# Patient Record
Sex: MALE | Race: WHITE | Employment: OTHER | URBAN - METROPOLITAN AREA
[De-identification: names, ages, dates, MRNs, and addresses within clinical notes are randomized per-mention and may not be internally consistent; named-entity substitution may affect disease eponyms.]

---

## 2018-09-05 ENCOUNTER — HOSPITAL ENCOUNTER (OUTPATIENT)
Dept: LAB | Age: 68
Discharge: HOME | End: 2018-09-05
Payer: MEDICARE

## 2018-09-05 DIAGNOSIS — I35.0 AORTIC VALVE STENOSIS, ETIOLOGY OF CARDIAC VALVE DISEASE UNSPECIFIED: ICD-10-CM

## 2018-09-05 DIAGNOSIS — R07.2 PRECORDIAL PAIN: ICD-10-CM

## 2018-09-05 DIAGNOSIS — R06.09 DYSPNEA ON EXERTION: ICD-10-CM

## 2018-09-05 DIAGNOSIS — I10 ESSENTIAL HYPERTENSION: ICD-10-CM

## 2018-09-05 LAB
ALBUMIN SERPL-MCNC: 3.7 G/DL (ref 3.2–4.6)
ALBUMIN/GLOB SERPL: 0.8 {RATIO} (ref 1.2–3.5)
ALP SERPL-CCNC: 53 U/L (ref 50–136)
ALT SERPL-CCNC: 34 U/L (ref 12–65)
ANION GAP SERPL CALC-SCNC: 11 MMOL/L (ref 7–16)
AST SERPL-CCNC: 26 U/L (ref 15–37)
BASOPHILS # BLD: 0 K/UL (ref 0–0.2)
BASOPHILS NFR BLD: 1 % (ref 0–2)
BILIRUB SERPL-MCNC: 0.6 MG/DL (ref 0.2–1.1)
BUN SERPL-MCNC: 19 MG/DL (ref 8–23)
CALCIUM SERPL-MCNC: 9 MG/DL (ref 8.3–10.4)
CHLORIDE SERPL-SCNC: 103 MMOL/L (ref 98–107)
CHOLEST SERPL-MCNC: 256 MG/DL
CO2 SERPL-SCNC: 25 MMOL/L (ref 21–32)
CREAT SERPL-MCNC: 0.97 MG/DL (ref 0.8–1.5)
DIFFERENTIAL METHOD BLD: NORMAL
EOSINOPHIL # BLD: 0.2 K/UL (ref 0–0.8)
EOSINOPHIL NFR BLD: 3 % (ref 0.5–7.8)
ERYTHROCYTE [DISTWIDTH] IN BLOOD BY AUTOMATED COUNT: 12.5 %
GLOBULIN SER CALC-MCNC: 4.6 G/DL (ref 2.3–3.5)
GLUCOSE SERPL-MCNC: 100 MG/DL (ref 65–100)
HCT VFR BLD AUTO: 46.6 % (ref 41.1–50.3)
HDLC SERPL-MCNC: 43 MG/DL (ref 40–60)
HDLC SERPL: 6 {RATIO}
HGB BLD-MCNC: 15 G/DL (ref 13.6–17.2)
IMM GRANULOCYTES # BLD: 0 K/UL (ref 0–0.5)
IMM GRANULOCYTES NFR BLD AUTO: 1 % (ref 0–5)
LDLC SERPL CALC-MCNC: 151.8 MG/DL
LIPID PROFILE,FLP: ABNORMAL
LYMPHOCYTES # BLD: 2 K/UL (ref 0.5–4.6)
LYMPHOCYTES NFR BLD: 31 % (ref 13–44)
MCH RBC QN AUTO: 29.1 PG (ref 26.1–32.9)
MCHC RBC AUTO-ENTMCNC: 32.2 G/DL (ref 31.4–35)
MCV RBC AUTO: 90.5 FL (ref 79.6–97.8)
MONOCYTES # BLD: 0.6 K/UL (ref 0.1–1.3)
MONOCYTES NFR BLD: 9 % (ref 4–12)
NEUTS SEG # BLD: 3.6 K/UL (ref 1.7–8.2)
NEUTS SEG NFR BLD: 57 % (ref 43–78)
NRBC # BLD: 0 K/UL (ref 0–0.2)
PLATELET # BLD AUTO: 186 K/UL (ref 150–450)
PMV BLD AUTO: 10.4 FL (ref 9.4–12.3)
POTASSIUM SERPL-SCNC: 4.5 MMOL/L (ref 3.5–5.1)
PROT SERPL-MCNC: 8.3 G/DL (ref 6.3–8.2)
RBC # BLD AUTO: 5.15 M/UL (ref 4.23–5.6)
SODIUM SERPL-SCNC: 139 MMOL/L (ref 136–145)
TRIGL SERPL-MCNC: 306 MG/DL (ref 35–150)
TSH SERPL DL<=0.005 MIU/L-ACNC: 2.21 UIU/ML (ref 0.36–3.74)
VLDLC SERPL CALC-MCNC: 61.2 MG/DL (ref 6–23)
WBC # BLD AUTO: 6.4 K/UL (ref 4.3–11.1)

## 2018-09-05 PROCEDURE — 85025 COMPLETE CBC W/AUTO DIFF WBC: CPT

## 2018-09-05 PROCEDURE — 84443 ASSAY THYROID STIM HORMONE: CPT

## 2018-09-05 PROCEDURE — 80053 COMPREHEN METABOLIC PANEL: CPT

## 2018-09-05 PROCEDURE — 36415 COLL VENOUS BLD VENIPUNCTURE: CPT

## 2018-09-05 PROCEDURE — 80061 LIPID PANEL: CPT

## 2018-09-06 RX ORDER — CHOLECALCIFEROL (VITAMIN D3) 125 MCG
220 CAPSULE ORAL AS NEEDED
COMMUNITY
End: 2021-07-27

## 2018-09-06 NOTE — PROGRESS NOTES
Patient pre-assessment complete for [facility] poss with Dr Tina Ortega scheduled for 18 at 9:30am, arrival time 7:30am. Patient verified using . Patient instructed to bring all home medications in labeled bottles on the day of procedure. NPO status reinforced. Patient informed to take a full dose aspirin 325mg  or 81 mg x 4 on the day of procedure. Instructed they can take all other medications excluding vitamins & supplements. Patient verbalizes understanding of all instructions & denies any questions at this time.

## 2018-09-07 ENCOUNTER — HOSPITAL ENCOUNTER (OUTPATIENT)
Dept: CARDIAC CATH/INVASIVE PROCEDURES | Age: 68
Discharge: HOME | End: 2018-09-07
Attending: INTERNAL MEDICINE | Admitting: INTERNAL MEDICINE
Payer: MEDICARE

## 2018-09-07 VITALS
HEIGHT: 71 IN | OXYGEN SATURATION: 95 % | DIASTOLIC BLOOD PRESSURE: 82 MMHG | BODY MASS INDEX: 31.08 KG/M2 | WEIGHT: 222 LBS | RESPIRATION RATE: 16 BRPM | SYSTOLIC BLOOD PRESSURE: 156 MMHG | HEART RATE: 66 BPM

## 2018-09-07 PROCEDURE — 77030019569 HC BND COMPR RAD TERU -B

## 2018-09-07 PROCEDURE — C1769 GUIDE WIRE: HCPCS

## 2018-09-07 PROCEDURE — 93454 CORONARY ARTERY ANGIO S&I: CPT

## 2018-09-07 PROCEDURE — 74011636320 HC RX REV CODE- 636/320: Performed by: INTERNAL MEDICINE

## 2018-09-07 PROCEDURE — 74011250636 HC RX REV CODE- 250/636

## 2018-09-07 PROCEDURE — 77030015766

## 2018-09-07 PROCEDURE — 74011250636 HC RX REV CODE- 250/636: Performed by: INTERNAL MEDICINE

## 2018-09-07 PROCEDURE — 74011000258 HC RX REV CODE- 258: Performed by: INTERNAL MEDICINE

## 2018-09-07 PROCEDURE — 74011250637 HC RX REV CODE- 250/637: Performed by: INTERNAL MEDICINE

## 2018-09-07 PROCEDURE — C1893 INTRO/SHEATH, FIXED,NON-PEEL: HCPCS

## 2018-09-07 PROCEDURE — 74011000250 HC RX REV CODE- 250: Performed by: INTERNAL MEDICINE

## 2018-09-07 PROCEDURE — 99152 MOD SED SAME PHYS/QHP 5/>YRS: CPT

## 2018-09-07 RX ORDER — MIDAZOLAM HYDROCHLORIDE 1 MG/ML
.5-5 INJECTION, SOLUTION INTRAMUSCULAR; INTRAVENOUS
Status: DISCONTINUED | OUTPATIENT
Start: 2018-09-07 | End: 2018-09-07 | Stop reason: HOSPADM

## 2018-09-07 RX ORDER — DIAZEPAM 5 MG/1
5 TABLET ORAL ONCE
Status: DISCONTINUED | OUTPATIENT
Start: 2018-09-07 | End: 2018-09-07 | Stop reason: HOSPADM

## 2018-09-07 RX ORDER — GUAIFENESIN 100 MG/5ML
81-324 LIQUID (ML) ORAL
Status: COMPLETED | OUTPATIENT
Start: 2018-09-07 | End: 2018-09-07

## 2018-09-07 RX ORDER — HEPARIN SODIUM 200 [USP'U]/100ML
3 INJECTION, SOLUTION INTRAVENOUS CONTINUOUS
Status: DISCONTINUED | OUTPATIENT
Start: 2018-09-07 | End: 2018-09-07 | Stop reason: HOSPADM

## 2018-09-07 RX ORDER — ASPIRIN 325 MG
650 TABLET ORAL ONCE
COMMUNITY
End: 2018-10-01

## 2018-09-07 RX ORDER — LIDOCAINE HYDROCHLORIDE 10 MG/ML
5-40 INJECTION INFILTRATION; PERINEURAL
Status: DISCONTINUED | OUTPATIENT
Start: 2018-09-07 | End: 2018-09-07 | Stop reason: HOSPADM

## 2018-09-07 RX ORDER — SODIUM CHLORIDE 9 MG/ML
75 INJECTION, SOLUTION INTRAVENOUS CONTINUOUS
Status: DISCONTINUED | OUTPATIENT
Start: 2018-09-07 | End: 2018-09-07 | Stop reason: HOSPADM

## 2018-09-07 RX ADMIN — IOPAMIDOL 50 ML: 755 INJECTION, SOLUTION INTRAVENOUS at 09:47

## 2018-09-07 RX ADMIN — HEPARIN SODIUM 2 ML: 10000 INJECTION INTRAVENOUS; SUBCUTANEOUS at 09:35

## 2018-09-07 RX ADMIN — LIDOCAINE HYDROCHLORIDE 3 ML: 10 INJECTION, SOLUTION INFILTRATION; PERINEURAL at 09:33

## 2018-09-07 RX ADMIN — SODIUM CHLORIDE 75 ML/HR: 900 INJECTION, SOLUTION INTRAVENOUS at 08:01

## 2018-09-07 RX ADMIN — ASPIRIN 81 MG 324 MG: 81 TABLET ORAL at 07:59

## 2018-09-07 RX ADMIN — MIDAZOLAM HYDROCHLORIDE 2 MG: 1 INJECTION, SOLUTION INTRAMUSCULAR; INTRAVENOUS at 09:29

## 2018-09-07 RX ADMIN — HEPARIN SODIUM 3 ML/HR: 5000 INJECTION, SOLUTION INTRAVENOUS; SUBCUTANEOUS at 09:35

## 2018-09-07 NOTE — PROGRESS NOTES
Discharged to home accompanied by family. Left unit via wheelchair. Right radial site without bleeding or hematoma.

## 2018-09-07 NOTE — DISCHARGE INSTRUCTIONS
HEART CATHETERIZATION/ANGIOGRAPHY DISCHARGE INSTRUCTIONS    1. Check puncture site frequently for swelling or bleeding. If there is any bleeding, lie down and apply pressure over the area with a clean towel or washcloth. Notify your doctor for any redness, swelling, drainage, or oozing from the puncture site. Notify your doctor for any fever or chills. 2. If the extremity becomes cold, numb, or painful call Dr. Bryan Faria at [phone].  3. Activity should be limited for the next 48 hours. Do not use your right arm for lifting, pushing, or pulling during this time. 4. You may resume your usual diet. Drink more fluids than usual.  5. Have a responsible person drive you home and stay with you for at least 24 hours after your heart catheterization/angiography. 6. You may remove bandage from your Right wrist in 24 hours. You may shower in 24 hours. No tub baths, hot tubs, or swimming for 1 week. Do not place any lotions, creams, powders, or ointments over puncture site for 1 week. You may place a clean band-aid over the puncture site each day for 5 days. Change daily. 7. Do not drive for 24 hours. I have read the above instructions and have had the opportunity to ask questions.       Patient: ________________________   Date: 9/7/2018    Witness: _______________________   Date: 9/7/2018

## 2018-09-07 NOTE — IP AVS SNAPSHOT
[address] 
[phone] Patient: Ronal Myers MRN: [id number] LYE:[birth year] Discharge Summary 9/7/2018 Ronal Myers MRN[de-identified]  [id number] Admission Information Provider Pager Service Admission Date Expected D/C Date Mara Hector MD  CARDIAC CATH LAB 9/7/2018 Actual LOS Patient Class 0 days OUTPATIENT Follow-up Information Follow up With Details Comments Contact Info Mara Hector MD  As needed [address] [facility] [address] 
[phone] Tarik Santa MD On 9/11/2018 at 2:20 p.m. [address] [facility] CARDIO THORACIC [facility] [address] 
[phone] My Medications ASK your physician about these medications Instructions Each Dose to Equal  
 Morning Noon Evening Bedtime ALEVE 220 mg Cap Generic drug:  naproxen sodium Your last dose was: Your next dose is: Take  by mouth daily. aspirin 325 mg tablet Commonly known as:  ASPIRIN Your last dose was: Your next dose is: Take 650 mg by mouth once. 650 mg  
    
   
   
   
  
 losartan 50 mg tablet Commonly known as:  COZAAR Your last dose was: Your next dose is: Take  by mouth daily. magnesium 250 mg Tab Your last dose was: Your next dose is: Take  by mouth daily. multivitamin tablet Commonly known as:  ONE A DAY Your last dose was: Your next dose is: Take 1 Tab by mouth daily. 1 Tab  
    
   
   
   
  
 potassium 99 mg tablet Your last dose was: Your next dose is: Take 99 mg by mouth daily. 99 mg  
    
   
   
   
  
  
  
  
 
  
General Information Please provide this summary of care documentation to your next provider. Allergies No Known Allergies Current Immunizations  Never Reviewed No immunizations on file. Discharge Instructions Discharge Instructions HEART CATHETERIZATION/ANGIOGRAPHY DISCHARGE INSTRUCTIONS 1. Check puncture site frequently for swelling or bleeding. If there is any bleeding, lie down and apply pressure over the area with a clean towel or washcloth. Notify your doctor for any redness, swelling, drainage, or oozing from the puncture site. Notify your doctor for any fever or chills. 2. If the extremity becomes cold, numb, or painful call Dr. Luis M Tamayo at [phone]. 
3. Activity should be limited for the next 48 hours. Do not use your right arm for lifting, pushing, or pulling during this time. 4. You may resume your usual diet. Drink more fluids than usual. 
5. Have a responsible person drive you home and stay with you for at least 24 hours after your heart catheterization/angiography. 6. You may remove bandage from your Right wrist in 24 hours. You may shower in 24 hours. No tub baths, hot tubs, or swimming for 1 week. Do not place any lotions, creams, powders, or ointments over puncture site for 1 week. You may place a clean band-aid over the puncture site each day for 5 days. Change daily. 7. Do not drive for 24 hours. I have read the above instructions and have had the opportunity to ask questions. Patient: ________________________   Date: 9/7/2018 Witness: _______________________   Date: 9/7/2018 Discharge Orders None  
  
` Patient Signature:  ____________________________________________________________ Date:  ____________________________________________________________  
  
 [employer] Provider Signature:  ____________________________________________________________ Date:  ____________________________________________________________

## 2018-09-07 NOTE — PROCEDURES
Cardiac Catheterization Procedure Note    Patient ID:     Name: Coty Serrano   Medical Record Number: [id number]   YOB: 1950    Date of Procedure: 9/7/2018     Pre-procedure Diagnosis:  Aortic Stenosis    Post-procedure Diagnosis: Aortic Stenosis    Reason for Procedure: Valvular Disease    Blood loss less than 5 ml    Sedation. Pt received 2 mg versed and 0 mcg fentanyl for monitored conscious sedation from 9:30 to 10:00. Nurse deidre    Specimen: None    No complications    No assistants    Time out, Mallampati, and ASA performed    Procedure:  After informed consent, patient was prepped and draped in the usual sterile fashion. The right wrist was infiltrated with lidocaine. The right radial artery was accessed via the modified Seldinger technique with a 6 Hungarian sheath. 70cc Visipaque contrast were utilized for the entire procedure. no closure device used    Catheters/wires/stents.  tig4    FINDINGS    Left Ventricle: not done  LVEDP: not done    Left Main:ok    Left Anterior descending coronary artery: moderate sized lad with diffuse disease mid lad diag 90% bifurcating stenosis    diagonals high grade ostial disease 90%    Left Circumflex coronary artery: prox 90%   OMs no sig om disease   Ramus large ramus with no sig disease    Right coronary artery: occluded   RV branch occluded   KAITLIN/PDA small fills from collaterals      Graft anatomy: na    Intervention if done: na    Conclusions: severe AS and 2 vessel disease    Recommentations: valve cabg    No complications      Signed By: Lesly Nelson MD

## 2018-09-07 NOTE — PROGRESS NOTES
TRANSFER - OUT REPORT: 
 
Verbal report given to Sudha White RN(name) on Andie Pineda  being transferred to cpru(unit) for routine progression of care Report consisted of patients Situation, Background, Assessment and  
Recommendations(SBAR). Information from the following report(s) SBAR was reviewed with the receiving nurse. 
 
has No Known Allergies. Opportunity for questions and clarification was provided. Procedure Summary:Pt had LHC via R wrist, site sealed with R band using 13 ml at 0943 hrs. Med Administration Versed:  2 mg Visit Vitals  /63 (BP 1 Location: Left arm, BP Patient Position: Supine)  Pulse 64  Resp 16  
 Ht 5' 11\" (1.803 m)  Wt 100.7 kg (222 lb)  SpO2 93%  BMI 30.96 kg/m2 Past Medical History:  
Diagnosis Date  Arthritis  Cancer ([facility] [address] melanoma nose  GERD (gastroesophageal reflux disease)  PUD (peptic ulcer disease) 1970's Peripheral IV 09/07/18 Right Antecubital (Active) Site Assessment Clean, dry, & intact 9/7/2018  7:49 AM  
Phlebitis Assessment 0 9/7/2018  7:49 AM  
Infiltration Assessment 0 9/7/2018  7:49 AM  
Dressing Status Clean, dry, & intact 9/7/2018  7:49 AM  
Dressing Type Soledad 9/7/2018  7:49 AM  
Hub Color/Line Status Infusing 9/7/2018  7:49 AM  
   
Peripheral IV 09/07/18 Left Antecubital (Active) Site Assessment Clean, dry, & intact 9/7/2018  7:49 AM  
Phlebitis Assessment 0 9/7/2018  7:49 AM  
Infiltration Assessment 0 9/7/2018  7:49 AM  
Dressing Status Clean, dry, & intact 9/7/2018  7:49 AM  
Dressing Type Soledad 9/7/2018  7:49 AM  
Hub Color/Line Status Flushed 9/7/2018  7:49 AM

## 2018-09-07 NOTE — PROGRESS NOTES
Report received from [facility] Cath Lab RN. Procedural findings communicated. Intra procedural  medication administration reviewed. Progression of care discussed. Patient received into [address] [facility] 4 post sheath removal.  
 
Access site without bleeding or swelling yes Dressing dry and intact yes Patient instructed to limit movement to right upper extremity Routine post procedural vital signs and site assessment initiated yes

## 2018-09-07 NOTE — PROGRESS NOTES
Patient arrived and ambulated to room with no visual problems for planned LHC/poss with Dr. Reed Brochelena. Consent signed and procedure discussed with patient. Time allow for patient to verbalize concerns, and concerns addressed with voiced understanding. Medications and history reviewed with patient. Patient prepped for procedure as ordered. The patient has a fraility score of 1-VERY FIT, based on lives very active lifestyle.

## 2018-09-07 NOTE — PROGRESS NOTES
Right radial band removed after deflation completed. No bleeding or hematoma. Site redressed with sterile gauze covered with tegaderm. Discharge instructions reviewed with patient and family. Verbalize understanding. Written instructions given.

## 2018-09-07 NOTE — PROGRESS NOTES
Report received from [facility] Cath Lab RN. Procedural findings communicated. Intra procedural  medication administration reviewed. Progression of care discussed. Patient received into [facility] 4 post sheath removal.  
 
Access site without bleeding or swelling yes Dressing dry and intact yes Patient instructed to limit movement to right upper extremity Routine post procedural vital signs and site assessment initiated yes

## 2018-09-11 PROBLEM — I10 HYPERTENSION: Chronic | Status: ACTIVE | Noted: 2018-09-11

## 2018-09-11 PROBLEM — I25.10 CAD (CORONARY ARTERY DISEASE): Chronic | Status: ACTIVE | Noted: 2018-09-11

## 2018-09-11 PROBLEM — I35.0 AORTIC STENOSIS: Status: ACTIVE | Noted: 2018-09-11

## 2018-10-01 ENCOUNTER — ANESTHESIA EVENT (OUTPATIENT)
Dept: SURGERY | Age: 68
DRG: 220 | End: 2018-10-01
Payer: MEDICARE

## 2018-10-01 ENCOUNTER — HOSPITAL ENCOUNTER (OUTPATIENT)
Dept: ULTRASOUND IMAGING | Age: 68
Discharge: HOME | DRG: 220 | End: 2018-10-01
Attending: THORACIC SURGERY (CARDIOTHORACIC VASCULAR SURGERY)
Payer: MEDICARE

## 2018-10-01 ENCOUNTER — HOSPITAL ENCOUNTER (OUTPATIENT)
Dept: SURGERY | Age: 68
Discharge: HOME | DRG: 220 | End: 2018-10-01
Payer: MEDICARE

## 2018-10-01 ENCOUNTER — HOSPITAL ENCOUNTER (OUTPATIENT)
Dept: GENERAL RADIOLOGY | Age: 68
Discharge: HOME | DRG: 220 | End: 2018-10-01
Attending: THORACIC SURGERY (CARDIOTHORACIC VASCULAR SURGERY)
Payer: MEDICARE

## 2018-10-01 VITALS
BODY MASS INDEX: 32.93 KG/M2 | WEIGHT: 230 LBS | HEIGHT: 70 IN | OXYGEN SATURATION: 99 % | HEART RATE: 70 BPM | TEMPERATURE: 98 F | SYSTOLIC BLOOD PRESSURE: 157 MMHG | DIASTOLIC BLOOD PRESSURE: 85 MMHG | RESPIRATION RATE: 17 BRPM

## 2018-10-01 LAB
ALBUMIN SERPL-MCNC: 3.8 G/DL (ref 3.2–4.6)
ANION GAP SERPL CALC-SCNC: 6 MMOL/L (ref 7–16)
APPEARANCE UR: CLEAR
APTT PPP: 30 SEC (ref 23.2–35.3)
ATRIAL RATE: 76 BPM
BACTERIA SPEC CULT: ABNORMAL
BILIRUB SERPL-MCNC: 0.5 MG/DL (ref 0.2–1.1)
BILIRUB UR QL: NEGATIVE
BUN SERPL-MCNC: 16 MG/DL (ref 8–23)
CALCIUM SERPL-MCNC: 8.8 MG/DL (ref 8.3–10.4)
CALCULATED P AXIS, ECG09: 36 DEGREES
CALCULATED R AXIS, ECG10: -2 DEGREES
CALCULATED T AXIS, ECG11: 16 DEGREES
CHLORIDE SERPL-SCNC: 104 MMOL/L (ref 98–107)
CO2 SERPL-SCNC: 25 MMOL/L (ref 21–32)
COLOR UR: YELLOW
CREAT SERPL-MCNC: 1.09 MG/DL (ref 0.8–1.5)
DIAGNOSIS, 93000: NORMAL
ERYTHROCYTE [DISTWIDTH] IN BLOOD BY AUTOMATED COUNT: 12.1 %
EST. AVERAGE GLUCOSE BLD GHB EST-MCNC: 134 MG/DL
GLUCOSE SERPL-MCNC: 131 MG/DL (ref 65–100)
GLUCOSE UR STRIP.AUTO-MCNC: NEGATIVE MG/DL
HBA1C MFR BLD: 6.3 % (ref 4.8–6)
HCT VFR BLD AUTO: 44 % (ref 41.1–50.3)
HGB BLD-MCNC: 14.5 G/DL (ref 13.6–17.2)
HGB UR QL STRIP: NEGATIVE
INR PPP: 1
KETONES UR QL STRIP.AUTO: NEGATIVE MG/DL
LEUKOCYTE ESTERASE UR QL STRIP.AUTO: NEGATIVE
MCH RBC QN AUTO: 29.4 PG (ref 26.1–32.9)
MCHC RBC AUTO-ENTMCNC: 33 G/DL (ref 31.4–35)
MCV RBC AUTO: 89.1 FL (ref 79.6–97.8)
NITRITE UR QL STRIP.AUTO: NEGATIVE
NRBC # BLD: 0 K/UL (ref 0–0.2)
P-R INTERVAL, ECG05: 188 MS
PH UR STRIP: 5 [PH] (ref 5–9)
PLATELET # BLD AUTO: 168 K/UL (ref 150–450)
PMV BLD AUTO: 10.1 FL (ref 9.4–12.3)
POTASSIUM SERPL-SCNC: 4.3 MMOL/L (ref 3.5–5.1)
PROT UR STRIP-MCNC: NEGATIVE MG/DL
PROTHROMBIN TIME: 12.6 SEC (ref 11.5–14.5)
Q-T INTERVAL, ECG07: 372 MS
QRS DURATION, ECG06: 98 MS
QTC CALCULATION (BEZET), ECG08: 418 MS
RBC # BLD AUTO: 4.94 M/UL (ref 4.23–5.6)
SERVICE CMNT-IMP: ABNORMAL
SODIUM SERPL-SCNC: 135 MMOL/L (ref 136–145)
SP GR UR REFRACTOMETRY: 1.02 (ref 1–1.02)
UROBILINOGEN UR QL STRIP.AUTO: 0.2 EU/DL (ref 0.2–1)
VENTRICULAR RATE, ECG03: 76 BPM
WBC # BLD AUTO: 6.4 K/UL (ref 4.3–11.1)

## 2018-10-01 PROCEDURE — 86901 BLOOD TYPING SEROLOGIC RH(D): CPT

## 2018-10-01 PROCEDURE — 82040 ASSAY OF SERUM ALBUMIN: CPT

## 2018-10-01 PROCEDURE — 86920 COMPATIBILITY TEST SPIN: CPT

## 2018-10-01 PROCEDURE — 93880 EXTRACRANIAL BILAT STUDY: CPT

## 2018-10-01 PROCEDURE — 77030027138 HC INCENT SPIROMETER -A

## 2018-10-01 PROCEDURE — 82247 BILIRUBIN TOTAL: CPT

## 2018-10-01 PROCEDURE — 85730 THROMBOPLASTIN TIME PARTIAL: CPT

## 2018-10-01 PROCEDURE — 93005 ELECTROCARDIOGRAM TRACING: CPT | Performed by: THORACIC SURGERY (CARDIOTHORACIC VASCULAR SURGERY)

## 2018-10-01 PROCEDURE — 94010 BREATHING CAPACITY TEST: CPT

## 2018-10-01 PROCEDURE — 81003 URINALYSIS AUTO W/O SCOPE: CPT

## 2018-10-01 PROCEDURE — 87641 MR-STAPH DNA AMP PROBE: CPT

## 2018-10-01 PROCEDURE — 71046 X-RAY EXAM CHEST 2 VIEWS: CPT

## 2018-10-01 PROCEDURE — 85027 COMPLETE CBC AUTOMATED: CPT

## 2018-10-01 PROCEDURE — 85610 PROTHROMBIN TIME: CPT

## 2018-10-01 PROCEDURE — 80048 BASIC METABOLIC PNL TOTAL CA: CPT

## 2018-10-01 PROCEDURE — 83036 HEMOGLOBIN GLYCOSYLATED A1C: CPT

## 2018-10-01 RX ORDER — AMIODARONE HYDROCHLORIDE 200 MG/1
600 TABLET ORAL ONCE
COMMUNITY
Start: 2018-10-02 | End: 2018-10-08

## 2018-10-01 RX ORDER — SODIUM CHLORIDE 9 MG/ML
250 INJECTION, SOLUTION INTRAVENOUS AS NEEDED
Status: DISCONTINUED | OUTPATIENT
Start: 2018-10-01 | End: 2018-10-05 | Stop reason: HOSPADM

## 2018-10-01 RX ORDER — ASPIRIN 81 MG/1
81 TABLET ORAL 2 TIMES WEEKLY
COMMUNITY
End: 2021-07-06 | Stop reason: CLARIF

## 2018-10-01 RX ORDER — MUPIROCIN 20 MG/G
OINTMENT TOPICAL SEE ADMIN INSTRUCTIONS
COMMUNITY
End: 2018-10-19

## 2018-10-01 NOTE — PERIOP NOTES
MRSA/MSSA swab positive for MRSA and MSSA. Called patient and instructed to begin Bactroban ointment immediately BID. Patient verbalizes understanding.

## 2018-10-01 NOTE — ANESTHESIA PREPROCEDURE EVALUATION
Anesthetic History No history of anesthetic complications Review of Systems / Medical History Patient summary reviewed and pertinent labs reviewed Pulmonary Within defined limits Neuro/Psych Within defined limits Cardiovascular Hypertension Valvular problems/murmurs: aortic stenosis CAD Exercise tolerance: >4 METS Comments: Denies recent CP, SOB or Palpitations Echo showing normal LVEF and severe AV stenosis with Mean gradient of 45mmHg and Peak of 76mmHG Cath showing LAD 90% occluded, Circ 90% occluded and RCA . EKG showing inferior infarct. GI/Hepatic/Renal 
  
GERD Endo/Other Obesity and arthritis Other Findings Physical Exam 
 
Airway Mallampati: II 
TM Distance: 4 - 6 cm Neck ROM: normal range of motion Mouth opening: Normal 
 
 Cardiovascular Murmur: Grade 3, Aortic area Dental 
No notable dental hx 
 
Comments: Multiple chips Pulmonary Breath sounds clear to auscultation Abdominal 
GI exam deferred Other Findings Anesthetic Plan ASA: 4 Anesthesia type: general 
 
Monitoring Plan: Arterial line, BIS, CVP, [place] and GARY Post procedure ventilation Induction: Intravenous Anesthetic plan and risks discussed with: Patient and Family

## 2018-10-01 NOTE — PERIOP NOTES
Patient agrees that pt name, date of birth and procedure is correct. All questions were answered to the best of their ability. Labs per Surgeon:CBC, BMP, albumin, PT/INR, PTT, total bili, A1C, MRSA/ MSSA, UA, T+C ([facility] labs)  POC Glucose:not required      Dr Vince King in to assess patient per anesthesia protocol. All cardiac records reviewed. EKG today and previous reviewed. Medication bottles visualized today. Patient viewed preoperative heart teaching video. Pre op instructions and education sheets given and reviewed with patient:  Heart instructions, central line catheter infection prevention, ventilator education,  blood transfusion education, hand hygiene education, smoking cessation education, pain management education. Patient verbalizes understanding of all instructions. Patient given incentive spirometer with verbal instructions,demonstrates proficient use, and verbalizes understanding to bring incentive spirometer on day of surgery. Patient seen by respiratory therapist, and  FEV1 results on chart. Pt instructed on importance of handwashing for infection prevention. Pt verbalizes understanding to shower nightly with antibacterial soap & Hibiclens provided at pre assessment on the night prior to and morning of surgery. Instructed to turn water off and wash with HIBICLENS from chin to toes avoiding genitalia, then rinse after 1 minute. Pt verbalizes understanding to repeat this the morning of surgery. Pt verbalizes understanding to take listed medications only on morning of surgery: ASA 81 mg, Bactroban nasal ointment    Pt verbalizes understanding to hold the following medications: vitamins and supplements    Pt verbalizes understanding to CONTINUE ALL OTHER MEDICATIONS AS PRESCRIBED UNTIL DAY OF SURGERY. Prescriptions called to patient's pharmacy: [facility]  Patient given written and verbal instructions to obtain and instructions for use.  Pt understands instructions to apply Mupirocin ointment with clean q-tips to each nostril twice daily and morning of surgery. Pt verbalizes understanding to take Amiodarone 600 mg after 4 pm the night before heart surgery. Lopressor 12.5 mg take by mouth after 4 pm the night before heart surgery. One time dose with no refills. MRSA swab and clean catch urine obtained and sent to lab. Patient ID armband placed on patient's arm, and patient given copy of order for CXR and carotid ultrasound. Patient verbalizes understanding to proceed to radiology after labs are drawn to have both CXR and carotid ultrasound completed before leaving the hospital today. Labs drawn by Shree Álvarez RN including blood bank labs. [employer] blood bank wrist band placed on the patient's right wrist and pt verbalizes understanding not to remove the band until discharged from the hospital after surgery. Patient verbalizes understanding that arrival time will be called to patient on the weekday prior to surgery date and that patient must check phone messages. If patient has any questions regarding arrival times call [phone]. PT. INSTRUCTED TO CALL THE FOLLOWING NUMBERS IF ANY SAFETY CONCERNS BEFORE, DURING, OR AFTER HOSPITAL ENCOUNTER: PT. SAFETY MDXI - [phone] OR PT. RELATIONS - [phone].

## 2018-10-01 NOTE — PROGRESS NOTES
Recent Results (from the past 12 hour(s))   HEMOGLOBIN A1C WITH EAG    Collection Time: 10/01/18  7:45 AM   Result Value Ref Range    Hemoglobin A1c 6.3 (H) 4.8 - 6.0 %    Est. average glucose 134 mg/dL   CBC W/O DIFF    Collection Time: 10/01/18  7:54 AM   Result Value Ref Range    WBC 6.4 4.3 - 11.1 K/uL    RBC 4.94 4.23 - 5.6 M/uL    HGB 14.5 13.6 - 17.2 g/dL    HCT 44.0 41.1 - 50.3 %    MCV 89.1 79.6 - 97.8 FL    MCH 29.4 26.1 - 32.9 PG    MCHC 33.0 31.4 - 35.0 g/dL    RDW 12.1 %    PLATELET 689 270 - 027 K/uL    MPV 10.1 9.4 - 12.3 FL    ABSOLUTE NRBC 0.00 0.0 - 0.2 K/uL   METABOLIC PANEL, BASIC    Collection Time: 10/01/18  7:54 AM   Result Value Ref Range    Sodium 135 (L) 136 - 145 mmol/L    Potassium 4.3 3.5 - 5.1 mmol/L    Chloride 104 98 - 107 mmol/L    CO2 25 21 - 32 mmol/L    Anion gap 6 (L) 7 - 16 mmol/L    Glucose 131 (H) 65 - 100 mg/dL    BUN 16 8 - 23 MG/DL    Creatinine 1.09 0.8 - 1.5 MG/DL    GFR est AA >60 >60 ml/min/1.73m2    GFR est non-AA >60 >60 ml/min/1.73m2    Calcium 8.8 8.3 - 10.4 MG/DL   PROTHROMBIN TIME + INR    Collection Time: 10/01/18  7:54 AM   Result Value Ref Range    Prothrombin time 12.6 11.5 - 14.5 sec    INR 1.0     PTT    Collection Time: 10/01/18  7:54 AM   Result Value Ref Range    aPTT 30.0 23.2 - 35.3 SEC   ALBUMIN    Collection Time: 10/01/18  7:54 AM   Result Value Ref Range    Albumin 3.8 3.2 - 4.6 g/dL   BILIRUBIN, TOTAL    Collection Time: 10/01/18  7:54 AM   Result Value Ref Range    Bilirubin, total 0.5 0.2 - 1.1 MG/DL   TYPE + CROSSMATCH    Collection Time: 10/01/18  7:54 AM   Result Value Ref Range    Crossmatch Expiration 10/04/2018     ABO/Rh(D) O POSITIVE     Antibody screen NEG     Unit number [id number]     Blood component type [facility]     Unit division 00     Status of unit ALLOCATED     Crossmatch result Compatible     Unit number [id number]     Blood component type [facility]     Unit division 00     Status of unit ALLOCATED     Crossmatch result Compatible    MSSA/MRSA SC BY PCR, NASAL SWAB    Collection Time: 10/01/18  8:00 AM   Result Value Ref Range    Special Requests: NO SPECIAL REQUESTS      Culture result: (A)       MRSA target DNA detected, SA target DNA detected. A positive test result does not necessarily indicate the presence of viable organisms. It is however, presumptive for the presence of MRSA or SA.    URINALYSIS W/ RFLX MICROSCOPIC    Collection Time: 10/01/18  8:40 AM   Result Value Ref Range    Color YELLOW      Appearance CLEAR      Specific gravity 1.019 1.001 - 1.023      pH (UA) 5.0 5.0 - 9.0      Protein NEGATIVE  NEG mg/dL    Glucose NEGATIVE  mg/dL    Ketone NEGATIVE  NEG mg/dL    Bilirubin NEGATIVE  NEG      Blood NEGATIVE  NEG      Urobilinogen 0.2 0.2 - 1.0 EU/dL    Nitrites NEGATIVE  NEG      Leukocyte Esterase NEGATIVE  NEG

## 2018-10-02 RX ORDER — VANCOMYCIN/0.9 % SOD CHLORIDE 1.5G/250ML
1500 PLASTIC BAG, INJECTION (ML) INTRAVENOUS ONCE
Status: COMPLETED | OUTPATIENT
Start: 2018-10-03 | End: 2018-10-05

## 2018-10-03 ENCOUNTER — HOSPITAL ENCOUNTER (INPATIENT)
Age: 68
LOS: 5 days | Discharge: HOME HEALTH | DRG: 220 | End: 2018-10-08
Attending: THORACIC SURGERY (CARDIOTHORACIC VASCULAR SURGERY) | Admitting: THORACIC SURGERY (CARDIOTHORACIC VASCULAR SURGERY)
Payer: MEDICARE

## 2018-10-03 ENCOUNTER — APPOINTMENT (OUTPATIENT)
Dept: GENERAL RADIOLOGY | Age: 68
DRG: 220 | End: 2018-10-03
Attending: THORACIC SURGERY (CARDIOTHORACIC VASCULAR SURGERY)
Payer: MEDICARE

## 2018-10-03 ENCOUNTER — ANESTHESIA (OUTPATIENT)
Dept: SURGERY | Age: 68
DRG: 220 | End: 2018-10-03
Payer: MEDICARE

## 2018-10-03 DIAGNOSIS — Z95.2 S/P AVR (AORTIC VALVE REPLACEMENT): ICD-10-CM

## 2018-10-03 DIAGNOSIS — Z99.11 ENCOUNTER FOR WEANING FROM VENTILATOR (HCC): ICD-10-CM

## 2018-10-03 DIAGNOSIS — I10 ESSENTIAL HYPERTENSION: ICD-10-CM

## 2018-10-03 DIAGNOSIS — Z95.1 STATUS POST CORONARY ARTERY BYPASS GRAFT: ICD-10-CM

## 2018-10-03 DIAGNOSIS — I35.0 AORTIC VALVE STENOSIS, ETIOLOGY OF CARDIAC VALVE DISEASE UNSPECIFIED: ICD-10-CM

## 2018-10-03 DIAGNOSIS — R09.02 HYPOXIA: ICD-10-CM

## 2018-10-03 LAB
ANION GAP SERPL CALC-SCNC: 7 MMOL/L (ref 7–16)
APTT PPP: 31.4 SEC (ref 23.2–35.3)
ARTERIAL PATENCY WRIST A: ABNORMAL
ARTERIAL PATENCY WRIST A: POSITIVE
ATRIAL RATE: 77 BPM
BASE DEFICIT BLD-SCNC: 1 MMOL/L
BASE DEFICIT BLD-SCNC: 1 MMOL/L
BASE DEFICIT BLD-SCNC: 2 MMOL/L
BASE DEFICIT BLD-SCNC: 4 MMOL/L
BASE DEFICIT BLDA-SCNC: 7.1 MMOL/L (ref 0–2)
BASE DEFICIT BLDA-SCNC: 7.2 MMOL/L (ref 0–2)
BASE EXCESS BLD CALC-SCNC: 0 MMOL/L
BASE EXCESS BLD CALC-SCNC: 0 MMOL/L
BASE EXCESS BLD CALC-SCNC: 1 MMOL/L
BDY SITE: ABNORMAL
BDY SITE: ABNORMAL
BUN SERPL-MCNC: 15 MG/DL (ref 8–23)
CA-I BLD-MCNC: 1.08 MMOL/L (ref 1.12–1.32)
CA-I BLD-MCNC: 1.14 MMOL/L (ref 1.12–1.32)
CA-I BLD-MCNC: 1.2 MMOL/L (ref 1.12–1.32)
CA-I BLD-MCNC: 1.2 MMOL/L (ref 1.12–1.32)
CA-I BLD-MCNC: 1.31 MMOL/L (ref 1.12–1.32)
CA-I BLD-SCNC: 1.17 MMOL/L (ref 1–1.3)
CALCIUM SERPL-MCNC: 7.4 MG/DL (ref 8.3–10.4)
CALCULATED P AXIS, ECG09: 52 DEGREES
CALCULATED R AXIS, ECG10: 36 DEGREES
CALCULATED T AXIS, ECG11: -148 DEGREES
CHLORIDE BLDA-SCNC: 110 MMOL/L (ref 98–106)
CHLORIDE SERPL-SCNC: 113 MMOL/L (ref 98–107)
CO2 SERPL-SCNC: 24 MMOL/L (ref 21–32)
COHGB MFR BLD: 0.1 % (ref 0.5–1.5)
COHGB MFR BLD: 0.3 % (ref 0.5–1.5)
CREAT SERPL-MCNC: 1.23 MG/DL (ref 0.8–1.5)
DIAGNOSIS, 93000: NORMAL
DO-HGB BLD-MCNC: 2 % (ref 0–5)
DO-HGB BLD-MCNC: 3 % (ref 0–5)
ERYTHROCYTE [DISTWIDTH] IN BLOOD BY AUTOMATED COUNT: 12.5 %
FIBRINOGEN PPP-MCNC: 160 MG/DL (ref 190–501)
FIO2 ON VENT: 60 %
GLUCOSE BLD STRIP.AUTO-MCNC: 117 MG/DL (ref 65–100)
GLUCOSE BLD STRIP.AUTO-MCNC: 121 MG/DL (ref 65–100)
GLUCOSE BLD STRIP.AUTO-MCNC: 123 MG/DL (ref 65–100)
GLUCOSE BLD STRIP.AUTO-MCNC: 124 MG/DL (ref 65–100)
GLUCOSE BLD STRIP.AUTO-MCNC: 124 MG/DL (ref 65–100)
GLUCOSE BLD STRIP.AUTO-MCNC: 126 MG/DL (ref 65–100)
GLUCOSE BLD STRIP.AUTO-MCNC: 127 MG/DL (ref 65–100)
GLUCOSE BLD STRIP.AUTO-MCNC: 130 MG/DL (ref 65–100)
GLUCOSE BLD STRIP.AUTO-MCNC: 132 MG/DL (ref 65–100)
GLUCOSE BLD STRIP.AUTO-MCNC: 133 MG/DL (ref 65–100)
GLUCOSE BLD STRIP.AUTO-MCNC: 134 MG/DL (ref 65–100)
GLUCOSE BLD STRIP.AUTO-MCNC: 135 MG/DL (ref 65–100)
GLUCOSE BLD STRIP.AUTO-MCNC: 144 MG/DL (ref 65–100)
GLUCOSE BLD STRIP.AUTO-MCNC: 145 MG/DL (ref 65–100)
GLUCOSE SERPL-MCNC: 124 MG/DL (ref 65–100)
HCO3 BLD-SCNC: 22.1 MMOL/L (ref 22–26)
HCO3 BLD-SCNC: 24.7 MMOL/L (ref 22–26)
HCO3 BLD-SCNC: 24.7 MMOL/L (ref 22–26)
HCO3 BLD-SCNC: 25.1 MMOL/L (ref 22–26)
HCO3 BLD-SCNC: 25.6 MMOL/L (ref 22–26)
HCO3 BLD-SCNC: 25.9 MMOL/L (ref 22–26)
HCO3 BLD-SCNC: 26.8 MMOL/L (ref 22–26)
HCO3 BLDA-SCNC: 19 MMOL/L (ref 22–26)
HCO3 BLDA-SCNC: 21 MMOL/L (ref 22–26)
HCT VFR BLD AUTO: 35.3 % (ref 41.1–50.3)
HCT VFR BLD AUTO: 35.6 % (ref 41.1–50.3)
HCT VFR BLD AUTO: 36.2 % (ref 41.1–50.3)
HGB BLD-MCNC: 11.3 G/DL (ref 13.6–17.2)
HGB BLD-MCNC: 11.5 G/DL (ref 13.6–17.2)
HGB BLD-MCNC: 11.6 G/DL (ref 13.6–17.2)
HGB BLDMV-MCNC: 12.8 GM/DL (ref 11.7–15)
HGB BLDMV-MCNC: 13.1 GM/DL (ref 11.7–15)
INR PPP: 1.6
MAGNESIUM SERPL-MCNC: 2.3 MG/DL (ref 1.8–2.4)
MAGNESIUM SERPL-MCNC: 2.4 MG/DL (ref 1.8–2.4)
MAGNESIUM SERPL-MCNC: 3.1 MG/DL (ref 1.8–2.4)
MCH RBC QN AUTO: 29.1 PG (ref 26.1–32.9)
MCHC RBC AUTO-ENTMCNC: 32 G/DL (ref 31.4–35)
MCV RBC AUTO: 91 FL (ref 79.6–97.8)
METHGB MFR BLD: 0.4 % (ref 0–1.5)
METHGB MFR BLD: 0.5 % (ref 0–1.5)
NRBC # BLD: 0 K/UL (ref 0–0.2)
OXYHGB MFR BLDA: 96.9 % (ref 94–97)
OXYHGB MFR BLDA: 97.5 % (ref 94–97)
P-R INTERVAL, ECG05: 224 MS
PCO2 BLD: 43.3 MMHG (ref 35–45)
PCO2 BLD: 44.2 MMHG (ref 35–45)
PCO2 BLD: 46.4 MMHG (ref 35–45)
PCO2 BLD: 47.4 MMHG (ref 35–45)
PCO2 BLD: 47.8 MMHG (ref 35–45)
PCO2 BLD: 49.4 MMHG (ref 35–45)
PCO2 BLD: 49.5 MMHG (ref 35–45)
PCO2 BLDA: 42 MMHG (ref 35–45)
PCO2 BLDA: 50 MMHG (ref 35–45)
PEEP RESPIRATORY: 8 CM[H2O]
PEEP RESPIRATORY: 8 CM[H2O]
PH BLD: 7.29 [PH] (ref 7.35–7.45)
PH BLD: 7.31 [PH] (ref 7.35–7.45)
PH BLD: 7.33 [PH] (ref 7.35–7.45)
PH BLD: 7.34 [PH] (ref 7.35–7.45)
PH BLD: 7.36 [PH] (ref 7.35–7.45)
PH BLD: 7.36 [PH] (ref 7.35–7.45)
PH BLD: 7.37 [PH] (ref 7.35–7.45)
PH BLDA: 7.23 [PH] (ref 7.35–7.45)
PH BLDA: 7.28 [PH] (ref 7.35–7.45)
PLATELET # BLD AUTO: 113 K/UL (ref 150–450)
PMV BLD AUTO: 9.9 FL (ref 9.4–12.3)
PO2 BLD: 231 MMHG (ref 80–105)
PO2 BLD: 242 MMHG (ref 80–105)
PO2 BLD: 299 MMHG (ref 80–105)
PO2 BLD: 321 MMHG (ref 80–105)
PO2 BLD: 340 MMHG (ref 80–105)
PO2 BLD: 359 MMHG (ref 80–105)
PO2 BLD: 40 MMHG (ref 80–105)
PO2 BLDA: 110 MMHG (ref 80–105)
PO2 BLDA: 140 MMHG (ref 80–105)
POTASSIUM BLD-SCNC: 4.2 MMOL/L (ref 3.5–5.1)
POTASSIUM BLD-SCNC: 4.3 MMOL/L (ref 3.5–5.1)
POTASSIUM BLD-SCNC: 4.4 MMOL/L (ref 3.5–5.1)
POTASSIUM BLD-SCNC: 4.7 MMOL/L (ref 3.5–5.1)
POTASSIUM BLD-SCNC: 4.9 MMOL/L (ref 3.5–5.1)
POTASSIUM BLD-SCNC: 5.5 MMOL/L (ref 3.5–5.1)
POTASSIUM BLD-SCNC: 5.8 MMOL/L (ref 3.5–5.1)
POTASSIUM BLDA-SCNC: 3.99 MMOL/L (ref 3.5–5.3)
POTASSIUM SERPL-SCNC: 4.1 MMOL/L (ref 3.5–5.1)
POTASSIUM SERPL-SCNC: 4.4 MMOL/L (ref 3.5–5.1)
POTASSIUM SERPL-SCNC: 4.7 MMOL/L (ref 3.5–5.1)
PROTHROMBIN TIME: 18 SEC (ref 11.5–14.5)
Q-T INTERVAL, ECG07: 476 MS
QRS DURATION, ECG06: 160 MS
QTC CALCULATION (BEZET), ECG08: 538 MS
RBC # BLD AUTO: 3.98 M/UL (ref 4.23–5.6)
RESP RATE: 15
SAO2 % BLD: 100 % (ref 95–98)
SAO2 % BLD: 69 % (ref 95–98)
SAO2 % BLD: 97 % (ref 92–98.5)
SAO2 % BLD: 98 % (ref 92–98.5)
SERVICE CMNT-IMP: ABNORMAL
SODIUM BLD-SCNC: 136 MMOL/L (ref 136–145)
SODIUM BLD-SCNC: 138 MMOL/L (ref 136–145)
SODIUM BLD-SCNC: 138 MMOL/L (ref 136–145)
SODIUM BLD-SCNC: 139 MMOL/L (ref 136–145)
SODIUM BLD-SCNC: 139 MMOL/L (ref 136–145)
SODIUM BLDA-SCNC: 136.9 MMOL/L (ref 135–148)
SODIUM SERPL-SCNC: 144 MMOL/L (ref 136–145)
VENTILATION MODE VENT: ABNORMAL
VENTILATION MODE VENT: ABNORMAL
VENTRICULAR RATE, ECG03: 77 BPM
VT SETTING VENT: 500 ML
WBC # BLD AUTO: 14.2 K/UL (ref 4.3–11.1)

## 2018-10-03 PROCEDURE — 85018 HEMOGLOBIN: CPT

## 2018-10-03 PROCEDURE — 93005 ELECTROCARDIOGRAM TRACING: CPT | Performed by: THORACIC SURGERY (CARDIOTHORACIC VASCULAR SURGERY)

## 2018-10-03 PROCEDURE — 77030013292 HC BOWL MX PRSM J&J -A: Performed by: ANESTHESIOLOGY

## 2018-10-03 PROCEDURE — 85730 THROMBOPLASTIN TIME PARTIAL: CPT

## 2018-10-03 PROCEDURE — 74011000250 HC RX REV CODE- 250: Performed by: THORACIC SURGERY (CARDIOTHORACIC VASCULAR SURGERY)

## 2018-10-03 PROCEDURE — 77030027138 HC INCENT SPIROMETER -A

## 2018-10-03 PROCEDURE — 80048 BASIC METABOLIC PNL TOTAL CA: CPT

## 2018-10-03 PROCEDURE — 77030018547 HC SUT ETHBND1 J&J -B: Performed by: THORACIC SURGERY (CARDIOTHORACIC VASCULAR SURGERY)

## 2018-10-03 PROCEDURE — 5A1221Z PERFORMANCE OF CARDIAC OUTPUT, CONTINUOUS: ICD-10-PCS | Performed by: THORACIC SURGERY (CARDIOTHORACIC VASCULAR SURGERY)

## 2018-10-03 PROCEDURE — 77030013797 HC KT TRNSDUC PRSSR EDWD -A: Performed by: THORACIC SURGERY (CARDIOTHORACIC VASCULAR SURGERY)

## 2018-10-03 PROCEDURE — 84132 ASSAY OF SERUM POTASSIUM: CPT

## 2018-10-03 PROCEDURE — 77030018729 HC ELECTRD DEFIB PAD CARD -B: Performed by: THORACIC SURGERY (CARDIOTHORACIC VASCULAR SURGERY)

## 2018-10-03 PROCEDURE — 77030020255 HC SOL INJ LR 1000ML BG: Performed by: THORACIC SURGERY (CARDIOTHORACIC VASCULAR SURGERY)

## 2018-10-03 PROCEDURE — 77030002933 HC SUT MCRYL J&J -A: Performed by: THORACIC SURGERY (CARDIOTHORACIC VASCULAR SURGERY)

## 2018-10-03 PROCEDURE — 77030013861 HC PNCH AORT CLNCUT QUES -B: Performed by: THORACIC SURGERY (CARDIOTHORACIC VASCULAR SURGERY)

## 2018-10-03 PROCEDURE — 94002 VENT MGMT INPAT INIT DAY: CPT

## 2018-10-03 PROCEDURE — 77030025827 HC BG BLD DNR AUTLG MEDT -A: Performed by: THORACIC SURGERY (CARDIOTHORACIC VASCULAR SURGERY)

## 2018-10-03 PROCEDURE — 77030008703 HC TU ET UNCUF COVD -A: Performed by: ANESTHESIOLOGY

## 2018-10-03 PROCEDURE — 77030005537 HC CATH URETH BARD -A: Performed by: THORACIC SURGERY (CARDIOTHORACIC VASCULAR SURGERY)

## 2018-10-03 PROCEDURE — 77030020751 HC FLTR TBNG TRNSFUS HAEM -A: Performed by: THORACIC SURGERY (CARDIOTHORACIC VASCULAR SURGERY)

## 2018-10-03 PROCEDURE — 77030002996 HC SUT SLK J&J -A: Performed by: THORACIC SURGERY (CARDIOTHORACIC VASCULAR SURGERY)

## 2018-10-03 PROCEDURE — 77030025646 HC AUTOTRNSFUS KT TERU -C: Performed by: THORACIC SURGERY (CARDIOTHORACIC VASCULAR SURGERY)

## 2018-10-03 PROCEDURE — 3E080GC INTRODUCTION OF OTHER THERAPEUTIC SUBSTANCE INTO HEART, OPEN APPROACH: ICD-10-PCS | Performed by: THORACIC SURGERY (CARDIOTHORACIC VASCULAR SURGERY)

## 2018-10-03 PROCEDURE — 77030033069 HC RLD QLC SGL COR-KNOT LSIS -B: Performed by: THORACIC SURGERY (CARDIOTHORACIC VASCULAR SURGERY)

## 2018-10-03 PROCEDURE — 77030012890

## 2018-10-03 PROCEDURE — 85610 PROTHROMBIN TIME: CPT

## 2018-10-03 PROCEDURE — 77030013839 HC OCCL INT FLRST BAXT -B: Performed by: THORACIC SURGERY (CARDIOTHORACIC VASCULAR SURGERY)

## 2018-10-03 PROCEDURE — 76060000040 HC ANESTHESIA 4.5 TO 5 HR: Performed by: THORACIC SURGERY (CARDIOTHORACIC VASCULAR SURGERY)

## 2018-10-03 PROCEDURE — 71045 X-RAY EXAM CHEST 1 VIEW: CPT

## 2018-10-03 PROCEDURE — 74011000258 HC RX REV CODE- 258

## 2018-10-03 PROCEDURE — 5A1223Z PERFORMANCE OF CARDIAC PACING, CONTINUOUS: ICD-10-PCS | Performed by: THORACIC SURGERY (CARDIOTHORACIC VASCULAR SURGERY)

## 2018-10-03 PROCEDURE — 74011250636 HC RX REV CODE- 250/636

## 2018-10-03 PROCEDURE — 74011636637 HC RX REV CODE- 636/637

## 2018-10-03 PROCEDURE — 77030018548 HC SUT ETHBND2 J&J -B: Performed by: THORACIC SURGERY (CARDIOTHORACIC VASCULAR SURGERY)

## 2018-10-03 PROCEDURE — 74011250637 HC RX REV CODE- 250/637: Performed by: THORACIC SURGERY (CARDIOTHORACIC VASCULAR SURGERY)

## 2018-10-03 PROCEDURE — 03HY32Z INSERTION OF MONITORING DEVICE INTO UPPER ARTERY, PERCUTANEOUS APPROACH: ICD-10-PCS | Performed by: NURSE ANESTHETIST, CERTIFIED REGISTERED

## 2018-10-03 PROCEDURE — 77030006689 HC BLD OPHTH BVR BD -A: Performed by: THORACIC SURGERY (CARDIOTHORACIC VASCULAR SURGERY)

## 2018-10-03 PROCEDURE — 88311 DECALCIFY TISSUE: CPT

## 2018-10-03 PROCEDURE — C1751 CATH, INF, PER/CENT/MIDLINE: HCPCS | Performed by: ANESTHESIOLOGY

## 2018-10-03 PROCEDURE — 77030039022: Performed by: THORACIC SURGERY (CARDIOTHORACIC VASCULAR SURGERY)

## 2018-10-03 PROCEDURE — 77030013794 HC KT TRNSDUC BLD EDWD -B: Performed by: ANESTHESIOLOGY

## 2018-10-03 PROCEDURE — 83735 ASSAY OF MAGNESIUM: CPT

## 2018-10-03 PROCEDURE — 77030020751 HC FLTR TBNG TRNSFUS HAEM -A: Performed by: ANESTHESIOLOGY

## 2018-10-03 PROCEDURE — C1729 CATH, DRAINAGE: HCPCS | Performed by: THORACIC SURGERY (CARDIOTHORACIC VASCULAR SURGERY)

## 2018-10-03 PROCEDURE — 77030034888 HC SUT PROL 2 J&J -B: Performed by: THORACIC SURGERY (CARDIOTHORACIC VASCULAR SURGERY)

## 2018-10-03 PROCEDURE — 05HM33Z INSERTION OF INFUSION DEVICE INTO RIGHT INTERNAL JUGULAR VEIN, PERCUTANEOUS APPROACH: ICD-10-PCS | Performed by: ANESTHESIOLOGY

## 2018-10-03 PROCEDURE — 77030034927 HC PK PROC CPB INSPIRE PERF LIVA -F: Performed by: THORACIC SURGERY (CARDIOTHORACIC VASCULAR SURGERY)

## 2018-10-03 PROCEDURE — 77030005401 HC CATH RAD ARRO -A: Performed by: ANESTHESIOLOGY

## 2018-10-03 PROCEDURE — 77030002520 HC INSRT CLMP LATIS STLTH AMR -B: Performed by: THORACIC SURGERY (CARDIOTHORACIC VASCULAR SURGERY)

## 2018-10-03 PROCEDURE — 77030010813: Performed by: THORACIC SURGERY (CARDIOTHORACIC VASCULAR SURGERY)

## 2018-10-03 PROCEDURE — 77030018836 HC SOL IRR NACL ICUM -A: Performed by: THORACIC SURGERY (CARDIOTHORACIC VASCULAR SURGERY)

## 2018-10-03 PROCEDURE — 77030018571 HC SUT PROL1 J&J -B: Performed by: THORACIC SURGERY (CARDIOTHORACIC VASCULAR SURGERY)

## 2018-10-03 PROCEDURE — 77030008477 HC STYL SATN SLP COVD -A: Performed by: ANESTHESIOLOGY

## 2018-10-03 PROCEDURE — 82803 BLOOD GASES ANY COMBINATION: CPT

## 2018-10-03 PROCEDURE — 77030020263 HC SOL INJ SOD CL0.9% LFCR 1000ML

## 2018-10-03 PROCEDURE — 77030002912 HC SUT ETHBND J&J -A: Performed by: THORACIC SURGERY (CARDIOTHORACIC VASCULAR SURGERY)

## 2018-10-03 PROCEDURE — 77030019597 HC DRN CHST PLUER TELE -B: Performed by: THORACIC SURGERY (CARDIOTHORACIC VASCULAR SURGERY)

## 2018-10-03 PROCEDURE — 77030016687: Performed by: THORACIC SURGERY (CARDIOTHORACIC VASCULAR SURGERY)

## 2018-10-03 PROCEDURE — 77030002888 HC SUT CHRMC J&J -A: Performed by: THORACIC SURGERY (CARDIOTHORACIC VASCULAR SURGERY)

## 2018-10-03 PROCEDURE — B24BZZ4 ULTRASONOGRAPHY OF HEART WITH AORTA, TRANSESOPHAGEAL: ICD-10-PCS | Performed by: ANESTHESIOLOGY

## 2018-10-03 PROCEDURE — 77030009355 HC CRDPLG DEL SET QUES -C: Performed by: THORACIC SURGERY (CARDIOTHORACIC VASCULAR SURGERY)

## 2018-10-03 PROCEDURE — 77030005518 HC CATH URETH FOL 2W BARD -B: Performed by: THORACIC SURGERY (CARDIOTHORACIC VASCULAR SURGERY)

## 2018-10-03 PROCEDURE — 74011000302 HC RX REV CODE- 302: Performed by: THORACIC SURGERY (CARDIOTHORACIC VASCULAR SURGERY)

## 2018-10-03 PROCEDURE — 77030008771 HC TU NG SALEM SUMP -A: Performed by: ANESTHESIOLOGY

## 2018-10-03 PROCEDURE — 77030006247 HC LD PCMKR MYOCRD BPLR TEMP MEDT -B: Performed by: THORACIC SURGERY (CARDIOTHORACIC VASCULAR SURGERY)

## 2018-10-03 PROCEDURE — 77030033068 HC DEV COR-KNOT SUT KT LSIS -E: Performed by: THORACIC SURGERY (CARDIOTHORACIC VASCULAR SURGERY)

## 2018-10-03 PROCEDURE — 74011000250 HC RX REV CODE- 250

## 2018-10-03 PROCEDURE — P9045 ALBUMIN (HUMAN), 5%, 250 ML: HCPCS | Performed by: THORACIC SURGERY (CARDIOTHORACIC VASCULAR SURGERY)

## 2018-10-03 PROCEDURE — 65610000006 HC RM INTENSIVE CARE

## 2018-10-03 PROCEDURE — 82962 GLUCOSE BLOOD TEST: CPT

## 2018-10-03 PROCEDURE — 77030010939 HC CLP LIG TELE -B: Performed by: THORACIC SURGERY (CARDIOTHORACIC VASCULAR SURGERY)

## 2018-10-03 PROCEDURE — C1769 GUIDE WIRE: HCPCS | Performed by: THORACIC SURGERY (CARDIOTHORACIC VASCULAR SURGERY)

## 2018-10-03 PROCEDURE — 77030002970 HC SUT PLEDG TELE -A: Performed by: THORACIC SURGERY (CARDIOTHORACIC VASCULAR SURGERY)

## 2018-10-03 PROCEDURE — 80051 ELECTROLYTE PANEL: CPT

## 2018-10-03 PROCEDURE — 77030003010 HC SUT SURG STL J&J -B: Performed by: THORACIC SURGERY (CARDIOTHORACIC VASCULAR SURGERY)

## 2018-10-03 PROCEDURE — 02100Z9 BYPASS CORONARY ARTERY, ONE ARTERY FROM LEFT INTERNAL MAMMARY, OPEN APPROACH: ICD-10-PCS | Performed by: THORACIC SURGERY (CARDIOTHORACIC VASCULAR SURGERY)

## 2018-10-03 PROCEDURE — 77030031139 HC SUT VCRL2 J&J -A: Performed by: THORACIC SURGERY (CARDIOTHORACIC VASCULAR SURGERY)

## 2018-10-03 PROCEDURE — X2RF032 REPLACEMENT OF AORTIC VALVE USING ZOOPLASTIC TISSUE, RAPID DEPLOYMENT TECHNIQUE, OPEN APPROACH, NEW TECHNOLOGY GROUP 2: ICD-10-PCS | Performed by: THORACIC SURGERY (CARDIOTHORACIC VASCULAR SURGERY)

## 2018-10-03 PROCEDURE — 88305 TISSUE EXAM BY PATHOLOGIST: CPT

## 2018-10-03 PROCEDURE — 77030016564 HC BLD STRNL SAW4 CNMD -B: Performed by: THORACIC SURGERY (CARDIOTHORACIC VASCULAR SURGERY)

## 2018-10-03 PROCEDURE — 76010000155 HC AUTO TRANSFUSION/CELL SAVER: Performed by: THORACIC SURGERY (CARDIOTHORACIC VASCULAR SURGERY)

## 2018-10-03 PROCEDURE — 77030002986 HC SUT PROL J&J -A: Performed by: THORACIC SURGERY (CARDIOTHORACIC VASCULAR SURGERY)

## 2018-10-03 PROCEDURE — 84295 ASSAY OF SERUM SODIUM: CPT

## 2018-10-03 PROCEDURE — 77030015758: Performed by: THORACIC SURGERY (CARDIOTHORACIC VASCULAR SURGERY)

## 2018-10-03 PROCEDURE — 76010000199 HC CV SURG 4.5 TO 5 HR INTENSV-TIER 1: Performed by: THORACIC SURGERY (CARDIOTHORACIC VASCULAR SURGERY)

## 2018-10-03 PROCEDURE — 74011250636 HC RX REV CODE- 250/636: Performed by: THORACIC SURGERY (CARDIOTHORACIC VASCULAR SURGERY)

## 2018-10-03 PROCEDURE — 77030020407 HC IV BLD WRMR ST 3M -A: Performed by: ANESTHESIOLOGY

## 2018-10-03 PROCEDURE — 36600 WITHDRAWAL OF ARTERIAL BLOOD: CPT

## 2018-10-03 PROCEDURE — 85384 FIBRINOGEN ACTIVITY: CPT

## 2018-10-03 PROCEDURE — 77030003422 HC NDL ASPIR NOVO -A: Performed by: THORACIC SURGERY (CARDIOTHORACIC VASCULAR SURGERY)

## 2018-10-03 PROCEDURE — 85027 COMPLETE CBC AUTOMATED: CPT

## 2018-10-03 PROCEDURE — 74011250637 HC RX REV CODE- 250/637

## 2018-10-03 PROCEDURE — 77030019908 HC STETH ESOPH SIMS -A: Performed by: ANESTHESIOLOGY

## 2018-10-03 PROCEDURE — 99291 CRITICAL CARE FIRST HOUR: CPT | Performed by: INTERNAL MEDICINE

## 2018-10-03 PROCEDURE — P9047 ALBUMIN (HUMAN), 25%, 50ML: HCPCS

## 2018-10-03 PROCEDURE — 86580 TB INTRADERMAL TEST: CPT | Performed by: THORACIC SURGERY (CARDIOTHORACIC VASCULAR SURGERY)

## 2018-10-03 PROCEDURE — 74011636637 HC RX REV CODE- 636/637: Performed by: THORACIC SURGERY (CARDIOTHORACIC VASCULAR SURGERY)

## 2018-10-03 PROCEDURE — 77030020782 HC GWN BAIR PAWS FLX 3M -B: Performed by: ANESTHESIOLOGY

## 2018-10-03 PROCEDURE — 74011250636 HC RX REV CODE- 250/636: Performed by: ANESTHESIOLOGY

## 2018-10-03 PROCEDURE — 77030002987 HC SUT PROL J&J -B: Performed by: THORACIC SURGERY (CARDIOTHORACIC VASCULAR SURGERY)

## 2018-10-03 PROCEDURE — 77030039425 HC BLD LARYNG TRULITE DISP TELE -A: Performed by: ANESTHESIOLOGY

## 2018-10-03 DEVICE — IMPLANTABLE DEVICE: Type: IMPLANTABLE DEVICE | Site: AORTIC VALVE | Status: FUNCTIONAL

## 2018-10-03 RX ORDER — SUFENTANIL CITRATE 50 UG/ML
INJECTION EPIDURAL; INTRAVENOUS AS NEEDED
Status: DISCONTINUED | OUTPATIENT
Start: 2018-10-03 | End: 2018-10-03 | Stop reason: HOSPADM

## 2018-10-03 RX ORDER — LIDOCAINE HYDROCHLORIDE 10 MG/ML
0.1 INJECTION INFILTRATION; PERINEURAL AS NEEDED
Status: DISCONTINUED | OUTPATIENT
Start: 2018-10-03 | End: 2018-10-03 | Stop reason: HOSPADM

## 2018-10-03 RX ORDER — NALOXONE HYDROCHLORIDE 0.4 MG/ML
0.4 INJECTION, SOLUTION INTRAMUSCULAR; INTRAVENOUS; SUBCUTANEOUS AS NEEDED
Status: DISCONTINUED | OUTPATIENT
Start: 2018-10-03 | End: 2018-10-04

## 2018-10-03 RX ORDER — MIDAZOLAM HYDROCHLORIDE 1 MG/ML
INJECTION, SOLUTION INTRAMUSCULAR; INTRAVENOUS AS NEEDED
Status: DISCONTINUED | OUTPATIENT
Start: 2018-10-03 | End: 2018-10-03 | Stop reason: HOSPADM

## 2018-10-03 RX ORDER — PROTAMINE SULFATE 10 MG/ML
INJECTION, SOLUTION INTRAVENOUS AS NEEDED
Status: DISCONTINUED | OUTPATIENT
Start: 2018-10-03 | End: 2018-10-03 | Stop reason: HOSPADM

## 2018-10-03 RX ORDER — NITROGLYCERIN 20 MG/100ML
INJECTION INTRAVENOUS
Status: DISCONTINUED | OUTPATIENT
Start: 2018-10-03 | End: 2018-10-03 | Stop reason: HOSPADM

## 2018-10-03 RX ORDER — SODIUM CHLORIDE 0.9 % (FLUSH) 0.9 %
5-10 SYRINGE (ML) INJECTION EVERY 8 HOURS
Status: DISCONTINUED | OUTPATIENT
Start: 2018-10-03 | End: 2018-10-04

## 2018-10-03 RX ORDER — AMIODARONE HYDROCHLORIDE 200 MG/1
200 TABLET ORAL EVERY 12 HOURS
Status: DISCONTINUED | OUTPATIENT
Start: 2018-10-03 | End: 2018-10-04 | Stop reason: SDUPTHER

## 2018-10-03 RX ORDER — ETOMIDATE 2 MG/ML
INJECTION INTRAVENOUS AS NEEDED
Status: DISCONTINUED | OUTPATIENT
Start: 2018-10-03 | End: 2018-10-03 | Stop reason: HOSPADM

## 2018-10-03 RX ORDER — HEPARIN SODIUM 1000 [USP'U]/ML
INJECTION, SOLUTION INTRAVENOUS; SUBCUTANEOUS AS NEEDED
Status: DISCONTINUED | OUTPATIENT
Start: 2018-10-03 | End: 2018-10-03 | Stop reason: HOSPADM

## 2018-10-03 RX ORDER — ONDANSETRON 2 MG/ML
4 INJECTION INTRAMUSCULAR; INTRAVENOUS
Status: DISCONTINUED | OUTPATIENT
Start: 2018-10-03 | End: 2018-10-04

## 2018-10-03 RX ORDER — PAPAVERINE HYDROCHLORIDE 30 MG/ML
INJECTION INTRAMUSCULAR; INTRAVENOUS AS NEEDED
Status: DISCONTINUED | OUTPATIENT
Start: 2018-10-03 | End: 2018-10-03 | Stop reason: HOSPADM

## 2018-10-03 RX ORDER — CEFAZOLIN SODIUM/WATER 2 G/20 ML
2 SYRINGE (ML) INTRAVENOUS EVERY 8 HOURS
Status: COMPLETED | OUTPATIENT
Start: 2018-10-03 | End: 2018-10-04

## 2018-10-03 RX ORDER — ALBUMIN HUMAN 50 G/1000ML
25 SOLUTION INTRAVENOUS ONCE
Status: COMPLETED | OUTPATIENT
Start: 2018-10-03 | End: 2018-10-05

## 2018-10-03 RX ORDER — NITROGLYCERIN 20 MG/100ML
10-100 INJECTION INTRAVENOUS
Status: DISCONTINUED | OUTPATIENT
Start: 2018-10-03 | End: 2018-10-04

## 2018-10-03 RX ORDER — MIDAZOLAM HYDROCHLORIDE 1 MG/ML
2 INJECTION, SOLUTION INTRAMUSCULAR; INTRAVENOUS
Status: DISCONTINUED | OUTPATIENT
Start: 2018-10-03 | End: 2018-10-03 | Stop reason: HOSPADM

## 2018-10-03 RX ORDER — SODIUM CHLORIDE, SODIUM LACTATE, POTASSIUM CHLORIDE, CALCIUM CHLORIDE 600; 310; 30; 20 MG/100ML; MG/100ML; MG/100ML; MG/100ML
75 INJECTION, SOLUTION INTRAVENOUS CONTINUOUS
Status: DISCONTINUED | OUTPATIENT
Start: 2018-10-03 | End: 2018-10-03 | Stop reason: HOSPADM

## 2018-10-03 RX ORDER — MUPIROCIN 20 MG/G
OINTMENT TOPICAL 2 TIMES DAILY
Status: DISCONTINUED | OUTPATIENT
Start: 2018-10-03 | End: 2018-10-03

## 2018-10-03 RX ORDER — DEXTROSE 50 % IN WATER (D50W) INTRAVENOUS SYRINGE
25 AS NEEDED
Status: DISCONTINUED | OUTPATIENT
Start: 2018-10-03 | End: 2018-10-04

## 2018-10-03 RX ORDER — MUPIROCIN 20 MG/G
OINTMENT TOPICAL ONCE
Status: DISCONTINUED | OUTPATIENT
Start: 2018-10-03 | End: 2018-10-03 | Stop reason: HOSPADM

## 2018-10-03 RX ORDER — GUAIFENESIN 100 MG/5ML
81 LIQUID (ML) ORAL DAILY
Status: DISCONTINUED | OUTPATIENT
Start: 2018-10-04 | End: 2018-10-03 | Stop reason: SDUPTHER

## 2018-10-03 RX ORDER — ATORVASTATIN CALCIUM 40 MG/1
40 TABLET, FILM COATED ORAL
Status: DISCONTINUED | OUTPATIENT
Start: 2018-10-03 | End: 2018-10-04 | Stop reason: SDUPTHER

## 2018-10-03 RX ORDER — OXYCODONE AND ACETAMINOPHEN 5; 325 MG/1; MG/1
1 TABLET ORAL
Status: DISCONTINUED | OUTPATIENT
Start: 2018-10-03 | End: 2018-10-04

## 2018-10-03 RX ORDER — AMIODARONE HYDROCHLORIDE 200 MG/1
400 TABLET ORAL ONCE
Status: COMPLETED | OUTPATIENT
Start: 2018-10-03 | End: 2018-10-03

## 2018-10-03 RX ORDER — GUAIFENESIN 100 MG/5ML
LIQUID (ML) ORAL
Status: COMPLETED
Start: 2018-10-03 | End: 2018-10-03

## 2018-10-03 RX ORDER — KETOROLAC TROMETHAMINE 30 MG/ML
30 INJECTION, SOLUTION INTRAMUSCULAR; INTRAVENOUS
Status: COMPLETED | OUTPATIENT
Start: 2018-10-03 | End: 2018-10-03

## 2018-10-03 RX ORDER — CEFAZOLIN SODIUM 1 G/3ML
INJECTION, POWDER, FOR SOLUTION INTRAMUSCULAR; INTRAVENOUS AS NEEDED
Status: DISCONTINUED | OUTPATIENT
Start: 2018-10-03 | End: 2018-10-03 | Stop reason: HOSPADM

## 2018-10-03 RX ORDER — POTASSIUM CHLORIDE 14.9 MG/ML
10 INJECTION INTRAVENOUS AS NEEDED
Status: DISCONTINUED | OUTPATIENT
Start: 2018-10-03 | End: 2018-10-04

## 2018-10-03 RX ORDER — ROCURONIUM BROMIDE 10 MG/ML
INJECTION, SOLUTION INTRAVENOUS AS NEEDED
Status: DISCONTINUED | OUTPATIENT
Start: 2018-10-03 | End: 2018-10-03 | Stop reason: HOSPADM

## 2018-10-03 RX ORDER — CELECOXIB 200 MG/1
200 CAPSULE ORAL
Status: DISCONTINUED | OUTPATIENT
Start: 2018-10-03 | End: 2018-10-03 | Stop reason: HOSPADM

## 2018-10-03 RX ORDER — FENTANYL CITRATE 50 UG/ML
100 INJECTION, SOLUTION INTRAMUSCULAR; INTRAVENOUS ONCE
Status: DISCONTINUED | OUTPATIENT
Start: 2018-10-03 | End: 2018-10-03 | Stop reason: HOSPADM

## 2018-10-03 RX ORDER — SODIUM CHLORIDE 0.9 % (FLUSH) 0.9 %
5-10 SYRINGE (ML) INJECTION AS NEEDED
Status: DISCONTINUED | OUTPATIENT
Start: 2018-10-03 | End: 2018-10-04

## 2018-10-03 RX ORDER — LIDOCAINE HYDROCHLORIDE 20 MG/ML
INJECTION, SOLUTION EPIDURAL; INFILTRATION; INTRACAUDAL; PERINEURAL AS NEEDED
Status: DISCONTINUED | OUTPATIENT
Start: 2018-10-03 | End: 2018-10-03 | Stop reason: HOSPADM

## 2018-10-03 RX ORDER — MIDAZOLAM HYDROCHLORIDE 1 MG/ML
1 INJECTION, SOLUTION INTRAMUSCULAR; INTRAVENOUS
Status: DISCONTINUED | OUTPATIENT
Start: 2018-10-03 | End: 2018-10-04

## 2018-10-03 RX ORDER — MORPHINE SULFATE 10 MG/ML
3-5 INJECTION, SOLUTION INTRAMUSCULAR; INTRAVENOUS
Status: DISCONTINUED | OUTPATIENT
Start: 2018-10-03 | End: 2018-10-04

## 2018-10-03 RX ORDER — MAGNESIUM SULFATE 1 G/100ML
1 INJECTION INTRAVENOUS AS NEEDED
Status: DISCONTINUED | OUTPATIENT
Start: 2018-10-03 | End: 2018-10-04

## 2018-10-03 RX ORDER — METOPROLOL TARTRATE 25 MG/1
25 TABLET, FILM COATED ORAL EVERY 12 HOURS
Status: DISCONTINUED | OUTPATIENT
Start: 2018-10-04 | End: 2018-10-04

## 2018-10-03 RX ORDER — LIDOCAINE HCL/PF 100 MG/5ML
50-100 SYRINGE (ML) INTRAVENOUS
Status: DISCONTINUED | OUTPATIENT
Start: 2018-10-03 | End: 2018-10-04

## 2018-10-03 RX ORDER — MUPIROCIN 20 MG/G
OINTMENT TOPICAL EVERY 12 HOURS
Status: DISCONTINUED | OUTPATIENT
Start: 2018-10-03 | End: 2018-10-04 | Stop reason: SDUPTHER

## 2018-10-03 RX ORDER — SODIUM BICARBONATE 1 MEQ/ML
50 SYRINGE (ML) INTRAVENOUS AS NEEDED
Status: DISCONTINUED | OUTPATIENT
Start: 2018-10-03 | End: 2018-10-04

## 2018-10-03 RX ORDER — SODIUM CHLORIDE 0.9 % (FLUSH) 0.9 %
5-10 SYRINGE (ML) INJECTION EVERY 8 HOURS
Status: DISCONTINUED | OUTPATIENT
Start: 2018-10-03 | End: 2018-10-03 | Stop reason: HOSPADM

## 2018-10-03 RX ORDER — GUAIFENESIN 100 MG/5ML
81 LIQUID (ML) ORAL DAILY
Status: DISCONTINUED | OUTPATIENT
Start: 2018-10-03 | End: 2018-10-03 | Stop reason: HOSPADM

## 2018-10-03 RX ORDER — CHLORHEXIDINE GLUCONATE 1.2 MG/ML
10 RINSE ORAL 2 TIMES DAILY
Status: DISCONTINUED | OUTPATIENT
Start: 2018-10-03 | End: 2018-10-04

## 2018-10-03 RX ORDER — MIDAZOLAM HYDROCHLORIDE 1 MG/ML
2 INJECTION, SOLUTION INTRAMUSCULAR; INTRAVENOUS ONCE
Status: DISCONTINUED | OUTPATIENT
Start: 2018-10-03 | End: 2018-10-03 | Stop reason: HOSPADM

## 2018-10-03 RX ORDER — VECURONIUM BROMIDE FOR INJECTION 1 MG/ML
INJECTION, POWDER, LYOPHILIZED, FOR SOLUTION INTRAVENOUS AS NEEDED
Status: DISCONTINUED | OUTPATIENT
Start: 2018-10-03 | End: 2018-10-03 | Stop reason: HOSPADM

## 2018-10-03 RX ORDER — ASPIRIN 81 MG/1
81 TABLET ORAL DAILY
Status: DISCONTINUED | OUTPATIENT
Start: 2018-10-04 | End: 2018-10-04 | Stop reason: SDUPTHER

## 2018-10-03 RX ORDER — SODIUM CHLORIDE 9 MG/ML
INJECTION, SOLUTION INTRAVENOUS
Status: DISCONTINUED | OUTPATIENT
Start: 2018-10-03 | End: 2018-10-03 | Stop reason: HOSPADM

## 2018-10-03 RX ORDER — SODIUM CHLORIDE, SODIUM LACTATE, POTASSIUM CHLORIDE, CALCIUM CHLORIDE 600; 310; 30; 20 MG/100ML; MG/100ML; MG/100ML; MG/100ML
INJECTION, SOLUTION INTRAVENOUS
Status: DISCONTINUED | OUTPATIENT
Start: 2018-10-03 | End: 2018-10-03 | Stop reason: HOSPADM

## 2018-10-03 RX ORDER — SODIUM CHLORIDE 9 MG/ML
25 INJECTION, SOLUTION INTRAVENOUS CONTINUOUS
Status: DISCONTINUED | OUTPATIENT
Start: 2018-10-03 | End: 2018-10-04

## 2018-10-03 RX ORDER — INSULIN GLARGINE 100 [IU]/ML
20 INJECTION, SOLUTION SUBCUTANEOUS ONCE
Status: COMPLETED | OUTPATIENT
Start: 2018-10-03 | End: 2018-10-03

## 2018-10-03 RX ORDER — CEFAZOLIN SODIUM/WATER 2 G/20 ML
2 SYRINGE (ML) INTRAVENOUS
Status: DISCONTINUED | OUTPATIENT
Start: 2018-10-03 | End: 2018-10-03

## 2018-10-03 RX ORDER — PROPOFOL 10 MG/ML
0-50 VIAL (ML) INTRAVENOUS
Status: DISCONTINUED | OUTPATIENT
Start: 2018-10-03 | End: 2018-10-04

## 2018-10-03 RX ORDER — SODIUM CHLORIDE 0.9 % (FLUSH) 0.9 %
5-10 SYRINGE (ML) INJECTION AS NEEDED
Status: DISCONTINUED | OUTPATIENT
Start: 2018-10-03 | End: 2018-10-03 | Stop reason: HOSPADM

## 2018-10-03 RX ORDER — DEXTROSE, SODIUM CHLORIDE, AND POTASSIUM CHLORIDE 5; .45; .15 G/100ML; G/100ML; G/100ML
25 INJECTION INTRAVENOUS CONTINUOUS
Status: DISCONTINUED | OUTPATIENT
Start: 2018-10-03 | End: 2018-10-04

## 2018-10-03 RX ADMIN — SUFENTANIL CITRATE 30 MCG: 50 INJECTION EPIDURAL; INTRAVENOUS at 07:22

## 2018-10-03 RX ADMIN — SUFENTANIL CITRATE 20 MCG: 50 INJECTION EPIDURAL; INTRAVENOUS at 07:59

## 2018-10-03 RX ADMIN — ALBUMIN (HUMAN) 25 G: 12.5 INJECTION, SOLUTION INTRAVENOUS at 21:39

## 2018-10-03 RX ADMIN — FAMOTIDINE 20 MG: 10 INJECTION, SOLUTION INTRAVENOUS at 21:07

## 2018-10-03 RX ADMIN — ASPIRIN 81 MG 81 MG: 81 TABLET ORAL at 05:51

## 2018-10-03 RX ADMIN — VECURONIUM BROMIDE FOR INJECTION 5 MG: 1 INJECTION, POWDER, LYOPHILIZED, FOR SOLUTION INTRAVENOUS at 08:00

## 2018-10-03 RX ADMIN — DEXTROSE, SODIUM CHLORIDE, AND POTASSIUM CHLORIDE 25 ML/HR: 5; .45; .15 INJECTION INTRAVENOUS at 12:36

## 2018-10-03 RX ADMIN — HEPARIN SODIUM 30000 UNITS: 1000 INJECTION, SOLUTION INTRAVENOUS; SUBCUTANEOUS at 09:07

## 2018-10-03 RX ADMIN — VANCOMYCIN HYDROCHLORIDE 1500 MG: 10 INJECTION, POWDER, LYOPHILIZED, FOR SOLUTION INTRAVENOUS at 06:04

## 2018-10-03 RX ADMIN — ETOMIDATE 14 MG: 2 INJECTION INTRAVENOUS at 07:23

## 2018-10-03 RX ADMIN — TUBERCULIN PURIFIED PROTEIN DERIVATIVE 5 UNITS: 5 INJECTION, SOLUTION INTRADERMAL at 21:08

## 2018-10-03 RX ADMIN — MIDAZOLAM HYDROCHLORIDE 1 MG: 1 INJECTION, SOLUTION INTRAMUSCULAR; INTRAVENOUS at 07:09

## 2018-10-03 RX ADMIN — CEFAZOLIN SODIUM 2 G: 1 INJECTION, POWDER, FOR SOLUTION INTRAMUSCULAR; INTRAVENOUS at 11:00

## 2018-10-03 RX ADMIN — Medication 81 MG: at 05:51

## 2018-10-03 RX ADMIN — CHLORHEXIDINE GLUCONATE 10 ML: 1.2 RINSE ORAL at 12:36

## 2018-10-03 RX ADMIN — ROCURONIUM BROMIDE 50 MG: 10 INJECTION, SOLUTION INTRAVENOUS at 07:24

## 2018-10-03 RX ADMIN — SODIUM CHLORIDE, SODIUM LACTATE, POTASSIUM CHLORIDE, CALCIUM CHLORIDE: 600; 310; 30; 20 INJECTION, SOLUTION INTRAVENOUS at 07:04

## 2018-10-03 RX ADMIN — SODIUM CHLORIDE, SODIUM LACTATE, POTASSIUM CHLORIDE, AND CALCIUM CHLORIDE 75 ML/HR: 600; 310; 30; 20 INJECTION, SOLUTION INTRAVENOUS at 06:04

## 2018-10-03 RX ADMIN — NITROGLYCERIN 10 MCG/MIN: 20 INJECTION INTRAVENOUS at 08:00

## 2018-10-03 RX ADMIN — VECURONIUM BROMIDE FOR INJECTION 5 MG: 1 INJECTION, POWDER, LYOPHILIZED, FOR SOLUTION INTRAVENOUS at 09:05

## 2018-10-03 RX ADMIN — SUFENTANIL CITRATE 50 MCG: 50 INJECTION EPIDURAL; INTRAVENOUS at 08:05

## 2018-10-03 RX ADMIN — CEFAZOLIN SODIUM 2 G: 1 INJECTION, POWDER, FOR SOLUTION INTRAMUSCULAR; INTRAVENOUS at 07:59

## 2018-10-03 RX ADMIN — OXYCODONE HYDROCHLORIDE AND ACETAMINOPHEN 1 TABLET: 5; 325 TABLET ORAL at 14:33

## 2018-10-03 RX ADMIN — ONDANSETRON 4 MG: 2 INJECTION INTRAMUSCULAR; INTRAVENOUS at 14:33

## 2018-10-03 RX ADMIN — ATORVASTATIN CALCIUM 40 MG: 40 TABLET, FILM COATED ORAL at 21:07

## 2018-10-03 RX ADMIN — SUFENTANIL CITRATE 50 MCG: 50 INJECTION EPIDURAL; INTRAVENOUS at 08:06

## 2018-10-03 RX ADMIN — PROTAMINE SULFATE 300 MG: 10 INJECTION, SOLUTION INTRAVENOUS at 11:10

## 2018-10-03 RX ADMIN — ROCURONIUM BROMIDE 30 MG: 10 INJECTION, SOLUTION INTRAVENOUS at 10:18

## 2018-10-03 RX ADMIN — SUFENTANIL CITRATE 50 MCG: 50 INJECTION EPIDURAL; INTRAVENOUS at 08:08

## 2018-10-03 RX ADMIN — SODIUM CHLORIDE: 9 INJECTION, SOLUTION INTRAVENOUS at 07:53

## 2018-10-03 RX ADMIN — MIDAZOLAM HYDROCHLORIDE 1 MG: 1 INJECTION, SOLUTION INTRAMUSCULAR; INTRAVENOUS at 07:11

## 2018-10-03 RX ADMIN — LIDOCAINE HYDROCHLORIDE 60 MG: 20 INJECTION, SOLUTION EPIDURAL; INFILTRATION; INTRACAUDAL; PERINEURAL at 07:22

## 2018-10-03 RX ADMIN — Medication 2 G: at 19:38

## 2018-10-03 RX ADMIN — MIDAZOLAM HYDROCHLORIDE 5 MG: 1 INJECTION, SOLUTION INTRAMUSCULAR; INTRAVENOUS at 10:18

## 2018-10-03 RX ADMIN — OXYCODONE HYDROCHLORIDE AND ACETAMINOPHEN 1 TABLET: 5; 325 TABLET ORAL at 21:07

## 2018-10-03 RX ADMIN — KETOROLAC TROMETHAMINE 30 MG: 30 INJECTION, SOLUTION INTRAMUSCULAR at 14:43

## 2018-10-03 RX ADMIN — INSULIN GLARGINE 20 UNITS: 100 INJECTION, SOLUTION SUBCUTANEOUS at 21:09

## 2018-10-03 RX ADMIN — AMIODARONE HYDROCHLORIDE 200 MG: 200 TABLET ORAL at 21:07

## 2018-10-03 RX ADMIN — SODIUM CHLORIDE: 9 INJECTION, SOLUTION INTRAVENOUS at 11:23

## 2018-10-03 RX ADMIN — MIDAZOLAM HYDROCHLORIDE 1 MG: 1 INJECTION, SOLUTION INTRAMUSCULAR; INTRAVENOUS at 07:07

## 2018-10-03 RX ADMIN — Medication 10 ML: at 14:37

## 2018-10-03 RX ADMIN — MIDAZOLAM HYDROCHLORIDE 2 MG: 1 INJECTION, SOLUTION INTRAMUSCULAR; INTRAVENOUS at 07:04

## 2018-10-03 RX ADMIN — MUPIROCIN: 20 OINTMENT TOPICAL at 21:09

## 2018-10-03 RX ADMIN — Medication 10 ML: at 21:38

## 2018-10-03 RX ADMIN — AMIODARONE HYDROCHLORIDE 400 MG: 200 TABLET ORAL at 05:50

## 2018-10-03 RX ADMIN — SUFENTANIL CITRATE 50 MCG: 50 INJECTION EPIDURAL; INTRAVENOUS at 08:07

## 2018-10-03 RX ADMIN — ROCURONIUM BROMIDE 20 MG: 10 INJECTION, SOLUTION INTRAVENOUS at 10:00

## 2018-10-03 RX ADMIN — SODIUM CHLORIDE, SODIUM LACTATE, POTASSIUM CHLORIDE, CALCIUM CHLORIDE: 600; 310; 30; 20 INJECTION, SOLUTION INTRAVENOUS at 07:53

## 2018-10-03 NOTE — CONSULTS
Cardiovascular ICU Consult Note: 10/3/2018  Elliot Peralta  Admission Date: 10/3/2018     The patient's chart is reviewed and the patient is discussed with the staff. Subjective:     Patient is seen at the request of Dr. Natalio Torres for respiratory management status post cardiac surgery. Patient had CABG x1 , LIMA to the LAD WITH AVR 23 mm Intuity valve. Currently is sedated in CV-ICU and orally intubated receiving  mechanical ventilation. Had been worsening fatigue and DELEON. Noted with cardiac murmur and ECHO revealed severe AS. Cardiac cath with severe CAD. We have been asked to see in the CV-ICU for mechanical ventilation management and weaning. Prior to Admission Medications   Prescriptions Last Dose Informant Patient Reported? Taking?   amiodarone (CORDARONE) 200 mg tablet   Yes No   Sig: Take 600 mg by mouth once. Amiodarone 200 mg (3 tabs). Take by mouth after 4pm- 10/2/2018 night before surgery per Dr. Toby Thomason   aspirin delayed-release 81 mg tablet 10/2/2018 at Unknown time  Yes Yes   Sig: Take 81 mg by mouth daily. losartan (COZAAR) 50 mg tablet 10/2/2018 at Unknown time  Yes Yes   Sig: Take  by mouth daily. magnesium 250 mg tab 10/1/2018  Yes No   Sig: Take  by mouth daily. metoprolol tartrate (LOPRESSOR PO) 10/2/2018 at Unknown time  Yes Yes   Sig: Take 12.5 mg by mouth once. Lopressor 12.5 mg. Take after 4pm (10/2/2018) night before surgery per Dr. Toby Thomason   multivitamin (ONE A DAY) tablet 10/1/2018  Yes No   Sig: Take 1 Tab by mouth daily. mupirocin (BACTROBAN) 2 % ointment 10/3/2018 at 430  Yes Yes   Sig: Apply  to affected area See Admin Instructions. Use inside each nostril as instructed by PAT nurse   naproxen sodium (ALEVE) 220 mg cap 10/1/2018  Yes No   Sig: Take  by mouth daily. oxymetazoline HCl (AFRIN NASAL SPRAY NA) 9/30/2018  Yes No   Sig: by Nasal route nightly. potassium 99 mg tablet 10/1/2018  Yes No   Sig: Take 99 mg by mouth daily. Facility-Administered Medications: None       Review of Systems  Review of systems not obtained due to patient factors. Patient is sedated and intubated immediatly post op CABG and AVR 23 mm Intuity valve . Past Medical History:   Diagnosis Date    Arthritis     generalized OA    CAD (coronary artery disease) 9/11/2018    Cancer ([address]     melanoma nose ~2016    GERD (gastroesophageal reflux disease)     OTC alkaseltzer    Hypertension 9/11/2018    Kidney stones     last passed stone 2016    PUD (peptic ulcer disease)     1970's- patient denies 10/01/18    Sleep apnea     observed to stop breathing in sleep; no CPAP     Past Surgical History:   Procedure Laterality Date    HX AORTIC VALVE REPLACEMENT  10/03/2018    CORONARY ARTERY BYPASS GRAFT X 1, LIMA to the LAD WITH AVR 23 mm Intuity valve    HX APPENDECTOMY       15 yo    HX CORONARY ARTERY BYPASS GRAFT  10/03/2018    CABG x1, Dr. Chandler Alvarado  2014    right lower ext - broken put in rods and screws    HX OTHER SURGICAL      melanoma surgery to nose     Social History     Social History    Marital status:      Spouse name: N/A    Number of children: N/A    Years of education: N/A     Occupational History    Not on file.      Social History Main Topics    Smoking status: Never Smoker    Smokeless tobacco: Never Used    Alcohol use 1.2 oz/week     2 Shots of liquor per week      Comment: occasional- 2     Drug use: No    Sexual activity: Not on file     Other Topics Concern    Not on file     Social History Narrative     Family History   Problem Relation Age of Onset    Cancer Mother     Cancer Brother     Hypertension Brother      Allergies   Allergen Reactions    Lortab [Hydrocodone-Acetaminophen] Other (comments)     hallucinations       Current Facility-Administered Medications   Medication Dose Route Frequency    [START ON 10/4/2018] aspirin delayed-release tablet 81 mg  81 mg Oral DAILY    0.9% sodium chloride infusion  25 mL/hr IntraVENous CONTINUOUS    dextrose 5% - 0.45% NaCl with KCl 20 mEq/L infusion  25 mL/hr IntraVENous CONTINUOUS    sodium chloride (NS) flush 5-10 mL  5-10 mL IntraVENous Q8H    sodium chloride (NS) flush 5-10 mL  5-10 mL IntraVENous PRN    oxyCODONE-acetaminophen (PERCOCET) 5-325 mg per tablet 1 Tab  1 Tab Oral Q4H PRN    morphine 10 mg/ml injection 3-5 mg  3-5 mg IntraVENous Q1H PRN    naloxone (NARCAN) injection 0.4 mg  0.4 mg IntraVENous PRN    mupirocin (BACTROBAN) 2 % ointment   Both Nostrils BID    ceFAZolin (ANCEF) 2 g/20 mL in sterile water IV syringe  2 g IntraVENous Q8H    sodium bicarbonate 8.4 % (1 mEq/mL) injection 50 mEq  50 mEq IntraVENous PRN    EPINEPHrine (ADRENALIN) 4 mg in 0.9% sodium chloride 250 mL infusion  0.05-0.1 mcg/kg/min IntraVENous TITRATE    nitroglycerin (Tridil) 200 mcg/ml infusion   mcg/min IntraVENous TITRATE    PHENYLephrine (WILNER-SYNEPHRINE) 30 mg in 0.9% sodium chloride 250 mL infusion   mcg/min IntraVENous TITRATE    lidocaine (PF) (XYLOCAINE) 100 mg/5 mL (2 %) injection syringe  mg   mg IntraVENous ONCE PRN    amiodarone (CORDARONE) tablet 200 mg  200 mg Oral Q12H    [START ON 10/4/2018] metoprolol tartrate (LOPRESSOR) tablet 25 mg  25 mg Oral Q12H    atorvastatin (LIPITOR) tablet 40 mg  40 mg Oral QHS    ondansetron (ZOFRAN) injection 4 mg  4 mg IntraVENous Q4H PRN    insulin regular (NOVOLIN R, HUMULIN R) 100 Units in 0.9% sodium chloride 100 mL infusion  1 Units/hr IntraVENous TITRATE    dextrose (D50W) injection syrg 12.5 g  25 mL IntraVENous PRN    magnesium sulfate 1 g/100 ml IVPB (premix or compounded)  1 g IntraVENous PRN    potassium chloride 10 mEq in 50 ml IVPB  10 mEq IntraVENous PRN    midazolam (VERSED) injection 1 mg  1 mg IntraVENous Q1H PRN    propofol (DIPRIVAN) infusion  0-50 mcg/kg/min IntraVENous TITRATE    chlorhexidine (PERIDEX) 0.12 % mouthwash 10 mL  10 mL Oral BID    tuberculin injection 5 Units  5 Units IntraDERMal ONCE    niCARdipine in Saline (CARDENE) 25 MG/250 mL infusion kit  0-15 mg/hr IntraVENous TITRATE     Facility-Administered Medications Ordered in Other Encounters   Medication Dose Route Frequency    0.9% sodium chloride infusion 250 mL  250 mL IntraVENous PRN     Objective:     Vitals:    10/03/18 0543 10/03/18 1201 10/03/18 1203 10/03/18 1207   BP: 140/78 94/46     Pulse: 72 74 71 71   Resp: 18 15 18 14   Temp: 98 °F (36.7 °C) 98.4 °F (36.9 °C) 98.4 °F (36.9 °C)    SpO2: 99% 98% 96% 96%   Weight: 226 lb 3 oz (102.6 kg)      Height: 5' 10\" (1.778 m)          Intake and Output:      10/03 0701 - 10/03 1900  In: 2600 [I.V.:1700]  Out: 740 [Urine:730]    Physical Exam:          Constitutional:  Sedated, orally intubated and mechanically ventilated. EENMT:  Sclera clear, pupils equal, oral mucosa moist and orally intubated  Respiratory: clear and equal breath sounds  Cardiovascular:  RRR with no M,G,R,crisp valve sounds with audible rub  Gastrointestinal:  soft; no bowel sounds present  Musculoskeletal:  warm with no cyanosis, no lower leg edema. Sedated with no movements. SKIN:  no jaundice or ecchymosis   Neurologic:  sedated but no gross neuro deficits  Psychiatric:  sedated and unable to assess at this time    CXR:  Reviewed at bedside when taken, no infiltrates, lines, tubes in adequate position. LINES:  ETT, leigh, swan vania, arterial line, chest tubes times 2 in epigastric area without air leak.     DRIPS:  NTG, Insulin    CI:  3.2    Ventilator Settings  Mode FIO2 Rate Tidal Volume Pressure PEEP   SIMV  60 %       20 cm H2O  8 cm H20      Peak airway pressure: 18 cm H2O   Minute ventilation: 7.4 l/min     ABG:   Recent Labs      10/03/18   1210   PH  7.23*   PCO2  50*   PO2  110*   HCO3  21*      LAB  Recent Labs      10/03/18   1220  10/01/18   0754   WBC  14.2*  6.4   HGB  11.6*  14.5   HCT  36.2*  44.0   PLT  113*  168   INR   --   1.0     Recent Labs      10/01/18   0754   NA  135*   K  4.3   CL  104   CO2  25   GLU  131*   BUN  16   CREA  1.09   CA  8.8   ALB  3.8     No results for input(s): LCAD, LAC in the last 72 hours. Assessment and Plan :  (Medical Decision Making)     Hospital Problems  Date Reviewed: 10/3/2018          Codes Class Noted POA    Aortic stenosis ICD-10-CM: I35.0  ICD-9-CM: 424.1  9/11/2018 Yes    chronic    CAD (coronary artery disease) (Chronic) ICD-10-CM: I25.10  ICD-9-CM: 414.00  9/11/2018 Yes    Severe CAD    Status post coronary artery bypass graft ICD-10-CM: Z95.1  ICD-9-CM: V45.81  10/3/2018 No     10/3/18 Dr. Anastasiya Blanco  CABG X 1, LIMA to the LAD WITH AVR 23 mm Intuity valve         Per CV surgery    S/P AVR (aortic valve replacement) ICD-10-CM: Z95.2  ICD-9-CM: V43.3  10/3/2018 No     10/3/18  Dr. Anastasiya Blanco  CABG X 1, LIMA to the LAD WITH AVR 23 mm Intuity valve         Per CV surgery    Encounter for weaning from ventilator [facility]) ICD-10-CM: Z99.11  ICD-9-CM: V46.13  10/3/2018 No    Per protocol    Hypertension (Chronic) ICD-10-CM: I10  ICD-9-CM: 401.9  9/11/2018 Yes    controlled    Hypoxia ICD-10-CM: R09.02  ICD-9-CM: 799.02  10/3/2018 No    Sat acceptable          Plan:   --Wean mechanical ventilation per protocol. --Bronchodilators per protocol. --Incentive spirometry every hour post extubation. --Lines tubes in adequate position on CXR  --Chest tube management per primary    More than 50% of the time documented was spent in face-to-face contact with the patient and in the care of the patient on the floor/unit where the patient is located. Thank you for this referral.  We appreciate the opportunity to participate in this patient's care. Will follow along with you.     Fazal Gerard MD

## 2018-10-03 NOTE — PERIOP NOTES
TRANSFER - OUT REPORT:    Verbal report given to Graciela Weiner RN  on Dayla Forget  being transferred to CVICU for routine post - op       Report consisted of patients Situation, Background, Assessment and   Recommendations(SBAR). Information from the following report(s) OR Summary was reviewed with the receiving nurse. Lines:   Double Lumen 10/03/18 Right Internal jugular (Active)       Ector [place] Dual 10/03/18 Right Neck (Active)       Peripheral IV 10/03/18 Right Hand (Active)   Site Assessment Clean, dry, & intact 10/3/2018  5:46 AM   Phlebitis Assessment 0 10/3/2018  5:46 AM   Infiltration Assessment 0 10/3/2018  5:46 AM   Dressing Status Clean, dry, & intact 10/3/2018  5:46 AM   Dressing Type Tape;Transparent 10/3/2018  5:46 AM   Hub Color/Line Status Green; Infusing 10/3/2018  5:46 AM       Arterial Line 10/03/18 Left Radial artery (Active)        Opportunity for questions and clarification was provided.       Patient transported with:   Monitor

## 2018-10-03 NOTE — IP AVS SNAPSHOT
[address] 
[phone] Patient: Betty Braun MRN: [id number] RNM:8[birth year] A check erick indicates which time of day the medication should be taken. My Medications START taking these medications Instructions Each Dose to Equal  
 Morning Noon Evening Bedtime  
 atorvastatin 80 mg tablet Commonly known as:  LIPITOR Your last dose was: Your next dose is: Take 1 Tab by mouth nightly. 80 mg  
    
   
   
   
  
 furosemide 40 mg tablet Commonly known as:  LASIX Your last dose was: Your next dose is: Take 1 Tab by mouth daily for 3 days. 40 mg  
    
   
   
   
  
 nitroglycerin 0.4 mg SL tablet Commonly known as:  NITROSTAT Your last dose was: Your next dose is:    
   
   
 1 Tab by SubLINGual route as needed for Chest Pain. Up to 3 doses. 0.4 mg  
    
   
   
   
  
 oxyCODONE-acetaminophen  mg per tablet Commonly known as:  PERCOCET 10 Your last dose was: Your next dose is: Take 1 Tab by mouth every four (4) hours as needed. Max Daily Amount: 6 Tabs. 1 Tab CHANGE how you take these medications Instructions Each Dose to Equal  
 Morning Noon Evening Bedtime  
 metoprolol tartrate 50 mg tablet Commonly known as:  LOPRESSOR What changed:   
- medication strength 
- how much to take - when to take this 
- additional instructions Your last dose was: Your next dose is: Take 1 Tab by mouth two (2) times a day. 50 mg CONTINUE taking these medications Instructions Each Dose to Equal  
 Morning Noon Evening Bedtime AFRIN NASAL SPRAY NA Your last dose was: Your next dose is:    
   
   
 by Nasal route nightly. ALEVE 220 mg Cap Generic drug:  naproxen sodium Your last dose was: Your next dose is: Take  by mouth daily. aspirin delayed-release 81 mg tablet Your last dose was: Your next dose is: Take 81 mg by mouth daily. 81 mg  
    
   
   
   
  
 losartan 50 mg tablet Commonly known as:  COZAAR Your last dose was: Your next dose is: Take  by mouth daily. magnesium 250 mg Tab Your last dose was: Your next dose is: Take  by mouth daily. multivitamin tablet Commonly known as:  ONE A DAY Your last dose was: Your next dose is: Take 1 Tab by mouth daily. 1 Tab  
    
   
   
   
  
 mupirocin 2 % ointment Commonly known as:  [facility] Your last dose was: Your next dose is:    
   
   
 Apply  to affected area See Admin Instructions. Use inside each nostril as instructed by PAT nurse  
     
   
   
   
  
 potassium 99 mg tablet Your last dose was: Your next dose is: Take 99 mg by mouth daily. 99 mg  
    
   
   
   
  
  
STOP taking these medications   
 amiodarone 200 mg tablet Commonly known as:  CORDARONE Where to Get Your Medications Information on where to get these meds will be given to you by the nurse or doctor. ! Ask your nurse or doctor about these medications  
  atorvastatin 80 mg tablet  
 furosemide 40 mg tablet  
 metoprolol tartrate 50 mg tablet  
 nitroglycerin 0.4 mg SL tablet  
 oxyCODONE-acetaminophen  mg per tablet

## 2018-10-03 NOTE — ANESTHESIA PROCEDURE NOTES
G[place] 
Date/Time: 10/3/2018 7:55 AM 
Ordering Provider: Oliver De La Rosa Procedure Details: probe placement, image aquisition & interpretation Risks and benefits discussed with the patient and plans are to proceed Procedure Note Performed by: Frederick Machado Authorized by: Frederick Machado Indications: assessment of ascending aorta and assessment of surgical repair Modalities: 2D, CF, CWD, PWD Probe Type: multiplane Insertion: atraumatic Patient Status: intubated and sedated Echocardiographic and Doppler Measurements Aorta  Size  Diam(cm)  Dissection PlaqueThick(mm)  Plaque Mobile Ascending normal 2.5 No    
 Arch normal      
 Descending Valves  Annulus  Stenosis  Area/Grad  Regurg  Leaflet Morph  Leaflet Motion Aortic calcified severe   calcified, thickened restricted Mitral normal none  1+ normal normal  
 Tricuspid normal none  0 normal normal  
 
 
 
 Atria  Size  SEC (smoke)  Thrombus  Tumor  Device Rt Atrium Lt Atrium Interatrial Septum Morphology: normal 
 
Interventricular Septum Morphology: hypertrophy Ventricle  Cavity Size  Cavity Dimension Hypertrophy  Thrombus  Gloal FXN  EF  
 RV        
 LV normal  Yes  normal   
 
 
 
Post Intervention Follow-up Study Valve  Function  Regurgitation  Area Aortic Mitral     
 Tricuspid Prosthetic Comments: Findings discussed with Dr. Giana Jacinto Maryobany Bridget   MRN: [id number]    Department:  [facility] Emergency Department   Date of Visit:  4/2/2019           Disclosure     Insurance plans vary and the physician(s) referred by the ER may not be covered by your plan.  Please contact your i tell this physician (or your personal doctor if your instructions are to return to your personal doctor) about any new or lasting problems. The primary care or specialist physician will see patients referred from the [facility] Emergency Department.  Eliana Peraza

## 2018-10-03 NOTE — PROGRESS NOTES
Hemodynamically stable. Outputs with parameters. RT at bedside. Successful completion of extubation mechanics. Extubated to AirVo with strong cough. Pt able to clear secretions. Voice clear. No stridor. SpO2 >95%. Chest expansion symmetrical. Teaching and encouragement provided regarding splinting chest, coughing, deep breathing and IS use. Ongoing monitoring.

## 2018-10-03 NOTE — ANESTHESIA PROCEDURE NOTES
Arterial Line Placement Start time: 10/3/2018 7:11 AM 
End time: 10/3/2018 7:14 AM 
Performed by: Darnell Freitas Authorized by: Gi Yousif  
 
Pre-Procedure Indications:  Arterial pressure monitoring and blood sampling Preanesthetic Checklist: patient identified, risks and benefits discussed, anesthesia consent, site marked, patient being monitored, timeout performed and patient being monitored Timeout Time: 07:10 Procedure:  
Prep:  Chlorhexidine Seldinger Technique?: No   
Orientation:  Left Location:  Radial artery Catheter size:  20 G Number of attempts:  1 Cont Cardiac Output Sensor: No   
 
Assessment:  
Post-procedure:  Line secured and sterile dressing applied Patient Tolerance:  Patient tolerated the procedure well with no immediate complications

## 2018-10-03 NOTE — INTERDISCIPLINARY ROUNDS
Interdisciplinary team rounds were held 10/3/2018 with the following team members:Nursing, Physician, Respiratory Therapy and Clinical Coordinator Recover per unit standard, routine. Plan of care discussed. See clinical pathway and/or care plan for interventions and desired outcomes.

## 2018-10-03 NOTE — ANESTHESIA POSTPROCEDURE EVALUATION
Post-Anesthesia Evaluation and Assessment Patient: Deena Peters MRN: [id number]  SSN: [id number] YOB: 1950  Age: 76 y.o. Sex: male Cardiovascular Function/Vital Signs Visit Vitals  BP 94/46  Pulse 71  Temp 36.6 °C (97.8 °F)  Resp 11  
 Ht 5' 10\" (1.778 m)  Wt 102.6 kg (226 lb 3 oz)  SpO2 99%  BMI 32.45 kg/m2 Patient is status post general anesthesia for Procedure(s): CORONARY ARTERY BYPASS GRAFT X 1, LIMA  WITH AVR 
ESOPHAGEAL TRANS ECHOCARDIOGRAM. 
 
Nausea/Vomiting: None Postoperative hydration reviewed and adequate. Pain: 
Pain Scale 1: Visual (10/03/18 1219) Pain Intensity 1: 0 (10/03/18 1219) Managed Neurological Status:  
Neuro (WDL): Within Defined Limits (10/03/18 0544) Neuro Neurologic State: Drowsy; Eyes open to voice (10/03/18 1310) Orientation Level: Unable to verbalize;Oriented to person (10/03/18 1310) Cognition: Follows commands; Impaired decision making;Poor safety awareness (10/03/18 1310) Speech: Intubated;Gesturing; Nods appropriately;Mouths words;Delayed responses (10/03/18 1310) Assessment L Pupil: Brisk;Round (10/03/18 1310) Size L Pupil (mm): 3 (10/03/18 1310) Assessment R Pupil: Brisk;Round (10/03/18 1310) Size R Pupil (mm): 3 (10/03/18 1310) LUE Motor Response: Spontaneous ; Purposeful;Weak (10/03/18 1310) LLE Motor Response: Spontaneous ; Purposeful;Weak (10/03/18 1310) RUE Motor Response: Spontaneous ; Purposeful;Weak (10/03/18 1310) RLE Motor Response: Spontaneous ; Purposeful;Weak (10/03/18 1310) NMB resolving Mental Status and Level of Consciousness: Sedated Pulmonary Status:  
O2 Device: Endotracheal tube;Ventilator (10/03/18 1205) Adequate oxygenation and airway patent Complications related to anesthesia: None Post-anesthesia assessment completed. No concerns Signed By: Cat Cobos MD   
 October 3, 2018

## 2018-10-03 NOTE — PROGRESS NOTES
's visit attempted in pre-op. Mr. Asmita Gardner went to surgery prior to my arrival. Attempted to visit with family in the surgery waiting room but patient's son could not be found.      Denver Favre, [address]  Board Certified

## 2018-10-03 NOTE — PROGRESS NOTES
Dual skin assessment completed - no skin breakdown noted. Allevyn repstnd on coccyx. MS surgical incision with james c/d/i.

## 2018-10-03 NOTE — PROGRESS NOTES
TRANSFER - IN REPORT:    Verbal report received from [facility]) on Magnus Locke  being received from OR(unit) for routine post - op      Report consisted of patients Situation, Background, Assessment and   Recommendations(SBAR). Information from the following report(s) SBAR, OR Summary, Intake/Output, MAR, Recent Results and Cardiac Rhythm SR was reviewed with the receiving nurse. Opportunity for questions and clarification was provided. Assessment completed upon patients arrival to unit and care assumed.    Received to [facility]

## 2018-10-03 NOTE — ANESTHESIA PROCEDURE NOTES
Central Line Placement Start time: 10/3/2018 7:30 AM 
End time: 10/3/2018 7:47 AM 
Performed by: Eleanor Finnegan Authorized by: Eleanor Finnegan Indications: vascular access, central pressure monitoring and need for vasopressors Preanesthetic Checklist: patient identified, risks and benefits discussed, anesthesia consent and patient being monitored Pre-procedure: All elements of maximal sterile barrier technique followed? Yes   
2% Chlorhexidine for cutaneous antisepsis, Hand hygiene performed prior to catheter insertion and Ultrasound guidance Sterile Ultrasound Technique followed?: Yes Ultrasound Image Stored? Image stored Procedure:  
Prep:  Chlorhexidine Location:  Internal jugular Orientation:  Right Patient position:  Trendelenburg Catheter type:  Double lumen Catheter size:  9 Fr 
Caudal catheter size: 11.5 cm. Number of attempts:  1 Successful placement: Yes Assessment:  
Post-procedure:  Catheter secured and sterile dressing applied Assessment:  Free fluid flow, blood return through all ports and guidewire removal verified Insertion:  Uncomplicated Patient tolerance:  Patient tolerated the procedure well with no immediate complications Internal Jugular CVC- placed after initiation of general anesthesia Right internal jugular CVC: Risks, benefits, alternatives explained and pt agrees to proceed. Pt positioned in Trendelenburg. Sterile prep with chlorhexidine and full body drape. Local anesthetic at insertion site. Right internal jugular vein on first stick with real time ultrasound guidance. Veinous cannulation confirmed with manometry and visualization of wire in vein on ultrasound. Easy introducer and PAC insertion to  46 centimeters. Sterile tegaderm applied. No complications. CXR to be performed in ICU. Seven step protocol followed.

## 2018-10-03 NOTE — PROGRESS NOTES
Ventilator check complete; patient has a #8. 0 ET tube secured at the 23 at the lip. Patient is sedated. Patient is not able to follow commands. Breath sounds are diminished. Trachea is midline, Negative for subcutaneous air, and chest excursion is symmetric. Patient is also Negative for cyanosis and is Negative for pitting edema. All alarms are set and audible. Resuscitation bag is at the head of the bed. Ventilator Settings  Mode FIO2 Rate Tidal Volume Pressure PEEP I:E Ratio                           Peak airway pressure:     Minute ventilation:       ABG: No results for input(s): PH, PCO2, PO2, HCO3 in the last 72 hours.       Lev Tellez, RT

## 2018-10-03 NOTE — PROGRESS NOTES
Respiratory Mechanics completed and are as follows:  Weaning Parameters  Spontaneous Breathing Trial Complete: Yes  Resp Rate Observed: 20  Ve: 14.8  VT: 508  NIF: -40  VC: 1611  Peraza Agitation Sedation Scale (RASS): Alert and calm  Patient extubated to a 40% airvo. Patient is able to communicate and is negative for stridor. Breath sounds are clear. No complications with extubation.      Cay Nyhan, RT

## 2018-10-03 NOTE — BRIEF OP NOTE
BRIEF OPERATIVE NOTE    Date of Procedure: 10/3/2018   Preoperative Diagnosis: Nonrheumatic aortic valve stenosis [I35.0]  Atherosclerosis of native coronary artery of native heart without angina pectoris [I25.10]  Postoperative Diagnosis: coronary artery of native heart without angina pectoris [I25.1    Procedure(s):  CORONARY ARTERY BYPASS GRAFT X 1, LIMA to the LAD WITH AVR 23 mm Intuity valve  ESOPHAGEAL TRANS ECHOCARDIOGRAM  Surgeon(s) and Role:     * Miguel Modi MD - Primary         Surgical Assistant:      Surgical Staff:  Circ-1: Callie Harrington  Circ-Relief: Ryan Jensen RN  Perfusionist: Fara Soni  Scrub Tech-1: Theo Kasper  Scrub Tech-2: Kole Plummer  Scrub Tech-Relief: Star Puri  Event Time In   Incision Start 0805   Incision Close 1143     Anesthesia: General   Estimated Blood Loss: minimal   Specimens:   ID Type Source Tests Collected by Time Destination   1 : Aortic Valve Preservative Aortic Valve  Miguel Modi MD 10/3/2018 7055 Pathology      Findings:     Complications:    Implants:   Implant Name Type Inv.  Item Serial No.  Lot No. LRB No. Used Action   VALVE AORT KT W/SYR-HDL 23MM [facility] - [id number]   VALVE AORT KT W/SYR-HDL 23MM -- Umer Ibarra [id number] [employer]   N/A 1 Implanted

## 2018-10-03 NOTE — IP AVS SNAPSHOT
Efe Coughlin 
 
 
 [address] [facility] 
[phone] Patient: Jw Batista MRN: [id number] PGP:[id number] About your hospitalization You were admitted on:  October 3, 2018 You last received care in the:  [facility] 2 CV STEPDOWN You were discharged on:  October 8, 2018 Why you were hospitalized Your primary diagnosis was:  Cad (Coronary Artery Disease) Your diagnoses also included:  Status Post Coronary Artery Bypass Graft, Aortic Stenosis, Hypertension, S/P Avr (Aortic Valve Replacement), Hypoxia, Encounter For Weaning From Ventilator ([facility]), Pneumothorax Follow-up Information Follow up With Details Comments Contact Info O CARDIOPULM REHAB  We will forward your referral to the [facility] facility as requested. [address] [place] [id number] 
[phone] [address] [id number] [phone] Jose Martin Narayan MD   [address] [facility] 39 [id number] 
[phone] Munira Epps MD On 10/19/2018 Follow up on October 19th at 4:00pm [facility])  [address] [facility] [address] 
[phone] Raul Parson MD On 10/23/2018 at 2:00 [address] [facility] [address] [facility] CARDIO THORACIC [facility] [address] 
[phone] Your Scheduled Appointments Friday October 19, 2018  4:00 PM EDT TRANSITIONAL CARE MANAGEMENT with Munira Epps MD  
[facility] OFFICE ([address] [place] [address]) [address]  
[address] [place]  
[phone] Tuesday October 23, 2018  2:00 PM EDT Global Post Op with Raul Parson MD  
[address] [facility] ([facility] [address] [address] [phone] Discharge Orders Procedure Order Date Status Priority Quantity Spec Type Associated Dx REFERRAL TO CARDIAC [facility] 10/08/18 0859 Normal Routine 1  Status post coronary artery bypass graft [[id number]] A check erick indicates which time of day the medication should be taken. My Medications START taking these medications Instructions Each Dose to Equal  
 Morning Noon Evening Bedtime  
 atorvastatin 80 mg tablet Commonly known as:  LIPITOR Your last dose was: Your next dose is: Take 1 Tab by mouth nightly. 80 mg  
    
   
   
   
  
 furosemide 40 mg tablet Commonly known as:  LASIX Your last dose was: Your next dose is: Take 1 Tab by mouth daily for 3 days. 40 mg  
    
   
   
   
  
 nitroglycerin 0.4 mg SL tablet Commonly known as:  NITROSTAT Your last dose was: Your next dose is:    
   
   
 1 Tab by SubLINGual route as needed for Chest Pain. Up to 3 doses. 0.4 mg  
    
   
   
   
  
 oxyCODONE-acetaminophen  mg per tablet Commonly known as:  PERCOCET 10 Your last dose was: Your next dose is: Take 1 Tab by mouth every four (4) hours as needed. Max Daily Amount: 6 Tabs. 1 Tab CHANGE how you take these medications Instructions Each Dose to Equal  
 Morning Noon Evening Bedtime  
 metoprolol tartrate 50 mg tablet Commonly known as:  LOPRESSOR What changed:   
- medication strength 
- how much to take - when to take this 
- additional instructions Your last dose was: Your next dose is: Take 1 Tab by mouth two (2) times a day. 50 mg CONTINUE taking these medications Instructions Each Dose to Equal  
 Morning Noon Evening Bedtime AFRIN NASAL SPRAY NA Your last dose was: Your next dose is:    
   
   
 by Nasal route nightly. ALEVE 220 mg Cap Generic drug:  naproxen sodium Your last dose was: Your next dose is: Take  by mouth daily. aspirin delayed-release 81 mg tablet Your last dose was: Your next dose is: Take 81 mg by mouth daily. 81 mg  
    
   
   
   
  
 losartan 50 mg tablet Commonly known as:  COZAAR Your last dose was: Your next dose is: Take  by mouth daily. magnesium 250 mg Tab Your last dose was: Your next dose is: Take  by mouth daily. multivitamin tablet Commonly known as:  ONE A DAY Your last dose was: Your next dose is: Take 1 Tab by mouth daily. 1 Tab  
    
   
   
   
  
 mupirocin 2 % ointment Commonly known as:  [facility] Your last dose was: Your next dose is:    
   
   
 Apply  to affected area See Admin Instructions. Use inside each nostril as instructed by PAT nurse  
     
   
   
   
  
 potassium 99 mg tablet Your last dose was: Your next dose is: Take 99 mg by mouth daily. 99 mg  
    
   
   
   
  
  
STOP taking these medications   
 amiodarone 200 mg tablet Commonly known as:  CORDARONE Where to Get Your Medications Information on where to get these meds will be given to you by the nurse or doctor. ! Ask your nurse or doctor about these medications  
  atorvastatin 80 mg tablet  
 furosemide 40 mg tablet  
 metoprolol tartrate 50 mg tablet  
 nitroglycerin 0.4 mg SL tablet  
 oxyCODONE-acetaminophen  mg per tablet Opioid Education Prescription Opioids: What You Need to Know: 
 
Prescription opioids can be used to help relieve moderate-to-severe pain and are often prescribed following a surgery or injury, or for certain health conditions. These medications can be an important part of treatment but also come with serious risks.   Opioids are strong pain medicines. Examples include hydrocodone, oxycodone, fentanyl, and morphine. Heroin is an example of an illegal opioid. It is important to work with your health care provider to make sure you are getting the safest, most effective care. WHAT ARE THE RISKS AND SIDE EFFECTS OF OPIOID USE? Prescription opioids carry serious risks of addiction and overdose, especially with prolonged use. An opioid overdose, often marked by slow breathing, can cause sudden death. The use of prescription opioids can have a number of side effects as well, even when taken as directed. · Tolerance-meaning you might need to take more of a medication for the same pain relief · Physical dependence-meaning you have symptoms of withdrawal when the medication is stopped. Withdrawal symptoms can include nausea, sweating, chills, diarrhea, stomach cramps, and muscle aches. Withdrawal can last up to several weeks, depending on which drug you took and how long you took it. · Increased sensitivity to pain · Constipation · Nausea, vomiting, and dry mouth · Sleepiness and dizziness · Confusion · Depression · Low levels of testosterone that can result in lower sex drive, energy, and strength · Itching and sweating RISKS ARE GREATER WITH:      
· History of drug misuse, substance use disorder, or overdose · Mental health conditions (such as depression or anxiety) · Sleep apnea · Older age (72 years or older) · Pregnancy Avoid alcohol while taking prescription opioids. Also, unless specifically advised by your health care provider, medications to avoid include: · Benzodiazepines (such as Xanax or Valium) · Muscle relaxants (such as Soma or Flexeril) · Hypnotics (such as Ambien or Lunesta) · Other prescription opioids KNOW YOUR OPTIONS Talk to your health care provider about ways to manage your pain that don't involve prescription opioids.   Some of these options may actually work better and have fewer risks and side effects. Consult your physician before adding or stopping any medications, treatments, or physical activity. Options may include: 
· Pain relievers such as acetaminophen, ibuprofen, and naproxen · Some medications that are also used for depression or seizures · Physical therapy and exercise · Counseling to help patients learn how to cope better with triggers of pain and stress. · Application of heat or cold compress · Massage therapy · Relaxation techniques Be Informed Make sure you know the name of your medication, how much and how often to take it, and its potential risks & side effects. IF YOU ARE PRESCRIBED OPIOIDS FOR PAIN: 
· Never take opioids in greater amounts or more often than prescribed. Remember the goal is not to be pain-free but to manage your pain at a tolerable level. · Follow up with your primary care provider to: · Work together to create a plan on how to manage your pain. · Talk about ways to help manage your pain that don't involve prescription opioids. · Talk about any and all concerns and side effects. · Help prevent misuse and abuse. · Never sell or share prescription opioids · Help prevent misuse and abuse. · Store prescription opioids in a secure place and out of reach of others (this may include visitors, children, friends, and family). · Safely dispose of unused/unwanted prescription opioids: Find your community drug take-back program or your pharmacy mail-back program, or flush them down the toilet, following guidance from the Food and Drug Administration (www.fda.gov/Drugs/ResourcesForYou). · Visit www.cdc.gov/drugoverdose to learn about the risks of opioid abuse and overdose. · If you believe you may be struggling with addiction, tell your health care provider and ask for guidance or call [address] [place], [place] [address] at [phone]-IWFG. Discharge Instructions Coronary Artery Bypass Graft: What to Expect at Home Your Recovery Coronary artery bypass graft (CABG) is surgery to treat coronary artery disease. The surgery helps blood make a detour, or bypass, around one or more narrowed or blocked coronary arteries. Coronary arteries are the blood vessels that bring blood to the heart. Your doctor did the surgery through a cut, called an incision, in your chest. 
You will feel tired and sore for the first few weeks after surgery. You may have some brief, sharp pains on either side of your chest. Your chest, shoulders, and upper back may ache. The incision in your chest and the area where the healthy vein was taken may be sore or swollen. These symptoms usually get better after 4 to 6 weeks. You will probably be able to do many of your usual activities after 4 to 6 weeks. But for 2 to 3 months you will not be able to lift heavy objects or do activities that strain your chest or upper arm muscles. At first you may notice that you get tired easily and need to rest often. It may take 1 to 2 months to get your energy back. Some people find that they are more emotional after this surgery. You may cry easily or show emotion in ways that are unusual for you. This is common and may last for up to a year. Some people get depressed after CABG surgery. Talk with your doctor if you have sadness that continues or you are concerned about how you are feeling. Treatment and other support can help you feel better. Even though the surgery may improve your symptoms, you will still need to make changes in your lifestyle to lower your risk of a heart attack or stroke. It will be important to eat a heart-healthy diet, get regular exercise, not smoke, take your heart medicines, and reduce stress.  
You will likely start a cardiac rehabilitation (rehab) program in the hospital. You will continue with this rehab program after you go home to help you recover and prevent problems with your heart. Talk to your doctor about whether rehab is right for you. This care sheet gives you a general idea about how long it will take for you to recover. But each person recovers at a different pace. Follow the steps below to get better as quickly as possible. How can you care for yourself at home? Activity 
  · Rest when you feel tired. Getting enough sleep will help you recover. Try to sleep on your back for 4 to 6 weeks while your breastbone (sternum) heals. This usually takes about 4 to 6 weeks.  
  · Try to walk each day. Start by walking a little more than you did the day before. Bit by bit, increase the amount you walk. Walking boosts blood flow and helps prevent pneumonia and constipation.  
  · Avoid strenuous activities, such as bicycle riding, jogging, weight lifting, or heavy aerobic exercise, until your doctor says it is okay.  
  · For 3 months, avoid activities that strain your chest or upper arm muscles. This includes pushing a  or vacuum, mopping floors, or swinging a golf club or tennis racquet.  
  · For 2 to 3 months, avoid lifting anything that would make you strain. This may include a child, heavy grocery bags and milk containers, a heavy briefcase or backpack, or cat litter or dog food bags.  
  · Hold a pillow firmly over your chest incision when you cough or take deep breaths. This will support your chest and reduce your pain.  
  · Do breathing exercises at home as instructed by your doctor. This will help prevent pneumonia.  
  · Ask your doctor when you can drive again.  
  · You will probably need to take 4 to 12 weeks off from work. It depends on the type of work you do and how you feel.  
  · You may shower as usual. Pat the incision dry. Do not take a bath for the first 3 weeks, or until your doctor tells you it is okay.  
  · Do not swim or use a hot tub for at least 1 month, or until your doctor says it is okay.   · Ask your doctor when it is okay for you to have sex. Diet 
  · Eat a heart-healthy diet. If you have not been eating this way, talk to your doctor. You also may want to talk to a dietitian. A dietitian can help you learn about healthy foods.  
  · Drink plenty of fluids (unless your doctor tells you not to).  
  · You may notice that your bowel movements are not regular right after your surgery. This is common. Try to avoid constipation and straining with bowel movements. You may want to take a fiber supplement every day. If you have not had a bowel movement after a couple of days, ask your doctor about taking a mild laxative. Medicines 
  · Your doctor will tell you if and when you can restart your medicines. He or she will also give you instructions about taking any new medicines.  
  · If you take blood thinners, such as warfarin (Coumadin), clopidogrel (Plavix), or aspirin, be sure to talk to your doctor. He or she will tell you if and when to start taking those medicines again. Make sure that you understand exactly what your doctor wants you to do.  
  · Your doctor may give you medicines to prevent blood clots, keep your heartbeat steady, and lower your blood pressure and cholesterol. Take your medicines exactly as prescribed. Call your doctor if you think you are having a problem with your medicine.  
  · Be safe with medicines. Take pain medicines exactly as directed. ¨ If the doctor gave you a prescription medicine for pain, take it as prescribed. ¨ If you are not taking a prescription pain medicine, ask your doctor if you can take an over-the-counter medicine. ¨ Do not take aspirin, ibuprofen (Advil, Motrin), naproxen (Aleve), or other nonsteroidal anti-inflammatory drugs (NSAIDs) unless your doctor says it is okay.  
  · If you think your pain medicine is making you sick to your stomach: 
¨ Take your medicine after meals (unless your doctor has told you not to). ¨ Ask your doctor for a different pain medicine.  
  · If your doctor prescribed antibiotics, take them as directed. Do not stop taking them just because you feel better. You need to take the full course of antibiotics. Incision care 
  · If you have strips of tape on the incisions the doctor made, leave the tape on for a week or until it falls off.  
  · Wash the area daily with warm, soapy water, and pat it dry. Don't use hydrogen peroxide or alcohol, which can slow healing. You may cover the area with a gauze bandage if it weeps or rubs against clothing. Change the bandage every day.  
  · Keep the area clean and dry.  
  · Do not use any creams, lotions, powders, ointments, or oils unless your doctor tells you it is okay.  
  · If you have an incision in your leg: ¨ Wear support stockings on your legs during the day for the first 2 weeks. You can take the stockings off at night while you sleep. ¨ Raise your legs above the level of your heart whenever you lay down for the first 4 to 6 weeks. Other instructions 
  · Keep track of your weight. Weigh yourself every day at the same time of day, on the same scale, in the same amount of clothing. A sudden increase in weight can be a sign of a problem with your heart. Tell your doctor if you suddenly gain weight, such as 3 pounds or more in 2 to 3 days.  
  · Do not smoke. Smoking can make it harder for you to recover and it will raise the chances of your arteries narrowing again. If you need help quitting, talk to your doctor about stop-smoking programs and medicines. These can increase your chances of quitting for good. Follow-up care is a key part of your treatment and safety. Be sure to make and go to all appointments, and call your doctor if you are having problems. It's also a good idea to know your test results and keep a list of the medicines you take. When should you call for help? Call 911 anytime you think you may need emergency care. For example, call if: 
  · You passed out (lost consciousness).  
  · You have severe trouble breathing.  
  · You have sudden chest pain and shortness of breath, or you cough up blood.  
  · You have severe pain in your chest.  
  · You have symptoms of a heart attack. These may include: ¨ Chest pain or pressure, or a strange feeling in the chest. 
¨ Sweating. ¨ Shortness of breath. ¨ Nausea or vomiting. ¨ Pain, pressure, or a strange feeling in the back, neck, jaw, or upper belly or in one or both shoulders or arms. ¨ Lightheadedness or sudden weakness. ¨ A fast or irregular heartbeat. After you call 911, the  may tell you to chew 1 adult-strength or 2 to 4 low-dose aspirin. Wait for an ambulance. Do not try to drive yourself.  
  · You have angina symptoms (such as chest pain or pressure) that do not go away with rest or are not getting better within 5 minutes after you take a dose of nitroglycerin.  
 Call your doctor now or seek immediate medical care if: 
  · You have pain that does not get better after you take pain medicine.  
  · You have a fever over 100°F.  
  · You have loose stitches, or your incision comes open.  
  · Bright red blood has soaked through the bandage over your incision.  
  · You have signs of infection, such as: 
¨ Increased pain, swelling, warmth, or redness. ¨ Red streaks leading from the incision. ¨ Pus draining from the incision. ¨ Swollen lymph nodes in your neck, armpits, or groin. ¨ A fever.  
  · You have signs of a blood clot in a leg. If you had a vein removed from your leg, you may have tenderness and swelling while your leg heals. But signs of a blood clot may be in a different part of your leg and may include: 
¨ Pain in your calf, back of the knee, thigh, or groin. ¨ Redness and swelling in your leg or groin.  
  · Your heartbeat feels very fast or slow, skips beats, or flutters.   · You are dizzy or lightheaded, or you feel like you may faint.  
  · You have new or increased shortness of breath.  
 Watch closely for changes in your health, and be sure to contact your doctor if: 
  · You gain weight suddenly, such as 3 pounds or more in 2 to 3 days.  
  · You have increased swelling in your legs, ankles, or feet.  
  · You have any concerns about your incision.  
  · You feel very sad or have other signs of depression, such as trouble sleeping or eating.  
  · You have questions about diet, exercise, quitting smoking, or stress reduction after surgery. Where can you learn more? Go to http://mya-solo.info/. Enter F759 in the search box to learn more about \"Coronary Artery Bypass Graft: What to Expect at Home. \" Current as of: December 6, 2017 Content Version: 11.8 © 6081-7323 [employer]. Care instructions adapted under license by "[employer] (which disclaims liability or warranty for this information). If you have questions about a medical condition or this instruction, always ask your healthcare professional. [place] [address] any warranty or liability for your use of this information. Reducing Heart Attack Risk With Daily Medicine: Care Instructions Your Care Instructions Heart disease is the number one cause of death. If you are at risk for heart disease, there are many medicines that can reduce your risk. These include: · ACE inhibitors or ARBs. These are types of blood pressure medicines. They can reduce the risk of heart attacks and strokes if you are at high risk. · Statin medicines. These lower cholesterol. They can also reduce the risk of heart disease and strokes. · Aspirin and other antiplatelets. These prevent blood clots. They can help certain people lower their risk of a heart attack or stroke. · Beta-blocker medicines.  These are a type of blood pressure and heart medicine. They can reduce the chance of early death if you have had a heart attack. All medicines can cause side effects. So it is important to understand the pros and cons of any medicine you take. It is also important to take your medicines exactly as your doctor tells you to. Follow-up care is a key part of your treatment and safety. Be sure to make and go to all appointments, and call your doctor if you are having problems. It's also a good idea to know your test results and keep a list of the medicines you take. ACE inhibitors ACE (angiotensin-converting enzyme) inhibitors are used for three main reasons. They lower blood pressure, protect the kidneys, and prevent heart attacks and strokes. Examples include benazepril (Lotensin), lisinopril (Prinivil, Zestril), and ramipril (Altace). An angiotensin II receptor blocker (ARB) may be used instead of an ACE inhibitor. ARBs help you in the same ways as ACE inhibitors. Examples include candesartan (Atacand), irbesartan (Avapro), losartan (Cozaar). Before you start taking an ACE inhibitor or an ARB, make sure your doctor knows if: 
· You are taking a water pill (diuretic). · You are taking potassium pills or using salt substitutes. · You are pregnant or breastfeeding. · You have had a kidney transplant or other kidney problems. ACE inhibitors and ARBs can cause side effects. Call your doctor right away if you have: · Trouble breathing. · Swelling in your face, head, neck, or tongue. · Dizziness or lightheadedness. · A dry cough. Statins Statins lower cholesterol. Examples include atorvastatin (Lipitor), lovastatin (Mevacor), pravastatin (Pravachol), and simvastatin (Zocor). Before you start taking a statin, make sure your doctor knows if: 
· You have had a kidney transplant or other kidney problems. · You have liver disease. · You take any other prescription medicine, over-the-counter medicine, vitamins, supplements, or herbal remedies. · You are pregnant or breastfeeding. Statins can cause side effects. Call your doctor right away if you have: · New, severe muscle aches. · Brown urine. Aspirin Taking an aspirin every day can lower your risk for a heart attack. A heart attack occurs when a blood vessel in the heart gets blocked. When this happens, oxygen can't get to the heart muscle, and part of the heart dies. Aspirin can help prevent blood clots that can block the blood vessels. Talk to your doctor before you start taking aspirin every day. He or she may recommend that you take one low-dose aspirin (81 mg) tablet each day, with a meal and a full glass of water. Taking aspirin isn't right for everyone. This is because it can cause serious bleeding. And you may not be able to use aspirin if you: 
· Have asthma. · Have an ulcer or other stomach problem. · Take some other medicine (called a blood thinner) that prevents blood clots. · Are allergic to aspirin. Before having a surgery or procedure, tell your doctor or dentist that you take aspirin. He or she will tell you if you should stop taking aspirin beforehand. Make sure that you understand exactly what your doctor wants you to do. Aspirin can cause side effects. Call your doctor right away if you have: · Unusual bleeding or bruising. · Nausea, vomiting, or heartburn. · Black or bloody stools. Beta-blockers Beta-blockers are used for three main reasons. They lower blood pressure, relieve angina symptoms (such as chest pain or pressure), and reduce the chances of a second heart attack. They include atenolol (Tenormin), carvedilol (Coreg), and metoprolol (Lopressor). Before you start taking a beta-blocker, make sure your doctor knows if you have: · Severe asthma or frequent asthma attacks. · A very slow pulse (less than 55 beats a minute). Beta-blockers can cause side effects. Call your doctor right away if you have: · Wheezing or trouble breathing. · Dizziness or lightheadedness. · Asthma that gets worse. When should you call for help? Watch closely for changes in your health, and be sure to contact your doctor if you have any problems. Where can you learn more? Go to http://mya-solo.info/. Enter R428 in the search box to learn more about \"Reducing Heart Attack Risk With Daily Medicine: Care Instructions. \" Current as of: December 6, 2017 Content Version: 11.8 © 7457-8616 [employer]. Care instructions adapted under license by [employer] (which disclaims liability or warranty for this information). If you have questions about a medical condition or this instruction, always ask your healthcare professional. [place] [address] any warranty or liability for your use of this information. Aortic Valve Replacement Surgery: What to Expect at Home Your Recovery You have had surgery to replace your heart's aortic valve. Your doctor did the surgery through a cut, called an incision, in your chest. 
You will feel tired and sore for the first few weeks after surgery. You may have some brief, sharp pains on either side of your chest. Your chest, shoulders, and upper back may ache. The incision in your chest may be sore or swollen. These symptoms usually get better after 4 to 6 weeks. You will probably be able to do many of your usual activities after 4 to 6 weeks. But for at least 6 weeks, you will not be able to lift heavy objects or do activities that strain your chest or upper arm muscles. At first you may notice that you get tired easily and need to rest often. It may take 1 to 2 months to get your energy back. Some people find that they are more emotional after this surgery. You may cry easily or show emotion in ways that are unusual for you. This is common and may last for up to a year.  Some people get depressed after this surgery. Talk with your doctor if you have sadness that continues or you are concerned about how you are feeling. Treatment and other support can help you feel better. Even though you have a new aortic valve, it is still important to eat a heart-healthy diet, get regular exercise, not smoke, take your heart medicines, and reduce stress. Your doctor may recommend that you work with a nurse, a dietitian, and a physical therapist to make these changes. This is sometimes called cardiac rehabilitation. This care sheet gives you a general idea about how long it will take for you to recover. But each person recovers at a different pace. Follow the steps below to get better as quickly as possible. How can you care for yourself at home? Activity 
  · Rest when you feel tired. Getting enough sleep will help you recover. Try to sleep on your back while your breastbone (sternum) heals. This usually takes about 4 to 6 weeks.  
  · Try to walk each day. Start by walking a little more than you did the day before. Bit by bit, increase the amount you walk. Walking boosts blood flow and helps prevent pneumonia and constipation.  
  · Avoid strenuous activities, such as bicycle riding, jogging, weight lifting, or heavy aerobic exercise, until your doctor says it is okay.  
  · For 3 months, avoid activities that strain your chest or upper arm muscles. This includes pushing a  or vacuum, mopping floors, or swinging a golf club or tennis racquet.  
  · For at least 6 weeks, avoid lifting anything that would make you strain. This may include a child, heavy grocery bags and milk containers, a heavy briefcase or backpack, or cat litter or dog food bags.  
  · Hold a pillow firmly over your chest incision when you cough or take deep breaths. This will support your chest and reduce your pain.  
  · Do breathing exercises at home as instructed by your doctor. This will help prevent pneumonia.   · Ask your doctor when you can drive again.  
  · You will probably need to take 4 to 12 weeks off from work. It depends on the type of work you do and how you feel.  
  · You may shower as usual. Pat the incision dry. Do not take a bath for the first 3 weeks, or until your doctor tells you it is okay.  
  · Do not swim or use a hot tub for at least 1 month, or until your doctor says it is okay.  
  · Ask your doctor when it is okay for you to have sex. Diet 
  · Eat a heart-healthy, low-salt diet. If you have not been eating this way, talk to your doctor. You also may want to talk to a dietitian. A dietitian can help you plan meals and learn about healthy foods.  
  · Drink plenty of fluids (unless your doctor tells you not to).  
  · You may notice that your bowel movements are not regular right after your surgery. This is common. Try to avoid constipation and straining with bowel movements. You may want to take a fiber supplement every day. If you have not had a bowel movement after a couple of days, ask your doctor about taking a mild laxative. Medicines 
  · Your doctor will tell you if and when you can restart your medicines. He or she will also give you instructions about taking any new medicines.  
  · If you take blood thinners, such as warfarin (Coumadin), clopidogrel (Plavix), or aspirin, be sure to talk to your doctor. He or she will tell you if and when to start taking those medicines again. Make sure that you understand exactly what your doctor wants you to do.  
  · Be safe with medicines. Take your medicines exactly as prescribed. Call your doctor if you think you are having a problem with your medicine.  
  · Take pain medicines exactly as directed. ¨ If the doctor gave you a prescription medicine for pain, take it as prescribed. ¨ If you are not taking a prescription pain medicine, ask your doctor if you can take an over-the-counter medicine. ¨ Do not take aspirin, ibuprofen (Advil, Motrin), naproxen (Aleve), or other nonsteroidal anti-inflammatory drugs (NSAIDs) unless your doctor says it is okay.  
  · If you think your pain medicine is making you sick to your stomach: 
¨ Take your medicine after meals (unless your doctor has told you not to). ¨ Ask your doctor for a different pain medicine.  
  · If your doctor prescribed antibiotics, take them as directed. Do not stop taking them just because you feel better. You need to take the full course of antibiotics.  
  · Your doctor may give you a blood thinner to prevent blood clots. If you take a blood thinner, be sure you get instructions about how to take your medicine safely. Blood thinners can cause serious bleeding problems. Incision care 
  · If you have strips of tape on the incision the doctor made, leave the tape on for a week or until it falls off.  
  · Wash the area daily with warm, soapy water and pat it dry. Don't use hydrogen peroxide or alcohol, which may delay healing. You may cover the area with a gauze bandage if it weeps or rubs against clothing. Change the bandage every day.  
  · Keep the area clean and dry. Other instructions 
  · Keep track of your weight. Weigh yourself every day at the same time of day, on the same scale, in the same amount of clothing. A sudden increase in weight can be a sign of a problem with your heart. Tell your doctor if you suddenly gain weight, such as 3 pounds or more in 2 to 3 days.  
  · Be sure to tell all your doctors and your dentist that you have an artificial aortic valve. This is important, because you may need to take antibiotics before certain procedures to prevent infection. Follow-up care is a key part of your treatment and safety. Be sure to make and go to all appointments, and call your doctor if you are having problems. It's also a good idea to know your test results and keep a list of the medicines you take. When should you call for help? Call 911 anytime you think you may need emergency care. For example, call if: 
  · You passed out (lost consciousness).  
  · You have trouble breathing.  
  · You have severe pain in your chest.  
  · You have symptoms of a stroke. These may include: 
¨ Sudden numbness, tingling, weakness, or loss of movement in your face, arm, or leg, especially on only one side of your body. ¨ Sudden vision changes. ¨ Sudden trouble speaking. ¨ Sudden confusion or trouble understanding simple statements. ¨ Sudden problems with walking or balance. ¨ A sudden, severe headache that is different from past headaches.  
  · You have symptoms of a heart attack. These may include: ¨ Chest pain or pressure, or a strange feeling in the chest. 
¨ Sweating. ¨ Shortness of breath. ¨ Nausea or vomiting. ¨ Pain, pressure, or a strange feeling in the back, neck, jaw, or upper belly or in one or both shoulders or arms. ¨ Lightheadedness or sudden weakness. ¨ A fast or irregular heartbeat.  
 After you call 911, the  may tell you to chew 1 adult-strength or 2 to 4 low-dose aspirin. Wait for an ambulance. Do not try to drive yourself. 
 Call your doctor now or seek immediate medical care if: 
  · You have pain that does not get better after you take pain medicine.  
  · You have loose stitches, or your incision comes open.  
  · Bright red blood has soaked through the bandage over your incision.  
  · You have signs of infection, such as: 
¨ Increased pain, swelling, warmth, or redness. ¨ Red streaks leading from the incision. ¨ Pus draining from the incision. ¨ A fever.  
  · You have signs of a blood clot, such as: 
¨ Pain in your calf, back of the knee, thigh, or groin. ¨ Redness and swelling in your leg or groin.  
  · You have new or changed symptoms of heart failure, such as: ¨ New or increased shortness of breath. ¨ New or worse swelling in your legs, ankles, or feet. ¨ Sudden weight gain, such as more than 2 to 3 pounds in a day or 5 pounds in a week. (Your doctor may suggest a different range of weight gain.) ¨ Feeling dizzy or lightheaded or like you may faint. ¨ Feeling so tired or weak that you cannot do your usual activities. ¨ Not sleeping well. Shortness of breath wakes you at night. You need extra pillows to prop yourself up to breathe easier.  
 Watch closely for changes in your health, and be sure to contact your doctor if you have any problems. Where can you learn more? Go to http://mya-solo.info/. Enter N712 in the search box to learn more about \"Aortic Valve Replacement Surgery: What to Expect at Home. \" Current as of: February 14, 2018 Content Version: 11.8 © 4612-4200 [employer]. Care instructions adapted under license by [employer] (which disclaims liability or warranty for this information). If you have questions about a medical condition or this instruction, always ask your healthcare professional. [address] any warranty or liability for your use of this information. Heart-Healthy Diet: Care Instructions Your Care Instructions A heart-healthy diet has lots of vegetables, fruits, nuts, beans, and whole grains, and is low in salt. It limits foods that are high in saturated fat, such as meats, cheeses, and fried foods. It may be hard to change your diet, but even small changes can lower your risk of heart attack and heart disease. Follow-up care is a key part of your treatment and safety. Be sure to make and go to all appointments, and call your doctor if you are having problems. It's also a good idea to know your test results and keep a list of the medicines you take. How can you care for yourself at home? Watch your portions · Learn what a serving is. A \"serving\" and a \"portion\" are not always the same thing.  Make sure that you are not eating larger portions than are recommended. For example, a serving of pasta is ½ cup. A serving size of meat is 2 to 3 ounces. A 3-ounce serving is about the size of a deck of cards. Measure serving sizes until you are good at [place]" them. Keep in mind that restaurants often serve portions that are 2 or 3 times the size of one serving. · To keep your energy level up and keep you from feeling hungry, eat often but in smaller portions. · Eat only the number of calories you need to stay at a healthy weight. If you need to lose weight, eat fewer calories than your body burns (through exercise and other physical activity). Eat more fruits and vegetables · Eat a variety of fruit and vegetables every day. Dark green, deep orange, red, or yellow fruits and vegetables are especially good for you. Examples include spinach, carrots, peaches, and berries. · Keep carrots, celery, and other veggies handy for snacks. Buy fruit that is in season and store it where you can see it so that you will be tempted to eat it. · Cook dishes that have a lot of veggies in them, such as stir-fries and soups. Limit saturated and trans fat · Read food labels, and try to avoid saturated and trans fats. They increase your risk of heart disease. Trans fat is found in many processed foods such as cookies and crackers. · Use olive or canola oil when you cook. Try cholesterol-lowering spreads, such as Benecol or Take Control. · Bake, broil, grill, or steam foods instead of frying them. · Choose lean meats instead of high-fat meats such as hot dogs and sausages. Cut off all visible fat when you prepare meat. · Eat fish, skinless poultry, and meat alternatives such as soy products instead of high-fat meats. Soy products, such as tofu, may be especially good for your heart. · Choose low-fat or fat-free milk and dairy products. Eat fish · Eat at least two servings of fish a week.  Certain fish, such as salmon and tuna, contain omega-3 fatty acids, which may help reduce your risk of heart attack. Eat foods high in fiber · Eat a variety of grain products every day. Include whole-grain foods that have lots of fiber and nutrients. Examples of whole-grain foods include oats, whole wheat bread, and brown rice. · Buy whole-grain breads and cereals, instead of white bread or pastries. Limit salt and sodium · Limit how much salt and sodium you eat to help lower your blood pressure. · Taste food before you salt it. Add only a little salt when you think you need it. With time, your taste buds will adjust to less salt. · Eat fewer snack items, fast foods, and other high-salt, processed foods. Check food labels for the amount of sodium in packaged foods. · Choose low-sodium versions of canned goods (such as soups, vegetables, and beans). Limit sugar · Limit drinks and foods with added sugar. These include candy, desserts, and soda pop. Limit alcohol · Limit alcohol to no more than 2 drinks a day for men and 1 drink a day for women. Too much alcohol can cause health problems. When should you call for help? Watch closely for changes in your health, and be sure to contact your doctor if: 
  · You would like help planning heart-healthy meals. Where can you learn more? Go to http://mya-solo.info/. Enter V137 in the search box to learn more about \"Heart-Healthy Diet: Care Instructions. \" Current as of: December 6, 2017 Content Version: 11.8 © 5657-9943 [employer]. Care instructions adapted under license by [employer] (which disclaims liability or warranty for this information). If you have questions about a medical condition or this instruction, always ask your healthcare professional. [place] [address] any warranty or liability for your use of this information. DISCHARGE SUMMARY from Nurse PATIENT INSTRUCTIONS: 
 
 
F-face looks uneven A-arms unable to move or move unevenly S-speech slurred or non-existent T-time-call 911 as soon as signs and symptoms begin-DO NOT go Back to bed or wait to see if you get better-TIME IS BRAIN. Warning Signs of HEART ATTACK Call 911 if you have these symptoms: 
? Chest discomfort. Most heart attacks involve discomfort in the center of the chest that lasts more than a few minutes, or that goes away and comes back. It can feel like uncomfortable pressure, squeezing, fullness, or pain. ? Discomfort in other areas of the upper body. Symptoms can include pain or discomfort in one or both arms, the back, neck, jaw, or stomach. ? Shortness of breath with or without chest discomfort. ? Other signs may include breaking out in a cold sweat, nausea, or lightheadedness. Don't wait more than five minutes to call [address]! Fast action can save your life. Calling 911 is almost always the fastest way to get lifesaving treatment. Emergency Medical Services staff can begin treatment when they arrive  up to an hour sooner than if someone gets to the hospital by car. The discharge information has been reviewed with the {PATIENT PARENT GUARDIAN:27415}. The {PATIENT PARENT GUARDIAN:60933} verbalized understanding. Discharge medications reviewed with the {Dishcarge meds reviewed ZRWE:55592} and appropriate educational materials and side effects teaching were provided. ___________________________________________________________________________________________________________________________________ MyChart Announcement We are excited to announce that we are making your provider's discharge notes available to you in Vice Media[employer]. You will see these notes when they are completed and signed by the physician that discharged you from your recent hospital stay.   If you have any questions or concerns about any information you see in RÃƒÂ¶sler miniDaT, please call the Health Information Department where you were seen or reach out to your Primary Care Provider for more information about your plan of care. Introducing [facility]! [employer] introduces Rocketskatest patient portal. Now you can access parts of your medical record, email your doctor's office, and request medication refills online. 1. In your internet browser, go to [email or web address] 2. Click on the First Time User? Click Here link in the Sign In box. You will see the New Member Sign Up page. 3. Enter your RÃƒÂ¶sler miniDaT Access Code exactly as it appears below. You will not need to use this code after youve completed the sign-up process. If you do not sign up before the expiration date, you must request a new code. · RÃƒÂ¶sler miniDaT Access Code: [email or web address] Expires: 12/4/2018 10:02 AM 
 
4. Enter the last four digits of your Social Security Number (xxxx) and Date of Birth (mm/dd/yyyy) as indicated and click Submit. You will be taken to the next sign-up page. 5. Create a RÃƒÂ¶sler miniDaT ID. This will be your RÃƒÂ¶sler miniDaT login ID and cannot be changed, so think of one that is secure and easy to remember. 6. Create a RÃƒÂ¶sler miniDaT password. You can change your password at any time. 7. Enter your Password Reset Question and Answer. This can be used at a later time if you forget your password. 8. Enter your e-mail address. You will receive e-mail notification when new information is available in [address] 9. Click Sign Up. You can now view and download portions of your medical record. 10. Click the Download Summary menu link to download a portable copy of your medical information. If you have questions, please visit the Frequently Asked Questions section of the RÃƒÂ¶sler miniDaT website. Remember, RÃƒÂ¶sler miniDaT is NOT to be used for urgent needs. For medical emergencies, dial 911. Now available from your iPhone and Android! Introducing Anthony Marvin As a [facility] patient, I wanted to make you aware of our electronic visit tool called Anthony Marvin. LifeShield allows you to connect within minutes with a medical provider 24 hours a day, seven days a week via a mobile device or tablet or logging into a secure website from your computer. You can access Anthony Castrolinafin from anywhere in the [place]. A virtual visit might be right for you when you have a simple condition and feel like you just dont want to get out of bed, or cant get away from work for an appointment, when your regular [facility] provider is not available (evenings, weekends or holidays), or when youre out of town and need minor care. Electronic visits cost only $49 and if the [employer] provider determines a prescription is needed to treat your condition, one can be electronically transmitted to a nearby pharmacy*. Please take a moment to enroll today if you have not already done so. The enrollment process is free and takes just a few minutes. To enroll, please download the LifeShield mark to your tablet or phone, or visit [email or web address] to enroll on your computer. And, as an [address] [place] [address] patient with a Probity account, the results of your visits will be scanned into your electronic medical record and your primary care provider will be able to view the scanned results. We urge you to continue to see your regular [facility] provider for your ongoing medical care. And while your primary care provider may not be the one available when you seek a Anthony Matthewlinafin virtual visit, the peace of mind you get from getting a real diagnosis real time can be priceless. For more information on Anthony Matthewsamaria, view our Frequently Asked Questions (FAQs) at [email or web address]. Sincerely, 
 
Marissa Arevalo MD 
Chief Medical Officer Karla Villarreal *:  certain medications cannot be prescribed via Anthony Marvin Providers Seen During Your Hospitalization Provider Specialty Primary office phone Lety Rizzo MD Cardiothoracic Surgery [phone] Immunizations Administered for This Admission Name Date  
 TB Skin Test (PPD) Intradermal  Deferred (), 10/3/2018 Your Primary Care Physician (PCP) Primary Care Physician Office Phone Office Fax [address] [phone] You are allergic to the following Allergen Reactions Lortab (Hydrocodone-Acetaminophen) Other (comments)  
 hallucinations Recent Documentation Height Weight BMI Smoking Status 1.778 m 102.2 kg 32.34 kg/m2 Never Smoker Emergency Contacts Name Discharge Info Relation Home Work Mobile Garett Villarreal [22] [phone] Patient Belongings The following personal items are in your possession at time of discharge: 
  Dental Appliances: None  Visual Aid: Glasses      Home Medications: None   Jewelry: None  Clothing: Socks, Undergarments, Footwear, Shirt, Pants    Other Valuables: None Discharge Instructions Attachments/References OXYCODONE/ACETAMINOPHEN (BY MOUTH) (ENGLISH) SECONDHAND SMOKE (ENGLISH) SMOKING: STOPPING (ENGLISH) Patient Handouts Oxycodone/Acetaminophen (By mouth) Acetaminophen (z-mqyh-t-MIN-oh-fen), Oxycodone Hydrochloride (ue-t-ZAT-done jayesh-droe-KLOR-kelly) Treats moderate to moderately severe pain. This medicine is a narcotic pain reliever. Brand Name(s): Endocet, Percocet, Primlev, Xartemis XR There may be other brand names for this medicine. When This Medicine Should Not Be Used: This medicine is not right for everyone. Do not use it if you had an allergic reaction to acetaminophen or oxycodone, or if you have serious breathing problems or paralytic ileus. How to Use This Medicine:  
Capsule, Liquid, Tablet, Long Acting Tablet · Your doctor will tell you how much medicine to use. Do not use more than directed. · An overdose can be dangerous. Follow directions carefully so you do not get too much medicine at one time. · Oral liquid: Measure the oral liquid medicine with a marked measuring spoon, oral syringe, or medicine cup. · Swallow the extended-release tablet whole. Do not crush, break, or chew it. Do not lick or wet the tablet before placing it in your mouth. Do not give this medicine through a feeding tube. · This medicine should come with a Medication Guide. Ask your pharmacist for a copy if you do not have one. · Missed dose: If you miss a dose of this medicine, skip the missed dose and go back to your regular dosing schedule. Do not double doses. · Store the medicine in a closed container at room temperature, away from heat, moisture, and direct light. Ask your pharmacist about the best way to dispose of medicine you do not use. Drugs and Foods to Avoid: Ask your doctor or pharmacist before using any other medicine, including over-the-counter medicines, vitamins, and herbal products. · Do not use Xartemis XR if you are using or have used an MAO inhibitor in the past 14 days. · Some medicines can affect how this medicine works. Tell your doctor if you are using any of the following: ¨ Carbamazepine, erythromycin, ketoconazole, lamotrigine, mirtazapine, naltrexone, phenytoin, propranolol, rifampin, ritonavir, tramadol, trazodone, or zidovudine ¨ Birth control pills ¨ Diuretic (water pill) ¨ Medicine to treat depression ¨ Phenothiazine medicine ¨ Triptan medicine to treat migraine headaches · Do not drink alcohol while you are using this medicine. Acetaminophen can damage your liver, and alcohol can increase this risk. Do not take acetaminophen without asking your doctor if you have 3 or more drinks of alcohol every day. · Tell your doctor if you use anything else that makes you sleepy.  Some examples are allergy medicine, narcotic pain medicine, and alcohol. Tell your doctor if you are using buprenorphine, butorphanol, nalbuphine, pentazocine, a benzodiazepine, or a muscle relaxer. Warnings While Using This Medicine: · Tell your doctor if you are pregnant or breastfeeding, or if you have kidney disease, liver disease, heart disease, low blood pressure, breathing problems or lung disease (such as asthma, COPD), thyroid problems, Fabian disease, pancreas or gallbladder problems, prostate problems, trouble urinating, or a stomach problems, or a history of head injury or brain damage, seizures, or alcohol or drug abuse. Tell your doctor if you are allergic to codeine. · This medicine may cause the following problems: 
¨ High risk of overdose, which can lead to death ¨ Respiratory depression (serious breathing problem that can be life-threatening) ¨ Liver problems ¨ Serious skin reactions ¨ Serotonin syndrome (when used with certain medicines) · This medicine may make you dizzy or drowsy. Do not drive or do anything that could be dangerous until you know how this medicine affects you. Sit or lie down if you feel dizzy. Stand up carefully. · This medicine contains acetaminophen. Read the labels of all other medicines you are using to see if they also contain acetaminophen, or ask your doctor or pharmacist. Eleazar Lee Ann not use more than 4 grams (4,000 milligrams) total of acetaminophen in one day. · This medicine can be habit-forming. Do not use more than your prescribed dose. Call your doctor if you think your medicine is not working. · Do not stop using this medicine suddenly. Your doctor will need to slowly decrease your dose before you stop it completely. · This medicine could cause infertility. Talk with your doctor before using this medicine if you plan to have children. · This medicine may cause constipation, especially with long-term use.  Ask your doctor if you should use a laxative to prevent and treat constipation. · Keep all medicine out of the reach of children. Never share your medicine with anyone. Possible Side Effects While Using This Medicine:  
Call your doctor right away if you notice any of these side effects: · Allergic reaction: Itching or hives, swelling in your face or hands, swelling or tingling in your mouth or throat, chest tightness, trouble breathing · Anxiety, restlessness, fast heartbeat, fever, muscle spasms, twitching, diarrhea, seeing or hearing things that are not there · Blistering, peeling, red skin rash · Blue lips, fingernails, or skin · Dark urine or pale stools, loss of appetite, stomach pain, yellow skin or eyes · Extreme weakness, shallow breathing, uneven heartbeat, seizures, sweating, or cold or clammy skin · Severe confusion, lightheadedness, dizziness, or fainting · Severe constipation, nausea, or vomiting · Trouble breathing or slow breathing If you notice these less serious side effects, talk with your doctor:  
· Headache · Mild constipation, nausea, or vomiting · Mild sleepiness or drowsiness If you notice other side effects that you think are caused by this medicine, tell your doctor. Call your doctor for medical advice about side effects. You may report side effects to FDA at 0-303-FDA-4153 © 2017 [address] Information is for End User's use only and may not be sold, redistributed or otherwise used for commercial purposes. The above information is an  only. It is not intended as medical advice for individual conditions or treatments. Talk to your doctor, nurse or pharmacist before following any medical regimen to see if it is safe and effective for you. Secondhand Smoke: Care Instructions Your Care Instructions Secondhand smoke comes from the burning end of a cigarette, cigar, or pipe and the smoke that a smoker exhales. The smoke contains nicotine and many other harmful chemicals. Breathing secondhand smoke can cause or worsen health problems including cancer, asthma, coronary artery disease, and respiratory infections. It can make your eyes and nose burn and cause a sore throat. Secondhand smoke is especially bad for babies and young children whose lungs are still developing. Babies whose parents smoke are more likely to have ear infections, pneumonia, and bronchitis in the first few years of their lives. Secondhand smoke can make asthma symptoms worse in children. If you are pregnant, it is important that you not smoke and that you avoid secondhand smoke. You are more likely to give birth to a baby who weighs less than expected (low birth weight) if you smoke. And your baby may have a greater risk for sudden infant death syndrome (SIDS). Babies whose mothers are exposed to secondhand smoke during pregnancy have a higher risk for health problems. Follow-up care is a key part of your treatment and safety. Be sure to make and go to all appointments, and call your doctor if you are having problems. It's also a good idea to know your test results and keep a list of the medicines you take. How can you care for yourself at home? · Do not smoke or let anyone else smoke in your home. If people must smoke, ask them to go outside. · If people do smoke in your home, choose a room where you can open a window or use a fan to get the smoke outside. · Do not let anyone smoke in your car. If someone must smoke, pull over in a safe place and let him or her smoke away from the car. · Ask your employer to make sure that you have a smoke-free work area. · Make sure that your children are not exposed to secondhand smoke at day care, school, and after-school programs. · Try to choose nonsmoking bars, restaurants, and other public places when you go out. · Help your family and friends who smoke to quit by encouraging them to try. Tell them about treatment resources. Having support from others often helps. · If you smoke, quit. Quitting is hard, but there are ways to boost your chance of quitting tobacco for good. ¨ Use nicotine gum, patches, or lozenges. Call a quitline. Ask your doctor about stop-smoking programs and medicines. ¨ Keep trying. When should you call for help? Watch closely for changes in your health, and be sure to contact your doctor if you have any problems. Where can you learn more? Go to http://myaTuxebosolo.info/. Enter L004 in the search box to learn more about \"Secondhand Smoke: Care Instructions. \" Current as of: November 29, 2017 Content Version: 11.8 © 2767-5707 [employer]. Care instructions adapted under license by [employer] (which disclaims liability or warranty for this information). If you have questions about a medical condition or this instruction, always ask your healthcare professional. [place] [address] any warranty or liability for your use of this information. Stopping Smoking: Care Instructions Your Care Instructions Cigarette smokers crave the nicotine in cigarettes. Giving it up is much harder than simply changing a habit. Your body has to stop craving the nicotine. It is hard to quit, but you can do it. There are many tools that people use to quit smoking. You may find that combining tools works best for you. There are several steps to quitting. First you get ready to quit. Then you get support to help you. After that, you learn new skills and behaviors to become a nonsmoker. For many people, a necessary step is getting and using medicine. Your doctor will help you set up the plan that best meets your needs. You may want to attend a smoking cessation program to help you quit smoking. When you choose a program, look for one that has proven success. Ask your doctor for ideas. You will greatly increase your chances of success if you take medicine as well as get counseling or join a cessation program. 
Some of the changes you feel when you first quit tobacco are uncomfortable. Your body will miss the nicotine at first, and you may feel short-tempered and grumpy. You may have trouble sleeping or concentrating. Medicine can help you deal with these symptoms. You may struggle with changing your smoking habits and rituals. The last step is the tricky one: Be prepared for the smoking urge to continue for a time. This is a lot to deal with, but keep at it. You will feel better. Follow-up care is a key part of your treatment and safety. Be sure to make and go to all appointments, and call your doctor if you are having problems. It's also a good idea to know your test results and keep a list of the medicines you take. How can you care for yourself at home? · Ask your family, friends, and coworkers for support. You have a better chance of quitting if you have help and support. · Join a support group, such as Nicotine Anonymous, for people who are trying to quit smoking. · Consider signing up for a smoking cessation program, such as the American Lung Association's Freedom from Smoking program. 
· Get text messaging support. Go to the website at www.smokefree. gov to sign up for the [facility] program. 
· Set a quit date. Pick your date carefully so that it is not right in the middle of a big deadline or stressful time. Once you quit, do not even take a puff. Get rid of all ashtrays and lighters after your last cigarette. Clean your house and your clothes so that they do not smell of smoke. · Learn how to be a nonsmoker. Think about ways you can avoid those things that make you reach for a cigarette. ¨ Avoid situations that put you at greatest risk for smoking.  For some people, it is hard to have a drink with friends without smoking. For others, they might skip a coffee break with coworkers who smoke. ¨ Change your daily routine. Take a different route to work or eat a meal in a different place. · Cut down on stress. Calm yourself or release tension by doing an activity you enjoy, such as reading a book, taking a hot bath, or gardening. · Talk to your doctor or pharmacist about nicotine replacement therapy, which replaces the nicotine in your body. You still get nicotine but you do not use tobacco. Nicotine replacement products help you slowly reduce the amount of nicotine you need. These products come in several forms, many of them available over-the-counter: ¨ Nicotine patches ¨ Nicotine gum and lozenges ¨ Nicotine inhaler · Ask your doctor about bupropion (Wellbutrin) or varenicline (Chantix), which are prescription medicines. They do not contain nicotine. They help you by reducing withdrawal symptoms, such as stress and anxiety. · Some people find hypnosis, acupuncture, and massage helpful for ending the smoking habit. · Eat a healthy diet and get regular exercise. Having healthy habits will help your body move past its craving for nicotine. · Be prepared to keep trying. Most people are not successful the first few times they try to quit. Do not get mad at yourself if you smoke again. Make a list of things you learned and think about when you want to try again, such as next week, next month, or next year. Where can you learn more? Go to http://mya-solo.info/. Enter H158 in the search box to learn more about \"Stopping Smoking: Care Instructions. \" Current as of: November 29, 2017 Content Version: 11.8 © 8042-6484 [employer]. Care instructions adapted under license by [employer] (which disclaims liability or warranty for this information).  If you have questions about a medical condition or this instruction, always ask your healthcare professional. [address] any warranty or liability for your use of this information. Please provide this summary of care documentation to your next provider. Signatures-by signing, you are acknowledging that this After Visit Summary has been reviewed with you and you have received a copy. Patient Signature:  ____________________________________________________________ Date:  ____________________________________________________________  
  
[facility] Signature:  ____________________________________________________________ Date:  ____________________________________________________________

## 2018-10-04 ENCOUNTER — APPOINTMENT (OUTPATIENT)
Dept: GENERAL RADIOLOGY | Age: 68
DRG: 220 | End: 2018-10-04
Attending: THORACIC SURGERY (CARDIOTHORACIC VASCULAR SURGERY)
Payer: MEDICARE

## 2018-10-04 LAB
ANION GAP SERPL CALC-SCNC: 5 MMOL/L (ref 7–16)
ATRIAL RATE: 86 BPM
BUN SERPL-MCNC: 13 MG/DL (ref 8–23)
CALCIUM SERPL-MCNC: 7.2 MG/DL (ref 8.3–10.4)
CALCULATED P AXIS, ECG09: 39 DEGREES
CALCULATED R AXIS, ECG10: 14 DEGREES
CALCULATED T AXIS, ECG11: 137 DEGREES
CHLORIDE SERPL-SCNC: 110 MMOL/L (ref 98–107)
CO2 SERPL-SCNC: 24 MMOL/L (ref 21–32)
CREAT SERPL-MCNC: 0.96 MG/DL (ref 0.8–1.5)
DIAGNOSIS, 93000: NORMAL
ERYTHROCYTE [DISTWIDTH] IN BLOOD BY AUTOMATED COUNT: 12.7 %
GLUCOSE BLD STRIP.AUTO-MCNC: 116 MG/DL (ref 65–100)
GLUCOSE BLD STRIP.AUTO-MCNC: 120 MG/DL (ref 65–100)
GLUCOSE BLD STRIP.AUTO-MCNC: 129 MG/DL (ref 65–100)
GLUCOSE BLD STRIP.AUTO-MCNC: 132 MG/DL (ref 65–100)
GLUCOSE BLD STRIP.AUTO-MCNC: 149 MG/DL (ref 65–100)
GLUCOSE SERPL-MCNC: 124 MG/DL (ref 65–100)
HCT VFR BLD AUTO: 33.5 % (ref 41.1–50.3)
HGB BLD-MCNC: 10.7 G/DL (ref 13.6–17.2)
MAGNESIUM SERPL-MCNC: 2.2 MG/DL (ref 1.8–2.4)
MCH RBC QN AUTO: 29.4 PG (ref 26.1–32.9)
MCHC RBC AUTO-ENTMCNC: 31.9 G/DL (ref 31.4–35)
MCV RBC AUTO: 92 FL (ref 79.6–97.8)
MM INDURATION POC: 0 MM (ref 0–5)
NRBC # BLD: 0 K/UL (ref 0–0.2)
P-R INTERVAL, ECG05: 198 MS
PLATELET # BLD AUTO: 72 K/UL (ref 150–450)
PMV BLD AUTO: 10.6 FL (ref 9.4–12.3)
POTASSIUM SERPL-SCNC: 4.6 MMOL/L (ref 3.5–5.1)
PPD POC: NORMAL NEGATIVE
Q-T INTERVAL, ECG07: 450 MS
QRS DURATION, ECG06: 158 MS
QTC CALCULATION (BEZET), ECG08: 538 MS
RBC # BLD AUTO: 3.64 M/UL (ref 4.23–5.6)
SODIUM SERPL-SCNC: 139 MMOL/L (ref 136–145)
VENTRICULAR RATE, ECG03: 86 BPM
WBC # BLD AUTO: 8.7 K/UL (ref 4.3–11.1)

## 2018-10-04 PROCEDURE — 82962 GLUCOSE BLOOD TEST: CPT

## 2018-10-04 PROCEDURE — 97110 THERAPEUTIC EXERCISES: CPT

## 2018-10-04 PROCEDURE — 93005 ELECTROCARDIOGRAM TRACING: CPT | Performed by: THORACIC SURGERY (CARDIOTHORACIC VASCULAR SURGERY)

## 2018-10-04 PROCEDURE — 74011250636 HC RX REV CODE- 250/636: Performed by: THORACIC SURGERY (CARDIOTHORACIC VASCULAR SURGERY)

## 2018-10-04 PROCEDURE — 74011000250 HC RX REV CODE- 250: Performed by: THORACIC SURGERY (CARDIOTHORACIC VASCULAR SURGERY)

## 2018-10-04 PROCEDURE — 71045 X-RAY EXAM CHEST 1 VIEW: CPT

## 2018-10-04 PROCEDURE — 97161 PT EVAL LOW COMPLEX 20 MIN: CPT

## 2018-10-04 PROCEDURE — 99233 SBSQ HOSP IP/OBS HIGH 50: CPT | Performed by: INTERNAL MEDICINE

## 2018-10-04 PROCEDURE — 74011250637 HC RX REV CODE- 250/637: Performed by: THORACIC SURGERY (CARDIOTHORACIC VASCULAR SURGERY)

## 2018-10-04 PROCEDURE — 36592 COLLECT BLOOD FROM PICC: CPT

## 2018-10-04 PROCEDURE — 83735 ASSAY OF MAGNESIUM: CPT

## 2018-10-04 PROCEDURE — 77010033678 HC OXYGEN DAILY

## 2018-10-04 PROCEDURE — 80048 BASIC METABOLIC PNL TOTAL CA: CPT

## 2018-10-04 PROCEDURE — 85027 COMPLETE CBC AUTOMATED: CPT

## 2018-10-04 PROCEDURE — 65660000004 HC RM CVT STEPDOWN

## 2018-10-04 RX ORDER — POTASSIUM CHLORIDE 750 MG/1
10 TABLET, EXTENDED RELEASE ORAL DAILY
Status: COMPLETED | OUTPATIENT
Start: 2018-10-04 | End: 2018-10-06

## 2018-10-04 RX ORDER — ZOLPIDEM TARTRATE 5 MG/1
5 TABLET ORAL
Status: DISCONTINUED | OUTPATIENT
Start: 2018-10-04 | End: 2018-10-08 | Stop reason: HOSPADM

## 2018-10-04 RX ORDER — METOPROLOL TARTRATE 25 MG/1
25 TABLET, FILM COATED ORAL EVERY 12 HOURS
Status: DISCONTINUED | OUTPATIENT
Start: 2018-10-04 | End: 2018-10-08

## 2018-10-04 RX ORDER — TRAMADOL HYDROCHLORIDE 50 MG/1
100 TABLET ORAL
Status: DISCONTINUED | OUTPATIENT
Start: 2018-10-04 | End: 2018-10-08 | Stop reason: HOSPADM

## 2018-10-04 RX ORDER — SODIUM CHLORIDE 0.9 % (FLUSH) 0.9 %
5-10 SYRINGE (ML) INJECTION AS NEEDED
Status: DISCONTINUED | OUTPATIENT
Start: 2018-10-04 | End: 2018-10-08 | Stop reason: HOSPADM

## 2018-10-04 RX ORDER — FUROSEMIDE 20 MG/1
20 TABLET ORAL DAILY
Status: COMPLETED | OUTPATIENT
Start: 2018-10-04 | End: 2018-10-05

## 2018-10-04 RX ORDER — SODIUM CHLORIDE 0.9 % (FLUSH) 0.9 %
5-10 SYRINGE (ML) INJECTION EVERY 8 HOURS
Status: DISCONTINUED | OUTPATIENT
Start: 2018-10-04 | End: 2018-10-08 | Stop reason: HOSPADM

## 2018-10-04 RX ORDER — POTASSIUM CHLORIDE 20 MEQ/1
20 TABLET, EXTENDED RELEASE ORAL
Status: DISCONTINUED | OUTPATIENT
Start: 2018-10-04 | End: 2018-10-08 | Stop reason: HOSPADM

## 2018-10-04 RX ORDER — ATORVASTATIN CALCIUM 40 MG/1
80 TABLET, FILM COATED ORAL
Status: DISCONTINUED | OUTPATIENT
Start: 2018-10-04 | End: 2018-10-08 | Stop reason: HOSPADM

## 2018-10-04 RX ORDER — MUPIROCIN 20 MG/G
OINTMENT TOPICAL 2 TIMES DAILY
Status: DISCONTINUED | OUTPATIENT
Start: 2018-10-04 | End: 2018-10-05

## 2018-10-04 RX ORDER — LANOLIN ALCOHOL/MO/W.PET/CERES
400 CREAM (GRAM) TOPICAL
Status: DISCONTINUED | OUTPATIENT
Start: 2018-10-04 | End: 2018-10-08 | Stop reason: HOSPADM

## 2018-10-04 RX ORDER — POTASSIUM CHLORIDE 20 MEQ/1
40 TABLET, EXTENDED RELEASE ORAL
Status: DISCONTINUED | OUTPATIENT
Start: 2018-10-04 | End: 2018-10-08 | Stop reason: HOSPADM

## 2018-10-04 RX ORDER — ASPIRIN 81 MG/1
81 TABLET ORAL DAILY
Status: DISCONTINUED | OUTPATIENT
Start: 2018-10-04 | End: 2018-10-08 | Stop reason: HOSPADM

## 2018-10-04 RX ORDER — OXYCODONE AND ACETAMINOPHEN 10; 325 MG/1; MG/1
1 TABLET ORAL
Status: DISCONTINUED | OUTPATIENT
Start: 2018-10-04 | End: 2018-10-08 | Stop reason: HOSPADM

## 2018-10-04 RX ORDER — ACETAMINOPHEN 10 MG/ML
1000 INJECTION, SOLUTION INTRAVENOUS ONCE
Status: COMPLETED | OUTPATIENT
Start: 2018-10-04 | End: 2018-10-05

## 2018-10-04 RX ORDER — AMOXICILLIN 250 MG
2 CAPSULE ORAL
Status: DISCONTINUED | OUTPATIENT
Start: 2018-10-04 | End: 2018-10-06

## 2018-10-04 RX ORDER — AMIODARONE HYDROCHLORIDE 200 MG/1
200 TABLET ORAL EVERY 12 HOURS
Status: DISCONTINUED | OUTPATIENT
Start: 2018-10-04 | End: 2018-10-06

## 2018-10-04 RX ORDER — ACETAMINOPHEN 325 MG/1
650 TABLET ORAL
Status: DISCONTINUED | OUTPATIENT
Start: 2018-10-04 | End: 2018-10-08 | Stop reason: HOSPADM

## 2018-10-04 RX ORDER — MAG HYDROX/ALUMINUM HYD/SIMETH 200-200-20
30 SUSPENSION, ORAL (FINAL DOSE FORM) ORAL
Status: DISCONTINUED | OUTPATIENT
Start: 2018-10-04 | End: 2018-10-08 | Stop reason: HOSPADM

## 2018-10-04 RX ORDER — LOSARTAN POTASSIUM 50 MG/1
25 TABLET ORAL DAILY
Status: DISCONTINUED | OUTPATIENT
Start: 2018-10-05 | End: 2018-10-08 | Stop reason: HOSPADM

## 2018-10-04 RX ADMIN — SENNOSIDES AND DOCUSATE SODIUM 2 TABLET: 8.6; 5 TABLET ORAL at 20:47

## 2018-10-04 RX ADMIN — OXYCODONE HYDROCHLORIDE AND ACETAMINOPHEN 1 TABLET: 5; 325 TABLET ORAL at 07:51

## 2018-10-04 RX ADMIN — OXYCODONE HYDROCHLORIDE AND ACETAMINOPHEN 1 TABLET: 10; 325 TABLET ORAL at 11:34

## 2018-10-04 RX ADMIN — FAMOTIDINE 20 MG: 10 INJECTION, SOLUTION INTRAVENOUS at 07:50

## 2018-10-04 RX ADMIN — ACETAMINOPHEN 1000 MG: 10 INJECTION, SOLUTION INTRAVENOUS at 15:22

## 2018-10-04 RX ADMIN — ATORVASTATIN CALCIUM 80 MG: 40 TABLET, FILM COATED ORAL at 20:47

## 2018-10-04 RX ADMIN — Medication 2 G: at 03:31

## 2018-10-04 RX ADMIN — TAPENTADOL HYDROCHLORIDE 100 MG: 50 TABLET, FILM COATED ORAL at 13:55

## 2018-10-04 RX ADMIN — POTASSIUM CHLORIDE 10 MEQ: 10 TABLET, EXTENDED RELEASE ORAL at 11:34

## 2018-10-04 RX ADMIN — TRAMADOL HYDROCHLORIDE 100 MG: 50 TABLET, FILM COATED ORAL at 16:17

## 2018-10-04 RX ADMIN — Medication 5 ML: at 06:00

## 2018-10-04 RX ADMIN — METOPROLOL TARTRATE 25 MG: 25 TABLET ORAL at 20:48

## 2018-10-04 RX ADMIN — Medication 10 ML: at 13:55

## 2018-10-04 RX ADMIN — AMIODARONE HYDROCHLORIDE 200 MG: 200 TABLET ORAL at 07:49

## 2018-10-04 RX ADMIN — OXYCODONE HYDROCHLORIDE AND ACETAMINOPHEN 1 TABLET: 5; 325 TABLET ORAL at 05:42

## 2018-10-04 RX ADMIN — OXYCODONE HYDROCHLORIDE AND ACETAMINOPHEN 1 TABLET: 10; 325 TABLET ORAL at 20:48

## 2018-10-04 RX ADMIN — AMIODARONE HYDROCHLORIDE 200 MG: 200 TABLET ORAL at 20:48

## 2018-10-04 RX ADMIN — MUPIROCIN: 20 OINTMENT TOPICAL at 17:29

## 2018-10-04 RX ADMIN — OXYCODONE HYDROCHLORIDE AND ACETAMINOPHEN 1 TABLET: 5; 325 TABLET ORAL at 01:03

## 2018-10-04 RX ADMIN — METOPROLOL TARTRATE 12.5 MG: 25 TABLET ORAL at 09:00

## 2018-10-04 RX ADMIN — FUROSEMIDE 20 MG: 20 TABLET ORAL at 11:34

## 2018-10-04 RX ADMIN — Medication 10 ML: at 20:48

## 2018-10-04 RX ADMIN — MUPIROCIN: 20 OINTMENT TOPICAL at 07:51

## 2018-10-04 NOTE — PROGRESS NOTES
TRANSFER - OUT REPORT:    Verbal report given to Sina Solano RN(name) on Amalia Barnard  being transferred to [facility](unit) for routine progression of care       Report consisted of patients Situation, Background, Assessment and   Recommendations(SBAR). Information from the following report(s) SBAR, OR Summary, Procedure Summary, Intake/Output, MAR, Recent Results and Cardiac Rhythm NSR with BBB was reviewed with the receiving nurse. Lines:   Peripheral IV 10/03/18 Right Hand (Active)   Site Assessment Clean, dry, & intact 10/4/2018 10:54 AM   Phlebitis Assessment 0 10/4/2018 10:54 AM   Infiltration Assessment 0 10/4/2018 10:54 AM   Dressing Status Clean, dry, & intact 10/4/2018 10:54 AM   Dressing Type Transparent;Tape 10/4/2018 10:54 AM   Hub Color/Line Status Green;Patent 10/4/2018 10:54 AM        Opportunity for questions and clarification was provided. Patient transported with:   Registered Nurse       Dual skin assessment completed with RN. Allevyn pulled back, all skin assessed. PIV patent, dsg C/D/I. Incisions documented under designated flowsheets. Sacrum benign. Otherwise, skin warm, dry, intact, and appropriate for ethnicity. Allevyn replaced.

## 2018-10-04 NOTE — PROGRESS NOTES
Problem: Mobility Impaired (Adult and Pediatric)  Goal: *Acute Goals and Plan of Care (Insert Text)  STG:  (1.)Mr. Edu Khan will move from supine to sit and sit to supine  with CONTACT GUARD ASSIST within 3 treatment day(s). (2.)Mr. Edu Khan will transfer from bed to chair and chair to bed with STAND BY ASSIST using the least restrictive device within 3 treatment day(s). (3.)Mr. Mcfarland will ambulate with CONTACT GUARD ASSIST for 250+ feet with the least restrictive device within 3 treatment day(s). LTG:  (1.)Mr. Edu Khan will move from supine to sit and sit to supine  in bed with SUPERVISION within 7 treatment day(s). (2.)Mr. Edu Khan will transfer from bed to chair and chair to bed with SUPERVISION using the least restrictive device within 7 treatment day(s). (3.)Mr. Mcfarland will ambulate with SUPERVISION for 500+ feet with the least restrictive device within 7 treatment day(s). ________________________________________________________________________________________________       Physical Therapy Note:     Patient requested PT to return later. He was in too much pain. RN aware of patient's request and complaints. Will check back on patient later as time and schedule permits. Thank you.     Jun Nuñez, PTA

## 2018-10-04 NOTE — DISCHARGE INSTRUCTIONS
Coronary Artery Bypass Graft: What to Expect at Home  Your Recovery    Coronary artery bypass graft (CABG) is surgery to treat coronary artery disease. The surgery helps blood make a detour, or bypass, around one or more narrowed or blocked coronary arteries. Coronary arteries are the blood vessels that bring blood to the heart. Your doctor did the surgery through a cut, called an incision, in your chest.  You will feel tired and sore for the first few weeks after surgery. You may have some brief, sharp pains on either side of your chest. Your chest, shoulders, and upper back may ache. The incision in your chest and the area where the healthy vein was taken may be sore or swollen. These symptoms usually get better after 4 to 6 weeks. You will probably be able to do many of your usual activities after 4 to 6 weeks. But for 2 to 3 months you will not be able to lift heavy objects or do activities that strain your chest or upper arm muscles. At first you may notice that you get tired easily and need to rest often. It may take 1 to 2 months to get your energy back. Some people find that they are more emotional after this surgery. You may cry easily or show emotion in ways that are unusual for you. This is common and may last for up to a year. Some people get depressed after CABG surgery. Talk with your doctor if you have sadness that continues or you are concerned about how you are feeling. Treatment and other support can help you feel better. Even though the surgery may improve your symptoms, you will still need to make changes in your lifestyle to lower your risk of a heart attack or stroke. It will be important to eat a heart-healthy diet, get regular exercise, not smoke, take your heart medicines, and reduce stress.   You will likely start a cardiac rehabilitation (rehab) program in the hospital. You will continue with this rehab program after you go home to help you recover and prevent problems with your heart. Talk to your doctor about whether rehab is right for you. This care sheet gives you a general idea about how long it will take for you to recover. But each person recovers at a different pace. Follow the steps below to get better as quickly as possible. How can you care for yourself at home? Activity    · Rest when you feel tired. Getting enough sleep will help you recover. Try to sleep on your back for 4 to 6 weeks while your breastbone (sternum) heals. This usually takes about 4 to 6 weeks.     · Try to walk each day. Start by walking a little more than you did the day before. Bit by bit, increase the amount you walk. Walking boosts blood flow and helps prevent pneumonia and constipation.     · Avoid strenuous activities, such as bicycle riding, jogging, weight lifting, or heavy aerobic exercise, until your doctor says it is okay.     · For 3 months, avoid activities that strain your chest or upper arm muscles. This includes pushing a  or vacuum, mopping floors, or swinging a golf club or tennis racquet.     · For 2 to 3 months, avoid lifting anything that would make you strain. This may include a child, heavy grocery bags and milk containers, a heavy briefcase or backpack, or cat litter or dog food bags.     · Hold a pillow firmly over your chest incision when you cough or take deep breaths. This will support your chest and reduce your pain.     · Do breathing exercises at home as instructed by your doctor. This will help prevent pneumonia.     · Ask your doctor when you can drive again.     · You will probably need to take 4 to 12 weeks off from work. It depends on the type of work you do and how you feel.     · You may shower as usual. Pat the incision dry. Do not take a bath for the first 3 weeks, or until your doctor tells you it is okay.     · Do not swim or use a hot tub for at least 1 month, or until your doctor says it is okay.     · Ask your doctor when it is okay for you to have sex. Diet    · Eat a heart-healthy diet. If you have not been eating this way, talk to your doctor. You also may want to talk to a dietitian. A dietitian can help you learn about healthy foods.     · Drink plenty of fluids (unless your doctor tells you not to).     · You may notice that your bowel movements are not regular right after your surgery. This is common. Try to avoid constipation and straining with bowel movements. You may want to take a fiber supplement every day. If you have not had a bowel movement after a couple of days, ask your doctor about taking a mild laxative. Medicines    · Your doctor will tell you if and when you can restart your medicines. He or she will also give you instructions about taking any new medicines.     · If you take blood thinners, such as warfarin (Coumadin), clopidogrel (Plavix), or aspirin, be sure to talk to your doctor. He or she will tell you if and when to start taking those medicines again. Make sure that you understand exactly what your doctor wants you to do.     · Your doctor may give you medicines to prevent blood clots, keep your heartbeat steady, and lower your blood pressure and cholesterol. Take your medicines exactly as prescribed. Call your doctor if you think you are having a problem with your medicine.     · Be safe with medicines. Take pain medicines exactly as directed. ¨ If the doctor gave you a prescription medicine for pain, take it as prescribed. ¨ If you are not taking a prescription pain medicine, ask your doctor if you can take an over-the-counter medicine. ¨ Do not take aspirin, ibuprofen (Advil, Motrin), naproxen (Aleve), or other nonsteroidal anti-inflammatory drugs (NSAIDs) unless your doctor says it is okay.     · If you think your pain medicine is making you sick to your stomach:  ¨ Take your medicine after meals (unless your doctor has told you not to).   ¨ Ask your doctor for a different pain medicine.     · If your doctor prescribed antibiotics, take them as directed. Do not stop taking them just because you feel better. You need to take the full course of antibiotics. Incision care    · If you have strips of tape on the incisions the doctor made, leave the tape on for a week or until it falls off.     · Wash the area daily with warm, soapy water, and pat it dry. Don't use hydrogen peroxide or alcohol, which can slow healing. You may cover the area with a gauze bandage if it weeps or rubs against clothing. Change the bandage every day.     · Keep the area clean and dry.     · Do not use any creams, lotions, powders, ointments, or oils unless your doctor tells you it is okay.     · If you have an incision in your leg:  ¨ Wear support stockings on your legs during the day for the first 2 weeks. You can take the stockings off at night while you sleep. ¨ Raise your legs above the level of your heart whenever you lay down for the first 4 to 6 weeks. Other instructions    · Keep track of your weight. Weigh yourself every day at the same time of day, on the same scale, in the same amount of clothing. A sudden increase in weight can be a sign of a problem with your heart. Tell your doctor if you suddenly gain weight, such as 3 pounds or more in 2 to 3 days.     · Do not smoke. Smoking can make it harder for you to recover and it will raise the chances of your arteries narrowing again. If you need help quitting, talk to your doctor about stop-smoking programs and medicines. These can increase your chances of quitting for good. Follow-up care is a key part of your treatment and safety. Be sure to make and go to all appointments, and call your doctor if you are having problems. It's also a good idea to know your test results and keep a list of the medicines you take. When should you call for help? Call 911 anytime you think you may need emergency care.  For example, call if:    · You passed out (lost consciousness).     · You have severe trouble breathing.     · You have sudden chest pain and shortness of breath, or you cough up blood.     · You have severe pain in your chest.     · You have symptoms of a heart attack. These may include:  ¨ Chest pain or pressure, or a strange feeling in the chest.  ¨ Sweating. ¨ Shortness of breath. ¨ Nausea or vomiting. ¨ Pain, pressure, or a strange feeling in the back, neck, jaw, or upper belly or in one or both shoulders or arms. ¨ Lightheadedness or sudden weakness. ¨ A fast or irregular heartbeat. After you call 911, the  may tell you to chew 1 adult-strength or 2 to 4 low-dose aspirin. Wait for an ambulance. Do not try to drive yourself.     · You have angina symptoms (such as chest pain or pressure) that do not go away with rest or are not getting better within 5 minutes after you take a dose of nitroglycerin.    Call your doctor now or seek immediate medical care if:    · You have pain that does not get better after you take pain medicine.     · You have a fever over 100°F.     · You have loose stitches, or your incision comes open.     · Bright red blood has soaked through the bandage over your incision.     · You have signs of infection, such as:  ¨ Increased pain, swelling, warmth, or redness. ¨ Red streaks leading from the incision. ¨ Pus draining from the incision. ¨ Swollen lymph nodes in your neck, armpits, or groin. ¨ A fever.     · You have signs of a blood clot in a leg. If you had a vein removed from your leg, you may have tenderness and swelling while your leg heals. But signs of a blood clot may be in a different part of your leg and may include:  ¨ Pain in your calf, back of the knee, thigh, or groin.   ¨ Redness and swelling in your leg or groin.     · Your heartbeat feels very fast or slow, skips beats, or flutters.     · You are dizzy or lightheaded, or you feel like you may faint.     · You have new or increased shortness of breath.    Watch closely for changes in your health, and be sure to contact your doctor if:    · You gain weight suddenly, such as 3 pounds or more in 2 to 3 days.     · You have increased swelling in your legs, ankles, or feet.     · You have any concerns about your incision.     · You feel very sad or have other signs of depression, such as trouble sleeping or eating.     · You have questions about diet, exercise, quitting smoking, or stress reduction after surgery. Where can you learn more? Go to http://mya-solo.info/. Enter F759 in the search box to learn more about \"Coronary Artery Bypass Graft: What to Expect at Home. \"  Current as of: December 6, 2017  Content Version: 11.8  © 3030-0921 [email or web address]. Care instructions adapted under license by [employer] (which disclaims liability or warranty for this information). If you have questions about a medical condition or this instruction, always ask your healthcare professional. [place] [address] any warranty or liability for your use of this information. Reducing Heart Attack Risk With Daily Medicine: Care Instructions  Your Care Instructions    Heart disease is the number one cause of death. If you are at risk for heart disease, there are many medicines that can reduce your risk. These include:  · ACE inhibitors or ARBs. These are types of blood pressure medicines. They can reduce the risk of heart attacks and strokes if you are at high risk. · Statin medicines. These lower cholesterol. They can also reduce the risk of heart disease and strokes. · Aspirin and other antiplatelets. These prevent blood clots. They can help certain people lower their risk of a heart attack or stroke. · Beta-blocker medicines. These are a type of blood pressure and heart medicine. They can reduce the chance of early death if you have had a heart attack. All medicines can cause side effects.  So it is important to understand the pros and cons of any medicine you take. It is also important to take your medicines exactly as your doctor tells you to. Follow-up care is a key part of your treatment and safety. Be sure to make and go to all appointments, and call your doctor if you are having problems. It's also a good idea to know your test results and keep a list of the medicines you take. ACE inhibitors  ACE (angiotensin-converting enzyme) inhibitors are used for three main reasons. They lower blood pressure, protect the kidneys, and prevent heart attacks and strokes. Examples include benazepril (Lotensin), lisinopril (Prinivil, Zestril), and ramipril (Altace). An angiotensin II receptor blocker (ARB) may be used instead of an ACE inhibitor. ARBs help you in the same ways as ACE inhibitors. Examples include candesartan (Atacand), irbesartan (Avapro), losartan (Cozaar). Before you start taking an ACE inhibitor or an ARB, make sure your doctor knows if:  · You are taking a water pill (diuretic). · You are taking potassium pills or using salt substitutes. · You are pregnant or breastfeeding. · You have had a kidney transplant or other kidney problems. ACE inhibitors and ARBs can cause side effects. Call your doctor right away if you have:  · Trouble breathing. · Swelling in your face, head, neck, or tongue. · Dizziness or lightheadedness. · A dry cough. Statins  Statins lower cholesterol. Examples include atorvastatin (Lipitor), lovastatin (Mevacor), pravastatin (Pravachol), and simvastatin (Zocor). Before you start taking a statin, make sure your doctor knows if:  · You have had a kidney transplant or other kidney problems. · You have liver disease. · You take any other prescription medicine, over-the-counter medicine, vitamins, supplements, or herbal remedies. · You are pregnant or breastfeeding. Statins can cause side effects. Call your doctor right away if you have:  · New, severe muscle aches. · Brown urine.   Aspirin  Taking an aspirin every day can lower your risk for a heart attack. A heart attack occurs when a blood vessel in the heart gets blocked. When this happens, oxygen can't get to the heart muscle, and part of the heart dies. Aspirin can help prevent blood clots that can block the blood vessels. Talk to your doctor before you start taking aspirin every day. He or she may recommend that you take one low-dose aspirin (81 mg) tablet each day, with a meal and a full glass of water. Taking aspirin isn't right for everyone. This is because it can cause serious bleeding. And you may not be able to use aspirin if you:  · Have asthma. · Have an ulcer or other stomach problem. · Take some other medicine (called a blood thinner) that prevents blood clots. · Are allergic to aspirin. Before having a surgery or procedure, tell your doctor or dentist that you take aspirin. He or she will tell you if you should stop taking aspirin beforehand. Make sure that you understand exactly what your doctor wants you to do. Aspirin can cause side effects. Call your doctor right away if you have:  · Unusual bleeding or bruising. · Nausea, vomiting, or heartburn. · Black or bloody stools. Beta-blockers  Beta-blockers are used for three main reasons. They lower blood pressure, relieve angina symptoms (such as chest pain or pressure), and reduce the chances of a second heart attack. They include atenolol (Tenormin), carvedilol (Coreg), and metoprolol (Lopressor). Before you start taking a beta-blocker, make sure your doctor knows if you have:  · Severe asthma or frequent asthma attacks. · A very slow pulse (less than 55 beats a minute). Beta-blockers can cause side effects. Call your doctor right away if you have:  · Wheezing or trouble breathing. · Dizziness or lightheadedness. · Asthma that gets worse. When should you call for help? Watch closely for changes in your health, and be sure to contact your doctor if you have any problems. Where can you learn more?   Go to http://mya-solo.info/. Enter R428 in the search box to learn more about \"Reducing Heart Attack Risk With Daily Medicine: Care Instructions. \"  Current as of: December 6, 2017  Content Version: 11.8  © 5302-8081 [employer]. Care instructions adapted under license by [employer] (which disclaims liability or warranty for this information). If you have questions about a medical condition or this instruction, always ask your healthcare professional. [address] any warranty or liability for your use of this information. Aortic Valve Replacement Surgery: What to Expect at [address] [place] [address] Drive have had surgery to replace your heart's aortic valve. Your doctor did the surgery through a cut, called an incision, in your chest.  You will feel tired and sore for the first few weeks after surgery. You may have some brief, sharp pains on either side of your chest. Your chest, shoulders, and upper back may ache. The incision in your chest may be sore or swollen. These symptoms usually get better after 4 to 6 weeks. You will probably be able to do many of your usual activities after 4 to 6 weeks. But for at least 6 weeks, you will not be able to lift heavy objects or do activities that strain your chest or upper arm muscles. At first you may notice that you get tired easily and need to rest often. It may take 1 to 2 months to get your energy back. Some people find that they are more emotional after this surgery. You may cry easily or show emotion in ways that are unusual for you. This is common and may last for up to a year. Some people get depressed after this surgery. Talk with your doctor if you have sadness that continues or you are concerned about how you are feeling. Treatment and other support can help you feel better.   Even though you have a new aortic valve, it is still important to eat a heart-healthy diet, get regular exercise, not smoke, take your heart medicines, and reduce stress. Your doctor may recommend that you work with a nurse, a dietitian, and a physical therapist to make these changes. This is sometimes called cardiac rehabilitation. This care sheet gives you a general idea about how long it will take for you to recover. But each person recovers at a different pace. Follow the steps below to get better as quickly as possible. How can you care for yourself at home? Activity    · Rest when you feel tired. Getting enough sleep will help you recover. Try to sleep on your back while your breastbone (sternum) heals. This usually takes about 4 to 6 weeks.     · Try to walk each day. Start by walking a little more than you did the day before. Bit by bit, increase the amount you walk. Walking boosts blood flow and helps prevent pneumonia and constipation.     · Avoid strenuous activities, such as bicycle riding, jogging, weight lifting, or heavy aerobic exercise, until your doctor says it is okay.     · For 3 months, avoid activities that strain your chest or upper arm muscles. This includes pushing a  or vacuum, mopping floors, or swinging a golf club or tennis racquet.     · For at least 6 weeks, avoid lifting anything that would make you strain. This may include a child, heavy grocery bags and milk containers, a heavy briefcase or backpack, or cat litter or dog food bags.     · Hold a pillow firmly over your chest incision when you cough or take deep breaths. This will support your chest and reduce your pain.     · Do breathing exercises at home as instructed by your doctor. This will help prevent pneumonia.     · Ask your doctor when you can drive again.     · You will probably need to take 4 to 12 weeks off from work. It depends on the type of work you do and how you feel.     · You may shower as usual. Pat the incision dry.  Do not take a bath for the first 3 weeks, or until your doctor tells you it is okay.     · Do not swim or use a hot tub for at least 1 month, or until your doctor says it is okay.     · Ask your doctor when it is okay for you to have sex. Diet    · Eat a heart-healthy, low-salt diet. If you have not been eating this way, talk to your doctor. You also may want to talk to a dietitian. A dietitian can help you plan meals and learn about healthy foods.     · Drink plenty of fluids (unless your doctor tells you not to).     · You may notice that your bowel movements are not regular right after your surgery. This is common. Try to avoid constipation and straining with bowel movements. You may want to take a fiber supplement every day. If you have not had a bowel movement after a couple of days, ask your doctor about taking a mild laxative. Medicines    · Your doctor will tell you if and when you can restart your medicines. He or she will also give you instructions about taking any new medicines.     · If you take blood thinners, such as warfarin (Coumadin), clopidogrel (Plavix), or aspirin, be sure to talk to your doctor. He or she will tell you if and when to start taking those medicines again. Make sure that you understand exactly what your doctor wants you to do.     · Be safe with medicines. Take your medicines exactly as prescribed. Call your doctor if you think you are having a problem with your medicine.     · Take pain medicines exactly as directed. ¨ If the doctor gave you a prescription medicine for pain, take it as prescribed. ¨ If you are not taking a prescription pain medicine, ask your doctor if you can take an over-the-counter medicine. ¨ Do not take aspirin, ibuprofen (Advil, Motrin), naproxen (Aleve), or other nonsteroidal anti-inflammatory drugs (NSAIDs) unless your doctor says it is okay.     · If you think your pain medicine is making you sick to your stomach:  ¨ Take your medicine after meals (unless your doctor has told you not to).   ¨ Ask your doctor for a different pain medicine.     · If your doctor prescribed antibiotics, take them as directed. Do not stop taking them just because you feel better. You need to take the full course of antibiotics.     · Your doctor may give you a blood thinner to prevent blood clots. If you take a blood thinner, be sure you get instructions about how to take your medicine safely. Blood thinners can cause serious bleeding problems. Incision care    · If you have strips of tape on the incision the doctor made, leave the tape on for a week or until it falls off.     · Wash the area daily with warm, soapy water and pat it dry. Don't use hydrogen peroxide or alcohol, which may delay healing. You may cover the area with a gauze bandage if it weeps or rubs against clothing. Change the bandage every day.     · Keep the area clean and dry. Other instructions    · Keep track of your weight. Weigh yourself every day at the same time of day, on the same scale, in the same amount of clothing. A sudden increase in weight can be a sign of a problem with your heart. Tell your doctor if you suddenly gain weight, such as 3 pounds or more in 2 to 3 days.     · Be sure to tell all your doctors and your dentist that you have an artificial aortic valve. This is important, because you may need to take antibiotics before certain procedures to prevent infection. Follow-up care is a key part of your treatment and safety. Be sure to make and go to all appointments, and call your doctor if you are having problems. It's also a good idea to know your test results and keep a list of the medicines you take. When should you call for help? Call 911 anytime you think you may need emergency care. For example, call if:    · You passed out (lost consciousness).     · You have trouble breathing.     · You have severe pain in your chest.     · You have symptoms of a stroke.  These may include:  ¨ Sudden numbness, tingling, weakness, or loss of movement in your face, arm, or leg, especially on only one side of your body. ¨ Sudden vision changes. ¨ Sudden trouble speaking. ¨ Sudden confusion or trouble understanding simple statements. ¨ Sudden problems with walking or balance. ¨ A sudden, severe headache that is different from past headaches.     · You have symptoms of a heart attack. These may include:  ¨ Chest pain or pressure, or a strange feeling in the chest.  ¨ Sweating. ¨ Shortness of breath. ¨ Nausea or vomiting. ¨ Pain, pressure, or a strange feeling in the back, neck, jaw, or upper belly or in one or both shoulders or arms. ¨ Lightheadedness or sudden weakness. ¨ A fast or irregular heartbeat.    After you call 911, the  may tell you to chew 1 adult-strength or 2 to 4 low-dose aspirin. Wait for an ambulance. Do not try to drive yourself.   Call your doctor now or seek immediate medical care if:    · You have pain that does not get better after you take pain medicine.     · You have loose stitches, or your incision comes open.     · Bright red blood has soaked through the bandage over your incision.     · You have signs of infection, such as:  ¨ Increased pain, swelling, warmth, or redness. ¨ Red streaks leading from the incision. ¨ Pus draining from the incision. ¨ A fever.     · You have signs of a blood clot, such as:  ¨ Pain in your calf, back of the knee, thigh, or groin. ¨ Redness and swelling in your leg or groin.     · You have new or changed symptoms of heart failure, such as:  ¨ New or increased shortness of breath. ¨ New or worse swelling in your legs, ankles, or feet. ¨ Sudden weight gain, such as more than 2 to 3 pounds in a day or 5 pounds in a week. (Your doctor may suggest a different range of weight gain.)  ¨ Feeling dizzy or lightheaded or like you may faint. ¨ Feeling so tired or weak that you cannot do your usual activities. ¨ Not sleeping well. Shortness of breath wakes you at night.  You need extra pillows to prop yourself up to breathe easier.    Watch closely for changes in your health, and be sure to contact your doctor if you have any problems. Where can you learn more? Go to http://mya-solo.info/. Enter T089 in the search box to learn more about \"Aortic Valve Replacement Surgery: What to Expect at Home. \"  Current as of: February 14, 2018  Content Version: 11.8  © 5071-6057 [employer]. Care instructions adapted under license by [employer] (which disclaims liability or warranty for this information). If you have questions about a medical condition or this instruction, always ask your healthcare professional. [place] [address] any warranty or liability for your use of this information. Heart-Healthy Diet: Care Instructions  Your Care Instructions    A heart-healthy diet has lots of vegetables, fruits, nuts, beans, and whole grains, and is low in salt. It limits foods that are high in saturated fat, such as meats, cheeses, and fried foods. It may be hard to change your diet, but even small changes can lower your risk of heart attack and heart disease. Follow-up care is a key part of your treatment and safety. Be sure to make and go to all appointments, and call your doctor if you are having problems. It's also a good idea to know your test results and keep a list of the medicines you take. How can you care for yourself at home? Watch your portions  · Learn what a serving is. A \"serving\" and a \"portion\" are not always the same thing. Make sure that you are not eating larger portions than are recommended. For example, a serving of pasta is ½ cup. A serving size of meat is 2 to 3 ounces. A 3-ounce serving is about the size of a deck of cards. Measure serving sizes until you are good at [place]" them. Keep in mind that restaurants often serve portions that are 2 or 3 times the size of one serving.   · To keep your energy level up and keep you from feeling hungry, eat often but in smaller portions. · Eat only the number of calories you need to stay at a healthy weight. If you need to lose weight, eat fewer calories than your body burns (through exercise and other physical activity). Eat more fruits and vegetables  · Eat a variety of fruit and vegetables every day. Dark green, deep orange, red, or yellow fruits and vegetables are especially good for you. Examples include spinach, carrots, peaches, and berries. · Keep carrots, celery, and other veggies handy for snacks. Buy fruit that is in season and store it where you can see it so that you will be tempted to eat it. · Cook dishes that have a lot of veggies in them, such as stir-fries and soups. Limit saturated and trans fat  · Read food labels, and try to avoid saturated and trans fats. They increase your risk of heart disease. Trans fat is found in many processed foods such as cookies and crackers. · Use olive or canola oil when you cook. Try cholesterol-lowering spreads, such as Benecol or Take Control. · Bake, broil, grill, or steam foods instead of frying them. · Choose lean meats instead of high-fat meats such as hot dogs and sausages. Cut off all visible fat when you prepare meat. · Eat fish, skinless poultry, and meat alternatives such as soy products instead of high-fat meats. Soy products, such as tofu, may be especially good for your heart. · Choose low-fat or fat-free milk and dairy products. Eat fish  · Eat at least two servings of fish a week. Certain fish, such as salmon and tuna, contain omega-3 fatty acids, which may help reduce your risk of heart attack. Eat foods high in fiber  · Eat a variety of grain products every day. Include whole-grain foods that have lots of fiber and nutrients. Examples of whole-grain foods include oats, whole wheat bread, and brown rice. · Buy whole-grain breads and cereals, instead of white bread or pastries.   Limit salt and sodium  · Limit how much salt and sodium you eat to help lower your blood pressure. · Taste food before you salt it. Add only a little salt when you think you need it. With time, your taste buds will adjust to less salt. · Eat fewer snack items, fast foods, and other high-salt, processed foods. Check food labels for the amount of sodium in packaged foods. · Choose low-sodium versions of canned goods (such as soups, vegetables, and beans). Limit sugar  · Limit drinks and foods with added sugar. These include candy, desserts, and soda pop. Limit alcohol  · Limit alcohol to no more than 2 drinks a day for men and 1 drink a day for women. Too much alcohol can cause health problems. When should you call for help? Watch closely for changes in your health, and be sure to contact your doctor if:    · You would like help planning heart-healthy meals. Where can you learn more? Go to http://myaSchedulicitysolo.info/. Enter V137 in the search box to learn more about \"Heart-Healthy Diet: Care Instructions. \"  Current as of: December 6, 2017  Content Version: 11.8  © 2867-9046 [employer]. Care instructions adapted under license by [employer] (which disclaims liability or warranty for this information). If you have questions about a medical condition or this instruction, always ask your healthcare professional. [address] any warranty or liability for your use of this information. DISCHARGE SUMMARY from Nurse    PATIENT INSTRUCTIONS:    After general anesthesia or intravenous sedation, for 24 hours or while taking prescription Narcotics:  · Limit your activities  · Do not drive and operate hazardous machinery  · Do not make important personal or business decisions  · Do  not drink alcoholic beverages  · If you have not urinated within 8 hours after discharge, please contact your surgeon on call.     Report the following to your surgeon:  · Excessive pain, swelling, redness or odor of or around the surgical area  · Temperature over 100.5  · Nausea and vomiting lasting longer than 4 hours or if unable to take medications  · Any signs of decreased circulation or nerve impairment to extremity: change in color, persistent  numbness, tingling, coldness or increase pain  · Any questions        *  Please give a list of your current medications to your Primary Care Provider. *  Please update this list whenever your medications are discontinued, doses are      changed, or new medications (including over-the-counter products) are added. *  Please carry medication information at all times in case of emergency situations. These are general instructions for a healthy lifestyle:    No smoking/ No tobacco products/ Avoid exposure to second hand smoke  Surgeon General's Warning:  Quitting smoking now greatly reduces serious risk to your health. Obesity, smoking, and sedentary lifestyle greatly increases your risk for illness    A healthy diet, regular physical exercise & weight monitoring are important for maintaining a healthy lifestyle    You may be retaining fluid if you have a history of heart failure or if you experience any of the following symptoms:  Weight gain of 3 pounds or more overnight or 5 pounds in a week, increased swelling in our hands or feet or shortness of breath while lying flat in bed. Please call your doctor as soon as you notice any of these symptoms; do not wait until your next office visit. Recognize signs and symptoms of STROKE:    F-face looks uneven    A-arms unable to move or move unevenly    S-speech slurred or non-existent    T-time-call 911 as soon as signs and symptoms begin-DO NOT go       Back to bed or wait to see if you get better-TIME IS BRAIN. Warning Signs of HEART ATTACK     Call 911 if you have these symptoms:   Chest discomfort. Most heart attacks involve discomfort in the center of the chest that lasts more than a few minutes, or that goes away and comes back.  It can feel like uncomfortable pressure, squeezing, fullness, or pain.  Discomfort in other areas of the upper body. Symptoms can include pain or discomfort in one or both arms, the back, neck, jaw, or stomach.  Shortness of breath with or without chest discomfort.  Other signs may include breaking out in a cold sweat, nausea, or lightheadedness. Don't wait more than five minutes to call 911 - MINUTES MATTER! Fast action can save your life. Calling 911 is almost always the fastest way to get lifesaving treatment. Emergency Medical Services staff can begin treatment when they arrive -- up to an hour sooner than if someone gets to the hospital by car. The discharge information has been reviewed with the patient. The patient and caregiver verbalized understanding. Discharge medications reviewed with the patient and caregiver and appropriate educational materials and side effects teaching were provided.   ___________________________________________________________________________________________________________________________________

## 2018-10-04 NOTE — PROGRESS NOTES
Pt states pain now under control after IV acetaminophen. Ambulatory in hallway with  ft. Minimal assistance required, pt denies dizziness or lightheadedness with ambulation, minimal exertional dyspnea noted. Repositioned in recliner for comfort with call light and personal items within reach. Continuing to reinforce use of IS, pt pulling 1250 mL at this time.

## 2018-10-04 NOTE — PROGRESS NOTES
Problem: Mobility Impaired (Adult and Pediatric)  Goal: *Acute Goals and Plan of Care (Insert Text)  STG:  (1.)Mr. Stephanie Tellez will move from supine to sit and sit to supine  with CONTACT GUARD ASSIST within 3 treatment day(s). (2.)Mr. Stephanie Tellez will transfer from bed to chair and chair to bed with STAND BY ASSIST using the least restrictive device within 3 treatment day(s). (3.)Mr. Mcfarland will ambulate with CONTACT GUARD ASSIST for 250+ feet with the least restrictive device within 3 treatment day(s). LTG:  (1.)Mr. Stephanie Tellez will move from supine to sit and sit to supine  in bed with SUPERVISION within 7 treatment day(s). (2.)Mr. Stephanie Tellez will transfer from bed to chair and chair to bed with SUPERVISION using the least restrictive device within 7 treatment day(s). (3.)Mr. Mcfarland will ambulate with SUPERVISION for 500+ feet with the least restrictive device within 7 treatment day(s).   ________________________________________________________________________________________________      PHYSICAL THERAPY: Initial Assessment, Treatment Day: Day of Assessment, AM 10/4/2018  INPATIENT: Hospital Day: 2  [place] / [place] / Product Type: Medicare /      NAME/AGE/GENDER: Mehran Polo is a 76 y.o. male   PRIMARY DIAGNOSIS: Nonrheumatic aortic valve stenosis [I35.0]  Atherosclerosis of native coronary artery of native heart without angina pectoris [I25.10] <principal problem not specified> <principal problem not specified>  Procedure(s) (LRB):  CORONARY ARTERY BYPASS GRAFT X 1, LIMA  WITH AVR (N/A)  ESOPHAGEAL TRANS ECHOCARDIOGRAM (N/A)  1 Day Post-Op  ICD-10: Treatment Diagnosis:    · Generalized Muscle Weakness (M62.81)  · Difficulty in walking, Not elsewhere classified (R26.2)   Precaution/Allergies:  Lortab [hydrocodone-acetaminophen]   Sternal  precautions     ASSESSMENT:     Mr. Stephanie Tellez is sitting up in bedside chair upon contact and agreeable to PT evaluation and treatment this morning. Pt just finished his second bout of gait with nursing. Pt lives alone in 1 story home with 1 step to enter. Pt is independent with gait and ADLs, 0 falls, and drives. Pt does not require supplemental O2 at baseline. Pt performed STS with CGA-Chaparrita demonstrating good static standing balance. Pt able to march in place several steps with fair dynamic balance noted. Will defer gait until PM session per pt request since he just finished a walk. Pt agreeable to exercises in chair. Pt performed exercises in both sitting and reclined position tolerating treatment well. Pt left sitting up in chair with all needs met and within reach. Sisi Nye will benefit from skilled PT (medically necessary) to address decreased strength, decreased balance, decreased functional tolerance, decreased cardiopulmonary endurance affecting participation in basic ADLs and functional tasks. This section established at most recent assessment   PROBLEM LIST (Impairments causing functional limitations):  1. Decreased Strength  2. Decreased ADL/Functional Activities  3. Decreased Transfer Abilities  4. Decreased Ambulation Ability/Technique  5. Decreased Balance  6. Increased Pain  7. Decreased Activity Tolerance  8. Decreased Pacing Skills  9. Increased Fatigue  10. Increased Shortness of Breath  11. Decreased Knowledge of Precautions  12. Decreased [place] with Home Exercise Program   INTERVENTIONS PLANNED: (Benefits and precautions of physical therapy have been discussed with the patient.)  1. Balance Exercise  2. Bed Mobility  3. Family Education  4. Gait Training  5. Home Exercise Program (HEP)  6. Neuromuscular Re-education/Strengthening  7. Range of Motion (ROM)  8. Therapeutic Activites  9. Therapeutic Exercise/Strengthening  10.  Transfer Training     TREATMENT PLAN: Frequency/Duration: twice daily for duration of hospital stay  Rehabilitation Potential For Stated Goals: Good     RECOMMENDED REHABILITATION/EQUIPMENT: (at time of discharge pending progress): Due to the probability of continued deficits (see above) this patient will likely need continued skilled physical therapy after discharge. Equipment:    None at this time              HISTORY:   History of Present Injury/Illness (Reason for Referral):  S/p CORONARY ARTERY BYPASS GRAFT X 1, LIMA  WITH AVR (N/A)  ESOPHAGEAL TRANS ECHOCARDIOGRAM  Past Medical History/Comorbidities:   Mr. Asmita Gardner  has a past medical history of Arthritis; CAD (coronary artery disease) (9/11/2018); Cancer [facility]); GERD (gastroesophageal reflux disease); Hypertension (9/11/2018); Kidney stones; PUD (peptic ulcer disease); and Sleep apnea. Mr. Asmita Gardner  has a past surgical history that includes hx other surgical; hx orthopaedic (2014); hx appendectomy; hx coronary artery bypass graft (10/03/2018); and hx aortic valve replacement (10/03/2018). Social History/Living Environment:   Home Environment: Private residence  # Steps to Enter: 1  One/Two Story Residence: One story  Living Alone: Yes  Support Systems: Family member(s)  Patient Expects to be Discharged to[de-identified] Private residence  Current DME Used/Available at Home: None  Prior Level of Function/Work/Activity:  Lives alone, independent with all mobility, 0 falls, drives   Number of Personal Factors/Comorbidities that affect the Plan of Care: 3+: HIGH COMPLEXITY   EXAMINATION:   Most Recent Physical Functioning:   Gross Assessment:  AROM: Generally decreased, functional  Strength: Generally decreased, functional  Coordination: Generally decreased, functional               Posture:     Balance:  Sitting: Intact; Without support  Standing: Impaired;Pull to stand; With support Bed Mobility:     Wheelchair Mobility:     Transfers:  Sit to Stand: Contact guard assistance;Minimum assistance  Stand to Sit: Contact guard assistance  Gait:            Body Structures Involved:  1. Nerves  2. Heart  3. Lungs  4. Bones  5. Joints  6. Muscles  7. Ligaments Body Functions Affected:  1. Sensory/Pain  2. Cardio  3. Respiratory  4. Neuromusculoskeletal  5. Movement Related Activities and Participation Affected:  1. General Tasks and Demands  2. Mobility  3. Self Care  4. Domestic Life  5. Interpersonal Interactions and Relationships  6. Community, Social and [place] Comfort   Number of elements that affect the Plan of Care: 4+: HIGH COMPLEXITY   CLINICAL PRESENTATION:   Presentation: Evolving clinical presentation with changing clinical characteristics: MODERATE COMPLEXITY   CLINICAL DECISION MAKIN [facility] Mobility Inpatient Short Form  How much difficulty does the patient currently have. .. Unable A Lot A Little None   1. Turning over in bed (including adjusting bedclothes, sheets and blankets)? [] 1   [] 2   [x] 3   [] 4   2. Sitting down on and standing up from a chair with arms ( e.g., wheelchair, bedside commode, etc.)   [] 1   [] 2   [x] 3   [] 4   3. Moving from lying on back to sitting on the side of the bed? [] 1   [] 2   [] 3   [] 4   How much help from another person does the patient currently need. .. Total A Lot A Little None   4. Moving to and from a bed to a chair (including a wheelchair)? [] 1   [] 2   [x] 3   [] 4   5. Need to walk in hospital room? [] 1   [] 2   [x] 3   [] 4   6. Climbing 3-5 steps with a railing? [] 1   [] 2   [x] 3   [] 4   © , Trustees of [facility] [employer], under license to [employer]. All rights reserved      Score:  Initial: 18 Most Recent: X (Date: -- )    Interpretation of Tool:  Represents activities that are increasingly more difficult (i.e. Bed mobility, Transfers, Gait). Score 24 23 22-20 19-15 14-10 9-7 6     Modifier CH CI CJ CK CL CM CN      ?  Mobility - Walking and Moving Around:     - CURRENT STATUS: CK - 40%-59% impaired, limited or restricted    - GOAL STATUS: CJ - 20%-39% impaired, limited or restricted    - D/C STATUS:  ---------------To be determined---------------  [place] / [place] / Product Type: Medicare /      Medical Necessity:     · Patient is expected to demonstrate progress in strength, range of motion, balance, coordination and functional technique to decrease assistance required with gait, transfers, and functional mobility. .  Reason for Services/Other Comments:  · Patient continues to require skilled intervention due to  decreased strength, decreased balance, decreased functional tolerance, decreased cardiopulmonary endurance affecting participation in basic ADLs and functional tasks. Use of outcome tool(s) and clinical judgement create a POC that gives a: Clear prediction of patient's progress: LOW COMPLEXITY            TREATMENT:   (In addition to Assessment/Re-Assessment sessions the following treatments were rendered)   Pre-treatment Symptoms/Complaints:  Post op pain  Pain: Initial:   Pain Intensity 1: 4  Post Session:  Increased with mobility 5-6/10   In addition to evaluation:  Therapeutic Exercise: (10 Minutes):  Exercises per grid below to improve mobility and strength. Required minimal visual, verbal and tactile cues to promote proper body alignment, promote proper body posture and promote proper body mechanics. Progressed range, repetitions and complexity of movement as indicated.      Date:  10/4/18 Date:   Date:     Activity/Exercise Parameters Parameters Parameters   LAQ 10 X B      Alt marches 10 X B      Ankle pumps 10 X B      Quad set 10 X B      Glute set 10 X      Heel slides 10 X B      Hip abduction 10 X B            Braces/Orthotics/Lines/Etc:   · IV  · leigh catheter  · chest tube  · O2 Device: Nasal cannula  Treatment/Session Assessment:    · Response to Treatment:  Exercises in chair, tolerated well  · Interdisciplinary Collaboration:   o Physical Therapist  o Registered Nurse  · After treatment position/precautions:   o Up in chair  o Bed/Chair-wheels locked  o Bed in low position  o Call light within reach  o Nurse at bedside   · Compliance with Program/Exercises: Will assess as treatment progresses. · Recommendations/Intent for next treatment session: \"Next visit will focus on advancements to more challenging activities and reduction in assistance provided\".   Total Treatment Duration:  PT Patient Time In/Time Out  Time In: 0926  Time Out: [place]

## 2018-10-04 NOTE — PROGRESS NOTES
Critical Care Daily Progress Note: 10/4/2018    Brian Lopez   Admission Date: 10/3/2018         The patient's chart is reviewed and the patient is discussed with the staff.     76 y old WM ,s/p CABG x 1 and AVR        Subjective:   extubated last night  up in a chair   On NC    Current Facility-Administered Medications   Medication Dose Route Frequency    aspirin delayed-release tablet 81 mg  81 mg Oral DAILY    0.9% sodium chloride infusion  25 mL/hr IntraVENous CONTINUOUS    dextrose 5% - 0.45% NaCl with KCl 20 mEq/L infusion  25 mL/hr IntraVENous CONTINUOUS    sodium chloride (NS) flush 5-10 mL  5-10 mL IntraVENous Q8H    sodium chloride (NS) flush 5-10 mL  5-10 mL IntraVENous PRN    oxyCODONE-acetaminophen (PERCOCET) 5-325 mg per tablet 1 Tab  1 Tab Oral Q4H PRN    morphine 10 mg/ml injection 3-5 mg  3-5 mg IntraVENous Q1H PRN    naloxone (NARCAN) injection 0.4 mg  0.4 mg IntraVENous PRN    sodium bicarbonate 8.4 % (1 mEq/mL) injection 50 mEq  50 mEq IntraVENous PRN    EPINEPHrine (ADRENALIN) 4 mg in 0.9% sodium chloride 250 mL infusion  0.05-0.1 mcg/kg/min IntraVENous TITRATE    nitroglycerin (Tridil) 200 mcg/ml infusion   mcg/min IntraVENous TITRATE    PHENYLephrine (WILNER-SYNEPHRINE) 30 mg in 0.9% sodium chloride 250 mL infusion   mcg/min IntraVENous TITRATE    lidocaine (PF) (XYLOCAINE) 100 mg/5 mL (2 %) injection syringe  mg   mg IntraVENous ONCE PRN    amiodarone (CORDARONE) tablet 200 mg  200 mg Oral Q12H    metoprolol tartrate (LOPRESSOR) tablet 25 mg  25 mg Oral Q12H    atorvastatin (LIPITOR) tablet 40 mg  40 mg Oral QHS    ondansetron (ZOFRAN) injection 4 mg  4 mg IntraVENous Q4H PRN    insulin regular (NOVOLIN R, HUMULIN R) 100 Units in 0.9% sodium chloride 100 mL infusion  1 Units/hr IntraVENous TITRATE    dextrose (D50W) injection syrg 12.5 g  25 mL IntraVENous PRN    magnesium sulfate 1 g/100 ml IVPB (premix or compounded)  1 g IntraVENous PRN    potassium chloride 10 mEq in 50 ml IVPB  10 mEq IntraVENous PRN    midazolam (VERSED) injection 1 mg  1 mg IntraVENous Q1H PRN    propofol (DIPRIVAN) infusion  0-50 mcg/kg/min IntraVENous TITRATE    chlorhexidine (PERIDEX) 0.12 % mouthwash 10 mL  10 mL Oral BID    tuberculin injection 5 Units  5 Units IntraDERMal ONCE    niCARdipine in Saline (CARDENE) 25 MG/250 mL infusion kit  0-15 mg/hr IntraVENous TITRATE    mupirocin (BACTROBAN) 2 % ointment   Both Nostrils Q12H    famotidine (PF) (PEPCID) 20 mg in sodium chloride 0.9% 10 mL injection  20 mg IntraVENous Q12H     Facility-Administered Medications Ordered in Other Encounters   Medication Dose Route Frequency    0.9% sodium chloride infusion 250 mL  250 mL IntraVENous PRN       Review of Systems      Constitutional:  negative for fever, chills, sweats  Cardiovascular:  negative for chest pain, palpitations, syncope, edema  Gastrointestinal:  negative for dysphagia, reflux, vomiting, diarrhea, abdominal pain, or melena  Neurologic:  negative for focal weakness, numbness, headache      Objective:     Vitals:    10/04/18 0428 10/04/18 0500 10/04/18 0530 10/04/18 0600   BP: 96/54 99/54 107/56 100/57   Pulse: 80 80 80 79   Resp: 15 14 17 17   Temp:       SpO2: 98% 98% 97% 98%   Weight:   240 lb 15.4 oz (109.3 kg)    Height:           Intake and Output:   10/02 1901 - 10/04 0700  In: 4436.3 [I.V.:3536.3]  Out: 3610 [Urine:3205]       Physical Exam:          Constitutional:  the patient is well developed and in no acute distress  EENMT:  Sclera clear, pupils equal, oral mucosa moist  Respiratory: clear  Cardiovascular:  RRR without M,G,R  Gastrointestinal: soft and non-tender; with positive bowel sounds. Musculoskeletal: warm without cyanosis. There is no lower leg edema.   Skin:  no jaundice or rashes, surgical wound  Neurologic: no gross neuro deficits     Psychiatric:  alert and oriented x 3    LINES:  CT, cordis, Mckay,     DRIPS:  None CXR:       LAB  Recent Labs      10/04/18   0613  10/04/18   0315  10/04/18   0109  10/03/18   2306  10/03/18   2129   GLUCPOC  116*  132*  120*  117*  121*      Recent Labs      10/04/18   0309  10/03/18   2007  10/03/18   1601  10/03/18   1220  10/01/18   0754   WBC  8.7   --    --   14.2*  6.4   HGB  10.7*  11.3*  11.5*  11.6*  14.5   HCT  33.5*  35.3*  35.6*  36.2*  44.0   PLT  72*   --    --   113*  168   INR   --    --    --   1.6  1.0     Recent Labs      10/04/18   0309  10/03/18   2007  10/03/18   1601  10/03/18   1220   10/01/18   0754   NA  139   --    --   144   --   135*   K  4.6  4.4  4.7  4.1   --   4.3   CL  110*   --    --   113*   --   104   CO2  24   --    --   24   --   25   GLU  124*   --    --   124*   --   131*   BUN  13   --    --   15   --   16   CREA  0.96   --    --   1.23   --   1.09   MG  2.2  2.3  2.4  3.1*   < >   --    CA  7.2*   --    --   7.4*   --   8.8   ALB   --    --    --    --    --   3.8   TBILI   --    --    --    --    --   0.5    < > = values in this interval not displayed.      Recent Labs      10/03/18   1345  10/03/18   1210   PH  7.28*  7.23*   PCO2  42  50*   PO2  140*  110*   HCO3  19*  21*         Assessment:  (Medical Decision Making)     Hospital Problems  Date Reviewed: 10/3/2018          Codes Class Noted POA    Status post coronary artery bypass graft ICD-10-CM: Z95.1  ICD-9-CM: V45.81  10/3/2018 No    Overview Signed 10/3/2018 12:20 PM by Leeroy Osuna NP     10/3/18 Dr. Lance Confer  CABG X 1, LIMA to the LAD WITH AVR 23 mm Intuity valve             S/P AVR (aortic valve replacement) ICD-10-CM: Z95.2  ICD-9-CM: V43.3  10/3/2018 No    Overview Signed 10/3/2018 12:21 PM by Leeroy Osuna NP     10/3/18  Dr. Lance Confer  CABG X 1, LIMA to the LAD WITH AVR 23 mm Intuity valve             Hypoxia ICD-10-CM: R09.02  ICD-9-CM: 799.02  10/3/2018 No        Encounter for weaning from ventilator [facility]) extubated  ICD-10-CM: Z99.11  ICD-9-CM: V46.13  10/3/2018 No Aortic stenosis ICD-10-CM: I35.0  ICD-9-CM: 424.1  9/11/2018 Yes        CAD (coronary artery disease) (Chronic) ICD-10-CM: I25.10  ICD-9-CM: 414.00  9/11/2018 Yes        Hypertension (Chronic) ICD-10-CM: I10  ICD-9-CM: 401.9  9/11/2018 Yes              Plan:  (Medical Decision Making)     -- mobilize  - pulmonary toilet, IS  - wean 02   -going to step down     More than 50% of the time documented was spent in face-to-face contact with the patient and in the care of the patient on the floor/unit where the patient is located.     Donavon Crawford MD

## 2018-10-04 NOTE — PROGRESS NOTES
CV Progress Note    Admit Date: 10/3/2018    POD 1    Subjective:     Patient present conditions: Awake, Alert and Cooperative. Review of Systems   Cardiac: Vital signs stable. Lines out  Respiratory: Chest x-ray clear. Minimal chest tube drainage. extubated  Neuro: Moves all four extremities. Incision: Dry. GI: Taking liquids. Objective:     Vitals:  Blood pressure 100/57, pulse 79, temperature 98.4 °F (36.9 °C), resp. rate 17, height 5' 10\" (1.778 m), weight 240 lb 15.4 oz (109.3 kg), SpO2 98 %. I/O:  10/03 1901 - 10/04 0700  In: 1500 [I.V.:1500]  Out: 1410 [Urine:1225]  10/02 0701 - 10/03 1900  In: 2936.3 [I.V.:2036.3]  Out: 2200 [Urine:1980]    Heart: No Murmur. Lung: Working with IS. Neuro: Cooperative. Incisions: Dry.     ECG/Telemetry: Unchanged from Pre-Op    Labs:  Recent Results (from the past 12 hour(s))   GLUCOSE, POC    Collection Time: 10/03/18  8:01 PM   Result Value Ref Range    Glucose (POC) 123 (H) 65 - 100 mg/dL   POTASSIUM    Collection Time: 10/03/18  8:07 PM   Result Value Ref Range    Potassium 4.4 3.5 - 5.1 mmol/L   MAGNESIUM    Collection Time: 10/03/18  8:07 PM   Result Value Ref Range    Magnesium 2.3 1.8 - 2.4 mg/dL   HGB & HCT    Collection Time: 10/03/18  8:07 PM   Result Value Ref Range    HGB 11.3 (L) 13.6 - 17.2 g/dL    HCT 35.3 (L) 41.1 - 50.3 %   GLUCOSE, POC    Collection Time: 10/03/18  9:29 PM   Result Value Ref Range    Glucose (POC) 121 (H) 65 - 100 mg/dL   GLUCOSE, POC    Collection Time: 10/03/18 11:06 PM   Result Value Ref Range    Glucose (POC) 117 (H) 65 - 100 mg/dL   GLUCOSE, POC    Collection Time: 10/04/18  1:09 AM   Result Value Ref Range    Glucose (POC) 120 (H) 65 - 662 mg/dL   METABOLIC PANEL, BASIC    Collection Time: 10/04/18  3:09 AM   Result Value Ref Range    Sodium 139 136 - 145 mmol/L    Potassium 4.6 3.5 - 5.1 mmol/L    Chloride 110 (H) 98 - 107 mmol/L    CO2 24 21 - 32 mmol/L    Anion gap 5 (L) 7 - 16 mmol/L    Glucose 124 (H) 65 - 100 mg/dL    BUN 13 8 - 23 MG/DL    Creatinine 0.96 0.8 - 1.5 MG/DL    GFR est AA >60 >60 ml/min/1.73m2    GFR est non-AA >60 >60 ml/min/1.73m2    Calcium 7.2 (L) 8.3 - 10.4 MG/DL   CBC W/O DIFF    Collection Time: 10/04/18  3:09 AM   Result Value Ref Range    WBC 8.7 4.3 - 11.1 K/uL    RBC 3.64 (L) 4.23 - 5.6 M/uL    HGB 10.7 (L) 13.6 - 17.2 g/dL    HCT 33.5 (L) 41.1 - 50.3 %    MCV 92.0 79.6 - 97.8 FL    MCH 29.4 26.1 - 32.9 PG    MCHC 31.9 31.4 - 35.0 g/dL    RDW 12.7 %    PLATELET 72 (L) 013 - 450 K/uL    MPV 10.6 9.4 - 12.3 FL    ABSOLUTE NRBC 0.00 0.0 - 0.2 K/uL   MAGNESIUM    Collection Time: 10/04/18  3:09 AM   Result Value Ref Range    Magnesium 2.2 1.8 - 2.4 mg/dL   GLUCOSE, POC    Collection Time: 10/04/18  3:15 AM   Result Value Ref Range    Glucose (POC) 132 (H) 65 - 100 mg/dL   GLUCOSE, POC    Collection Time: 10/04/18  6:13 AM   Result Value Ref Range    Glucose (POC) 116 (H) 65 - 100 mg/dL       Assessment:     Stable. Plan transfer today      Plan/Recommendations/Medical Decision Making:     Continue present treatment    Discontinue: Chest tubes today. See orders    Roni Cruz.  Libia Cade MD

## 2018-10-04 NOTE — DISCHARGE SUMMARY
Discharge Summary     Patient ID:  Chito Murray  [id number]  27 y.o.  1950    Admit date: 10/3/2018    Discharge date:  10/8/2018    Admitting Physician: Christa Fuller MD     Discharge Physician: CARLI Deleon/Dr. Liliana Kerr    Admission Diagnoses: Nonrheumatic aortic valve stenosis [I35.0]  Atherosclerosis of native coronary artery of native heart without angina pectoris [I25.10]    Discharge Diagnoses:   Patient Active Problem List    Diagnosis Date Noted    Status post coronary artery bypass graft 10/03/2018    S/P AVR (aortic valve replacement) 10/03/2018    Hypoxia 10/03/2018    Encounter for weaning from ventilator ([address] 10/03/2018    Aortic stenosis 09/11/2018    CAD (coronary artery disease) 09/11/2018    Hypertension 09/11/2018       Procedures this admission:  CABG surgery, aortic valve replacement  Consults: [facility] Course: Patient presented for elective CABG/AVR. He had noted worsening fatigue and some DELEON. He was evaluated by PCP and noted to have a murmur. He had an echo and was referred to cardiology. Echo showed severe aortic stenosis. He underwent cardiac catheterization that showed obstructive disease in the LAD and occluded RCA. He is active and does  work. He stated he had to stop and rest before he can return to activity. He underwent CABG X 1 with LIMA to LAD and AVR with 23mm Intuity valve on 10/3/18. He did well post operatively and was transferred to  stepdown on POD 1. He had a trace left lateral pneumothorax that improved. He was weaned off of O2 and diuresed with lasix. He had a new LBBB post op but no AV block noted. He lives alone and STR was recommended for continued rehab. The morning of 10/10/18, patient was feeling well and ambulating without any symptoms. Patient was determined stable and ready for discharge.  For maximized medical therapy for CAD, patient will continue ASA, BB, ARB, and statin as well. The patient will have close transitional care follow up with [facility] Dr. Ofelia Gore on October 19th at 4:00pm THE [facility] AT [place]). The patient will follow up with Dr. Libia Cade in 2-3 weeks and has been referred to cardiac rehab to begin after discharge from rehab,     DISPOSITION: The patient is being discharged to rehab facility in stable condition on a low saturated fat, low cholesterol and low salt diet. The patient is instructed to advance activities as tolerated to the limit of fatigue or shortness of breath. The patient is instructed to avoid all heavy lifting or activities that strain the chest or upper arm muscles. Strenuous activity should be avoided. The patient is instructed to watch for signs of infection which include: increasing area of redness, fever or purulent drainage at the incision site. The patient is instructed to call the office or return to the ER for immediate evaluation for any shortness of breath or chest pain not relieved by NTG. Discharge Exam:   Visit Vitals    /79    Pulse 86    Temp 97.2 °F (36.2 °C)    Resp 18    Ht 5' 10\" (1.778 m)    Wt 225 lb 6.4 oz (102.2 kg)    SpO2 94%    BMI 32.34 kg/m2         Physical Exam:  General: Well Developed, Well Nourished, No Acute Distress, Alert & Oriented  Neck: supple, no JVD  Heart: S1S2 with RRR without murmurs or gallops  Lungs: Clear throughout auscultation bilaterally without adventitious sounds  Abd: soft, nontender, nondistended, with good bowel sounds  Ext: warm, no edema  Sternal incision: clean, dry, and intact  Skin: warm and dry    Patient Instructions:   Current Discharge Medication List      START taking these medications    Details   atorvastatin (LIPITOR) 80 mg tablet Take 1 Tab by mouth nightly. Qty: 30 Tab, Refills: 11      furosemide (LASIX) 40 mg tablet Take 1 Tab by mouth daily for 3 days.   Qty: 3 Tab, Refills: 0      nitroglycerin (NITROSTAT) 0.4 mg SL tablet 1 Tab by SubLINGual route as needed for Chest Pain. Up to 3 doses. Qty: 2 Bottle, Refills: 4      oxyCODONE-acetaminophen (PERCOCET 10)  mg per tablet Take 1 Tab by mouth every four (4) hours as needed. Max Daily Amount: 6 Tabs. Qty: 15 Tab, Refills: 0    Associated Diagnoses: Status post coronary artery bypass graft; Aortic valve stenosis, etiology of cardiac valve disease unspecified         CONTINUE these medications which have CHANGED    Details   metoprolol tartrate (LOPRESSOR) 50 mg tablet Take 1 Tab by mouth two (2) times a day. Qty: 60 Tab, Refills: 6         CONTINUE these medications which have NOT CHANGED    Details   aspirin delayed-release 81 mg tablet Take 81 mg by mouth daily. mupirocin (BACTROBAN) 2 % ointment Apply  to affected area See Admin Instructions. Use inside each nostril as instructed by PAT nurse      losartan (COZAAR) 50 mg tablet Take  by mouth daily. oxymetazoline HCl (AFRIN NASAL SPRAY NA) by Nasal route nightly. naproxen sodium (ALEVE) 220 mg cap Take  by mouth daily. multivitamin (ONE A DAY) tablet Take 1 Tab by mouth daily. magnesium 250 mg tab Take  by mouth daily. potassium 99 mg tablet Take 99 mg by mouth daily.          STOP taking these medications       amiodarone (CORDARONE) 200 mg tablet Comments:   Reason for Stopping:                 Sophia Whitman PA-C  10/8/2018

## 2018-10-04 NOTE — INTERDISCIPLINARY ROUNDS
Interdisciplinary team rounds were held 10/4/2018 with the following team members:Care Management, Nursing and Clinical Coordinator. , Marybeth Fernandez, aware that Dr Adan Jackson wants pt ready for discharge to [facility] by Monday    Plan of care discussed. See clinical pathway and/or care plan for interventions and desired outcomes.

## 2018-10-04 NOTE — PROGRESS NOTES
Chest tubes off suction for 3 hours, no air leak noted with breathing or coughing. Pt ambulatory in hallway 150 feet prior to chest tube removal with minimal output after.     Pt instructed to hum after maximum inspiration, chest tubes removed per orders. Sutures triple tied. Petroleum gauze placed over site and bandaged with 4x4 and tape. Pacer wires isolated and taped.     SWAN introducer cath removed with cath tip intact. Pressure held until hemostasis achieved. Site bandaged with gauze, antibiotic ointment, and tegaderm.     Pt remains resting in NAD, no s/sx SOB, SaO2 95% on supplemental O2 at 1lnc, breath sounds CTA bilaterally and diminished in bases as prior, no subcutaneous emphysema noted.  Will continue to monitor.

## 2018-10-04 NOTE — PROGRESS NOTES
Pt's left radial art line removed at this time. Manual pressure held until hemostasis achieved. No bleeding or hematoma at site. Pressure dressing to site. Will monitor. Pt's [place] removed at this time. Pt given instructions for [place] pull and Pt v/u. [place] removed without difficulty, no ectopy seen on monitor. Line hub capped. Pt tolerated both procedures well. No acute distress noted. VSS throughout. Pt placed on NIBP Q 15min. Will monitor.

## 2018-10-04 NOTE — PROGRESS NOTES
SW assessed pt s/p CABG for discharge planning. He lives alone in a one level home with one step at back entrance. He is ADL-independent, doesn't use anything for ambulation, and doesn't have any DMEs. He confirmed his PCP and insurance as listed and doesn't have any trouble getting his medications. His son, DIL, grandchildren, and some long time customers provide his social support. 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 At discharge, he wants to go to [facility]. VIOLETTE sent referral via [facility] and notified Citlali of referral and that pt might be ready on Saturday, per [facility]. CM following. Care Management Interventions  PCP Verified by CM:  Yes  Last Visit to PCP: 08/04/18  Transition of Care Consult (CM Consult): SNF  Partner SNF: Yes  Physical Therapy Consult: Yes  Current Support Network: Own Home, Family Lives Nearby  Confirm Follow Up Transport: Family  Plan discussed with Pt/Family/Caregiver: Yes  Freedom of Choice Offered: Yes  Discharge Location  Discharge Placement: Skilled nursing facility

## 2018-10-05 ENCOUNTER — APPOINTMENT (OUTPATIENT)
Dept: GENERAL RADIOLOGY | Age: 68
DRG: 220 | End: 2018-10-05
Attending: THORACIC SURGERY (CARDIOTHORACIC VASCULAR SURGERY)
Payer: MEDICARE

## 2018-10-05 PROBLEM — J93.9 PNEUMOTHORAX: Status: ACTIVE | Noted: 2018-10-05

## 2018-10-05 PROBLEM — Z99.11 ENCOUNTER FOR WEANING FROM VENTILATOR (HCC): Status: RESOLVED | Noted: 2018-10-03 | Resolved: 2018-10-05

## 2018-10-05 LAB
ABO + RH BLD: NORMAL
BLD PROD TYP BPU: NORMAL
BLD PROD TYP BPU: NORMAL
BLOOD GROUP ANTIBODIES SERPL: NORMAL
BPU ID: NORMAL
BPU ID: NORMAL
CROSSMATCH RESULT,%XM: NORMAL
CROSSMATCH RESULT,%XM: NORMAL
ERYTHROCYTE [DISTWIDTH] IN BLOOD BY AUTOMATED COUNT: 12.4 %
GLUCOSE BLD STRIP.AUTO-MCNC: 118 MG/DL (ref 65–100)
HCT VFR BLD AUTO: 36.1 % (ref 41.1–50.3)
HGB BLD-MCNC: 11.7 G/DL (ref 13.6–17.2)
MAGNESIUM SERPL-MCNC: 2.1 MG/DL (ref 1.8–2.4)
MCH RBC QN AUTO: 29.2 PG (ref 26.1–32.9)
MCHC RBC AUTO-ENTMCNC: 32.4 G/DL (ref 31.4–35)
MCV RBC AUTO: 90 FL (ref 79.6–97.8)
MM INDURATION POC: 0 MM (ref 0–5)
NRBC # BLD: 0 K/UL (ref 0–0.2)
PLATELET # BLD AUTO: 77 K/UL (ref 150–450)
PMV BLD AUTO: 10.5 FL (ref 9.4–12.3)
POTASSIUM SERPL-SCNC: 4.2 MMOL/L (ref 3.5–5.1)
PPD POC: NORMAL NEGATIVE
RBC # BLD AUTO: 4.01 M/UL (ref 4.23–5.6)
SPECIMEN EXP DATE BLD: NORMAL
STATUS OF UNIT,%ST: NORMAL
STATUS OF UNIT,%ST: NORMAL
UNIT DIVISION, %UDIV: 0
UNIT DIVISION, %UDIV: 0
WBC # BLD AUTO: 11.3 K/UL (ref 4.3–11.1)

## 2018-10-05 PROCEDURE — 74011250637 HC RX REV CODE- 250/637: Performed by: THORACIC SURGERY (CARDIOTHORACIC VASCULAR SURGERY)

## 2018-10-05 PROCEDURE — 65660000004 HC RM CVT STEPDOWN

## 2018-10-05 PROCEDURE — 36415 COLL VENOUS BLD VENIPUNCTURE: CPT

## 2018-10-05 PROCEDURE — 97110 THERAPEUTIC EXERCISES: CPT

## 2018-10-05 PROCEDURE — 97530 THERAPEUTIC ACTIVITIES: CPT

## 2018-10-05 PROCEDURE — 83735 ASSAY OF MAGNESIUM: CPT

## 2018-10-05 PROCEDURE — 82962 GLUCOSE BLOOD TEST: CPT

## 2018-10-05 PROCEDURE — 85027 COMPLETE CBC AUTOMATED: CPT

## 2018-10-05 PROCEDURE — 71046 X-RAY EXAM CHEST 2 VIEWS: CPT

## 2018-10-05 PROCEDURE — 94760 N-INVAS EAR/PLS OXIMETRY 1: CPT

## 2018-10-05 PROCEDURE — 84132 ASSAY OF SERUM POTASSIUM: CPT

## 2018-10-05 PROCEDURE — 99232 SBSQ HOSP IP/OBS MODERATE 35: CPT | Performed by: INTERNAL MEDICINE

## 2018-10-05 PROCEDURE — 77010033678 HC OXYGEN DAILY

## 2018-10-05 RX ORDER — BISACODYL 5 MG
10 TABLET, DELAYED RELEASE (ENTERIC COATED) ORAL DAILY PRN
Status: DISCONTINUED | OUTPATIENT
Start: 2018-10-05 | End: 2018-10-06

## 2018-10-05 RX ORDER — MUPIROCIN 20 MG/G
OINTMENT TOPICAL EVERY 12 HOURS
Status: DISCONTINUED | OUTPATIENT
Start: 2018-10-05 | End: 2018-10-08 | Stop reason: HOSPADM

## 2018-10-05 RX ADMIN — POTASSIUM CHLORIDE 10 MEQ: 10 TABLET, EXTENDED RELEASE ORAL at 10:19

## 2018-10-05 RX ADMIN — OXYCODONE HYDROCHLORIDE AND ACETAMINOPHEN 1 TABLET: 10; 325 TABLET ORAL at 21:22

## 2018-10-05 RX ADMIN — METOPROLOL TARTRATE 25 MG: 25 TABLET ORAL at 21:21

## 2018-10-05 RX ADMIN — SENNOSIDES AND DOCUSATE SODIUM 2 TABLET: 8.6; 5 TABLET ORAL at 21:21

## 2018-10-05 RX ADMIN — Medication 10 ML: at 05:37

## 2018-10-05 RX ADMIN — MUPIROCIN: 20 OINTMENT TOPICAL at 21:22

## 2018-10-05 RX ADMIN — Medication 10 ML: at 21:20

## 2018-10-05 RX ADMIN — OXYCODONE HYDROCHLORIDE AND ACETAMINOPHEN 1 TABLET: 10; 325 TABLET ORAL at 10:21

## 2018-10-05 RX ADMIN — ATORVASTATIN CALCIUM 80 MG: 40 TABLET, FILM COATED ORAL at 21:20

## 2018-10-05 RX ADMIN — OXYCODONE HYDROCHLORIDE AND ACETAMINOPHEN 1 TABLET: 10; 325 TABLET ORAL at 16:59

## 2018-10-05 RX ADMIN — FUROSEMIDE 20 MG: 20 TABLET ORAL at 10:20

## 2018-10-05 RX ADMIN — Medication 10 ML: at 10:30

## 2018-10-05 RX ADMIN — AMIODARONE HYDROCHLORIDE 200 MG: 200 TABLET ORAL at 21:21

## 2018-10-05 RX ADMIN — MUPIROCIN: 20 OINTMENT TOPICAL at 10:28

## 2018-10-05 RX ADMIN — AMIODARONE HYDROCHLORIDE 200 MG: 200 TABLET ORAL at 10:20

## 2018-10-05 RX ADMIN — OXYCODONE HYDROCHLORIDE AND ACETAMINOPHEN 1 TABLET: 10; 325 TABLET ORAL at 01:22

## 2018-10-05 RX ADMIN — BISACODYL 10 MG: 5 TABLET, COATED ORAL at 21:22

## 2018-10-05 RX ADMIN — METOPROLOL TARTRATE 25 MG: 25 TABLET ORAL at 10:21

## 2018-10-05 NOTE — PROGRESS NOTES
Problem: Mobility Impaired (Adult and Pediatric)  Goal: *Acute Goals and Plan of Care (Insert Text)  STG:  (1.)Mr. Juliana Delgado will move from supine to sit and sit to supine  with CONTACT GUARD ASSIST within 3 treatment day(s). (2.)Mr. Juliana Delgado will transfer from bed to chair and chair to bed with STAND BY ASSIST using the least restrictive device within 3 treatment day(s). (3.)Mr. Mcfarland will ambulate with CONTACT GUARD ASSIST for 250+ feet with the least restrictive device within 3 treatment day(s). LTG:  (1.)Mr. Juliana Delgado will move from supine to sit and sit to supine  in bed with SUPERVISION within 7 treatment day(s). (2.)Mr. Juliana Delgado will transfer from bed to chair and chair to bed with SUPERVISION using the least restrictive device within 7 treatment day(s). (3.)Mr. Mcfarland will ambulate with SUPERVISION for 500+ feet with the least restrictive device within 7 treatment day(s). ________________________________________________________________________________________________      PHYSICAL THERAPY: Daily Note, Treatment Day: 1st, PM 10/5/2018  INPATIENT: Hospital Day: 3  [place] / Product Type: Medicare /      NAME/AGE/GENDER: Brian Lopez is a 76 y.o. male   PRIMARY DIAGNOSIS: Nonrheumatic aortic valve stenosis [I35.0]  Atherosclerosis of native coronary artery of native heart without angina pectoris [I25.10] CAD (coronary artery disease) CAD (coronary artery disease)  Procedure(s) (LRB):  CORONARY ARTERY BYPASS GRAFT X 1, LIMA  WITH AVR (N/A)  ESOPHAGEAL TRANS ECHOCARDIOGRAM (N/A)  2 Days Post-Op  ICD-10: Treatment Diagnosis:    · Generalized Muscle Weakness (M62.81)  · Difficulty in walking, Not elsewhere classified (R26.2)   Precaution/Allergies:  Lortab [hydrocodone-acetaminophen]   Sternal  precautions     ASSESSMENT:     Mr. Juliana Delgado presents sitting up in bedside chair and is agreeable to therapy.   Pt stood and was able to ambulate about 250' with SBA, O2 at 5L/min. Pt returned to room and was left with RN to assist with shower. Pt is making good progress and requiring less assistance. Will continue with POC. This section established at most recent assessment   PROBLEM LIST (Impairments causing functional limitations):  1. Decreased Strength  2. Decreased ADL/Functional Activities  3. Decreased Transfer Abilities  4. Decreased Ambulation Ability/Technique  5. Decreased Balance  6. Increased Pain  7. Decreased Activity Tolerance  8. Decreased Pacing Skills  9. Increased Fatigue  10. Increased Shortness of Breath  11. Decreased Knowledge of Precautions  12. Decreased [place] with Home Exercise Program   INTERVENTIONS PLANNED: (Benefits and precautions of physical therapy have been discussed with the patient.)  1. Balance Exercise  2. Bed Mobility  3. Family Education  4. Gait Training  5. Home Exercise Program (HEP)  6. Neuromuscular Re-education/Strengthening  7. Range of Motion (ROM)  8. Therapeutic Activites  9. Therapeutic Exercise/Strengthening  10. Transfer Training     TREATMENT PLAN: Frequency/Duration: twice daily for duration of hospital stay  Rehabilitation Potential For Stated Goals: Good     RECOMMENDED REHABILITATION/EQUIPMENT: (at time of discharge pending progress): Due to the probability of continued deficits (see above) this patient will likely need continued skilled physical therapy after discharge. Equipment:    None at this time              HISTORY:   History of Present Injury/Illness (Reason for Referral):  S/p CORONARY ARTERY BYPASS GRAFT X 1, LIMA  WITH AVR (N/A)  ESOPHAGEAL TRANS ECHOCARDIOGRAM  Past Medical History/Comorbidities:   Mr. Karo Lay  has a past medical history of Arthritis; CAD (coronary artery disease) (9/11/2018); Cancer [facility]); GERD (gastroesophageal reflux disease); Hypertension (9/11/2018); Kidney stones; PUD (peptic ulcer disease); and Sleep apnea.   Mr. Karo Lay  has a past surgical history that includes hx other surgical; hx orthopaedic (); hx appendectomy; hx coronary artery bypass graft (10/03/2018); and hx aortic valve replacement (10/03/2018). Social History/Living Environment:   Home Environment: Private residence  # Steps to Enter: 1  One/Two Story Residence: One story  Living Alone: Yes  Support Systems: Family member(s)  Patient Expects to be Discharged to[de-identified] Private residence  Current DME Used/Available at Home: None  Prior Level of Function/Work/Activity:  Lives alone, independent with all mobility, 0 falls, drives   Number of Personal Factors/Comorbidities that affect the Plan of Care: 3+: HIGH COMPLEXITY   EXAMINATION:   Most Recent Physical Functioning:   Gross Assessment:                  Posture:     Balance:    Bed Mobility:     Wheelchair Mobility:     Transfers:     Gait:            Body Structures Involved:  1. Nerves  2. Heart  3. Lungs  4. Bones  5. Joints  6. Muscles  7. Ligaments Body Functions Affected:  1. Sensory/Pain  2. Cardio  3. Respiratory  4. Neuromusculoskeletal  5. Movement Related Activities and Participation Affected:  1. General Tasks and Demands  2. Mobility  3. Self Care  4. Domestic Life  5. Interpersonal Interactions and Relationships  6. Community, Social and [place]   Number of elements that affect the Plan of Care: 4+: HIGH COMPLEXITY   CLINICAL PRESENTATION:   Presentation: Evolving clinical presentation with changing clinical characteristics: MODERATE COMPLEXITY   CLINICAL DECISION MAKIN [facility] Mobility Inpatient Short Form  How much difficulty does the patient currently have. .. Unable A Lot A Little None   1. Turning over in bed (including adjusting bedclothes, sheets and blankets)? [] 1   [] 2   [x] 3   [] 4   2. Sitting down on and standing up from a chair with arms ( e.g., wheelchair, bedside commode, etc.)   [] 1   [] 2   [x] 3   [] 4   3. Moving from lying on back to sitting on the side of the bed?    [] 1   [] 2 [] 3   [] 4   How much help from another person does the patient currently need. .. Total A Lot A Little None   4. Moving to and from a bed to a chair (including a wheelchair)? [] 1   [] 2   [x] 3   [] 4   5. Need to walk in hospital room? [] 1   [] 2   [x] 3   [] 4   6. Climbing 3-5 steps with a railing? [] 1   [] 2   [x] 3   [] 4   © 2007, Trustees of [address] [place] [address] [address] [id number], under license to [employer]. All rights reserved      Score:  Initial: 18 Most Recent: X (Date: -- )    Interpretation of Tool:  Represents activities that are increasingly more difficult (i.e. Bed mobility, Transfers, Gait). Score 24 23 22-20 19-15 14-10 9-7 6     Modifier CH CI CJ CK CL CM CN      ? Mobility - Walking and Moving Around:     - CURRENT STATUS: CK - 40%-59% impaired, limited or restricted    - GOAL STATUS: CJ - 20%-39% impaired, limited or restricted    - D/C STATUS:  ---------------To be determined---------------  [place] / [place] / Product Type: Medicare /      Medical Necessity:     · Patient is expected to demonstrate progress in strength, range of motion, balance, coordination and functional technique to decrease assistance required with gait, transfers, and functional mobility. .  Reason for Services/Other Comments:  · Patient continues to require skilled intervention due to  decreased strength, decreased balance, decreased functional tolerance, decreased cardiopulmonary endurance affecting participation in basic ADLs and functional tasks. Use of outcome tool(s) and clinical judgement create a POC that gives a: Clear prediction of patient's progress: LOW COMPLEXITY            TREATMENT:      Pre-treatment Symptoms/Complaints:  Pt agreeable to therapy and with no complaints. Pain: Initial:      Post Session:  None noted     Therapeutic Activity: (   10 Minutes):   Therapeutic activities including Chair transfers and Ambulation on level ground to improve mobility, strength, balance and activity tolerance. Required minimal   to promote dynamic balance in standing. Therapeutic Exercise: ():  Exercises per grid below to improve mobility and strength. Required minimal visual, verbal and tactile cues to promote proper body alignment, promote proper body posture and promote proper body mechanics. Progressed range, repetitions and complexity of movement as indicated. Date:  10/4/18 Date:  10-5-18 Date:     Activity/Exercise Parameters Parameters Parameters   LAQ 10 X B  x20    Alt marches 10 X B  x20    Ankle pumps 10 X B  x20    Quad set 10 X B      Glute set 10 X      Heel slides 10 X B      Hip abduction 10 X B  x20            Braces/Orthotics/Lines/Etc:   · NOne  Treatment/Session Assessment:    · Response to Treatment:  Pt tolerated treatment well. · Interdisciplinary Collaboration:   o Physical Therapy Assistant  o Registered Nurse  · After treatment position/precautions:   o Up in chair  o Bed/Chair-wheels locked  o Bed in low position  o Call light within reach  o RN notified   · Compliance with Program/Exercises: Will assess as treatment progresses. · Recommendations/Intent for next treatment session: \"Next visit will focus on advancements to more challenging activities and reduction in assistance provided\".   Total Treatment Duration:  PT Patient Time In/Time Out  Time In: 2244  Time Out:  [facility], [facility]

## 2018-10-05 NOTE — PROGRESS NOTES
Intentional bedside rounding completed with RN. Patient involved and encouraged to ask any questions regarding previous care and upcoming care.

## 2018-10-05 NOTE — PROGRESS NOTES
Today's Date: 10/5/2018  Date of Admission: 10/3/2018    Chart Reviewed. Subjective:     Pt feels ok. He notes mild dyspnea. Appetite is good. Medications Reviewed. Objective:     Vitals:    10/04/18 2252 10/05/18 0400 10/05/18 0739 10/05/18 0755   BP: 129/73 139/78  129/71   Pulse: 84 98  83   Resp: 18 18 18   Temp: 98 °F (36.7 °C) 97 °F (36.1 °C)  97.5 °F (36.4 °C)   SpO2: 91% 92% 93% 98%   Weight:  237 lb 11.2 oz (107.8 kg)     Height:           Intake and Output  Current Shift:     Last 3 Shifts: 10/03 1901 - 10/05 0700  In: 3021.7 [P.O.:960; I.V.:2061.7]  Out: 3180 [Urine:2670]    Physical Exam:  General: Well Developed, Well Nourished, No Acute Distress, Alert & Oriented x 3, Appropriate mood  Neck: supple, no JVD  Heart: S1S2 with RRR without murmurs or gallops  Lungs: decreased bilaterally with crackles   Abd: soft, nontender, nondistended, with good bowel sounds  Ext: mild edema bilaterally  Sternal incision: clean, dry, and intact  Skin: warm and dry    LABS  Data Review:   Recent Labs      10/05/18   0350  10/04/18   0309   10/03/18   1220   NA   --   139   --   144   K  4.2  4.6   < >  4.1   MG  2.1  2.2   < >  3.1*   BUN   --   13   --   15   CREA   --   0.96   --   1.23   GLU   --   124*   --   124*   WBC  11.3*  8.7   --   14.2*   HGB  11.7*  10.7*   < >  11.6*   HCT  36.1*  33.5*   < >  36.2*   PLT  77*  72*   --   113*   INR   --    --    --   1.6    < > = values in this interval not displayed. Estimated Creatinine Clearance: 90.5 mL/min (based on Cr of 0.96). Assessment/Plan:     Principal Problem:    CAD (coronary artery disease) (9/11/2018)  S/p CABG X 1, on ASA, BB, ARB, statin    Active Problems:     Aortic stenosis (9/11/2018)    S/p AVR      Hypertension (9/11/2018)  BP stable      Status post coronary artery bypass graft (10/3/2018)  As above, continue medical therapy, pacing wires removed, on O2 by NC      S/P AVR (aortic valve replacement) (10/3/2018)  On O2 by NC      Hypoxia (10/3/2018)  On O2       Pneumothorax (10/5/2018)   New trace left lateral pneumothorax, monitor     Dispo   plans for STR at discharge      iVckie Celestin PA-C

## 2018-10-05 NOTE — PROGRESS NOTES
Ciarra Easton  Admission Date: 10/3/2018             Daily Progress Note: 10/5/2018    The patient's chart is reviewed and the patient is discussed with the staff. 77 yo male with a history of GERD, HTN, and CAD. He experienced worsening fatigue and DELEON. Noted with cardiac murmur and ECHO revealed severe AS. Cardiac cath with severe CAD. Patient is now s/p CABG x 1 to LAD and AVR on 10/3/18 per Dr. Cirilo Garibay. Subjective:     Currently on O2 at 2 lpm via NC with o2 sat 93%. Denies cough or mucus production. Using IS and drawing 1750 ml. Incisional pain controlled with pain medications. Bowels have not moved post-op.      Current Facility-Administered Medications   Medication Dose Route Frequency    lip protectant (BLISTEX) ointment   Topical PRN    sodium chloride (NS) flush 5-10 mL  5-10 mL IntraVENous Q8H    sodium chloride (NS) flush 5-10 mL  5-10 mL IntraVENous PRN    metoprolol tartrate (LOPRESSOR) tablet 25 mg  25 mg Oral Q12H    furosemide (LASIX) tablet 20 mg  20 mg Oral DAILY    alum-mag hydroxide-simeth (MYLANTA) oral suspension 30 mL  30 mL Oral Q4H PRN    acetaminophen (TYLENOL) tablet 650 mg  650 mg Oral Q4H PRN    zolpidem (AMBIEN) tablet 5 mg  5 mg Oral QHS PRN    atorvastatin (LIPITOR) tablet 80 mg  80 mg Oral QHS    amiodarone (CORDARONE) tablet 200 mg  200 mg Oral Q12H    mupirocin (BACTROBAN) 2 % ointment   Both Nostrils BID    aspirin delayed-release tablet 81 mg  81 mg Oral DAILY    magnesium oxide (MAG-OX) tablet 400 mg  400 mg Oral TID PRN    magnesium oxide (MAG-OX) tablet 400 mg  400 mg Oral QID PRN    potassium chloride (KLOR-CON) tablet 10 mEq  10 mEq Oral DAILY    potassium chloride (K-DUR, KLOR-CON) SR tablet 20 mEq  20 mEq Oral BID PRN    potassium chloride (K-DUR, KLOR-CON) SR tablet 40 mEq  40 mEq Oral BID PRN    oxyCODONE-acetaminophen (PERCOCET 10)  mg per tablet 1 Tab  1 Tab Oral Q4H PRN    senna-docusate (PERICOLACE) 8.6-50 mg per tablet 2 Tab  2 Tab Oral QHS    losartan (COZAAR) tablet 25 mg  25 mg Oral DAILY    traMADol (ULTRAM) tablet 100 mg  100 mg Oral Q6H PRN       Review of Systems  Constitutional: negative for fever, chills, sweats  Cardiovascular: negative for chest pain, palpitations, syncope, edema  Gastrointestinal:  negative for dysphagia, reflux, vomiting, diarrhea, abdominal pain, or melena  Neurologic:  negative for focal weakness, numbness, headache    Objective:     Vitals:    10/04/18 1900 10/04/18 2252 10/05/18 0400 10/05/18 0739   BP: 117/64 129/73 139/78    Pulse: 85 84 98    Resp: 18 18 18    Temp: 97.5 °F (36.4 °C) 98 °F (36.7 °C) 97 °F (36.1 °C)    SpO2: 92% 91% 92% 93%   Weight:   237 lb 11.2 oz (107.8 kg)    Height:         Intake and Output:   10/03 1901 - 10/05 0700  In: 3021.7 [P.O.:960; I.V.:2061.7]  Out: 3180 [Urine:2670]       Physical Exam:   Constitution:  the patient is well developed and in no acute distress  EENMT:  Sclera clear, pupils equal, oral mucosa moist  Respiratory: clear to auscultation bilaterally  Cardiovascular:  RRR without M,G,R  Gastrointestinal: soft and non-tender; with positive bowel sounds. Musculoskeletal: warm without cyanosis. There is no lower leg edema.   Skin:  no jaundice or rashes, midline sternal incision without redness, swelling, or drainage   Neurologic: no gross neuro deficits     Psychiatric:  alert and oriented x 3    CXR:   10/5          LAB  Recent Labs      10/05/18   0524  10/04/18   2046  10/04/18   1706  10/04/18   0613  10/04/18   0315   GLUCPOC  118*  129*  149*  116*  132*      Recent Labs      10/05/18   0350  10/04/18   0309  10/03/18   2007  10/03/18   1601  10/03/18   1220   WBC  11.3*  8.7   --    --   14.2*   HGB  11.7*  10.7*  11.3*  11.5*  11.6*   HCT  36.1*  33.5*  35.3*  35.6*  36.2*   PLT  77*  72*   --    --   113*   INR   --    --    --    --   1.6     Recent Labs      10/05/18   0350  10/04/18   0309  10/03/18   2007   10/03/18 1220   NA   --   139   --    --   144   K  4.2  4.6  4.4   < >  4.1   CL   --   110*   --    --   113*   CO2   --   24   --    --   24   GLU   --   124*   --    --   124*   BUN   --   13   --    --   15   CREA   --   0.96   --    --   1.23   MG  2.1  2.2  2.3   < >  3.1*   CA   --   7.2*   --    --   7.4*    < > = values in this interval not displayed. Recent Labs      10/03/18   1345  10/03/18   1210   PH  7.28*  7.23*   PCO2  42  50*   PO2  140*  110*   HCO3  19*  21*     No results for input(s): LCAD, LAC in the last 72 hours. Assessment:  (Medical Decision Making)     Hospital Problems  Date Reviewed: 10/5/2018          Codes Class Noted POA    Status post coronary artery bypass graft ICD-10-CM: Z95.1  ICD-9-CM: V45.81  10/3/2018 No     10/3/18 Dr. Carmen Ibanez  CABG X 1, LIMA to the LAD WITH AVR 23 mm Intuity valve         S/P AVR (aortic valve replacement) ICD-10-CM: Z95.2  ICD-9-CM: V43.3  10/3/2018 No     10/3/18  Dr. Carmen Ibanez  CABG X 1, LIMA to the LAD WITH AVR 23 mm Intuity valve         Hypoxia ICD-10-CM: R09.02  ICD-9-CM: 799.02  10/3/2018 No        Pneumothorax    Tiny L PTX  O2 at 6 lpm for N2 washout      Aortic stenosis ICD-10-CM: I35.0  ICD-9-CM: 424.1  9/11/2018 Yes        * (Principal)CAD (coronary artery disease) (Chronic) ICD-10-CM: I25.10  ICD-9-CM: 414.00  9/11/2018 Yes        Hypertension (Chronic) ICD-10-CM: I10  ICD-9-CM: 401.9  9/11/2018 Yes         BS range 116-149    plt 77    Plan:  (Medical Decision Making)     --continue IS  --mobilize  --small PTX on today's CXR - o2 at 6 lpm with humidity for N2 washout    More than 50% of the time documented was spent in face-to-face contact with the patient and in the care of the patient on the floor/unit where the patient is located. Eboni Adair NP      Lungs:  cvlear  Heart:  RRR with no Murmur/Rubs/Gallops    Additional Comments: as above    I have spoken with and examined the patient.  I agree with the above assessment and plan as documented.     Donavon Crawford MD

## 2018-10-05 NOTE — PROGRESS NOTES
Problem: Mobility Impaired (Adult and Pediatric)  Goal: *Acute Goals and Plan of Care (Insert Text)  STG:  (1.)Mr. Eduar Arora will move from supine to sit and sit to supine  with CONTACT GUARD ASSIST within 3 treatment day(s). (2.)Mr. Eduar Arora will transfer from bed to chair and chair to bed with STAND BY ASSIST using the least restrictive device within 3 treatment day(s). (3.)Mr. Mcfarland will ambulate with CONTACT GUARD ASSIST for 250+ feet with the least restrictive device within 3 treatment day(s). LTG:  (1.)Mr. Eduar Arora will move from supine to sit and sit to supine  in bed with SUPERVISION within 7 treatment day(s). (2.)Mr. Eduar Arora will transfer from bed to chair and chair to bed with SUPERVISION using the least restrictive device within 7 treatment day(s). (3.)Mr. Mcfarland will ambulate with SUPERVISION for 500+ feet with the least restrictive device within 7 treatment day(s). ________________________________________________________________________________________________      PHYSICAL THERAPY: Daily Note, Treatment Day: 1st, AM 10/5/2018  INPATIENT: Hospital Day: 3  [place] / Product Type: Medicare /      NAME/AGE/GENDER: Giorgi Love is a 76 y.o. male   PRIMARY DIAGNOSIS: Nonrheumatic aortic valve stenosis [I35.0]  Atherosclerosis of native coronary artery of native heart without angina pectoris [I25.10] CAD (coronary artery disease) CAD (coronary artery disease)  Procedure(s) (LRB):  CORONARY ARTERY BYPASS GRAFT X 1, LIMA  WITH AVR (N/A)  ESOPHAGEAL TRANS ECHOCARDIOGRAM (N/A)  2 Days Post-Op  ICD-10: Treatment Diagnosis:    · Generalized Muscle Weakness (M62.81)  · Difficulty in walking, Not elsewhere classified (R26.2)   Precaution/Allergies:  Lortab [hydrocodone-acetaminophen]   Sternal  precautions     ASSESSMENT:     Mr. Eduar Arora presents sitting up in bedside chair. He is agreeable to treatment.  He stood from chair with CGA and then increased his ambulation distance with no assistive device and CGA/SBA. Good progress noted with mobility this morning. He participated in seated exercises this morning. He demonstrated good technique with all exercises. Will continue PT efforts. This section established at most recent assessment   PROBLEM LIST (Impairments causing functional limitations):  1. Decreased Strength  2. Decreased ADL/Functional Activities  3. Decreased Transfer Abilities  4. Decreased Ambulation Ability/Technique  5. Decreased Balance  6. Increased Pain  7. Decreased Activity Tolerance  8. Decreased Pacing Skills  9. Increased Fatigue  10. Increased Shortness of Breath  11. Decreased Knowledge of Precautions  12. Decreased [place] with Home Exercise Program   INTERVENTIONS PLANNED: (Benefits and precautions of physical therapy have been discussed with the patient.)  1. Balance Exercise  2. Bed Mobility  3. Family Education  4. Gait Training  5. Home Exercise Program (HEP)  6. Neuromuscular Re-education/Strengthening  7. Range of Motion (ROM)  8. Therapeutic Activites  9. Therapeutic Exercise/Strengthening  10. Transfer Training     TREATMENT PLAN: Frequency/Duration: twice daily for duration of hospital stay  Rehabilitation Potential For Stated Goals: Good     RECOMMENDED REHABILITATION/EQUIPMENT: (at time of discharge pending progress): Due to the probability of continued deficits (see above) this patient will likely need continued skilled physical therapy after discharge. Equipment:    None at this time              HISTORY:   History of Present Injury/Illness (Reason for Referral):  S/p CORONARY ARTERY BYPASS GRAFT X 1, LIMA  WITH AVR (N/A)  ESOPHAGEAL TRANS ECHOCARDIOGRAM  Past Medical History/Comorbidities:   Mr. Jeanine Sun  has a past medical history of Arthritis; CAD (coronary artery disease) (9/11/2018); Cancer [facility]); GERD (gastroesophageal reflux disease); Hypertension (9/11/2018);  Kidney stones; PUD (peptic ulcer disease); and Sleep apnea. Mr. Rebekah Greenfield  has a past surgical history that includes hx other surgical; hx orthopaedic (2014); hx appendectomy; hx coronary artery bypass graft (10/03/2018); and hx aortic valve replacement (10/03/2018). Social History/Living Environment:   Home Environment: Private residence  # Steps to Enter: 1  One/Two Story Residence: One story  Living Alone: Yes  Support Systems: Family member(s)  Patient Expects to be Discharged to[de-identified] Private residence  Current DME Used/Available at Home: None  Prior Level of Function/Work/Activity:  Lives alone, independent with all mobility, 0 falls, drives   Number of Personal Factors/Comorbidities that affect the Plan of Care: 3+: HIGH COMPLEXITY   EXAMINATION:   Most Recent Physical Functioning:   Gross Assessment:                  Posture:  Posture (WDL): Exceptions to WDL  Posture Assessment: Forward head  Balance:  Sitting: Intact  Standing: Impaired  Standing - Static: Good  Standing - Dynamic : Fair Bed Mobility:     Wheelchair Mobility:     Transfers:  Sit to Stand: Contact guard assistance  Stand to Sit: Stand-by assistance  Gait:     Base of Support: Widened  Speed/Amy: Pace decreased (<100 feet/min)  Step Length: Left shortened;Right shortened  Gait Abnormalities: Decreased step clearance  Distance (ft): 240 Feet (ft)  Assistive Device:  (NOne)  Ambulation - Level of Assistance: Contact guard assistance;Stand-by assistance      Body Structures Involved:  1. Nerves  2. Heart  3. Lungs  4. Bones  5. Joints  6. Muscles  7. Ligaments Body Functions Affected:  1. Sensory/Pain  2. Cardio  3. Respiratory  4. Neuromusculoskeletal  5. Movement Related Activities and Participation Affected:  1. General Tasks and Demands  2. Mobility  3. Self Care  4. Domestic Life  5. Interpersonal Interactions and Relationships  6.  Community, Social and [place]   Number of elements that affect the Plan of Care: 4+: HIGH COMPLEXITY   CLINICAL PRESENTATION:   Presentation: Evolving clinical presentation with changing clinical characteristics: MODERATE COMPLEXITY   CLINICAL DECISION MAKIN [facility] Mobility Inpatient Short Form  How much difficulty does the patient currently have. .. Unable A Lot A Little None   1. Turning over in bed (including adjusting bedclothes, sheets and blankets)? [] 1   [] 2   [x] 3   [] 4   2. Sitting down on and standing up from a chair with arms ( e.g., wheelchair, bedside commode, etc.)   [] 1   [] 2   [x] 3   [] 4   3. Moving from lying on back to sitting on the side of the bed? [] 1   [] 2   [] 3   [] 4   How much help from another person does the patient currently need. .. Total A Lot A Little None   4. Moving to and from a bed to a chair (including a wheelchair)? [] 1   [] 2   [x] 3   [] 4   5. Need to walk in hospital room? [] 1   [] 2   [x] 3   [] 4   6. Climbing 3-5 steps with a railing? [] 1   [] 2   [x] 3   [] 4   © , Trustees of [address] [place] [address], under license to [employer]. All rights reserved      Score:  Initial: 18 Most Recent: X (Date: -- )    Interpretation of Tool:  Represents activities that are increasingly more difficult (i.e. Bed mobility, Transfers, Gait). Score 24 23 22-20 19-15 14-10 9-7 6     Modifier CH CI CJ CK CL CM CN      ? Mobility - Walking and Moving Around:     - CURRENT STATUS: CK - 40%-59% impaired, limited or restricted    - GOAL STATUS: CJ - 20%-39% impaired, limited or restricted    - D/C STATUS:  ---------------To be determined---------------  [place] / [place] / Product Type: Medicare /      Medical Necessity:     · Patient is expected to demonstrate progress in strength, range of motion, balance, coordination and functional technique to decrease assistance required with gait, transfers, and functional mobility. .  Reason for Services/Other Comments:  · Patient continues to require skilled intervention due to decreased strength, decreased balance, decreased functional tolerance, decreased cardiopulmonary endurance affecting participation in basic ADLs and functional tasks. Use of outcome tool(s) and clinical judgement create a POC that gives a: Clear prediction of patient's progress: LOW COMPLEXITY            TREATMENT:   (In addition to Assessment/Re-Assessment sessions the following treatments were rendered)   Pre-treatment Symptoms/Complaints: \"Yesterday was rough. \"  Pain: Initial:   Pain Intensity 1: 0  Post Session:  None noted     Therapeutic Activity: (    13 Minutes): Therapeutic activities including Chair transfers and Ambulation on level ground to improve mobility, strength, balance and activity tolerance. Required minimal   to promote dynamic balance in standing. Therapeutic Exercise: (12 Minutes):  Exercises per grid below to improve mobility and strength. Required minimal visual, verbal and tactile cues to promote proper body alignment, promote proper body posture and promote proper body mechanics. Progressed range, repetitions and complexity of movement as indicated. Date:  10/4/18 Date:  10-5-18 Date:     Activity/Exercise Parameters Parameters Parameters   LAQ 10 X B  x20    Alt marches 10 X B  x20    Ankle pumps 10 X B  x20    Quad set 10 X B      Glute set 10 X      Heel slides 10 X B      Hip abduction 10 X B  x20            Braces/Orthotics/Lines/Etc:   · NOne  Treatment/Session Assessment:    · Response to Treatment:  Exercises in chair, tolerated well  · Interdisciplinary Collaboration:   o Physical Therapist  o Registered Nurse  · After treatment position/precautions:   o Up in chair  o Bed/Chair-wheels locked  o Bed in low position  o Call light within reach  o RN notified   · Compliance with Program/Exercises: Will assess as treatment progresses. · Recommendations/Intent for next treatment session:   \"Next visit will focus on advancements to more challenging activities and reduction in assistance provided\".   Total Treatment Duration:  PT Patient Time In/Time Out  Time In: 1140  Time Out: 73 Darioe Romie Cabrera, PTA

## 2018-10-05 NOTE — OP NOTES
[address] [facility], [facility] [address]  MR#: [id number]  : 1950  ACCOUNT #: [de-identified]   DATE OF SERVICE: 10/03/2018    PREOPERATIVE DIAGNOSES:  Coronary artery occlusive disease, aortic valve stenosis. POSTOPERATIVE DIAGNOSES:  Coronary artery occlusive disease, aortic valve stenosis,  trileaflet aortic valve. PROCEDURES PERFORMED:  Aortic valve replacement using a 23 mm Cruz Intuity valve; single-vessel coronary artery bypass grafting using left internal mammary artery to the left anterior descending coronary artery; (arterial line and [place]-Ольга catheter placed by Anesthesia); temporary right ventricular pacing wire. ANESTHESIA:  General.    CARDIOPULMONARY BYPASS TIME:  91 minutes. AORTIC CROSSCLAMP TIME:  66 minutes. SURGEON:  Clinton Simons. Adan Jackson MD    ASSISTANT:       ESTIMATED BLOOD LOSS:  minimal    SPECIMENS REMOVED:       COMPLICATIONS:       IMPLANTS:       PREOPERATIVE HISTORY:  This is a 59-year-old gentleman who developed some fatigue and shortness of breath with activities. He had been followed for aortic valve stenosis and the echocardiogram showed the gradient as severe. Cardiac catheterization also showed proximal disease in the left anterior descending coronary artery and surgical valve replacement and coronary artery bypass grafting were recommended. WHAT WAS FOUND AND WHAT WAS DONE:  The heart was exposed through a median sternotomy. The heart was hypertrophied, but sagrario well. There was no transmural scarring. The aortic valve was trileaflet, but each leaflet was heavily calcified and thickened. After excision, it was replaced with a 23 mm Cruz Intuity valve. The internal mammary artery was used to bypass the left anterior descending coronary artery. The patient came off bypass without difficulty. PROCEDURE IN DETAIL:  The patient was premedicated and brought to the operating room.   The patient was placed supine on the table. Appropriate monitoring lines were placed including a [place]-Ольга catheter and radial artery catheter. General endotracheal anesthesia was given. Anterior body and both legs were then prepped with Betadine. The patient was draped into a sterile field. A midline chest incision was made and a sternotomy was carried out. The left pleural cavity was opened widely. The mammary artery was taken down. Next, the pericardium was opened vertically and retracted. Heparin at 400 units per kilogram was given. The patient was then cannulated placing a 2-stage venous return cannula in the right atrium, aortic cannula was placed, and a vent was placed through the right superior pulmonary vein. The patient was then placed on bypass and cooled to 32 degrees centigrade. The aorta was crossclamped. Blood cardioplegia was given antegrade. The internal mammary artery was sutured to the mid left anterior descending coronary artery with a running 7-0 Prolene. Next, we opened the aortic root obliquely to expose the valve. Findings of the valve were described above. The individual leaflets were excised sharply back to a good pliable annulus. At this time, the Intuity valve sizing devices were brought to the field. The annulus sized to 25 mm, which would have been used with an Cruz Magna Ease valve. However, the 23 mm Intuity valve was selected for this patient. The 3 shahzad stitches were placed and then passed through the sewing ring of the Intuity valve. The valve was then placed into the annulus of the aortic root. The balloon was moved into position and inflated to 4.5 atmospheres for 10 seconds. The balloon was then removed and we inspected the valve, which had good seating and appeared to function normally. The aortotomy was closed in 2 layers using a fine Prolene suture.   At this point, we carried out the maneuvers to remove all air from the heart and the aorta before releasing the crossclamp, and the patient was rewarmed to 37 degrees centigrade. The patient was weaned from bypass without difficulty. All blood was then returned to the patient, after which cannulas were removed and the pursestring sutures tied. Protamine was then given to reverse the heparin. Temporary right ventricular pacing wires were placed. A 32-[place] tube was left to drain the mediastinum. Hemostasis was satisfactory. The sternum was then approximated with interrupted stainless steel wire. Soft tissues were closed with Vicryl and the skin was closed with Vicryl using a subcuticular closure technique. The patient tolerated the procedure well and was moved to intensive care in stable condition. Sponge, needle, and instrument counts were correct.       MD GEORGIA Luke / KEVAN  D: 10/05/2018 16:21     T: 10/05/2018 16:55  JOB #: [id number]  CC: Magdalena Alcantara MD

## 2018-10-05 NOTE — PROGRESS NOTES
Cardiac Rehab: Spoke with patient regarding referral to cardiac rehab. Patient meets admission criteria based on CABG (10/3/18). Written information about Cardiac Rehab given and reviewed with patient. Discussed lifestyle modifications to promote cardiac wellness. Patient indicated that he wants to participate in the cardiac rehab program at the [facility] which is closer to his home in [facility]. His Cardiologist is Dr. Alejandro Avila.       Thank you,  ASHA AyalaN, RN  Cardiopulmonary Rehabilitation Nurse Liaison  [facility]

## 2018-10-05 NOTE — PROGRESS NOTES
Patricia pulled epicardial wires. Patient tolerated well. Instructed patient not to get up for one hour. Taking BP q15min. Patient stable.

## 2018-10-05 NOTE — PROGRESS NOTES
Brief visit with patient and family    Jessica Soliz, staff Rogelio jackson 12, [address]  /   [email or web address]

## 2018-10-06 ENCOUNTER — APPOINTMENT (OUTPATIENT)
Dept: GENERAL RADIOLOGY | Age: 68
DRG: 220 | End: 2018-10-06
Attending: THORACIC SURGERY (CARDIOTHORACIC VASCULAR SURGERY)
Payer: MEDICARE

## 2018-10-06 LAB
ATRIAL RATE: 89 BPM
CALCULATED P AXIS, ECG09: 46 DEGREES
CALCULATED R AXIS, ECG10: 17 DEGREES
CALCULATED T AXIS, ECG11: -149 DEGREES
DIAGNOSIS, 93000: NORMAL
GLUCOSE BLD STRIP.AUTO-MCNC: 115 MG/DL (ref 65–100)
MAGNESIUM SERPL-MCNC: 2 MG/DL (ref 1.8–2.4)
MM INDURATION POC: 0 MM (ref 0–5)
P-R INTERVAL, ECG05: 242 MS
POTASSIUM SERPL-SCNC: 3.9 MMOL/L (ref 3.5–5.1)
PPD POC: NORMAL NEGATIVE
Q-T INTERVAL, ECG07: 416 MS
QRS DURATION, ECG06: 162 MS
QTC CALCULATION (BEZET), ECG08: 506 MS
VENTRICULAR RATE, ECG03: 89 BPM

## 2018-10-06 PROCEDURE — 94760 N-INVAS EAR/PLS OXIMETRY 1: CPT

## 2018-10-06 PROCEDURE — 82962 GLUCOSE BLOOD TEST: CPT

## 2018-10-06 PROCEDURE — 65660000004 HC RM CVT STEPDOWN

## 2018-10-06 PROCEDURE — 74011250636 HC RX REV CODE- 250/636: Performed by: THORACIC SURGERY (CARDIOTHORACIC VASCULAR SURGERY)

## 2018-10-06 PROCEDURE — 93005 ELECTROCARDIOGRAM TRACING: CPT | Performed by: THORACIC SURGERY (CARDIOTHORACIC VASCULAR SURGERY)

## 2018-10-06 PROCEDURE — 84132 ASSAY OF SERUM POTASSIUM: CPT

## 2018-10-06 PROCEDURE — 99232 SBSQ HOSP IP/OBS MODERATE 35: CPT | Performed by: INTERNAL MEDICINE

## 2018-10-06 PROCEDURE — 74011250636 HC RX REV CODE- 250/636

## 2018-10-06 PROCEDURE — 36415 COLL VENOUS BLD VENIPUNCTURE: CPT

## 2018-10-06 PROCEDURE — 83735 ASSAY OF MAGNESIUM: CPT

## 2018-10-06 PROCEDURE — 74011250637 HC RX REV CODE- 250/637: Performed by: PHYSICIAN ASSISTANT

## 2018-10-06 PROCEDURE — 71045 X-RAY EXAM CHEST 1 VIEW: CPT

## 2018-10-06 PROCEDURE — 74011250637 HC RX REV CODE- 250/637

## 2018-10-06 PROCEDURE — 74011250637 HC RX REV CODE- 250/637: Performed by: THORACIC SURGERY (CARDIOTHORACIC VASCULAR SURGERY)

## 2018-10-06 RX ORDER — MORPHINE SULFATE 2 MG/ML
INJECTION, SOLUTION INTRAMUSCULAR; INTRAVENOUS
Status: COMPLETED
Start: 2018-10-06 | End: 2018-10-06

## 2018-10-06 RX ORDER — ONDANSETRON 2 MG/ML
4 INJECTION INTRAMUSCULAR; INTRAVENOUS
Status: DISCONTINUED | OUTPATIENT
Start: 2018-10-06 | End: 2018-10-08 | Stop reason: HOSPADM

## 2018-10-06 RX ORDER — DILTIAZEM HYDROCHLORIDE 5 MG/ML
10 INJECTION INTRAVENOUS ONCE
Status: DISPENSED | OUTPATIENT
Start: 2018-10-06 | End: 2018-10-06

## 2018-10-06 RX ORDER — AMIODARONE HYDROCHLORIDE 200 MG/1
400 TABLET ORAL EVERY 12 HOURS
Status: DISCONTINUED | OUTPATIENT
Start: 2018-10-06 | End: 2018-10-08 | Stop reason: HOSPADM

## 2018-10-06 RX ORDER — AMIODARONE HYDROCHLORIDE 200 MG/1
200 TABLET ORAL ONCE
Status: COMPLETED | OUTPATIENT
Start: 2018-10-06 | End: 2018-10-06

## 2018-10-06 RX ORDER — NITROGLYCERIN 0.4 MG/1
0.4 TABLET SUBLINGUAL AS NEEDED
Status: DISCONTINUED | OUTPATIENT
Start: 2018-10-06 | End: 2018-10-08 | Stop reason: HOSPADM

## 2018-10-06 RX ORDER — MORPHINE SULFATE 2 MG/ML
2 INJECTION, SOLUTION INTRAMUSCULAR; INTRAVENOUS ONCE
Status: COMPLETED | OUTPATIENT
Start: 2018-10-06 | End: 2018-10-06

## 2018-10-06 RX ORDER — NITROGLYCERIN 0.4 MG/1
TABLET SUBLINGUAL
Status: COMPLETED
Start: 2018-10-06 | End: 2018-10-06

## 2018-10-06 RX ADMIN — MORPHINE SULFATE 2 MG: 2 INJECTION, SOLUTION INTRAMUSCULAR; INTRAVENOUS at 07:00

## 2018-10-06 RX ADMIN — MUPIROCIN: 20 OINTMENT TOPICAL at 10:06

## 2018-10-06 RX ADMIN — Medication 10 ML: at 20:00

## 2018-10-06 RX ADMIN — POTASSIUM CHLORIDE 20 MEQ: 20 TABLET, EXTENDED RELEASE ORAL at 15:55

## 2018-10-06 RX ADMIN — Medication 10 ML: at 04:36

## 2018-10-06 RX ADMIN — NITROGLYCERIN 0.4 MG: 0.4 TABLET SUBLINGUAL at 06:56

## 2018-10-06 RX ADMIN — METOPROLOL TARTRATE 25 MG: 25 TABLET ORAL at 06:42

## 2018-10-06 RX ADMIN — Medication 10 ML: at 14:50

## 2018-10-06 RX ADMIN — OXYCODONE HYDROCHLORIDE AND ACETAMINOPHEN 1 TABLET: 10; 325 TABLET ORAL at 15:54

## 2018-10-06 RX ADMIN — AMIODARONE HYDROCHLORIDE 200 MG: 200 TABLET ORAL at 06:41

## 2018-10-06 RX ADMIN — OXYCODONE HYDROCHLORIDE AND ACETAMINOPHEN 1 TABLET: 10; 325 TABLET ORAL at 21:04

## 2018-10-06 RX ADMIN — AMIODARONE HYDROCHLORIDE 400 MG: 200 TABLET ORAL at 19:48

## 2018-10-06 RX ADMIN — ATORVASTATIN CALCIUM 80 MG: 40 TABLET, FILM COATED ORAL at 19:48

## 2018-10-06 RX ADMIN — OXYCODONE HYDROCHLORIDE AND ACETAMINOPHEN 1 TABLET: 10; 325 TABLET ORAL at 06:09

## 2018-10-06 RX ADMIN — POTASSIUM CHLORIDE 20 MEQ: 20 TABLET, EXTENDED RELEASE ORAL at 10:06

## 2018-10-06 RX ADMIN — MUPIROCIN: 20 OINTMENT TOPICAL at 20:00

## 2018-10-06 RX ADMIN — ONDANSETRON 4 MG: 2 INJECTION INTRAMUSCULAR; INTRAVENOUS at 14:47

## 2018-10-06 RX ADMIN — ASPIRIN 81 MG: 81 TABLET, COATED ORAL at 06:43

## 2018-10-06 RX ADMIN — LOSARTAN POTASSIUM 25 MG: 50 TABLET ORAL at 10:05

## 2018-10-06 RX ADMIN — POTASSIUM CHLORIDE 10 MEQ: 10 TABLET, EXTENDED RELEASE ORAL at 10:05

## 2018-10-06 RX ADMIN — AMIODARONE HYDROCHLORIDE 200 MG: 200 TABLET ORAL at 10:09

## 2018-10-06 RX ADMIN — METOPROLOL TARTRATE 25 MG: 25 TABLET ORAL at 19:48

## 2018-10-06 NOTE — PROGRESS NOTES
Milana Dominguez  Admission Date: 10/3/2018             Daily Progress Note: 10/6/2018    The patient's chart is reviewed and the patient is discussed with the staff. 77 yo male with a history of GERD, HTN, and CAD. He experienced worsening fatigue and DELEON. Noted with cardiac murmur and ECHO revealed severe AS. Cardiac cath with severe CAD. Patient is now s/p CABG x 1 to LAD and AVR on 10/3/18 per Dr. Ana Elaine. Subjective:     Rate controlled a.fib overnight - now back in NSR. Currently on O2 at 6 lpm via NC with o2 sat 96% (N2 washout). Had an \"episode\" this morning but patient is unable to describe in detail - nausea / chest discomfort. Minimal cough with some mucus production but does not know what color. Using IS and drawing 1750 ml. Incisional pain controlled with pain medications.      Current Facility-Administered Medications   Medication Dose Route Frequency    nitroglycerin (NITROSTAT) tablet 0.4 mg  0.4 mg SubLINGual PRN    dilTIAZem (CARDIZEM) injection 10 mg  10 mg IntraVENous ONCE    amiodarone (CORDARONE) tablet 400 mg  400 mg Oral Q12H    lip protectant (BLISTEX) ointment   Topical PRN    mupirocin (BACTROBAN) 2 % ointment   Both Nostrils Q12H    bisacodyl (DULCOLAX) tablet 10 mg  10 mg Oral DAILY PRN    sodium chloride (NS) flush 5-10 mL  5-10 mL IntraVENous Q8H    sodium chloride (NS) flush 5-10 mL  5-10 mL IntraVENous PRN    metoprolol tartrate (LOPRESSOR) tablet 25 mg  25 mg Oral Q12H    alum-mag hydroxide-simeth (MYLANTA) oral suspension 30 mL  30 mL Oral Q4H PRN    acetaminophen (TYLENOL) tablet 650 mg  650 mg Oral Q4H PRN    zolpidem (AMBIEN) tablet 5 mg  5 mg Oral QHS PRN    atorvastatin (LIPITOR) tablet 80 mg  80 mg Oral QHS    aspirin delayed-release tablet 81 mg  81 mg Oral DAILY    magnesium oxide (MAG-OX) tablet 400 mg  400 mg Oral TID PRN    magnesium oxide (MAG-OX) tablet 400 mg  400 mg Oral QID PRN    potassium chloride (KLOR-CON) tablet 10 mEq  10 mEq Oral DAILY    potassium chloride (K-DUR, KLOR-CON) SR tablet 20 mEq  20 mEq Oral BID PRN    potassium chloride (K-DUR, KLOR-CON) SR tablet 40 mEq  40 mEq Oral BID PRN    oxyCODONE-acetaminophen (PERCOCET 10)  mg per tablet 1 Tab  1 Tab Oral Q4H PRN    senna-docusate (PERICOLACE) 8.6-50 mg per tablet 2 Tab  2 Tab Oral QHS    losartan (COZAAR) tablet 25 mg  25 mg Oral DAILY    traMADol (ULTRAM) tablet 100 mg  100 mg Oral Q6H PRN       Review of Systems  Constitutional: negative for fever, chills, sweats  Cardiovascular: negative for chest pain, palpitations, syncope, edema  Gastrointestinal:  negative for dysphagia, reflux, vomiting, diarrhea, abdominal pain, or melena  Neurologic:  negative for focal weakness, numbness, headache    Objective:     Vitals:    10/05/18 1900 10/05/18 2253 10/06/18 0400 10/06/18 0641   BP: 140/72 144/75 141/75 151/71   Pulse: 86 86 78 87   Resp: 18 16 18    Temp: 98 °F (36.7 °C) 97.5 °F (36.4 °C) 98 °F (36.7 °C)    SpO2: 96% 91% 96%    Weight:   231 lb 3.2 oz (104.9 kg)    Height:         Intake and Output:   10/04 1901 - 10/06 0700  In: 680 [P.O.:680]  Out: 2300 [Urine:2300]       Physical Exam:   Constitution:  the patient is well developed and in no acute distress  EENMT:  Sclera clear, pupils equal, oral mucosa moist  Respiratory: clear to auscultation bilaterally, O2 at 6 lpm for N2 washout  Cardiovascular:  RRR without M,G,R  Gastrointestinal: soft and non-tender; with positive bowel sounds. Musculoskeletal: warm without cyanosis. There is no lower leg edema.   Skin:  no jaundice or rashes, midline sternal incision without redness, swelling, or drainage   Neurologic: no gross neuro deficits     Psychiatric:  alert and oriented x 3    CXR:   10/5          LAB  Recent Labs      10/05/18   0524  10/04/18   2046  10/04/18   1706  10/04/18   0613  10/04/18   0315   GLUCPOC  118*  129*  149*  116*  132*      Recent Labs      10/05/18   0350  10/04/18 0309  10/03/18   2007  10/03/18   1601  10/03/18   1220   WBC  11.3*  8.7   --    --   14.2*   HGB  11.7*  10.7*  11.3*  11.5*  11.6*   HCT  36.1*  33.5*  35.3*  35.6*  36.2*   PLT  77*  72*   --    --   113*   INR   --    --    --    --   1.6     Recent Labs      10/06/18   0447  10/05/18   0350  10/04/18   0309   10/03/18   1220   NA   --    --   139   --   144   K  3.9  4.2  4.6   < >  4.1   CL   --    --   110*   --   113*   CO2   --    --   24   --   24   GLU   --    --   124*   --   124*   BUN   --    --   13   --   15   CREA   --    --   0.96   --   1.23   MG  2.0  2.1  2.2   < >  3.1*   CA   --    --   7.2*   --   7.4*    < > = values in this interval not displayed. Recent Labs      10/03/18   1345  10/03/18   1210   PH  7.28*  7.23*   PCO2  42  50*   PO2  140*  110*   HCO3  19*  21*     No results for input(s): LCAD, LAC in the last 72 hours.       Assessment:  (Medical Decision Making)     Hospital Problems  Date Reviewed: 10/5/2018          Codes Class Noted POA    Status post coronary artery bypass graft ICD-10-CM: Z95.1  ICD-9-CM: V45.81  10/3/2018 No     10/3/18 Dr. Zuri Jensen  CABG X 1, LIMA to the LAD WITH AVR 23 mm Intuity valve         S/P AVR (aortic valve replacement) ICD-10-CM: Z95.2  ICD-9-CM: V43.3  10/3/2018 No     10/3/18  Dr. Zuri Jensen  CABG X 1, LIMA to the LAD WITH AVR 23 mm Intuity valve         Hypoxia ICD-10-CM: R09.02  ICD-9-CM: 799.02  10/3/2018 No        Pneumothorax    Tiny L PTX  O2 at 6 lpm for N2 washout      Aortic stenosis ICD-10-CM: I35.0  ICD-9-CM: 424.1  9/11/2018 Yes        * (Principal)CAD (coronary artery disease) (Chronic) ICD-10-CM: I25.10  ICD-9-CM: 414.00  9/11/2018 Yes        Hypertension (Chronic) ICD-10-CM: I10  ICD-9-CM: 401.9  9/11/2018 Yes         plt 77    Plan:  (Medical Decision Making)     --follow up CXR pending  --continue IS  --mobilize - PT following - ambulated 240' yesterday  --small PTX on yesterday's CXR - o2 at 6 lpm with humidity for N2 washout    More than 50% of the time documented was spent in face-to-face contact with the patient and in the care of the patient on the floor/unit where the patient is located. Sarah Aus, NP    Lungs:  clear  Heart:  RRR with no Murmur/Rubs/Gallops    Additional Comments:  CXR- I do not see PTX on today CXR,OK to to wean 02  I have spoken with and examined the patient. I agree with the above assessment and plan as documented.     Harish Rajan MD

## 2018-10-06 NOTE — PROGRESS NOTES
Patient in Afib. Controlled rate. Given po am meds and Dr. Cirilo Garibay notified. New orders received.

## 2018-10-06 NOTE — PROGRESS NOTES
CV Progress Note    Subjective: Incisional pain:  moderate  Appetite:  poor  Activity: Ambulating with assistance      Objective:     Vitals:  Blood pressure 126/74, pulse 81, temperature 97.7 °F (36.5 °C), resp. rate 18, height 5' 10\" (1.778 m), weight 231 lb 3.2 oz (104.9 kg), SpO2 93 %. Temp (24hrs), Av.8 °F (36.6 °C), Min:97.4 °F (36.3 °C), Max:98.4 °F (36.9 °C)        Plan/Recommendations/Medical Decision Making:     Principal Problem:    CAD (coronary artery disease) (2018)    Active Problems: Aortic stenosis (2018)      Hypertension (2018)      Status post coronary artery bypass graft (10/3/2018)      Overview: 10/3/18 Dr. Abraham Bucio      CABG X 1, LIMA to the LAD WITH AVR 23 mm Intuity valve      S/P AVR (aortic valve replacement) (10/3/2018)      Overview: 10/3/18  Dr. Abraham Bucio      CABG X 1, LIMA to the LAD WITH AVR 23 mm Intuity valve      Hypoxia (10/3/2018)      Pneumothorax (10/5/2018)        Continue present treatment    See orders for details. Beti Batista.  Marcio Khalil MD

## 2018-10-07 PROCEDURE — 99232 SBSQ HOSP IP/OBS MODERATE 35: CPT | Performed by: INTERNAL MEDICINE

## 2018-10-07 PROCEDURE — 65660000004 HC RM CVT STEPDOWN

## 2018-10-07 PROCEDURE — 74011250637 HC RX REV CODE- 250/637: Performed by: PHYSICIAN ASSISTANT

## 2018-10-07 PROCEDURE — 74011250637 HC RX REV CODE- 250/637: Performed by: THORACIC SURGERY (CARDIOTHORACIC VASCULAR SURGERY)

## 2018-10-07 PROCEDURE — 97530 THERAPEUTIC ACTIVITIES: CPT

## 2018-10-07 RX ADMIN — MUPIROCIN: 20 OINTMENT TOPICAL at 21:00

## 2018-10-07 RX ADMIN — Medication 10 ML: at 20:28

## 2018-10-07 RX ADMIN — METOPROLOL TARTRATE 25 MG: 25 TABLET ORAL at 09:17

## 2018-10-07 RX ADMIN — TRAMADOL HYDROCHLORIDE 100 MG: 50 TABLET, FILM COATED ORAL at 16:08

## 2018-10-07 RX ADMIN — ASPIRIN 81 MG: 81 TABLET, COATED ORAL at 09:17

## 2018-10-07 RX ADMIN — AMIODARONE HYDROCHLORIDE 400 MG: 200 TABLET ORAL at 09:17

## 2018-10-07 RX ADMIN — Medication 10 ML: at 15:04

## 2018-10-07 RX ADMIN — OXYCODONE HYDROCHLORIDE AND ACETAMINOPHEN 1 TABLET: 10; 325 TABLET ORAL at 07:01

## 2018-10-07 RX ADMIN — Medication 10 ML: at 05:10

## 2018-10-07 RX ADMIN — OXYCODONE HYDROCHLORIDE AND ACETAMINOPHEN 1 TABLET: 10; 325 TABLET ORAL at 20:34

## 2018-10-07 RX ADMIN — ATORVASTATIN CALCIUM 80 MG: 40 TABLET, FILM COATED ORAL at 20:27

## 2018-10-07 RX ADMIN — LOSARTAN POTASSIUM 25 MG: 50 TABLET ORAL at 09:16

## 2018-10-07 RX ADMIN — METOPROLOL TARTRATE 25 MG: 25 TABLET ORAL at 20:28

## 2018-10-07 RX ADMIN — MUPIROCIN: 20 OINTMENT TOPICAL at 09:18

## 2018-10-07 RX ADMIN — AMIODARONE HYDROCHLORIDE 400 MG: 200 TABLET ORAL at 20:28

## 2018-10-07 NOTE — PROGRESS NOTES
Ciarra Easton  Admission Date: 10/3/2018             Daily Progress Note: 10/7/2018    The patient's chart is reviewed and the patient is discussed with the staff. 75 yo male with a history of GERD, HTN, and CAD. He experienced worsening fatigue and DELEON. Noted with cardiac murmur and ECHO revealed severe AS. Cardiac cath with severe CAD. Patient is now s/p CABG x 1 to LAD and AVR on 10/3/18 per Dr. Cirilo Garibay. Subjective:     Currently on RA with o2 sat 92%. Minimal cough with some mucus production but does not know what color. Using IS and drawing 2250 ml. Incisional pain controlled with pain medications.      Current Facility-Administered Medications   Medication Dose Route Frequency    nitroglycerin (NITROSTAT) tablet 0.4 mg  0.4 mg SubLINGual PRN    amiodarone (CORDARONE) tablet 400 mg  400 mg Oral Q12H    ondansetron (ZOFRAN) injection 4 mg  4 mg IntraVENous Q6H PRN    lip protectant (BLISTEX) ointment   Topical PRN    mupirocin (BACTROBAN) 2 % ointment   Both Nostrils Q12H    sodium chloride (NS) flush 5-10 mL  5-10 mL IntraVENous Q8H    sodium chloride (NS) flush 5-10 mL  5-10 mL IntraVENous PRN    metoprolol tartrate (LOPRESSOR) tablet 25 mg  25 mg Oral Q12H    alum-mag hydroxide-simeth (MYLANTA) oral suspension 30 mL  30 mL Oral Q4H PRN    acetaminophen (TYLENOL) tablet 650 mg  650 mg Oral Q4H PRN    zolpidem (AMBIEN) tablet 5 mg  5 mg Oral QHS PRN    atorvastatin (LIPITOR) tablet 80 mg  80 mg Oral QHS    aspirin delayed-release tablet 81 mg  81 mg Oral DAILY    magnesium oxide (MAG-OX) tablet 400 mg  400 mg Oral TID PRN    magnesium oxide (MAG-OX) tablet 400 mg  400 mg Oral QID PRN    potassium chloride (K-DUR, KLOR-CON) SR tablet 20 mEq  20 mEq Oral BID PRN    potassium chloride (K-DUR, KLOR-CON) SR tablet 40 mEq  40 mEq Oral BID PRN    oxyCODONE-acetaminophen (PERCOCET 10)  mg per tablet 1 Tab  1 Tab Oral Q4H PRN    losartan (COZAAR) tablet 25 mg  25 mg Oral DAILY    traMADol (ULTRAM) tablet 100 mg  100 mg Oral Q6H PRN       Review of Systems  Constitutional: negative for fever, chills, sweats  Cardiovascular: negative for chest pain, palpitations, syncope, edema  Gastrointestinal:  negative for dysphagia, reflux, vomiting, diarrhea, abdominal pain, or melena  Neurologic:  negative for focal weakness, numbness, headache    Objective:     Vitals:    10/06/18 1546 10/06/18 1906 10/06/18 2244 10/07/18 0247   BP: 156/85 122/71 125/64 155/78   Pulse: 91 91 85 85   Resp: 20 18 16 18   Temp: 98 °F (36.7 °C) 98.8 °F (37.1 °C) 98.2 °F (36.8 °C) 98.2 °F (36.8 °C)   SpO2: 93% 91% 90% 92%   Weight:    225 lb (102.1 kg)   Height:         Intake and Output:   10/05 1901 - 10/07 0700  In: 5566 [P.O.:1405]  Out: 2200 [Urine:2200]       Physical Exam:   Constitution:  the patient is well developed and in no acute distress  EENMT:  Sclera clear, pupils equal, oral mucosa moist  Respiratory: clear to auscultation bilaterally, RA  Cardiovascular:  RRR without M,G,R  Gastrointestinal: soft and non-tender; with positive bowel sounds. Musculoskeletal: warm without cyanosis. There is no lower leg edema. Skin:  no jaundice or rashes, midline sternal incision without redness, swelling, or drainage   Neurologic: no gross neuro deficits     Psychiatric:  alert and oriented x 3    CXR:   10/6      LAB  Recent Labs      10/06/18   2100  10/05/18   0524  10/04/18   2046  10/04/18   1706   GLUCPOC  115*  118*  129*  149*      Recent Labs      10/05/18   0350   WBC  11.3*   HGB  11.7*   HCT  36.1*   PLT  77*     Recent Labs      10/06/18   0447  10/05/18   0350   K  3.9  4.2   MG  2.0  2.1     No results for input(s): PH, PCO2, PO2, HCO3 in the last 72 hours. No results for input(s): LCAD, LAC in the last 72 hours.       Assessment:  (Medical Decision Making)     Hospital Problems  Date Reviewed: 10/5/2018          Codes Class Noted POA    Status post coronary artery bypass graft ICD-10-CM: Z95.1  ICD-9-CM: V45.81  10/3/2018 No     10/3/18 Dr. Fox Leon  CABG X 1, ALONZO to the LAD WITH AVR 23 mm Intuity valve         S/P AVR (aortic valve replacement) ICD-10-CM: Z95.2  ICD-9-CM: V43.3  10/3/2018 No     10/3/18  Dr. Fox Leon  CABG X 1, ALONZO to the LAD WITH AVR 23 mm Intuity valve         Hypoxia ICD-10-CM: R09.02  ICD-9-CM: 799.02  10/3/2018 No    Now on RA      Pneumothorax    Tiny L PTX      Aortic stenosis ICD-10-CM: I35.0  ICD-9-CM: 424.1  9/11/2018 Yes        * (Principal)CAD (coronary artery disease) (Chronic) ICD-10-CM: I25.10  ICD-9-CM: 414.00  9/11/2018 Yes        Hypertension (Chronic) ICD-10-CM: I10  ICD-9-CM: 401.9  9/11/2018 Yes         plt 77    Plan:  (Medical Decision Making)     --continue IS  --mobilize - PT following - ambulated 240' yesterday      More than 50% of the time documented was spent in face-to-face contact with the patient and in the care of the patient on the floor/unit where the patient is located. Juan Quintero NP    Lungs:  clear  Heart:  RRR with no Murmur/Rubs/Gallops    Additional Comments:  Stable from pulmonary stand point, will sign off call if needed    I have spoken with and examined the patient. I agree with the above assessment and plan as documented.     Jack Barnes MD

## 2018-10-07 NOTE — PROGRESS NOTES
CV Progress Note    Subjective: Incisional pain:  moderate  Appetite:  good   Activity: Ambulating well      Objective:     Vitals:  Blood pressure 120/70, pulse 82, temperature 97.6 °F (36.4 °C), resp. rate 18, height 5' 10\" (1.778 m), weight 225 lb (102.1 kg), SpO2 94 %. Temp (24hrs), Av.2 °F (36.8 °C), Min:97.6 °F (36.4 °C), Max:98.8 °F (37.1 °C)        Plan/Recommendations/Medical Decision Making:     Principal Problem:    CAD (coronary artery disease) (2018)    Active Problems: Aortic stenosis (2018)      Hypertension (2018)      Status post coronary artery bypass graft (10/3/2018)      Overview: 10/3/18 Dr. Sara Pina      CABG X 1, LIMA to the LAD WITH AVR 23 mm Intuity valve      S/P AVR (aortic valve replacement) (10/3/2018)      Overview: 10/3/18  Dr. Sara Pina      CABG X 1, LIMA to the LAD WITH AVR 23 mm Intuity valve      Hypoxia (10/3/2018)      Pneumothorax (10/5/2018)        Continue present treatment    See orders for details. Natalie De Guzman MD

## 2018-10-07 NOTE — PROGRESS NOTES
Bedside and Verbal shift change report given to Anastacia Schmidt (oncoming nurse) by Prentice Libman (offgoing nurse). Report included the following information SBAR, Kardex, MAR and Med Rec Status.

## 2018-10-07 NOTE — PROGRESS NOTES
Problem: Mobility Impaired (Adult and Pediatric)  Goal: *Acute Goals and Plan of Care (Insert Text)  STG:  (1.)Mr. Chuy Parry will move from supine to sit and sit to supine  with CONTACT GUARD ASSIST within 3 treatment day(s). (2.)Mr. Chuy Parry will transfer from bed to chair and chair to bed with STAND BY ASSIST using the least restrictive device within 3 treatment day(s). (3.)Mr. Mcfarland will ambulate with CONTACT GUARD ASSIST for 250+ feet with the least restrictive device within 3 treatment day(s). LTG:  (1.)Mr. Chuy Parry will move from supine to sit and sit to supine  in bed with SUPERVISION within 7 treatment day(s). (2.)Mr. Chuy Parry will transfer from bed to chair and chair to bed with SUPERVISION using the least restrictive device within 7 treatment day(s). (3.)Mr. Mcfarland will ambulate with SUPERVISION for 500+ feet with the least restrictive device within 7 treatment day(s). ________________________________________________________________________________________________      PHYSICAL THERAPY: Daily Note, Treatment Day: 2nd, PM 10/7/2018  INPATIENT: Hospital Day: 5  [place] / Product Type: Medicare /      NAME/AGE/GENDER: Umair Kevin is a 76 y.o. male   PRIMARY DIAGNOSIS: Nonrheumatic aortic valve stenosis [I35.0]  Atherosclerosis of native coronary artery of native heart without angina pectoris [I25.10] CAD (coronary artery disease) CAD (coronary artery disease)  Procedure(s) (LRB):  CORONARY ARTERY BYPASS GRAFT X 1, LIMA  WITH AVR (N/A)  ESOPHAGEAL TRANS ECHOCARDIOGRAM (N/A)  4 Days Post-Op  ICD-10: Treatment Diagnosis:    · Generalized Muscle Weakness (M62.81)  · Difficulty in walking, Not elsewhere classified (R26.2)   Precaution/Allergies:  Lortab [hydrocodone-acetaminophen]   Sternal  precautions     ASSESSMENT:     Mr. Chuy Parry presents sitting up in bed. He is happy to walk with  Me.   He walked about the same distance as last visit as well as practiced going up and over stair unit. He gets SOB with ambulation but requires no assistance and sats 96% on RA. Steady progress. This section established at most recent assessment   PROBLEM LIST (Impairments causing functional limitations):  1. Decreased Strength  2. Decreased ADL/Functional Activities  3. Decreased Transfer Abilities  4. Decreased Ambulation Ability/Technique  5. Decreased Balance  6. Increased Pain  7. Decreased Activity Tolerance  8. Decreased Pacing Skills  9. Increased Fatigue  10. Increased Shortness of Breath  11. Decreased Knowledge of Precautions  12. Decreased [place] with Home Exercise Program   INTERVENTIONS PLANNED: (Benefits and precautions of physical therapy have been discussed with the patient.)  1. Balance Exercise  2. Bed Mobility  3. Family Education  4. Gait Training  5. Home Exercise Program (HEP)  6. Neuromuscular Re-education/Strengthening  7. Range of Motion (ROM)  8. Therapeutic Activites  9. Therapeutic Exercise/Strengthening  10. Transfer Training     TREATMENT PLAN: Frequency/Duration: twice daily for duration of hospital stay  Rehabilitation Potential For Stated Goals: Good     RECOMMENDED REHABILITATION/EQUIPMENT: (at time of discharge pending progress): Due to the probability of continued deficits (see above) this patient will likely need continued skilled physical therapy after discharge. Equipment:    None at this time              HISTORY:   History of Present Injury/Illness (Reason for Referral):  S/p CORONARY ARTERY BYPASS GRAFT X 1, LIMA  WITH AVR (N/A)  ESOPHAGEAL TRANS ECHOCARDIOGRAM  Past Medical History/Comorbidities:   Mr. Asmita Gardner  has a past medical history of Arthritis; CAD (coronary artery disease) (9/11/2018); Cancer [facility]); GERD (gastroesophageal reflux disease); Hypertension (9/11/2018); Kidney stones; PUD (peptic ulcer disease); and Sleep apnea.   Mr. Asmita Gardner  has a past surgical history that includes hx other surgical; hx orthopaedic (); hx appendectomy; hx coronary artery bypass graft (10/03/2018); and hx aortic valve replacement (10/03/2018). Social History/Living Environment:   Home Environment: Private residence  # Steps to Enter: 1  One/Two Story Residence: One story  Living Alone: Yes  Support Systems: Family member(s)  Patient Expects to be Discharged to[de-identified] Private residence  Current DME Used/Available at Home: None  Prior Level of Function/Work/Activity:  Lives alone, independent with all mobility, 0 falls, drives   Number of Personal Factors/Comorbidities that affect the Plan of Care: 3+: HIGH COMPLEXITY   EXAMINATION:   Most Recent Physical Functioning:   Gross Assessment:                  Posture:     Balance:    Bed Mobility:  Supine to Sit: Modified independent  Sit to Supine: Modified independent  Wheelchair Mobility:     Transfers:  Sit to Stand: Stand-by assistance  Gait:     Distance (ft): 120 Feet (ft) (x2)  Ambulation - Level of Assistance: Stand-by assistance  Number of Stairs Trained: 3  Stairs - Level of Assistance: Stand-by assistance      Body Structures Involved:  1. Nerves  2. Heart  3. Lungs  4. Bones  5. Joints  6. Muscles  7. Ligaments Body Functions Affected:  1. Sensory/Pain  2. Cardio  3. Respiratory  4. Neuromusculoskeletal  5. Movement Related Activities and Participation Affected:  1. General Tasks and Demands  2. Mobility  3. Self Care  4. Domestic Life  5. Interpersonal Interactions and Relationships  6. Community, Social and [place]   Number of elements that affect the Plan of Care: 4+: HIGH COMPLEXITY   CLINICAL PRESENTATION:   Presentation: Evolving clinical presentation with changing clinical characteristics: MODERATE COMPLEXITY   CLINICAL DECISION MAKIN [facility] Inpatient Short Form  How much difficulty does the patient currently have. .. Unable A Lot A Little None   1. Turning over in bed (including adjusting bedclothes, sheets and blankets)?    [] 1   [] 2   [x] 3   [] 4   2. Sitting down on and standing up from a chair with arms ( e.g., wheelchair, bedside commode, etc.)   [] 1   [] 2   [x] 3   [] 4   3. Moving from lying on back to sitting on the side of the bed? [] 1   [] 2   [] 3   [] 4   How much help from another person does the patient currently need. .. Total A Lot A Little None   4. Moving to and from a bed to a chair (including a wheelchair)? [] 1   [] 2   [x] 3   [] 4   5. Need to walk in hospital room? [] 1   [] 2   [x] 3   [] 4   6. Climbing 3-5 steps with a railing? [] 1   [] 2   [x] 3   [] 4   © 2007, Trustees of [facility] [place] [employer], under license to [employer]. All rights reserved      Score:  Initial: 18 Most Recent: X (Date: -- )    Interpretation of Tool:  Represents activities that are increasingly more difficult (i.e. Bed mobility, Transfers, Gait). Score 24 23 22-20 19-15 14-10 9-7 6     Modifier CH CI CJ CK CL CM CN      ? Mobility - Walking and Moving Around:     - CURRENT STATUS: CK - 40%-59% impaired, limited or restricted    - GOAL STATUS: CJ - 20%-39% impaired, limited or restricted    - D/C STATUS:  ---------------To be determined---------------  [place] / [place] / Product Type: Medicare /      Medical Necessity:     · Patient is expected to demonstrate progress in strength, range of motion, balance, coordination and functional technique to decrease assistance required with gait, transfers, and functional mobility. .  Reason for Services/Other Comments:  · Patient continues to require skilled intervention due to  decreased strength, decreased balance, decreased functional tolerance, decreased cardiopulmonary endurance affecting participation in basic ADLs and functional tasks.    Use of outcome tool(s) and clinical judgement create a POC that gives a: Clear prediction of patient's progress: LOW COMPLEXITY            TREATMENT:      Pre-treatment Symptoms/Complaints:  Pt agreeable to therapy and with no complaints. Pain: Initial:   Pain Intensity 1: 0  Post Session:  None noted     Therapeutic Activity: (   15 Minutes): Therapeutic activities including bed transfers and Ambulation on level ground to improve mobility, strength, balance and activity tolerance. Required no assistance to promote dynamic balance in standing. Date:  10/4/18 Date:  10-5-18 Date:     Activity/Exercise Parameters Parameters Parameters   LAQ 10 X B  x20    Alt marches 10 X B  x20    Ankle pumps 10 X B  x20    Quad set 10 X B      Glute set 10 X      Heel slides 10 X B      Hip abduction 10 X B  x20        Braces/Orthotics/Lines/Etc:   · NOne  Treatment/Session Assessment:    · Response to Treatment:  Pt tolerated treatment well. · Interdisciplinary Collaboration:   o Physical Therapy Assistant  o Registered Nurse  · After treatment position/precautions:   o Supine in bed  o Bed/Chair-wheels locked  o Bed in low position  o Call light within reach  o RN notified   · Compliance with Program/Exercises: good  · Recommendations/Intent for next treatment session: \"Next visit will focus on advancements to more challenging activities and reduction in assistance provided\".   Total Treatment Duration:  PT Patient Time In/Time Out  Time In: 1400  Time Out: 1415    Janey Paulino PTA

## 2018-10-08 ENCOUNTER — APPOINTMENT (OUTPATIENT)
Dept: GENERAL RADIOLOGY | Age: 68
DRG: 220 | End: 2018-10-08
Attending: THORACIC SURGERY (CARDIOTHORACIC VASCULAR SURGERY)
Payer: MEDICARE

## 2018-10-08 VITALS
TEMPERATURE: 97 F | HEIGHT: 70 IN | DIASTOLIC BLOOD PRESSURE: 73 MMHG | SYSTOLIC BLOOD PRESSURE: 120 MMHG | WEIGHT: 225.4 LBS | BODY MASS INDEX: 32.27 KG/M2 | HEART RATE: 72 BPM | RESPIRATION RATE: 20 BRPM | OXYGEN SATURATION: 94 %

## 2018-10-08 PROCEDURE — 97530 THERAPEUTIC ACTIVITIES: CPT

## 2018-10-08 PROCEDURE — 97110 THERAPEUTIC EXERCISES: CPT

## 2018-10-08 PROCEDURE — 74011250637 HC RX REV CODE- 250/637: Performed by: PHYSICIAN ASSISTANT

## 2018-10-08 PROCEDURE — 74011250637 HC RX REV CODE- 250/637: Performed by: THORACIC SURGERY (CARDIOTHORACIC VASCULAR SURGERY)

## 2018-10-08 PROCEDURE — 71046 X-RAY EXAM CHEST 2 VIEWS: CPT

## 2018-10-08 PROCEDURE — 77030012891

## 2018-10-08 RX ORDER — METOPROLOL TARTRATE 50 MG/1
50 TABLET ORAL EVERY 12 HOURS
Status: DISCONTINUED | OUTPATIENT
Start: 2018-10-08 | End: 2018-10-08 | Stop reason: HOSPADM

## 2018-10-08 RX ORDER — FUROSEMIDE 40 MG/1
40 TABLET ORAL ONCE
Status: COMPLETED | OUTPATIENT
Start: 2018-10-08 | End: 2018-10-08

## 2018-10-08 RX ORDER — OXYCODONE AND ACETAMINOPHEN 10; 325 MG/1; MG/1
1 TABLET ORAL
Qty: 15 TAB | Refills: 0 | Status: SHIPPED | OUTPATIENT
Start: 2018-10-08 | End: 2019-01-22

## 2018-10-08 RX ORDER — ATORVASTATIN CALCIUM 80 MG/1
80 TABLET, FILM COATED ORAL
Qty: 30 TAB | Refills: 11 | Status: SHIPPED | OUTPATIENT
Start: 2018-10-08 | End: 2021-02-11 | Stop reason: SDUPTHER

## 2018-10-08 RX ORDER — METOPROLOL TARTRATE 50 MG/1
50 TABLET ORAL 2 TIMES DAILY
Qty: 60 TAB | Refills: 6 | Status: SHIPPED
Start: 2018-10-08 | End: 2019-08-20

## 2018-10-08 RX ORDER — FUROSEMIDE 40 MG/1
40 TABLET ORAL DAILY
Qty: 3 TAB | Refills: 0 | Status: SHIPPED | OUTPATIENT
Start: 2018-10-08 | End: 2018-10-11

## 2018-10-08 RX ORDER — NITROGLYCERIN 0.4 MG/1
0.4 TABLET SUBLINGUAL AS NEEDED
Qty: 2 BOTTLE | Refills: 4 | Status: SHIPPED
Start: 2018-10-08 | End: 2021-09-24

## 2018-10-08 RX ADMIN — ASPIRIN 81 MG: 81 TABLET, COATED ORAL at 09:34

## 2018-10-08 RX ADMIN — Medication 10 ML: at 06:05

## 2018-10-08 RX ADMIN — OXYCODONE HYDROCHLORIDE AND ACETAMINOPHEN 1 TABLET: 10; 325 TABLET ORAL at 09:34

## 2018-10-08 RX ADMIN — METOPROLOL TARTRATE 50 MG: 50 TABLET ORAL at 09:33

## 2018-10-08 RX ADMIN — FUROSEMIDE 40 MG: 40 TABLET ORAL at 09:33

## 2018-10-08 RX ADMIN — LOSARTAN POTASSIUM 25 MG: 50 TABLET ORAL at 09:34

## 2018-10-08 RX ADMIN — MUPIROCIN: 20 OINTMENT TOPICAL at 09:36

## 2018-10-08 RX ADMIN — AMIODARONE HYDROCHLORIDE 400 MG: 200 TABLET ORAL at 09:33

## 2018-10-08 NOTE — PROGRESS NOTES
Report called in SBAR format to Cyndie Rodríguez RN at [facility]. Time allowed for questions. Callback number provided.

## 2018-10-08 NOTE — PROGRESS NOTES
Discharge instructions, activity restrictions, medications, and follow up appointments reviewed with patient and son . Time allowed for questions. Verbalize understanding. Patient transported to discharge area where he was driven to [facility] rehab by son. Patient stable at discharge.

## 2018-10-08 NOTE — PROGRESS NOTES
Pt is being discharged today to [facility], Rm 221B, report line is [id number]. Declan Sadler, notified. Care Management Interventions  PCP Verified by CM:  Yes  Last Visit to PCP: 08/04/18  Transition of Care Consult (CM Consult): SNF  Partner SNF: Yes  Physical Therapy Consult: Yes  Current Support Network: Own Home, Family Lives Nearby  Confirm Follow Up Transport: Family  Plan discussed with Pt/Family/Caregiver: Yes  Freedom of Choice Offered: Yes  Discharge Location  Discharge Placement: Skilled nursing facility

## 2018-10-08 NOTE — PROGRESS NOTES
Problem: Mobility Impaired (Adult and Pediatric)  Goal: *Acute Goals and Plan of Care (Insert Text)  STG:  (1.)Mr. Neo Monk will move from supine to sit and sit to supine  with CONTACT GUARD ASSIST within 3 treatment day(s). MET 10/8/18  (2.)Mr. Neo Monk will transfer from bed to chair and chair to bed with STAND BY ASSIST using the least restrictive device within 3 treatment day(s). MET 10/8/18  (3.)Mr. Mcfarland will ambulate with CONTACT GUARD ASSIST for 250+ feet with the least restrictive device within 3 treatment day(s). MET 10/8/18    LTG:  (1.)Mr. Neo Monk will move from supine to sit and sit to supine  in bed with SUPERVISION within 7 treatment day(s). MET 10/8/18  (2.)Mr. Neo Monk will transfer from bed to chair and chair to bed with SUPERVISION using the least restrictive device within 7 treatment day(s). MET 10/8/18  (3.)Mr. Mcfarland will ambulate with SUPERVISION for 500+ feet with the least restrictive device within 7 treatment day(s).  MET 10/8/18  ________________________________________________________________________________________________      PHYSICAL THERAPY: Daily Note, Treatment Day: 3rd, AM 10/8/2018  INPATIENT: Hospital Day: 6  [place] / Product Type: Medicare /      NAME/AGE/GENDER: Iván Boland is a 76 y.o. male   PRIMARY DIAGNOSIS: Nonrheumatic aortic valve stenosis [I35.0]  Atherosclerosis of native coronary artery of native heart without angina pectoris [I25.10] CAD (coronary artery disease) CAD (coronary artery disease)  Procedure(s) (LRB):  CORONARY ARTERY BYPASS GRAFT X 1, LIMA  WITH AVR (N/A)  ESOPHAGEAL TRANS ECHOCARDIOGRAM (N/A)  5 Days Post-Op  ICD-10: Treatment Diagnosis:    · Generalized Muscle Weakness (M62.81)  · Difficulty in walking, Not elsewhere classified (R26.2)   Precaution/Allergies:  Lortab [hydrocodone-acetaminophen]   Sternal  precautions     ASSESSMENT:     Mr. Neo Monk is supine in bed on arrival.  He is agreeable to PT and plans to go to rehab today. Pt is doing well, but lives alone. He ambulated in the hallway without an assistive device and no LOB. Exercises performed standing without assist or UE support. Pt left sitting up in chair with  present. This section established at most recent assessment   PROBLEM LIST (Impairments causing functional limitations):  1. Decreased Strength  2. Decreased ADL/Functional Activities  3. Decreased Transfer Abilities  4. Decreased Ambulation Ability/Technique  5. Decreased Balance  6. Increased Pain  7. Decreased Activity Tolerance  8. Decreased Pacing Skills  9. Increased Fatigue  10. Increased Shortness of Breath  11. Decreased Knowledge of Precautions  12. Decreased [place] with Home Exercise Program   INTERVENTIONS PLANNED: (Benefits and precautions of physical therapy have been discussed with the patient.)  1. Balance Exercise  2. Bed Mobility  3. Family Education  4. Gait Training  5. Home Exercise Program (HEP)  6. Neuromuscular Re-education/Strengthening  7. Range of Motion (ROM)  8. Therapeutic Activites  9. Therapeutic Exercise/Strengthening  10. Transfer Training     TREATMENT PLAN: Frequency/Duration: twice daily for duration of hospital stay  Rehabilitation Potential For Stated Goals: Good     RECOMMENDED REHABILITATION/EQUIPMENT: (at time of discharge pending progress): Due to the probability of continued deficits (see above) this patient will likely need continued skilled physical therapy after discharge. Equipment:    None at this time              HISTORY:   History of Present Injury/Illness (Reason for Referral):  S/p CORONARY ARTERY BYPASS GRAFT X 1, LIMA  WITH AVR (N/A)  ESOPHAGEAL TRANS ECHOCARDIOGRAM  Past Medical History/Comorbidities:   Mr. Ricarda Chowdhury  has a past medical history of Arthritis; CAD (coronary artery disease) (9/11/2018); Cancer [facility]); GERD (gastroesophageal reflux disease); Hypertension (9/11/2018);  Kidney stones; PUD (peptic ulcer disease); and Sleep apnea. Mr. Sophia Cook  has a past surgical history that includes hx other surgical; hx orthopaedic (2014); hx appendectomy; hx coronary artery bypass graft (10/03/2018); and hx aortic valve replacement (10/03/2018). Social History/Living Environment:   Home Environment: Private residence  # Steps to Enter: 1  One/Two Story Residence: One story  Living Alone: Yes  Support Systems: Family member(s)  Patient Expects to be Discharged to[de-identified] Private residence  Current DME Used/Available at Home: None  Prior Level of Function/Work/Activity:  Lives alone, independent with all mobility, 0 falls, drives   Number of Personal Factors/Comorbidities that affect the Plan of Care: 3+: HIGH COMPLEXITY   EXAMINATION:   Most Recent Physical Functioning:   Gross Assessment:                  Posture:     Balance:    Bed Mobility:  Supine to Sit: Modified independent  Sit to Supine: Modified independent  Wheelchair Mobility:     Transfers:  Sit to Stand: Supervision  Stand to Sit: Supervision  Gait:     Base of Support: Widened  Speed/Amy: Delayed;Pace decreased (<100 feet/min)  Step Length: Left shortened;Right shortened  Gait Abnormalities: Decreased step clearance  Distance (ft): 500 Feet (ft)  Assistive Device:  (no AD)  Ambulation - Level of Assistance: Independent;Supervision  Stairs - Level of Assistance: Independent;Supervision      Body Structures Involved:  1. Nerves  2. Heart  3. Lungs  4. Bones  5. Joints  6. Muscles  7. Ligaments Body Functions Affected:  1. Sensory/Pain  2. Cardio  3. Respiratory  4. Neuromusculoskeletal  5. Movement Related Activities and Participation Affected:  1. General Tasks and Demands  2. Mobility  3. Self Care  4. Domestic Life  5. Interpersonal Interactions and Relationships  6.  Community, Social and [place]   Number of elements that affect the Plan of Care: 4+: HIGH COMPLEXITY   CLINICAL PRESENTATION:   Presentation: Evolving clinical presentation with changing clinical characteristics: MODERATE COMPLEXITY   CLINICAL DECISION MAKING:   [facility] AM-PAC 6 Clicks   Basic Mobility Inpatient Short Form  How much difficulty does the patient currently have. .. Unable A Lot A Little None   1. Turning over in bed (including adjusting bedclothes, sheets and blankets)? [] 1   [] 2   [x] 3   [] 4   2. Sitting down on and standing up from a chair with arms ( e.g., wheelchair, bedside commode, etc.)   [] 1   [] 2   [x] 3   [] 4   3. Moving from lying on back to sitting on the side of the bed? [] 1   [] 2   [] 3   [] 4   How much help from another person does the patient currently need. .. Total A Lot A Little None   4. Moving to and from a bed to a chair (including a wheelchair)? [] 1   [] 2   [x] 3   [] 4   5. Need to walk in hospital room? [] 1   [] 2   [x] 3   [] 4   6. Climbing 3-5 steps with a railing? [] 1   [] 2   [x] 3   [] 4   © 2007, Trustees of [facility], under license to [employer]. All rights reserved      Score:  Initial: 18 Most Recent: X (Date: -- )    Interpretation of Tool:  Represents activities that are increasingly more difficult (i.e. Bed mobility, Transfers, Gait). Score 24 23 22-20 19-15 14-10 9-7 6     Modifier CH CI CJ CK CL CM CN      ? Mobility - Walking and Moving Around:     - CURRENT STATUS: CK - 40%-59% impaired, limited or restricted    - GOAL STATUS: CJ - 20%-39% impaired, limited or restricted    - D/C STATUS:  ---------------To be determined---------------  [place] / Product Type: Medicare /      Medical Necessity:     · Patient is expected to demonstrate progress in strength, range of motion, balance, coordination and functional technique to decrease assistance required with gait, transfers, and functional mobility. .  Reason for Services/Other Comments:  · Patient continues to require skilled intervention due to  decreased strength, decreased balance, decreased functional tolerance, decreased cardiopulmonary endurance affecting participation in basic ADLs and functional tasks. Use of outcome tool(s) and clinical judgement create a POC that gives a: Clear prediction of patient's progress: LOW COMPLEXITY            TREATMENT:      Pre-treatment Symptoms/Complaints:  Pt agreeable to therapy and with no complaints. Pain: Initial:      Post Session:  None noted     Therapeutic Activity: (   10 Minutes): Therapeutic activities including bed transfers and Ambulation on level ground to improve mobility, strength, balance and activity tolerance. Required no assistance to promote dynamic balance in standing. Therapeutic Exercise: (15 Minutes):  Exercises per grid below to improve mobility, strength and balance. Required minimal verbal cues to promote proper body alignment, promote proper body posture and promote proper body mechanics. Progressed repetitions as indicated. Date:  10/4/18 Date:  10-5-18 Date:  10/8/18   Activity/Exercise Parameters Parameters Parameters   LAQ 10 X B  x20    Alt marches 10 X B  x20 Standing x 10 B   Ankle pumps 10 X B  x20    Quad set 10 X B      Glute set 10 X      Heel slides 10 X B      Hip abduction 10 X B  x20 Standing x 10 B   Toe/heel rocks    Standing x 10 B                   Braces/Orthotics/Lines/Etc:   · NOne  Treatment/Session Assessment:    · Response to Treatment:  Pt tolerated treatment well. · Interdisciplinary Collaboration:   o Physical Therapy Assistant  o Registered Nurse  · After treatment position/precautions:   o Up in chair  o Bed/Chair-wheels locked  o Bed in low position  o Call light within reach  o RN notified   · Compliance with Program/Exercises: good  · Recommendations/Intent for next treatment session: \"Next visit will focus on advancements to more challenging activities and reduction in assistance provided\".   Total Treatment Duration:  PT Patient Time In/Time Out  Time In: 0930  Time Out: 6122    Gabino Jacinto Festus Robertson, PTA

## 2018-10-09 ENCOUNTER — HOME HEALTH ADMISSION (OUTPATIENT)
Dept: HOME HEALTH SERVICES | Facility: HOME HEALTH | Age: 68
End: 2018-10-09
Payer: MEDICARE

## 2018-10-09 NOTE — PROGRESS NOTES
[facility]  Face to Face Encounter    Patients Name: Ciarra Easton    YOB: 1950    Ordering Physician: Cirilo Garibay    Primary Diagnosis: Nonrheumatic aortic valve stenosis [I35.0]  Atherosclerosis of native coronary artery of native heart without angina pectoris [I25.10]    Date of Face to Face:   10/7/18                                  Face to Face Encounter findings are related to primary reason for home care:   yes. 1. I certify that the patient needs intermittent care as follows: skilled nursing care:  skilled observation/assessment, patient education  physical therapy: strengthening, stretching/ROM and transfer training    2. I certify that this patient is homebound, that is: 1) patient requires the use of a None device, special transportation, or assistance of another to leave the home; or 2) patient's condition makes leaving the home medically contraindicated; and 3) patient has a normal inability to leave the home and leaving the home requires considerable and taxing effort. Patient may leave the home for infrequent and short duration for medical reasons, and occasional absences for non-medical reasons. Homebound status is due to the following functional limitations: Patient currently under activity restrictions secondary to recent surgical procedure, this hinders their ability to safely leave the home. Patient with strength deficits limiting the performance of all ADL's without caregiver assistance or the use of an assistive device. 3. I certify that this patient is under my care and that I, or a nurse practitioner or [facility], or clinical nurse specialist, or certified nurse midwife, working with me, had a Face-to-Face Encounter that meets the physician Face-to-Face Encounter requirements.   The following are the clinical findings from the [address] [place] [address] encounter that support the need for skilled services and is a summary of the encounter:     See [facility] chart. Jack March, [facility]  10/9/2018      THE FOLLOWING TO BE COMPLETED BY THE COMMUNITY PHYSICIAN:    I concur with the findings described above from the F2F encounter that this patient is homebound and in need of a skilled service.     Certifying Physician: _____________________________________      Printed Certifying Physician Name: _____________________________________    Date: _________________

## 2018-10-09 NOTE — PROGRESS NOTES
VIOLETTE received notification from [address] at [facility] that patient never came to them. He told her he went to two different SNFs that said he wasn't supposed to be there and he left. When she offered him directions, he told her he was already at home and wasn't going to come back out. VIOLETTE notified Dylon Penny, of this and set up [facility].   VIOLETTE notified Tiffanie of referral.

## 2018-10-10 ENCOUNTER — HOME CARE VISIT (OUTPATIENT)
Dept: SCHEDULING | Facility: HOME HEALTH | Age: 68
End: 2018-10-10
Payer: MEDICARE

## 2018-10-10 VITALS
TEMPERATURE: 97.3 F | HEART RATE: 87 BPM | SYSTOLIC BLOOD PRESSURE: 142 MMHG | OXYGEN SATURATION: 97 % | DIASTOLIC BLOOD PRESSURE: 85 MMHG | RESPIRATION RATE: 18 BRPM

## 2018-10-10 PROCEDURE — 3331090002 HH PPS REVENUE DEBIT

## 2018-10-10 PROCEDURE — G0299 HHS/HOSPICE OF RN EA 15 MIN: HCPCS

## 2018-10-10 PROCEDURE — 400013 HH SOC

## 2018-10-10 PROCEDURE — 3331090001 HH PPS REVENUE CREDIT

## 2018-10-11 ENCOUNTER — HOME CARE VISIT (OUTPATIENT)
Dept: SCHEDULING | Facility: HOME HEALTH | Age: 68
End: 2018-10-11
Payer: MEDICARE

## 2018-10-11 ENCOUNTER — TELEPHONE (OUTPATIENT)
Dept: HOME HEALTH SERVICES | Facility: HOME HEALTH | Age: 68
End: 2018-10-11

## 2018-10-11 PROCEDURE — 3331090001 HH PPS REVENUE CREDIT

## 2018-10-11 PROCEDURE — G0151 HHCP-SERV OF PT,EA 15 MIN: HCPCS

## 2018-10-11 PROCEDURE — 3331090002 HH PPS REVENUE DEBIT

## 2018-10-12 VITALS
RESPIRATION RATE: 15 BRPM | OXYGEN SATURATION: 98 % | SYSTOLIC BLOOD PRESSURE: 137 MMHG | TEMPERATURE: 97.4 F | HEART RATE: 96 BPM | DIASTOLIC BLOOD PRESSURE: 87 MMHG

## 2018-10-12 PROCEDURE — 3331090002 HH PPS REVENUE DEBIT

## 2018-10-12 PROCEDURE — 3331090001 HH PPS REVENUE CREDIT

## 2018-10-13 PROCEDURE — 3331090002 HH PPS REVENUE DEBIT

## 2018-10-13 PROCEDURE — 3331090001 HH PPS REVENUE CREDIT

## 2018-10-14 PROCEDURE — 3331090001 HH PPS REVENUE CREDIT

## 2018-10-14 PROCEDURE — 3331090002 HH PPS REVENUE DEBIT

## 2018-10-15 ENCOUNTER — HOME CARE VISIT (OUTPATIENT)
Dept: SCHEDULING | Facility: HOME HEALTH | Age: 68
End: 2018-10-15
Payer: MEDICARE

## 2018-10-15 VITALS
OXYGEN SATURATION: 98 % | TEMPERATURE: 98.4 F | SYSTOLIC BLOOD PRESSURE: 158 MMHG | RESPIRATION RATE: 18 BRPM | DIASTOLIC BLOOD PRESSURE: 98 MMHG | HEART RATE: 79 BPM

## 2018-10-15 PROCEDURE — 3331090001 HH PPS REVENUE CREDIT

## 2018-10-15 PROCEDURE — G0299 HHS/HOSPICE OF RN EA 15 MIN: HCPCS

## 2018-10-15 PROCEDURE — 3331090002 HH PPS REVENUE DEBIT

## 2018-10-16 PROCEDURE — 3331090001 HH PPS REVENUE CREDIT

## 2018-10-16 PROCEDURE — 3331090002 HH PPS REVENUE DEBIT

## 2018-10-17 ENCOUNTER — TELEPHONE (OUTPATIENT)
Dept: HOME HEALTH SERVICES | Facility: HOME HEALTH | Age: 68
End: 2018-10-17

## 2018-10-17 PROCEDURE — 3331090002 HH PPS REVENUE DEBIT

## 2018-10-17 PROCEDURE — 3331090001 HH PPS REVENUE CREDIT

## 2018-10-17 NOTE — TELEPHONE ENCOUNTER
I spoke with  Fabricio Demarco today. He said his rash continued until a couple of days ago when he stopped the mupirocin oint. Now seems better. I reminded him to see Dr. Aleyda Caba on Friday. He thought he saw him on next Tuesday. I told him to check his paperwork because I was showing Dr. Aleyda Caba this Friday and Dr. Alexis Hardin next Tuesday. He is not home right now, but will check when he gets home as he does not want to miss an appointment. He said OK to call back any time.

## 2018-10-18 ENCOUNTER — HOME CARE VISIT (OUTPATIENT)
Dept: SCHEDULING | Facility: HOME HEALTH | Age: 68
End: 2018-10-18
Payer: MEDICARE

## 2018-10-18 PROCEDURE — 3331090002 HH PPS REVENUE DEBIT

## 2018-10-18 PROCEDURE — 3331090003 HH PPS REVENUE ADJ

## 2018-10-18 PROCEDURE — G0299 HHS/HOSPICE OF RN EA 15 MIN: HCPCS

## 2018-10-18 PROCEDURE — 3331090001 HH PPS REVENUE CREDIT

## 2018-10-19 VITALS
HEART RATE: 84 BPM | OXYGEN SATURATION: 97 % | DIASTOLIC BLOOD PRESSURE: 68 MMHG | RESPIRATION RATE: 18 BRPM | TEMPERATURE: 98.3 F | SYSTOLIC BLOOD PRESSURE: 124 MMHG

## 2018-11-08 ENCOUNTER — TELEPHONE (OUTPATIENT)
Dept: NON INVASIVE DIAGNOSTICS | Age: 68
End: 2018-11-08

## 2018-11-08 NOTE — TELEPHONE ENCOUNTER
Patient phoned for 30 day post op status. Patient is alive and well. 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 This information was collected for STS Registry purposes.

## 2019-09-30 ENCOUNTER — HOSPITAL ENCOUNTER (EMERGENCY)
Age: 69
Discharge: HOME | End: 2019-09-30
Attending: EMERGENCY MEDICINE
Payer: MEDICARE

## 2019-09-30 VITALS
OXYGEN SATURATION: 100 % | SYSTOLIC BLOOD PRESSURE: 130 MMHG | RESPIRATION RATE: 16 BRPM | TEMPERATURE: 98.1 F | DIASTOLIC BLOOD PRESSURE: 82 MMHG | HEART RATE: 67 BPM | BODY MASS INDEX: 30.52 KG/M2 | HEIGHT: 71 IN | WEIGHT: 218 LBS

## 2019-09-30 DIAGNOSIS — M19.90 ARTHRITIS: ICD-10-CM

## 2019-09-30 DIAGNOSIS — R10.9 FLANK PAIN: Primary | ICD-10-CM

## 2019-09-30 LAB
ALBUMIN SERPL-MCNC: 3.5 G/DL (ref 3.2–4.6)
ALBUMIN/GLOB SERPL: 0.7 {RATIO} (ref 1.2–3.5)
ALP SERPL-CCNC: 67 U/L (ref 50–136)
ALT SERPL-CCNC: 15 U/L (ref 12–65)
ANION GAP SERPL CALC-SCNC: 5 MMOL/L (ref 7–16)
AST SERPL-CCNC: 14 U/L (ref 15–37)
BACTERIA URNS QL MICRO: 0 /HPF
BASOPHILS # BLD: 0 K/UL (ref 0–0.2)
BASOPHILS NFR BLD: 0 % (ref 0–2)
BILIRUB SERPL-MCNC: 0.6 MG/DL (ref 0.2–1.1)
BUN SERPL-MCNC: 12 MG/DL (ref 8–23)
CALCIUM SERPL-MCNC: 9.4 MG/DL (ref 8.3–10.4)
CASTS URNS QL MICRO: NORMAL /LPF
CHLORIDE SERPL-SCNC: 106 MMOL/L (ref 98–107)
CO2 SERPL-SCNC: 28 MMOL/L (ref 21–32)
CREAT SERPL-MCNC: 1.31 MG/DL (ref 0.8–1.5)
DIFFERENTIAL METHOD BLD: ABNORMAL
EOSINOPHIL # BLD: 0.3 K/UL (ref 0–0.8)
EOSINOPHIL NFR BLD: 3 % (ref 0.5–7.8)
EPI CELLS #/AREA URNS HPF: 0 /HPF
ERYTHROCYTE [DISTWIDTH] IN BLOOD BY AUTOMATED COUNT: 13.2 % (ref 11.9–14.6)
GLOBULIN SER CALC-MCNC: 4.9 G/DL (ref 2.3–3.5)
GLUCOSE SERPL-MCNC: 227 MG/DL (ref 65–100)
HCT VFR BLD AUTO: 45.2 % (ref 41.1–50.3)
HGB BLD-MCNC: 14.6 G/DL (ref 13.6–17.2)
IMM GRANULOCYTES # BLD AUTO: 0.1 K/UL (ref 0–0.5)
IMM GRANULOCYTES NFR BLD AUTO: 1 % (ref 0–5)
LYMPHOCYTES # BLD: 1.7 K/UL (ref 0.5–4.6)
LYMPHOCYTES NFR BLD: 19 % (ref 13–44)
MCH RBC QN AUTO: 28.6 PG (ref 26.1–32.9)
MCHC RBC AUTO-ENTMCNC: 32.3 G/DL (ref 31.4–35)
MCV RBC AUTO: 88.6 FL (ref 79.6–97.8)
MONOCYTES # BLD: 0.6 K/UL (ref 0.1–1.3)
MONOCYTES NFR BLD: 7 % (ref 4–12)
NEUTS SEG # BLD: 6.3 K/UL (ref 1.7–8.2)
NEUTS SEG NFR BLD: 71 % (ref 43–78)
NRBC # BLD: 0 K/UL (ref 0–0.2)
PLATELET # BLD AUTO: 144 K/UL (ref 150–450)
PMV BLD AUTO: 10.3 FL (ref 9.4–12.3)
POTASSIUM SERPL-SCNC: 4.4 MMOL/L (ref 3.5–5.1)
PROT SERPL-MCNC: 8.4 G/DL (ref 6.3–8.2)
RBC # BLD AUTO: 5.1 M/UL (ref 4.23–5.6)
RBC #/AREA URNS HPF: NORMAL /HPF
SODIUM SERPL-SCNC: 139 MMOL/L (ref 136–145)
WBC # BLD AUTO: 8.9 K/UL (ref 4.3–11.1)
WBC URNS QL MICRO: NORMAL /HPF

## 2019-09-30 PROCEDURE — 81003 URINALYSIS AUTO W/O SCOPE: CPT | Performed by: EMERGENCY MEDICINE

## 2019-09-30 PROCEDURE — 85025 COMPLETE CBC W/AUTO DIFF WBC: CPT

## 2019-09-30 PROCEDURE — 81015 MICROSCOPIC EXAM OF URINE: CPT

## 2019-09-30 PROCEDURE — 99284 EMERGENCY DEPT VISIT MOD MDM: CPT | Performed by: EMERGENCY MEDICINE

## 2019-09-30 PROCEDURE — 80053 COMPREHEN METABOLIC PANEL: CPT

## 2019-09-30 RX ORDER — PREDNISONE 20 MG/1
40 TABLET ORAL DAILY
Qty: 8 TAB | Refills: 0 | Status: SHIPPED | OUTPATIENT
Start: 2019-09-30 | End: 2019-10-04

## 2019-09-30 NOTE — ED NOTES
I have reviewed discharge instructions with the patient. The patient verbalized understanding. Patient left ED via Discharge Method: ambulatory to Home with self    Opportunity for questions and clarification provided. Patient given 1 scripts. To continue your aftercare when you leave the hospital, you may receive an automated call from our care team to check in on how you are doing. This is a free service and part of our promise to provide the best care and service to meet your aftercare needs.  If you have questions, or wish to unsubscribe from this service please call [phone]. Thank you for Choosing our [facility] Emergency Department.

## 2019-09-30 NOTE — ED TRIAGE NOTES
Pt states Friday he started to have right sided flank pain, hx of kidney stones. Pt states increased urinary frequency as well. Pt states burning with urination stopped yesterday when he took his sons amoxicillin. Pt states he is still having right sided flank pain. Pt states he arthritis is hurting his as well in his feet, hands, and shoulders. States muscle pain as well.

## 2019-09-30 NOTE — ED PROVIDER NOTES
[facility] Emergency Department  Arrival Date/Time: 9/30/2019  9:07 AM      Julio César Kent  MRN: [id number]    YOB: 1950   71 y.o. male    [facility] EMERGENCY DEPT ER16/16  Seen on 9/30/2019 @ 9:38 AM      Today's Chief Complaint:   Chief Complaint   Patient presents with    Flank Pain     HPI: 42-year-old male presents to the emergency department with several complaints. Patient had some abdominal pain. Increased urination. Urinary urgency and frequency. States he started taking amoxicillin and that is improved. Complains of bilateral hand pain bilateral shoulder pain. Feels crunching in his shoulders. Waxes and wanes    He is try to get appointments with primary care. Try to get appointment with arthritis Dr-- without success    Increased pain today    No fever no chills. No chest pain or shortness of breath. HPI    Review of Systems: Review of Systems   Constitutional: Negative. HENT: Negative. Respiratory: Negative. Cardiovascular: Negative. Genitourinary: Positive for decreased urine volume, frequency and urgency. Skin: Negative. Psychiatric/Behavioral: Negative. Past Medical History: Primary Care Doctor: Gloria Dacosta MD  Meds, PMH, PSHx, SocHx at end of this note     Allergies: Allergies   Allergen Reactions    Lortab [Hydrocodone-Acetaminophen] Other (comments)     hallucinations         Key Anti-Platelet Anticoagulant Meds             aspirin delayed-release 81 mg tablet Take 81 mg by mouth daily. Physical Exam:  Nursing documentation reviewed. Vitals:    09/30/19 0906 09/30/19 1228   BP: (!) 170/103 130/82   Pulse: 100 67   Resp: 16 16   Temp: 98.1 °F (36.7 °C)    SpO2: 98% 100%    Vital signs were reviewed. Physical Exam   Constitutional: He is oriented to person, place, and time. He appears well-developed and well-nourished. Eyes: Pupils are equal, round, and reactive to light.  EOM are normal.   Neck: Normal range of motion. Cardiovascular: Normal rate and regular rhythm. Abdominal: There is no tenderness. Musculoskeletal:   Patient has some crepitance in the joints of the shoulders. Swelling of the knuckles bilaterally   Neurological: He is alert and oriented to person, place, and time. Skin: Skin is warm and dry. Capillary refill takes less than 2 seconds. Nursing note and vitals reviewed. MEDICAL DECISION MAKING:   Differential Diagnosis:    MDM  Number of Diagnoses or Management Options  Diagnosis management comments: Well-appearing 57-year-old male    Check his urine. Amount and/or Complexity of Data Reviewed  Clinical lab tests: ordered and reviewed    Risk of Complications, Morbidity, and/or Mortality  Presenting problems: low  Diagnostic procedures: minimal  Management options: moderate          Data/Management:      Lab findings during this visit (only abnormal values will be noted, if no value noted then the result   was normal range):   Recent Results (from the past 48 hour(s))   CBC WITH AUTOMATED DIFF    Collection Time: 09/30/19  9:08 AM   Result Value Ref Range    WBC 8.9 4.3 - 11.1 K/uL    RBC 5.10 4.23 - 5.6 M/uL    HGB 14.6 13.6 - 17.2 g/dL    HCT 45.2 41.1 - 50.3 %    MCV 88.6 79.6 - 97.8 FL    MCH 28.6 26.1 - 32.9 PG    MCHC 32.3 31.4 - 35.0 g/dL    RDW 13.2 11.9 - 14.6 %    PLATELET 979 (L) 875 - 450 K/uL    MPV 10.3 9.4 - 12.3 FL    ABSOLUTE NRBC 0.00 0.0 - 0.2 K/uL    DF AUTOMATED      NEUTROPHILS 71 43 - 78 %    LYMPHOCYTES 19 13 - 44 %    MONOCYTES 7 4.0 - 12.0 %    EOSINOPHILS 3 0.5 - 7.8 %    BASOPHILS 0 0.0 - 2.0 %    IMMATURE GRANULOCYTES 1 0.0 - 5.0 %    ABS. NEUTROPHILS 6.3 1.7 - 8.2 K/UL    ABS. LYMPHOCYTES 1.7 0.5 - 4.6 K/UL    ABS. MONOCYTES 0.6 0.1 - 1.3 K/UL    ABS. EOSINOPHILS 0.3 0.0 - 0.8 K/UL    ABS. BASOPHILS 0.0 0.0 - 0.2 K/UL    ABS. IMM.  GRANS. 0.1 0.0 - 0.5 K/UL   METABOLIC PANEL, COMPREHENSIVE    Collection Time: 09/30/19  9:08 AM   Result Value Ref Range Sodium 139 136 - 145 mmol/L    Potassium 4.4 3.5 - 5.1 mmol/L    Chloride 106 98 - 107 mmol/L    CO2 28 21 - 32 mmol/L    Anion gap 5 (L) 7 - 16 mmol/L    Glucose 227 (H) 65 - 100 mg/dL    BUN 12 8 - 23 MG/DL    Creatinine 1.31 0.8 - 1.5 MG/DL    GFR est AA >60 >60 ml/min/1.73m2    GFR est non-AA 58 (L) >60 ml/min/1.73m2    Calcium 9.4 8.3 - 10.4 MG/DL    Bilirubin, total 0.6 0.2 - 1.1 MG/DL    ALT (SGPT) 15 12 - 65 U/L    AST (SGOT) 14 (L) 15 - 37 U/L    Alk. phosphatase 67 50 - 136 U/L    Protein, total 8.4 (H) 6.3 - 8.2 g/dL    Albumin 3.5 3.2 - 4.6 g/dL    Globulin 4.9 (H) 2.3 - 3.5 g/dL    A-G Ratio 0.7 (L) 1.2 - 3.5     URINE MICROSCOPIC    Collection Time: 09/30/19 11:22 AM   Result Value Ref Range    WBC  0 /hpf    RBC 0-3 0 /hpf    Epithelial cells 0 0 /hpf    Bacteria 0 0 /hpf    Casts 10-20 0 /lpf       Radiology studies during this visit: No results found. Medications given in the ED: Medications - No data to display    Recheck and Additional Documentation:  (use .addrecheck  . addsepsis   . addstroke   . addhip  . addhandoff)     Well-appearing 72-year-old male. He is afebrile. Normal white count. No pain on my exam    Does have generalized joint pain. Needs primary care and probably rheumatology follow-up. no imaging is indicated today. Procedure Documentation:   Procedures     Other ED Course Notes:        Assessment and Plan:      Impression:     ICD-10-CM ICD-9-CM   1. Flank pain R10.9 789.09   2.  Arthritis M19.90 716.90       Disposition: home    Follow-up:   Follow-up Information     Follow up With Specialties Details Why Contact [facility] EMERGENCY DEPT Emergency Medicine   [facility] [address] [place] [address]    August Hyde MD [facility] Call today  [address] [place] [address] Dr [facility]  [phone]            Discharge Medications:   Discharge Medication List as of 9/30/2019 11:00 AM START taking these medications    Details   predniSONE (DELTASONE) 20 mg tablet Take 40 mg by mouth daily for 4 days. , Normal, Disp-8 Tab, R-0         CONTINUE these medications which have NOT CHANGED    Details   diphenhydrAMINE (BENADRYL ALLERGY) 25 mg tablet Take 50 mg by mouth nightly as needed for Itching (rash up near R shoulder blade). , Historical Med      magnesium 200 mg tab Take 400 mg by mouth daily. , Historical Med      atorvastatin (LIPITOR) 80 mg tablet Take 1 Tab by mouth nightly. , Print, Disp-30 Tab, R-11      nitroglycerin (NITROSTAT) 0.4 mg SL tablet 1 Tab by SubLINGual route as needed for Chest Pain. Up to 3 doses. , No Print, Disp-2 Bottle, R-4      aspirin delayed-release 81 mg tablet Take 81 mg by mouth daily. , Historical Med      oxymetazoline HCl (AFRIN NASAL SPRAY NA) by Nasal route nightly., Historical Med      naproxen sodium (ALEVE) 220 mg cap Take  by mouth daily. , Historical Med      multivitamin (ONE A DAY) tablet Take 1 Tab by mouth daily. , Historical Med             Past Medical History:      Past Medical History:   Diagnosis Date    Arthritis     generalized OA    CAD (coronary artery disease) 9/11/2018    Cancer ([facility] [address]     melanoma nose ~2016    GERD (gastroesophageal reflux disease)     OTC alkaseltzer    Hypertension 9/11/2018    Kidney stones     last passed stone 2016    PUD (peptic ulcer disease)     1970's- patient denies 10/01/18    Sleep apnea     observed to stop breathing in sleep; no CPAP     Past Surgical History:   Procedure Laterality Date    HX AORTIC VALVE REPLACEMENT  10/03/2018    CORONARY ARTERY BYPASS GRAFT X 1, LIMA to the LAD WITH AVR 23 mm Intuity valve    HX APPENDECTOMY       15 yo    HX CORONARY ARTERY BYPASS GRAFT  10/03/2018    CABG x1, Dr. Jemima Alvarado  2014    right lower ext - broken put in rods and screws    HX OTHER SURGICAL      melanoma surgery to nose     Social History     Tobacco Use    Smoking status: Never Smoker    Smokeless tobacco: Never Used   Substance Use Topics    Alcohol use: Yes     Alcohol/week: 2.0 standard drinks     Types: 2 Shots of liquor per week     Comment: occasional- 2     Drug use: No     Prior to Admission Medications   Prescriptions Last Dose Informant Patient Reported? Taking?   aspirin delayed-release 81 mg tablet   Yes No   Sig: Take 81 mg by mouth daily. atorvastatin (LIPITOR) 80 mg tablet   No No   Sig: Take 1 Tab by mouth nightly. Patient taking differently: Take 40 mg by mouth nightly. diphenhydrAMINE (BENADRYL ALLERGY) 25 mg tablet   Yes No   Sig: Take 50 mg by mouth nightly as needed for Itching (rash up near R shoulder blade). magnesium 200 mg tab   Yes No   Sig: Take 400 mg by mouth daily. multivitamin (ONE A DAY) tablet   Yes No   Sig: Take 1 Tab by mouth daily. naproxen sodium (ALEVE) 220 mg cap   Yes No   Sig: Take  by mouth daily. nitroglycerin (NITROSTAT) 0.4 mg SL tablet   No No   Si Tab by SubLINGual route as needed for Chest Pain. Up to 3 doses. oxymetazoline HCl (AFRIN NASAL SPRAY NA)   Yes No   Sig: by Nasal route nightly.       Facility-Administered Medications: None

## 2019-09-30 NOTE — DISCHARGE INSTRUCTIONS
Rest  Ice shoulders     Prednisone as directed     Recent Results (from the past 8 hour(s))   CBC WITH AUTOMATED DIFF    Collection Time: 09/30/19  9:08 AM   Result Value Ref Range    WBC 8.9 4.3 - 11.1 K/uL    RBC 5.10 4.23 - 5.6 M/uL    HGB 14.6 13.6 - 17.2 g/dL    HCT 45.2 41.1 - 50.3 %    MCV 88.6 79.6 - 97.8 FL    MCH 28.6 26.1 - 32.9 PG    MCHC 32.3 31.4 - 35.0 g/dL    RDW 13.2 11.9 - 14.6 %    PLATELET 470 (L) 435 - 450 K/uL    MPV 10.3 9.4 - 12.3 FL    ABSOLUTE NRBC 0.00 0.0 - 0.2 K/uL    DF AUTOMATED      NEUTROPHILS 71 43 - 78 %    LYMPHOCYTES 19 13 - 44 %    MONOCYTES 7 4.0 - 12.0 %    EOSINOPHILS 3 0.5 - 7.8 %    BASOPHILS 0 0.0 - 2.0 %    IMMATURE GRANULOCYTES 1 0.0 - 5.0 %    ABS. NEUTROPHILS 6.3 1.7 - 8.2 K/UL    ABS. LYMPHOCYTES 1.7 0.5 - 4.6 K/UL    ABS. MONOCYTES 0.6 0.1 - 1.3 K/UL    ABS. EOSINOPHILS 0.3 0.0 - 0.8 K/UL    ABS. BASOPHILS 0.0 0.0 - 0.2 K/UL    ABS. IMM. GRANS. 0.1 0.0 - 0.5 K/UL   METABOLIC PANEL, COMPREHENSIVE    Collection Time: 09/30/19  9:08 AM   Result Value Ref Range    Sodium 139 136 - 145 mmol/L    Potassium 4.4 3.5 - 5.1 mmol/L    Chloride 106 98 - 107 mmol/L    CO2 28 21 - 32 mmol/L    Anion gap 5 (L) 7 - 16 mmol/L    Glucose 227 (H) 65 - 100 mg/dL    BUN 12 8 - 23 MG/DL    Creatinine 1.31 0.8 - 1.5 MG/DL    GFR est AA >60 >60 ml/min/1.73m2    GFR est non-AA 58 (L) >60 ml/min/1.73m2    Calcium 9.4 8.3 - 10.4 MG/DL    Bilirubin, total 0.6 0.2 - 1.1 MG/DL    ALT (SGPT) 15 12 - 65 U/L    AST (SGOT) 14 (L) 15 - 37 U/L    Alk.  phosphatase 67 50 - 136 U/L    Protein, total 8.4 (H) 6.3 - 8.2 g/dL    Albumin 3.5 3.2 - 4.6 g/dL    Globulin 4.9 (H) 2.3 - 3.5 g/dL    A-G Ratio 0.7 (L) 1.2 - 3.5

## 2020-06-03 ENCOUNTER — HOSPITAL ENCOUNTER (OUTPATIENT)
Dept: MRI IMAGING | Age: 70
Discharge: HOME | End: 2020-06-03
Attending: ORTHOPAEDIC SURGERY
Payer: MEDICARE

## 2020-06-03 DIAGNOSIS — M75.121 COMPLETE TEAR OF RIGHT ROTATOR CUFF: ICD-10-CM

## 2020-06-03 DIAGNOSIS — M75.122 COMPLETE ROTATOR CUFF TEAR OF LEFT SHOULDER: ICD-10-CM

## 2020-06-03 PROCEDURE — 73221 MRI JOINT UPR EXTREM W/O DYE: CPT

## 2021-03-12 ENCOUNTER — HOSPITAL ENCOUNTER (OUTPATIENT)
Dept: LAB | Age: 71
Discharge: HOME | End: 2021-03-12
Payer: MEDICARE

## 2021-03-12 DIAGNOSIS — Z95.1 STATUS POST CORONARY ARTERY BYPASS GRAFT: ICD-10-CM

## 2021-03-12 DIAGNOSIS — I35.0 AORTIC VALVE STENOSIS, ETIOLOGY OF CARDIAC VALVE DISEASE UNSPECIFIED: ICD-10-CM

## 2021-03-12 DIAGNOSIS — I50.22 SYSTOLIC CHF, CHRONIC (HCC): ICD-10-CM

## 2021-03-12 LAB
ALBUMIN SERPL-MCNC: 4 G/DL (ref 3.2–4.6)
ALBUMIN/GLOB SERPL: 0.9 {RATIO} (ref 1.2–3.5)
ALP SERPL-CCNC: 53 U/L (ref 50–136)
ALT SERPL-CCNC: 28 U/L (ref 12–65)
ANION GAP SERPL CALC-SCNC: 7 MMOL/L (ref 7–16)
AST SERPL-CCNC: 26 U/L (ref 15–37)
BASOPHILS # BLD: 0 K/UL (ref 0–0.2)
BASOPHILS NFR BLD: 0 % (ref 0–2)
BILIRUB SERPL-MCNC: 0.7 MG/DL (ref 0.2–1.1)
BUN SERPL-MCNC: 18 MG/DL (ref 8–23)
CALCIUM SERPL-MCNC: 9.3 MG/DL (ref 8.3–10.4)
CHLORIDE SERPL-SCNC: 105 MMOL/L (ref 98–107)
CHOLEST SERPL-MCNC: 182 MG/DL
CO2 SERPL-SCNC: 23 MMOL/L (ref 21–32)
CREAT SERPL-MCNC: 1.15 MG/DL (ref 0.8–1.5)
DIFFERENTIAL METHOD BLD: NORMAL
EOSINOPHIL # BLD: 0.4 K/UL (ref 0–0.8)
EOSINOPHIL NFR BLD: 5 % (ref 0.5–7.8)
ERYTHROCYTE [DISTWIDTH] IN BLOOD BY AUTOMATED COUNT: 13.9 % (ref 11.9–14.6)
GLOBULIN SER CALC-MCNC: 4.3 G/DL (ref 2.3–3.5)
GLUCOSE SERPL-MCNC: 114 MG/DL (ref 65–100)
HCT VFR BLD AUTO: 45.9 % (ref 41.1–50.3)
HDLC SERPL-MCNC: 41 MG/DL (ref 40–60)
HDLC SERPL: 4.4 {RATIO}
HGB BLD-MCNC: 14.6 G/DL (ref 13.6–17.2)
IMM GRANULOCYTES # BLD AUTO: 0 K/UL (ref 0–0.5)
IMM GRANULOCYTES NFR BLD AUTO: 0 % (ref 0–5)
LDLC SERPL CALC-MCNC: 95.2 MG/DL
LIPID PROFILE,FLP: ABNORMAL
LYMPHOCYTES # BLD: 2.4 K/UL (ref 0.5–4.6)
LYMPHOCYTES NFR BLD: 30 % (ref 13–44)
MCH RBC QN AUTO: 27.6 PG (ref 26.1–32.9)
MCHC RBC AUTO-ENTMCNC: 31.8 G/DL (ref 31.4–35)
MCV RBC AUTO: 86.8 FL (ref 79.6–97.8)
MONOCYTES # BLD: 0.7 K/UL (ref 0.1–1.3)
MONOCYTES NFR BLD: 8 % (ref 4–12)
NEUTS SEG # BLD: 4.5 K/UL (ref 1.7–8.2)
NEUTS SEG NFR BLD: 57 % (ref 43–78)
NRBC # BLD: 0 K/UL (ref 0–0.2)
PLATELET # BLD AUTO: 155 K/UL (ref 150–450)
PMV BLD AUTO: 10.9 FL (ref 9.4–12.3)
POTASSIUM SERPL-SCNC: 4.4 MMOL/L (ref 3.5–5.1)
PROT SERPL-MCNC: 8.3 G/DL (ref 6.3–8.2)
RBC # BLD AUTO: 5.29 M/UL (ref 4.23–5.6)
SODIUM SERPL-SCNC: 135 MMOL/L (ref 138–145)
TRIGL SERPL-MCNC: 229 MG/DL (ref 35–150)
VLDLC SERPL CALC-MCNC: 45.8 MG/DL (ref 6–23)
WBC # BLD AUTO: 7.9 K/UL (ref 4.3–11.1)

## 2021-03-12 PROCEDURE — 80053 COMPREHEN METABOLIC PANEL: CPT

## 2021-03-12 PROCEDURE — 80061 LIPID PANEL: CPT

## 2021-03-12 PROCEDURE — 36415 COLL VENOUS BLD VENIPUNCTURE: CPT

## 2021-03-12 PROCEDURE — 85025 COMPLETE CBC W/AUTO DIFF WBC: CPT

## 2021-03-17 NOTE — PROGRESS NOTES
Patient pre-assessment complete for [facility] poss with Dr Rama Franklin scheduled for 3/18/21 at 11am, arrival time 9am. Patient verified using . Patient instructed to bring all home medications in labeled bottles on the day of procedure. NPO status reinforced. Patient informed to take a full dose aspirin 325mg  or 81 mg x 4 on the day of procedure. Patient instructed to HOLD spironolactone in am. Instructed they can take all other medications excluding vitamins & supplements. Patient verbalizes understanding of all instructions & denies any questions at this time.

## 2021-03-18 ENCOUNTER — HOSPITAL ENCOUNTER (OUTPATIENT)
Dept: CARDIAC CATH/INVASIVE PROCEDURES | Age: 71
Discharge: HOME | End: 2021-03-18
Attending: INTERNAL MEDICINE | Admitting: INTERNAL MEDICINE
Payer: MEDICARE

## 2021-03-18 VITALS
DIASTOLIC BLOOD PRESSURE: 75 MMHG | BODY MASS INDEX: 29.82 KG/M2 | SYSTOLIC BLOOD PRESSURE: 116 MMHG | OXYGEN SATURATION: 96 % | HEIGHT: 71 IN | TEMPERATURE: 98.2 F | WEIGHT: 213 LBS | RESPIRATION RATE: 18 BRPM | HEART RATE: 60 BPM

## 2021-03-18 DIAGNOSIS — I25.118 CORONARY ARTERY DISEASE OF NATIVE ARTERY OF NATIVE HEART WITH STABLE ANGINA PECTORIS (HCC): Chronic | ICD-10-CM

## 2021-03-18 LAB
ATRIAL RATE: 55 BPM
CALCULATED P AXIS, ECG09: 29 DEGREES
CALCULATED R AXIS, ECG10: 27 DEGREES
CALCULATED T AXIS, ECG11: 177 DEGREES
DIAGNOSIS, 93000: NORMAL
P-R INTERVAL, ECG05: 216 MS
Q-T INTERVAL, ECG07: 498 MS
QRS DURATION, ECG06: 168 MS
QTC CALCULATION (BEZET), ECG08: 476 MS
VENTRICULAR RATE, ECG03: 55 BPM

## 2021-03-18 PROCEDURE — 93455 CORONARY ART/GRFT ANGIO S&I: CPT

## 2021-03-18 PROCEDURE — C1894 INTRO/SHEATH, NON-LASER: HCPCS

## 2021-03-18 PROCEDURE — 74011000636 HC RX REV CODE- 636: Performed by: INTERNAL MEDICINE

## 2021-03-18 PROCEDURE — 99152 MOD SED SAME PHYS/QHP 5/>YRS: CPT | Performed by: INTERNAL MEDICINE

## 2021-03-18 PROCEDURE — 77030016699 HC CATH ANGI DX INFN1 CARD -A

## 2021-03-18 PROCEDURE — 74011000250 HC RX REV CODE- 250: Performed by: INTERNAL MEDICINE

## 2021-03-18 PROCEDURE — C1760 CLOSURE DEV, VASC: HCPCS

## 2021-03-18 PROCEDURE — C1769 GUIDE WIRE: HCPCS

## 2021-03-18 PROCEDURE — 93455 CORONARY ART/GRFT ANGIO S&I: CPT | Performed by: INTERNAL MEDICINE

## 2021-03-18 PROCEDURE — 99152 MOD SED SAME PHYS/QHP 5/>YRS: CPT

## 2021-03-18 PROCEDURE — 93005 ELECTROCARDIOGRAM TRACING: CPT | Performed by: INTERNAL MEDICINE

## 2021-03-18 PROCEDURE — 74011250636 HC RX REV CODE- 250/636: Performed by: INTERNAL MEDICINE

## 2021-03-18 RX ORDER — SODIUM CHLORIDE 9 MG/ML
75 INJECTION, SOLUTION INTRAVENOUS CONTINUOUS
Status: DISCONTINUED | OUTPATIENT
Start: 2021-03-18 | End: 2021-03-18 | Stop reason: HOSPADM

## 2021-03-18 RX ORDER — HEPARIN SODIUM 200 [USP'U]/100ML
3 INJECTION, SOLUTION INTRAVENOUS CONTINUOUS
Status: DISCONTINUED | OUTPATIENT
Start: 2021-03-18 | End: 2021-03-18 | Stop reason: HOSPADM

## 2021-03-18 RX ORDER — LIDOCAINE HYDROCHLORIDE 10 MG/ML
10-30 INJECTION, SOLUTION EPIDURAL; INFILTRATION; INTRACAUDAL; PERINEURAL ONCE
Status: COMPLETED | OUTPATIENT
Start: 2021-03-18 | End: 2021-03-18

## 2021-03-18 RX ORDER — MIDAZOLAM HYDROCHLORIDE 1 MG/ML
.5-2 INJECTION, SOLUTION INTRAMUSCULAR; INTRAVENOUS
Status: DISCONTINUED | OUTPATIENT
Start: 2021-03-18 | End: 2021-03-18 | Stop reason: HOSPADM

## 2021-03-18 RX ADMIN — LIDOCAINE HYDROCHLORIDE 10 ML: 10 INJECTION, SOLUTION EPIDURAL; INFILTRATION; INTRACAUDAL; PERINEURAL at 10:02

## 2021-03-18 RX ADMIN — MIDAZOLAM 2 MG: 1 INJECTION INTRAMUSCULAR; INTRAVENOUS at 10:19

## 2021-03-18 RX ADMIN — HEPARIN SODIUM 3 UNITS/HR: 200 INJECTION, SOLUTION INTRAVENOUS at 10:02

## 2021-03-18 RX ADMIN — IOPAMIDOL 45 ML: 755 INJECTION, SOLUTION INTRAVENOUS at 10:36

## 2021-03-18 NOTE — PROGRESS NOTES
Discharge instructions reviewed with patient, voiced good understanding and no questions at this time.   Both IV's removed, tips intact

## 2021-03-18 NOTE — PROCEDURES
[facility]  CARDIAC CATH    Name:  Naya Noland  MR#:  [id number]  :  1950  ACCOUNT #:  [de-identified]  DATE OF SERVICE:  2021    PROCEDURES PERFORMED:  Left heart cath with coronaries only, no LV gram, LIMA angiography. PREOPERATIVE DIAGNOSES:  chf    POSTOPERATIVE DIAGNOSES:  chf    SURGEON:  Shi Myles MD    ASSISTANT:  jeff    ESTIMATED BLOOD LOSS:  3 mL    SPECIMENS REMOVED:  None. COMPLICATIONS:  None. IMPLANTS:  None. ANESTHESIA:  2 mg of Versed given from 10:15 to 10:40 by Awilda Potter who assisted in monitoring the patient    INDICATION:  New-onset heart failure with prior history of single-vessel bypass and bioprosthetic AVR. ACCESS:  Right radial.    CLOSURE:  Angio-Seal.    EQUIPMENT USED:  Raoul left 4, Raoul right 4, and multipurpose were used. FINDINGS:  Left main arises off the left cusp. It trifurcates into an LAD which is occluded, a large bifurcating ramus branch which is patent, and a circumflex artery which is patent. Right coronary artery is a nondominant small artery with severe diffuse disease. The LAD is occluded off the left main. It fills from a patent LIMA. The LIMA is a large-caliber vessel. Distal anastomosis is good. There is good backfilling and antegrade filling of an LAD and diagonal system. Ramus artery is a large bifurcating artery supplying the lateral wall with minimal luminal irregularities. Circumflex artery is an anatomically dominant artery with a subtotally occluded very small diameter OM1. This does fill from collaterals. There is no appreciable OM2. There is a large atrial branch. There is a small PDA off the terminal circumflex. Right coronary artery is nondominant and severely diseased. CONCLUSIONS:  Stable coronary anatomy with continued patency of the LIMA to the LAD and minimal disease otherwise.   The patient appears to have a nonischemic cardiomyopathy and will continue medical therapy for such.       Braulio Mazariegos MD      [id number]  D:  03/18/2021 10:56  T:  03/18/2021 14:06  JOB #:  [id number]

## 2021-03-18 NOTE — DISCHARGE INSTRUCTIONS
HEART CATHETERIZATION/ANGIOGRAPHY DISCHARGE INSTRUCTIONS    1. Check puncture site frequently for swelling or bleeding. If there is any bleeding, lie down and apply pressure over the area with a clean towel or washcloth. Notify your doctor for any redness, swelling, drainage, or oozing from the puncture site. Notify your doctor for any fever or chills. 2. If the extremity becomes cold, numb, or painful call [facility] at [phone].  3. Activity should be limited for the next 48 hours. Climb stairs as little as possible and avoid any stooping, bending, or strenuous activity for 48 hours. No heavy lifting (anything over 10 pounds) for 3 days. 4. You may resume your usual diet. Drink more fluids than usual.  5. Have a responsible person drive you home and stay with you for at least 24 hours after your heart catheterization/angiography. No driving for 24 hours  6. You may remove bandage from your Right Groin in 24 hours. You may shower in 24 hours. No tub baths, hot tubs, or swimming for 1 week. Do not place any lotions, creams, powders, or ointments over puncture site for 1 week. You may place a clean band-aid over the puncture site each day for 5 days. Change daily. I have read the above instructions and have had the opportunity to ask questions.       Patient: ________________________   Date: 3/18/2021    Witness: _______________________   Date: 3/18/2021

## 2021-03-18 NOTE — PROGRESS NOTES
[address] [facility] at bedside. Pt reminded to lay flat and keep head on pillow.   Son at bedside

## 2021-03-18 NOTE — PROGRESS NOTES
Report received from EDILSON Grullon 51. Procedural findings communicated. Intra procedural  medication administration reviewed. Progression of care discussed.       Patient received into [address] post sheath removal. RFA     Access site without bleeding or swelling yes     Dressing dry and intact yes     Patient instructed to limit movement to right lower extremity     Routine post procedural vital signs and site assessment initiated yes

## 2021-03-18 NOTE — PROGRESS NOTES
Patient received to [address] [place] [address] room # 11  Ambulatory from [facility]. Patient scheduled for [facility] today with Dr Sandrita Webb. Procedure reviewed & questions answered, voiced good understanding consent obtained & placed on chart. All medications and medical history reviewed. Will prep patient per orders. Patient & family updated on plan of care. The patient has a fraility score of 3-MANAGING WELL, based on reports no recent falls.     ASA 81 mg x 4 @ 0600

## 2021-03-18 NOTE — PROGRESS NOTES
[facility] with Dr. Lo Salcedo  Right femoral access  No intervention  Versed 2mg  5f sheath removed from right groin. 6f angio-seal placed. TRANSFER - OUT REPORT:    Verbal report given to cpru RN(name) on Cleophus Bones. being transferred to cpru(unit) for routine progression of care       Report consisted of patients Situation, Background, Assessment and   Recommendations(SBAR). Information from the following report(s) SBAR, Kardex, Procedure Summary and MAR was reviewed with the receiving nurse. Opportunity for questions and clarification was provided.       Patient transported with:   [employer]

## 2021-03-18 NOTE — PROCEDURES
Cardiac Catheterization Procedure Note    Patient ID:     Name: Pina Steward   Medical Record Number: [id number]   YOB: 1950    Date of Procedure: 3/18/2021     Pre-procedure Diagnosis:  Congestive Heart Failure    Post-procedure Diagnosis: Congestive Heart Failure    Reason for Procedure: LV Dysfunction    Blood loss less than 5 ml    Sedation. Pt received 2 mg versed and 0 mcg fentanyl for monitored conscious sedation from 1015to 1040. independent trained observer to assist in the monitoring of the patient's level of consciousness and physiological status was present    Nurse trevino    Specimen: None    No complications    No assistants    Time out, Mallampati, and ASA performed    Procedure:  After informed consent, patient was prepped and draped in the usual sterile fashion. Right femoral approach was used. 80cc Visipaque contrast were utilized for the entire procedure. angioseal closure device used        FINDINGS    Left Ventricle: not done  LVEDP:      Left Main:ok    Left Anterior descending coronary artery: occluded       Left Circumflex coronary artery: patent minimal disease        Right coronary artery: non dominant w severe disease            Graft anatomy: strauss to lad widely patent    Intervention if done: na    Conclusions: stable anatomy    Recommentations: med tx    No complications    Family and or significant other were sought out and discussion of the procedure and findings took place. Procedure and findings including pertinent sequele were discussed with the patient immediately post procedure. Opportunity to ask questions was offered. If no one was available in the post procedure waiting area, I can be reached thru paging system or at [phone].           Signed By: Blanca Min MD

## 2021-07-06 ENCOUNTER — HOSPITAL ENCOUNTER (OUTPATIENT)
Dept: PHYSICAL THERAPY | Age: 71
Discharge: HOME | End: 2021-07-06
Payer: MEDICARE

## 2021-07-06 ENCOUNTER — HOSPITAL ENCOUNTER (OUTPATIENT)
Dept: SURGERY | Age: 71
Discharge: HOME | End: 2021-07-06
Attending: ORTHOPAEDIC SURGERY
Payer: MEDICARE

## 2021-07-06 VITALS
SYSTOLIC BLOOD PRESSURE: 140 MMHG | HEIGHT: 71 IN | HEART RATE: 60 BPM | RESPIRATION RATE: 16 BRPM | OXYGEN SATURATION: 98 % | DIASTOLIC BLOOD PRESSURE: 76 MMHG | WEIGHT: 225.1 LBS | TEMPERATURE: 97.9 F | BODY MASS INDEX: 31.51 KG/M2

## 2021-07-06 DIAGNOSIS — R06.83 SNORING: Primary | ICD-10-CM

## 2021-07-06 LAB
ALBUMIN SERPL-MCNC: 3.9 G/DL (ref 3.2–4.6)
ANION GAP SERPL CALC-SCNC: 5 MMOL/L (ref 7–16)
APTT PPP: 28.2 SEC (ref 24.1–35.1)
BACTERIA SPEC CULT: NORMAL
BASOPHILS # BLD: 0 K/UL (ref 0–0.2)
BASOPHILS NFR BLD: 1 % (ref 0–2)
BUN SERPL-MCNC: 25 MG/DL (ref 8–23)
CALCIUM SERPL-MCNC: 9.5 MG/DL (ref 8.3–10.4)
CHLORIDE SERPL-SCNC: 103 MMOL/L (ref 98–107)
CO2 SERPL-SCNC: 27 MMOL/L (ref 21–32)
CREAT SERPL-MCNC: 1.22 MG/DL (ref 0.8–1.5)
DIFFERENTIAL METHOD BLD: ABNORMAL
EOSINOPHIL # BLD: 0.3 K/UL (ref 0–0.8)
EOSINOPHIL NFR BLD: 3 % (ref 0.5–7.8)
ERYTHROCYTE [DISTWIDTH] IN BLOOD BY AUTOMATED COUNT: 13.4 % (ref 11.9–14.6)
EST. AVERAGE GLUCOSE BLD GHB EST-MCNC: 134 MG/DL
GLUCOSE SERPL-MCNC: 109 MG/DL (ref 65–100)
HBA1C MFR BLD: 6.3 % (ref 4.2–6.3)
HCT VFR BLD AUTO: 40.5 % (ref 41.1–50.3)
HGB BLD-MCNC: 13.2 G/DL (ref 13.6–17.2)
IMM GRANULOCYTES # BLD AUTO: 0 K/UL (ref 0–0.5)
IMM GRANULOCYTES NFR BLD AUTO: 0 % (ref 0–5)
INR PPP: 1
LYMPHOCYTES # BLD: 2.4 K/UL (ref 0.5–4.6)
LYMPHOCYTES NFR BLD: 29 % (ref 13–44)
MCH RBC QN AUTO: 28.9 PG (ref 26.1–32.9)
MCHC RBC AUTO-ENTMCNC: 32.6 G/DL (ref 31.4–35)
MCV RBC AUTO: 88.6 FL (ref 79.6–97.8)
MONOCYTES # BLD: 0.7 K/UL (ref 0.1–1.3)
MONOCYTES NFR BLD: 9 % (ref 4–12)
NEUTS SEG # BLD: 4.8 K/UL (ref 1.7–8.2)
NEUTS SEG NFR BLD: 58 % (ref 43–78)
NRBC # BLD: 0 K/UL (ref 0–0.2)
PLATELET # BLD AUTO: 164 K/UL (ref 150–450)
PMV BLD AUTO: 10.7 FL (ref 9.4–12.3)
POTASSIUM SERPL-SCNC: 4.6 MMOL/L (ref 3.5–5.1)
PROTHROMBIN TIME: 13.3 SEC (ref 12.5–14.7)
RBC # BLD AUTO: 4.57 M/UL (ref 4.23–5.6)
SERVICE CMNT-IMP: NORMAL
SODIUM SERPL-SCNC: 135 MMOL/L (ref 136–145)
WBC # BLD AUTO: 8.2 K/UL (ref 4.3–11.1)

## 2021-07-06 PROCEDURE — 77030027138 HC INCENT SPIROMETER -A

## 2021-07-06 PROCEDURE — 85610 PROTHROMBIN TIME: CPT

## 2021-07-06 PROCEDURE — 94760 N-INVAS EAR/PLS OXIMETRY 1: CPT

## 2021-07-06 PROCEDURE — 85025 COMPLETE CBC W/AUTO DIFF WBC: CPT

## 2021-07-06 PROCEDURE — 80048 BASIC METABOLIC PNL TOTAL CA: CPT

## 2021-07-06 PROCEDURE — 36415 COLL VENOUS BLD VENIPUNCTURE: CPT

## 2021-07-06 PROCEDURE — 83036 HEMOGLOBIN GLYCOSYLATED A1C: CPT

## 2021-07-06 PROCEDURE — 87641 MR-STAPH DNA AMP PROBE: CPT

## 2021-07-06 PROCEDURE — 80307 DRUG TEST PRSMV CHEM ANLYZR: CPT

## 2021-07-06 PROCEDURE — 85730 THROMBOPLASTIN TIME PARTIAL: CPT

## 2021-07-06 PROCEDURE — 82040 ASSAY OF SERUM ALBUMIN: CPT

## 2021-07-06 PROCEDURE — 97161 PT EVAL LOW COMPLEX 20 MIN: CPT

## 2021-07-06 RX ORDER — ATORVASTATIN CALCIUM 40 MG/1
40 TABLET, FILM COATED ORAL DAILY
COMMUNITY
End: 2022-04-19 | Stop reason: SDUPTHER

## 2021-07-06 RX ORDER — ASPIRIN 81 MG/1
81 TABLET ORAL DAILY
Status: ON HOLD | COMMUNITY
End: 2021-07-26 | Stop reason: SDUPTHER

## 2021-07-06 NOTE — PERIOP NOTES
Recent Results (from the past 12 hour(s))   CBC WITH AUTOMATED DIFF    Collection Time: 07/06/21 10:18 AM   Result Value Ref Range    WBC 8.2 4.3 - 11.1 K/uL    RBC 4.57 4.23 - 5.6 M/uL    HGB 13.2 (L) 13.6 - 17.2 g/dL    HCT 40.5 (L) 41.1 - 50.3 %    MCV 88.6 79.6 - 97.8 FL    MCH 28.9 26.1 - 32.9 PG    MCHC 32.6 31.4 - 35.0 g/dL    RDW 13.4 11.9 - 14.6 %    PLATELET 071 416 - 277 K/uL    MPV 10.7 9.4 - 12.3 FL    ABSOLUTE NRBC 0.00 0.0 - 0.2 K/uL    DF AUTOMATED      NEUTROPHILS 58 43 - 78 %    LYMPHOCYTES 29 13 - 44 %    MONOCYTES 9 4.0 - 12.0 %    EOSINOPHILS 3 0.5 - 7.8 %    BASOPHILS 1 0.0 - 2.0 %    IMMATURE GRANULOCYTES 0 0.0 - 5.0 %    ABS. NEUTROPHILS 4.8 1.7 - 8.2 K/UL    ABS. LYMPHOCYTES 2.4 0.5 - 4.6 K/UL    ABS. MONOCYTES 0.7 0.1 - 1.3 K/UL    ABS. EOSINOPHILS 0.3 0.0 - 0.8 K/UL    ABS. BASOPHILS 0.0 0.0 - 0.2 K/UL    ABS. IMM.  GRANS. 0.0 0.0 - 0.5 K/UL   HEMOGLOBIN A1C WITH EAG    Collection Time: 07/06/21 10:18 AM   Result Value Ref Range    Hemoglobin A1c 6.3 4.2 - 6.3 %    Est. average glucose 042 mg/dL   METABOLIC PANEL, BASIC    Collection Time: 07/06/21 10:18 AM   Result Value Ref Range    Sodium 135 (L) 136 - 145 mmol/L    Potassium 4.6 3.5 - 5.1 mmol/L    Chloride 103 98 - 107 mmol/L    CO2 27 21 - 32 mmol/L    Anion gap 5 (L) 7 - 16 mmol/L    Glucose 109 (H) 65 - 100 mg/dL    BUN 25 (H) 8 - 23 MG/DL    Creatinine 1.22 0.8 - 1.5 MG/DL    GFR est AA >60 >60 ml/min/1.73m2    GFR est non-AA >60 >60 ml/min/1.73m2    Calcium 9.5 8.3 - 10.4 MG/DL   PROTHROMBIN TIME + INR    Collection Time: 07/06/21 10:18 AM   Result Value Ref Range    Prothrombin time 13.3 12.5 - 14.7 sec    INR 1.0     PTT    Collection Time: 07/06/21 10:18 AM   Result Value Ref Range    aPTT 28.2 24.1 - 35.1 SEC   ALBUMIN    Collection Time: 07/06/21 10:18 AM   Result Value Ref Range    Albumin 3.9 3.2 - 4.6 g/dL

## 2021-07-06 NOTE — ADVANCED PRACTICE NURSE
Total Joint Surgery Preoperative Chart Review      Patient ID:  Agus Che  [id number]  51 y.o.  1950  Surgeon: Dr. Padmini Jimenez  Date of Surgery: 7/26/2021  Procedure: Total Right Hip Arthroplasty  Primary Care Physician: None None  Specialty Physician(s):      Subjective:   Agus Che is a 70 y.o. WHITE/NON- male who presents for preoperative evaluation for Total Right Hip arthroplasty. This is a preoperative chart review note based on data collected by the nurse at the surgical Pre-Assessment visit. Past Medical History:   Diagnosis Date    Arthritis     generalized OA    CAD (coronary artery disease) 09/11/2018    Follow[facility].  CHF (congestive heart failure) (HCC)     GERD (gastroesophageal reflux disease)     OTC alkaseltzer    History of kidney stones 2016    naturally passed    History of melanoma 2016    removed from nose    Hx of CABG     Hypertension 9/11/2018    PUD (peptic ulcer disease)     1970's- patient denies 10/01/18    Right hip pain     S/P AVR (aortic valve replacement)     Sleep apnea     observed to stop breathing in sleep; no CPAP      Past Surgical History:   Procedure Laterality Date    HX AORTIC VALVE REPLACEMENT  10/03/2018    CORONARY ARTERY BYPASS GRAFT X 1, LIMA to the LAD WITH AVR 23 mm Intuity valve    HX APPENDECTOMY       15 yo    HX CORONARY ARTERY BYPASS GRAFT  10/03/2018    CABG x1, Dr. Earnestine Iraheta HX ORTHOPAEDIC  2014    right lower ext - broken put in rods and screws    HX OTHER SURGICAL      melanoma surgery to nose     Family History   Problem Relation Age of Onset    Cancer Mother         Leukemia    Alzheimer Mother     Cancer Brother         Leukemia    Hypertension Brother     No Known Problems Father     Diabetes Sister     No Known Problems Brother       Social History     Tobacco Use    Smoking status: Never Smoker    Smokeless tobacco: Never Used   Substance Use Topics    Alcohol use:  Yes Comment: occasional       Prior to Admission medications    Medication Sig Start Date End Date Taking? Authorizing Provider   aspirin delayed-release 81 mg tablet Take 81 mg by mouth daily. Yes Provider, Historical   atorvastatin (Lipitor) 40 mg tablet Take 40 mg by mouth daily. Yes Provider, Historical   sildenafil citrate (Viagra) 100 mg tablet Take 1 Tab by mouth as needed for Erectile Dysfunction. 3/31/21   Sohan Gamez MD   metoprolol succinate (TOPROL-XL) 50 mg XL tablet Take 1 Tab by mouth daily. 3/12/21   Sohan Gamez MD   valsartan (DIOVAN) 40 mg tablet Take 1 Tab by mouth daily. 3/12/21   Sohan Gamez MD   spironolactone (ALDACTONE) 25 mg tablet Take 1 Tab by mouth daily. 3/12/21   Sohan Gamez MD   diphenhydrAMINE (BENADRYL ALLERGY) 25 mg tablet Take 50 mg by mouth nightly as needed. 10/11/18   Provider, Historical   nitroglycerin (NITROSTAT) 0.4 mg SL tablet 1 Tab by SubLINGual route as needed for Chest Pain. Up to 3 doses. 10/8/18   Fabiana Hinton PA   naproxen sodium (ALEVE) 220 mg cap Take 220 mg by mouth as needed. Provider, Historical   multivitamin (ONE A DAY) tablet Take 1 Tab by mouth daily.     Provider, Historical     Allergies   Allergen Reactions    Lortab [Hydrocodone-Acetaminophen] Other (comments)     hallucinations          Objective:     Physical Exam:   Patient Vitals for the past 24 hrs:   Temp Pulse Resp BP SpO2   07/06/21 1200 97.9 °F (36.6 °C) 60 16 (!) 140/76 98 %       ECG:    EKG Results     None          Data Review:   Labs:   Recent Results (from the past 24 hour(s))   CBC WITH AUTOMATED DIFF    Collection Time: 07/06/21 10:18 AM   Result Value Ref Range    WBC 8.2 4.3 - 11.1 K/uL    RBC 4.57 4.23 - 5.6 M/uL    HGB 13.2 (L) 13.6 - 17.2 g/dL    HCT 40.5 (L) 41.1 - 50.3 %    MCV 88.6 79.6 - 97.8 FL    MCH 28.9 26.1 - 32.9 PG    MCHC 32.6 31.4 - 35.0 g/dL    RDW 13.4 11.9 - 14.6 %    PLATELET 139 497 - 562 K/uL    MPV 10.7 9.4 - 12.3 FL    ABSOLUTE NRBC 0.00 0.0 - 0.2 K/uL    DF AUTOMATED      NEUTROPHILS 58 43 - 78 %    LYMPHOCYTES 29 13 - 44 %    MONOCYTES 9 4.0 - 12.0 %    EOSINOPHILS 3 0.5 - 7.8 %    BASOPHILS 1 0.0 - 2.0 %    IMMATURE GRANULOCYTES 0 0.0 - 5.0 %    ABS. NEUTROPHILS 4.8 1.7 - 8.2 K/UL    ABS. LYMPHOCYTES 2.4 0.5 - 4.6 K/UL    ABS. MONOCYTES 0.7 0.1 - 1.3 K/UL    ABS. EOSINOPHILS 0.3 0.0 - 0.8 K/UL    ABS. BASOPHILS 0.0 0.0 - 0.2 K/UL    ABS. IMM. GRANS. 0.0 0.0 - 0.5 K/UL   HEMOGLOBIN A1C WITH EAG    Collection Time: 07/06/21 10:18 AM   Result Value Ref Range    Hemoglobin A1c 6.3 4.2 - 6.3 %    Est. average glucose 111 mg/dL   METABOLIC PANEL, BASIC    Collection Time: 07/06/21 10:18 AM   Result Value Ref Range    Sodium 135 (L) 136 - 145 mmol/L    Potassium 4.6 3.5 - 5.1 mmol/L    Chloride 103 98 - 107 mmol/L    CO2 27 21 - 32 mmol/L    Anion gap 5 (L) 7 - 16 mmol/L    Glucose 109 (H) 65 - 100 mg/dL    BUN 25 (H) 8 - 23 MG/DL    Creatinine 1.22 0.8 - 1.5 MG/DL    GFR est AA >60 >60 ml/min/1.73m2    GFR est non-AA >60 >60 ml/min/1.73m2    Calcium 9.5 8.3 - 10.4 MG/DL   PROTHROMBIN TIME + INR    Collection Time: 07/06/21 10:18 AM   Result Value Ref Range    Prothrombin time 13.3 12.5 - 14.7 sec    INR 1.0     PTT    Collection Time: 07/06/21 10:18 AM   Result Value Ref Range    aPTT 28.2 24.1 - 35.1 SEC   ALBUMIN    Collection Time: 07/06/21 10:18 AM   Result Value Ref Range    Albumin 3.9 3.2 - 4.6 g/dL         Problem List:  )  Patient Active Problem List   Diagnosis Code    Aortic stenosis I35.0    CAD (coronary artery disease) I25.10    Hypertension I10    Status post coronary artery bypass graft Z95.1    S/P AVR (aortic valve replacement) Z95.2    Hypoxia R09.02    Pneumothorax J93.9    Snoring R06.83       Total Joint Surgery Pre-Assessment Recommendations:           Patient reports the symptoms of snoring, fatigue, observed apnea and /or excessive daytime sleepiness. Will refer patient for HST based on above assessment. Recommend continuous saturation monitoring hours of sleep, during hospitalization.     Signed By: Marah Blank, NP-C    July 6, 2021

## 2021-07-06 NOTE — PROGRESS NOTES
[facility]  : [address](89 y.o.) Joint Vinicio Mendez at [address] [address].  [phone]       Physical Therapy Prehab Plan of Treatment and Evaluation Summary:2021    ICD-10: Treatment Diagnosis:   · Pain in Right Hip (M25.551)  · Stiffness of Right Hip, Not elsewhere classified (M25.651)  Precautions/Allergies:   Harrel Panning [hydrocodone-acetaminophen]  MEDICAL/REFERRING DIAGNOSIS:  Unilateral primary osteoarthritis, right hip [M16.11]  REFERRING PHYSICIAN: Alex Driver MD  DATE OF SURGERY: 21    Assessment:   Comments:  He is here alone. He is having a R Bi and will go home at VT. He lives alone, and his son with offer him assistance at VT. He should do well after surgery. Very motivated     PROBLEM LIST (Impacting functional limitations):  Mr. Freedom Choi presents with the following right lower extremity(s) problems:  1. Strength  2. Range of Motion  3. Home Exercise Program  4. Pain   INTERVENTIONS PLANNED:  1. Home Exercise Program  2. Educational Discussion      TREATMENT PLAN: Effective Dates: 2021 TO 2021. Frequency/Duration: Patient to continue to perform home exercise program at least twice per day up until his surgery. GOALS: (Goals have been discussed and agreed upon with patient.)  Discharge Goals: Time Frame: 1 Day  1. Patient will demonstrate independence with a home exercise program designed to increase strength, range of motion and pain control to minimize functional deficits and optimize patient for total joint replacement. Rehabilitation Potential For Stated Goals: Good  Regarding Sergio Mcfarland's therapy, I certify that the treatment plan above will be carried out by a therapist or under their direction.   Thank you for this referral,  Jamison Johnson, PT               HISTORY:   Present Symptoms:  Pain Intensity 1: 8 (at its worst)  Pain Location 1: Hip, Leg  Pain Orientation 1: Anterior, Lateral, Posterior, Medial, Right   History of Present Injury/Illness (Reason for Referral):  Medical/Referring Diagnosis: Unilateral primary osteoarthritis, right hip [M16.11]   Past Medical History/Comorbidities:   Mr. Yimi Vallecillo  has a past medical history of Arthritis, CAD (coronary artery disease) (9/11/2018), Cancer ([facility] [address] GERD (gastroesophageal reflux disease), Hypertension (9/11/2018), Kidney stones, PUD (peptic ulcer disease), and Sleep apnea. He also has no past medical history of Arrhythmia, Asthma, Chronic kidney disease, Diabetes ([facility] [address] Difficult intubation, Malignant hyperthermia due to anesthesia, Nausea & vomiting, Pseudocholinesterase deficiency, or Stroke ([facility] [address] Mr. Yimi Vallecillo  has a past surgical history that includes hx other surgical; hx orthopaedic (2014); hx appendectomy; hx coronary artery bypass graft (10/03/2018); and hx aortic valve replacement (10/03/2018). 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  Heavy activity level  Dominant Side:  RIGHT  Current Medications:  See Pre-assessment nursing note   Number of Personal Factors/Comorbidities that affect the Plan of Care: 1-2: MODERATE COMPLEXITY   EXAMINATION:   ADLs (Current Functional Status):   Ambulation:  [x] Independent  [] Walk Indoors Only  [x] Walk Outdoors  [] Use Assistive Device  [] Use Wheelchair Only Dressing:  [x] Independent  Requires Assistance from Someone for:  [] Sock/Shoes  [] Pants  [] Everything   Bathing/Showering:   [x] Independent  [] Requires Assistance from Someone  [] Sponge Bath Only Household Activities:  [x] Routine house and yard work  [] Light Housework Only  [] None Observation/Orthostatic Postural Assessment:       ROM/Flexibility:   AROM: Within functional limits (L LE)                       RLE AROM  R Hip Flexion: 120  R Hip ABduction: 25   Strength:   Strength: Within functional limits (L LE)              RLE Strength  R Hip Flexion: 4  R Hip ABduction: 4  R Knee Extension: 5  R Ankle Dorsiflexion: 5   Functional Mobility:    Sensation: Intact (L LE)    Stand to Sit: Independent  Sit to Stand: Independent  Stand Pivot Transfers: Independent  Distance (ft): 300 Feet (ft)  Ambulation - Level of Assistance: Independent  Speed/Amy: Pace decreased (<100 feet/min)  Gait Abnormalities: Antalgic          Balance:    Sitting: Intact  Standing: Intact   Body Structures Involved:  1. Bones  2. Joints  3. Muscles Body Functions Affected:  1. Movement Related Activities and Participation Affected:  1. General Tasks and Demands  2. Mobility   Number of elements that affect the Plan of Care: 4+: HIGH COMPLEXITY   CLINICAL PRESENTATION:   Presentation: Stable and uncomplicated: LOW COMPLEXITY   CLINICAL DECISION MAKING:   Outcome Measure: Tool Used: Lower Extremity Functional Scale (LEFS)  Score:  Initial: 38/80 Most Recent: X/80 (Date: -- )   Interpretation of Score: 20 questions each scored on a 5 point scale with 0 representing \"extreme difficulty or unable to perform\" and 4 representing \"no difficulty\". The lower the score, the greater the functional disability. 80/80 represents no disability. Minimal detectable change is 9 points. Medical Necessity:   · Mr. Fabby Crowley is expected to optimize his lower extremity strength and ROM in preparation for joint replacement surgery. Reason for Services/Other Comments:  · Achieve baseline assesment of musculoskeletal system, functional mobility and home environment. , educate in PT HEP in preparation for surgery, educate in hospital plan of care.    Use of outcome tool(s) and clinical judgement create a POC that gives a: Clear prediction of patient's progress: LOW COMPLEXITY   TREATMENT:   Treatment/Session Assessment:  Patient was instructed in PT- HEP to increase strength and ROM in LEs. Answered all questions. · Post session pain:  2  · Compliance with Program/Exercises: compliant most of the time.   Total Treatment Duration:  PT Patient Time In/Time Out  Time In: 1000  Time Out: [address],

## 2021-07-06 NOTE — PERIOP NOTES
PLEASE CONTINUE TAKING ALL PRESCRIPTION MEDICATIONS UP TO THE DAY OF SURGERY UNLESS OTHERWISE DIRECTED BELOW. DISCONTINUE all vitamins and supplements 21 days prior to surgery. DISCONTINUE Non-Steriodal Anti-Inflammatory (NSAIDS) such as Advil and Aleve 5 days prior to surgery. Home Medications to take  the day of surgery      Metoprolol (Toprol)     Atorvastatin (Lipitor)     Aspirin 81mg     Home Medications   to Hold   No vitamins,supplements, or anti-inflammatories such as Aleve, Ibuprofen, Excedrin, Motrin, and Advil. Multivitamin  Aleve     Comments      Covid test 7.22.21 @ [address] [place] [address]    On the day before surgery please take Acetaminophen 1000mg in the morning and then again before bed. You may substitute for Tylenol 650 mg. TOBIN-HEX shower the night before surgery and the morning of surgery. Bring TOBIN-HEX and Incentive Spirometer the day of surgery. Please do not bring home medications with you on the day of surgery unless otherwise directed by your nurse. If you are instructed to bring home medications, please give them to your nurse as they will be administered by the nursing staff. If you have any questions, please call [facility] [phone] or [facility] [phone]. A copy of this note was provided to the patient for reference. How to Use Your Incentive Spirometer       About Your Incentive Spirometer  An incentive spirometer is a device that will expand your lungs by helping you to breathe more deeply and fully. The parts of your incentive spirometer are labeled in Figure 1. Using your incentive spirometer  When youre using your incentive spirometer, make sure to breathe through your mouth. If you breathe through your nose, the incentive spirometer wont work properly. You can hold your nose if you have trouble. DO NOT BLOW INTO THE DEVICE. If you feel dizzy at any time, stop and rest. Try again at a later time.   1. Sit upright in a chair or in bed. Hold the incentive spirometer at eye level. 2. Put the mouthpiece in your mouth and close your lips tightly around it. Slowly breathe out (exhale) completely. 3. Breathe in (inhale) slowly through your mouth as deeply as you can. As you take the breath, you will see the piston rise inside the large column. While the piston rises, the indicator on the right should move upwards. It should stay in between the 2 arrows (see Figure 1). 4. Try to get the piston as high as you can, while keeping the indicator between the arrows. If the indicator doesnt stay between the arrows, youre breathing either too fast or too slow. 5. When you get it as high as you can, hold your breath for 10 seconds, or as long as possible. While youre holding your breath, the piston will slowly fall to the base of the spirometer. 6. Once the piston reaches the bottom of the spirometer, breathe out slowly through your mouth. Rest for a few seconds. 7. Repeat 10 times. Try to get the piston to the same level with each breath. 8. After each set of 10 breaths, try to cough as coughing will help loosen or clear any mucus in your lungs. 9. Put the marker at the level the piston reached on your incentive spirometer. This will be your goal next time. Repeat these steps every hour that youre awake.   Cover the mouthpiece of the incentive spirometer when you arent using it

## 2021-07-06 NOTE — PROGRESS NOTES
07/06/21 1030   Oxygen Therapy   O2 Sat (%) 98 %   Pulse via Oximetry 69 beats per minute   O2 Device None (Room air)   Pre-Treatment   Breath Sounds Bilateral Clear   Pre FEV1 (liters) 3.3 liters   % Predicted 107     Pt's symptoms include:    Snoring  Tiredness- excessive daytime sleepiness  Observed apnea  Neck size      42        cm  Modified Cuenca stage 3  SACS Score 14  STOP BANG 5  Height   5   '  10  \"   Weight   225  lbs  BMI 31.59      Refer patient for sleep study based on above assessment. Initial respiratory Assessment completed with pt. Pt was interviewed and evaluated in [facility] prior to surgery. Patient ID:  Nickolas Parra  [id number]  70 y.o.  1950  Surgeon: Dr. Zakiya Joseph  Date of Surgery: 7/26/2021  Procedure: Total Right Hip Arthroplasty  Primary Care Physician: None None  Specialists:     Pt taught proper COUGH technique  DIAPHRAGMATIC BREATHING EXERCISE INSTRUCTIONS GIVEN    History of smoking:   DENIES                 Quit date:         Secondhand smoke:DENIES    Past procedures with Oxygen desaturation or delayed awakening:DENIES    Past Medical History:   Diagnosis Date    Arthritis     generalized OA    CAD (coronary artery disease) 09/11/2018    Followed by [address] [place] [address] [address]     [facility])     GERD (gastroesophageal reflux disease)     OTC alkaseltzer    History of kidney stones 2016    naturally passed    History of melanoma 2016    removed from nose    Hx of CABG     Hypertension 9/11/2018    PUD (peptic ulcer disease)     1970's- patient denies 10/01/18    Right hip pain     S/P AVR (aortic valve replacement)     Sleep apnea     observed to stop breathing in sleep; no CPAP    PNA IN THE 1990'S    Respiratory history:DENIES SOB                                                                  Respiratory meds:  DENIES    FAMILY PRESENT:             NO     PAST SLEEP STUDY:                 DENIES  HX OF GALO: DENIES  GALO assessment:     HX OF CHF                                          SLEEPS ON SIDE       PHYSICAL EXAM   Body mass index is 31.84 kg/m². Visit Vitals  BP (!) 140/76 (BP 1 Location: Left arm, BP Patient Position: Sitting)   Pulse 60   Temp 97.9 °F (36.6 °C)   Resp 16   Ht 5' 10.5\" (1.791 m)   Wt 102.1 kg (225 lb 1.6 oz)   SpO2 98%   BMI 31.84 kg/m²     Neck circumference: 42     cm    Loud snoring:                                                 YES            Witnessed apnea or wakening gasping or choking:            APNEA  Awakens with headaches:                                               Morning or daytime tiredness/ sleepiness:                             TIRED  Dry mouth or sore throat in morning:            YES                                            Cuenca stage:  3                                   SACS score:14  Stop Bang   STOP-BANG  Does the patient snore loudly (louder than talking or loud enough to be heard through closed doors)?: Yes  Does the patient often feel tired, fatigued, or sleepy during the daytime, even after a \"good\" night's sleep?: Yes  Has anyone ever observed the patient stop breathing during their sleep? : Yes  Does the patient have or are they being treated for high blood pressure?: No  Is the patient's BMI greater than 35?: No  Is your neck circumference greater than 17 inches (Male) or 16 inches (Female)?: No  Is the patient older than 48?: Yes  Is the patient male?: Yes  GALO Score: 5  Has the patient been referred to Sleep Medicine?: Yes  Has the patient previously been diagnosed with Obstructive Sleep Apnea?: No                            [facility]                                        Referrals:  [facility]  Pt.  Phone Number:  [phone]

## 2021-07-07 ENCOUNTER — ANESTHESIA EVENT (OUTPATIENT)
Dept: SURGERY | Age: 71
DRG: 470 | End: 2021-07-07
Payer: MEDICARE

## 2021-07-07 LAB
COTININE UR QL SCN: NEGATIVE NG/ML
DRUG SCREEN COMMENT:, 753798: NORMAL

## 2021-07-07 NOTE — PERIOP NOTES
Dr. Nae Dc (anesthesia) reviewed ECHO dated 3/2/21; \"OK\" to proceed as scheduled. No new orders received.

## 2021-07-08 NOTE — PERIOP NOTES
7/7/2021  5:35 PM - James, [facility]    Component Value Flag Ref Range Units Status   Cotinine Screen, urine Negative   Naczld=948 ng/mL Final   Drug Screen Comment: Comment

## 2021-07-26 ENCOUNTER — ANESTHESIA (OUTPATIENT)
Dept: SURGERY | Age: 71
DRG: 470 | End: 2021-07-26
Payer: MEDICARE

## 2021-07-26 ENCOUNTER — APPOINTMENT (OUTPATIENT)
Dept: GENERAL RADIOLOGY | Age: 71
DRG: 470 | End: 2021-07-26
Attending: NURSE PRACTITIONER
Payer: MEDICARE

## 2021-07-26 ENCOUNTER — HOME HEALTH ADMISSION (OUTPATIENT)
Dept: HOME HEALTH SERVICES | Facility: HOME HEALTH | Age: 71
End: 2021-07-26
Payer: MEDICARE

## 2021-07-26 ENCOUNTER — HOSPITAL ENCOUNTER (INPATIENT)
Age: 71
LOS: 1 days | Discharge: HOME HEALTH | DRG: 470 | End: 2021-07-27
Attending: ORTHOPAEDIC SURGERY | Admitting: ORTHOPAEDIC SURGERY
Payer: MEDICARE

## 2021-07-26 DIAGNOSIS — M16.11 PRIMARY OSTEOARTHRITIS OF RIGHT HIP: Primary | ICD-10-CM

## 2021-07-26 LAB — HGB BLD-MCNC: 11 G/DL (ref 13.6–17.2)

## 2021-07-26 PROCEDURE — 74011250636 HC RX REV CODE- 250/636: Performed by: NURSE ANESTHETIST, CERTIFIED REGISTERED

## 2021-07-26 PROCEDURE — 77030002933 HC SUT MCRYL J&J -A: Performed by: ORTHOPAEDIC SURGERY

## 2021-07-26 PROCEDURE — 77030003665 HC NDL SPN BBMI -A: Performed by: ANESTHESIOLOGY

## 2021-07-26 PROCEDURE — 97110 THERAPEUTIC EXERCISES: CPT

## 2021-07-26 PROCEDURE — 97161 PT EVAL LOW COMPLEX 20 MIN: CPT

## 2021-07-26 PROCEDURE — 74011250637 HC RX REV CODE- 250/637: Performed by: NURSE PRACTITIONER

## 2021-07-26 PROCEDURE — 76060000034 HC ANESTHESIA 1.5 TO 2 HR: Performed by: ORTHOPAEDIC SURGERY

## 2021-07-26 PROCEDURE — 74011250636 HC RX REV CODE- 250/636: Performed by: NURSE PRACTITIONER

## 2021-07-26 PROCEDURE — 77030033067 HC SUT PDO STRATFX SPIR J&J -B: Performed by: ORTHOPAEDIC SURGERY

## 2021-07-26 PROCEDURE — C1776 JOINT DEVICE (IMPLANTABLE): HCPCS | Performed by: ORTHOPAEDIC SURGERY

## 2021-07-26 PROCEDURE — 0SR904A REPLACEMENT OF RIGHT HIP JOINT WITH CERAMIC ON POLYETHYLENE SYNTHETIC SUBSTITUTE, UNCEMENTED, OPEN APPROACH: ICD-10-PCS | Performed by: ORTHOPAEDIC SURGERY

## 2021-07-26 PROCEDURE — 2709999900 HC NON-CHARGEABLE SUPPLY

## 2021-07-26 PROCEDURE — 97165 OT EVAL LOW COMPLEX 30 MIN: CPT

## 2021-07-26 PROCEDURE — 74011250637 HC RX REV CODE- 250/637: Performed by: ANESTHESIOLOGY

## 2021-07-26 PROCEDURE — 97535 SELF CARE MNGMENT TRAINING: CPT

## 2021-07-26 PROCEDURE — 94760 N-INVAS EAR/PLS OXIMETRY 1: CPT

## 2021-07-26 PROCEDURE — 77030006835 HC BLD SAW SAG STRY -B: Performed by: ORTHOPAEDIC SURGERY

## 2021-07-26 PROCEDURE — 77030019557 HC ELECTRD VES SEAL MEDT -F: Performed by: ORTHOPAEDIC SURGERY

## 2021-07-26 PROCEDURE — 74011250636 HC RX REV CODE- 250/636: Performed by: ANESTHESIOLOGY

## 2021-07-26 PROCEDURE — 74011250636 HC RX REV CODE- 250/636: Performed by: ORTHOPAEDIC SURGERY

## 2021-07-26 PROCEDURE — 77030034479 HC ADH SKN CLSR PRINEO J&J -B: Performed by: ORTHOPAEDIC SURGERY

## 2021-07-26 PROCEDURE — 74011000250 HC RX REV CODE- 250: Performed by: NURSE PRACTITIONER

## 2021-07-26 PROCEDURE — 85018 HEMOGLOBIN: CPT

## 2021-07-26 PROCEDURE — 27130 TOTAL HIP ARTHROPLASTY: CPT | Performed by: NURSE PRACTITIONER

## 2021-07-26 PROCEDURE — 94762 N-INVAS EAR/PLS OXIMTRY CONT: CPT

## 2021-07-26 PROCEDURE — 77030007880 HC KT SPN EPDRL BBMI -B: Performed by: ANESTHESIOLOGY

## 2021-07-26 PROCEDURE — 27130 TOTAL HIP ARTHROPLASTY: CPT | Performed by: ORTHOPAEDIC SURGERY

## 2021-07-26 PROCEDURE — 74011000250 HC RX REV CODE- 250: Performed by: NURSE ANESTHETIST, CERTIFIED REGISTERED

## 2021-07-26 PROCEDURE — 74011000250 HC RX REV CODE- 250: Performed by: ANESTHESIOLOGY

## 2021-07-26 PROCEDURE — 76210000016 HC OR PH I REC 1 TO 1.5 HR: Performed by: ORTHOPAEDIC SURGERY

## 2021-07-26 PROCEDURE — 77030018673: Performed by: ORTHOPAEDIC SURGERY

## 2021-07-26 PROCEDURE — 72170 X-RAY EXAM OF PELVIS: CPT

## 2021-07-26 PROCEDURE — 76010000875 HC OR TIME 1.5 TO 2HR INTENSV - TIER 2: Performed by: ORTHOPAEDIC SURGERY

## 2021-07-26 PROCEDURE — 77030002922 HC SUT FBRWRE ARTH -B: Performed by: ORTHOPAEDIC SURGERY

## 2021-07-26 PROCEDURE — 65270000029 HC RM PRIVATE

## 2021-07-26 PROCEDURE — 36415 COLL VENOUS BLD VENIPUNCTURE: CPT

## 2021-07-26 PROCEDURE — 2709999900 HC NON-CHARGEABLE SUPPLY: Performed by: ORTHOPAEDIC SURGERY

## 2021-07-26 PROCEDURE — 77030033138 HC SUT PGA STRATFX J&J -B: Performed by: ORTHOPAEDIC SURGERY

## 2021-07-26 DEVICE — 127 DEGREE NECK ANGLE HIP STEM
Type: IMPLANTABLE DEVICE | Site: HIP | Status: FUNCTIONAL
Brand: ACCOLADE

## 2021-07-26 DEVICE — CERAMIC V40 FEMORAL HEAD
Type: IMPLANTABLE DEVICE | Site: HIP | Status: FUNCTIONAL
Brand: BIOLOX

## 2021-07-26 DEVICE — TRIDENT II TRITANIUM CLUSTER 56F
Type: IMPLANTABLE DEVICE | Site: HIP | Status: FUNCTIONAL
Brand: TRIDENT II

## 2021-07-26 DEVICE — TRIDENT X3 10 DEGREE POLYETHYLENE INSERT
Type: IMPLANTABLE DEVICE | Site: HIP | Status: FUNCTIONAL
Brand: TRIDENT X3 INSERT

## 2021-07-26 DEVICE — HIP H2 TOT ADV OTHER HD -- IMPL CAPPED H2: Type: IMPLANTABLE DEVICE | Status: FUNCTIONAL

## 2021-07-26 RX ORDER — CYCLOBENZAPRINE HCL 10 MG
10 TABLET ORAL
Status: DISCONTINUED | OUTPATIENT
Start: 2021-07-26 | End: 2021-07-27 | Stop reason: HOSPADM

## 2021-07-26 RX ORDER — GABAPENTIN 100 MG/1
100 CAPSULE ORAL 2 TIMES DAILY
Qty: 30 CAPSULE | Refills: 0 | Status: SHIPPED | OUTPATIENT
Start: 2021-07-26 | End: 2021-09-24

## 2021-07-26 RX ORDER — ATORVASTATIN CALCIUM 40 MG/1
40 TABLET, FILM COATED ORAL DAILY
Status: DISCONTINUED | OUTPATIENT
Start: 2021-07-26 | End: 2021-07-27 | Stop reason: HOSPADM

## 2021-07-26 RX ORDER — FENTANYL CITRATE 50 UG/ML
100 INJECTION, SOLUTION INTRAMUSCULAR; INTRAVENOUS ONCE
Status: DISCONTINUED | OUTPATIENT
Start: 2021-07-26 | End: 2021-07-26 | Stop reason: HOSPADM

## 2021-07-26 RX ORDER — SODIUM CHLORIDE, SODIUM LACTATE, POTASSIUM CHLORIDE, CALCIUM CHLORIDE 600; 310; 30; 20 MG/100ML; MG/100ML; MG/100ML; MG/100ML
75 INJECTION, SOLUTION INTRAVENOUS CONTINUOUS
Status: DISCONTINUED | OUTPATIENT
Start: 2021-07-26 | End: 2021-07-26 | Stop reason: HOSPADM

## 2021-07-26 RX ORDER — FENTANYL CITRATE 50 UG/ML
INJECTION, SOLUTION INTRAMUSCULAR; INTRAVENOUS AS NEEDED
Status: DISCONTINUED | OUTPATIENT
Start: 2021-07-26 | End: 2021-07-26 | Stop reason: HOSPADM

## 2021-07-26 RX ORDER — METOPROLOL SUCCINATE 50 MG/1
50 TABLET, EXTENDED RELEASE ORAL DAILY
Status: DISCONTINUED | OUTPATIENT
Start: 2021-07-26 | End: 2021-07-26

## 2021-07-26 RX ORDER — TRANEXAMIC ACID 100 MG/ML
INJECTION, SOLUTION INTRAVENOUS AS NEEDED
Status: DISCONTINUED | OUTPATIENT
Start: 2021-07-26 | End: 2021-07-26 | Stop reason: HOSPADM

## 2021-07-26 RX ORDER — HYDROMORPHONE HYDROCHLORIDE 1 MG/ML
1 INJECTION, SOLUTION INTRAMUSCULAR; INTRAVENOUS; SUBCUTANEOUS
Status: DISCONTINUED | OUTPATIENT
Start: 2021-07-26 | End: 2021-07-27 | Stop reason: HOSPADM

## 2021-07-26 RX ORDER — ACETAMINOPHEN 500 MG
1000 TABLET ORAL EVERY 6 HOURS
Status: DISCONTINUED | OUTPATIENT
Start: 2021-07-26 | End: 2021-07-27 | Stop reason: HOSPADM

## 2021-07-26 RX ORDER — ACETAMINOPHEN 500 MG
1000 TABLET ORAL ONCE
Status: COMPLETED | OUTPATIENT
Start: 2021-07-26 | End: 2021-07-26

## 2021-07-26 RX ORDER — ACETAMINOPHEN 500 MG
1000 TABLET ORAL
Qty: 60 TABLET | Refills: 0 | Status: SHIPPED | OUTPATIENT
Start: 2021-07-26

## 2021-07-26 RX ORDER — PROPOFOL 10 MG/ML
INJECTION, EMULSION INTRAVENOUS AS NEEDED
Status: DISCONTINUED | OUTPATIENT
Start: 2021-07-26 | End: 2021-07-26 | Stop reason: HOSPADM

## 2021-07-26 RX ORDER — MIDAZOLAM HYDROCHLORIDE 1 MG/ML
2 INJECTION, SOLUTION INTRAMUSCULAR; INTRAVENOUS
Status: DISCONTINUED | OUTPATIENT
Start: 2021-07-26 | End: 2021-07-26 | Stop reason: HOSPADM

## 2021-07-26 RX ORDER — OXYCODONE HYDROCHLORIDE 5 MG/1
5 TABLET ORAL
Status: DISCONTINUED | OUTPATIENT
Start: 2021-07-26 | End: 2021-07-26 | Stop reason: HOSPADM

## 2021-07-26 RX ORDER — VALSARTAN 40 MG/1
40 TABLET ORAL DAILY
Status: DISCONTINUED | OUTPATIENT
Start: 2021-07-26 | End: 2021-07-27 | Stop reason: HOSPADM

## 2021-07-26 RX ORDER — SPIRONOLACTONE 25 MG/1
25 TABLET ORAL DAILY
Status: DISCONTINUED | OUTPATIENT
Start: 2021-07-26 | End: 2021-07-27 | Stop reason: HOSPADM

## 2021-07-26 RX ORDER — NALOXONE HYDROCHLORIDE 0.4 MG/ML
.2-.4 INJECTION, SOLUTION INTRAMUSCULAR; INTRAVENOUS; SUBCUTANEOUS
Status: DISCONTINUED | OUTPATIENT
Start: 2021-07-26 | End: 2021-07-27 | Stop reason: HOSPADM

## 2021-07-26 RX ORDER — SODIUM CHLORIDE 9 MG/ML
100 INJECTION, SOLUTION INTRAVENOUS CONTINUOUS
Status: DISCONTINUED | OUTPATIENT
Start: 2021-07-26 | End: 2021-07-27 | Stop reason: HOSPADM

## 2021-07-26 RX ORDER — DIPHENHYDRAMINE HCL 25 MG
25 CAPSULE ORAL
Status: DISCONTINUED | OUTPATIENT
Start: 2021-07-26 | End: 2021-07-27 | Stop reason: HOSPADM

## 2021-07-26 RX ORDER — ONDANSETRON 2 MG/ML
INJECTION INTRAMUSCULAR; INTRAVENOUS AS NEEDED
Status: DISCONTINUED | OUTPATIENT
Start: 2021-07-26 | End: 2021-07-26 | Stop reason: HOSPADM

## 2021-07-26 RX ORDER — DEXAMETHASONE SODIUM PHOSPHATE 100 MG/10ML
10 INJECTION INTRAMUSCULAR; INTRAVENOUS ONCE
Status: DISCONTINUED | OUTPATIENT
Start: 2021-07-27 | End: 2021-07-27 | Stop reason: HOSPADM

## 2021-07-26 RX ORDER — GABAPENTIN 100 MG/1
100 CAPSULE ORAL 2 TIMES DAILY
Status: DISCONTINUED | OUTPATIENT
Start: 2021-07-26 | End: 2021-07-27 | Stop reason: HOSPADM

## 2021-07-26 RX ORDER — ASPIRIN 81 MG/1
81 TABLET ORAL 2 TIMES DAILY
Qty: 60 TABLET | Refills: 0 | Status: SHIPPED | OUTPATIENT
Start: 2021-07-26

## 2021-07-26 RX ORDER — SODIUM CHLORIDE 0.9 % (FLUSH) 0.9 %
5-40 SYRINGE (ML) INJECTION EVERY 8 HOURS
Status: DISCONTINUED | OUTPATIENT
Start: 2021-07-26 | End: 2021-07-27 | Stop reason: HOSPADM

## 2021-07-26 RX ORDER — SODIUM CHLORIDE, SODIUM LACTATE, POTASSIUM CHLORIDE, CALCIUM CHLORIDE 600; 310; 30; 20 MG/100ML; MG/100ML; MG/100ML; MG/100ML
50 INJECTION, SOLUTION INTRAVENOUS CONTINUOUS
Status: DISCONTINUED | OUTPATIENT
Start: 2021-07-26 | End: 2021-07-26 | Stop reason: HOSPADM

## 2021-07-26 RX ORDER — HYDROMORPHONE HYDROCHLORIDE 2 MG/1
2 TABLET ORAL
Qty: 42 TABLET | Refills: 0 | Status: SHIPPED | OUTPATIENT
Start: 2021-07-26 | End: 2021-08-02

## 2021-07-26 RX ORDER — CYCLOBENZAPRINE HCL 5 MG
5 TABLET ORAL
Qty: 30 TABLET | Refills: 0 | Status: SHIPPED | OUTPATIENT
Start: 2021-07-26 | End: 2021-09-24

## 2021-07-26 RX ORDER — LIDOCAINE HYDROCHLORIDE 10 MG/ML
0.1 INJECTION INFILTRATION; PERINEURAL AS NEEDED
Status: DISCONTINUED | OUTPATIENT
Start: 2021-07-26 | End: 2021-07-26 | Stop reason: HOSPADM

## 2021-07-26 RX ORDER — HYDROMORPHONE HYDROCHLORIDE 2 MG/1
2 TABLET ORAL
Status: DISCONTINUED | OUTPATIENT
Start: 2021-07-26 | End: 2021-07-27 | Stop reason: HOSPADM

## 2021-07-26 RX ORDER — PROPOFOL 10 MG/ML
INJECTION, EMULSION INTRAVENOUS
Status: DISCONTINUED | OUTPATIENT
Start: 2021-07-26 | End: 2021-07-26 | Stop reason: HOSPADM

## 2021-07-26 RX ORDER — AMOXICILLIN 250 MG
1 CAPSULE ORAL DAILY
Qty: 30 TABLET | Refills: 0 | Status: SHIPPED | OUTPATIENT
Start: 2021-07-26 | End: 2021-09-24

## 2021-07-26 RX ORDER — ASPIRIN 81 MG/1
81 TABLET ORAL EVERY 12 HOURS
Status: DISCONTINUED | OUTPATIENT
Start: 2021-07-26 | End: 2021-07-27 | Stop reason: HOSPADM

## 2021-07-26 RX ORDER — KETOROLAC TROMETHAMINE 30 MG/ML
INJECTION, SOLUTION INTRAMUSCULAR; INTRAVENOUS AS NEEDED
Status: DISCONTINUED | OUTPATIENT
Start: 2021-07-26 | End: 2021-07-26 | Stop reason: HOSPADM

## 2021-07-26 RX ORDER — AMOXICILLIN 250 MG
2 CAPSULE ORAL DAILY
Status: DISCONTINUED | OUTPATIENT
Start: 2021-07-27 | End: 2021-07-27 | Stop reason: HOSPADM

## 2021-07-26 RX ORDER — ONDANSETRON 8 MG/1
8 TABLET, ORALLY DISINTEGRATING ORAL
Status: DISCONTINUED | OUTPATIENT
Start: 2021-07-26 | End: 2021-07-27 | Stop reason: HOSPADM

## 2021-07-26 RX ORDER — LIDOCAINE HYDROCHLORIDE 20 MG/ML
INJECTION, SOLUTION EPIDURAL; INFILTRATION; INTRACAUDAL; PERINEURAL AS NEEDED
Status: DISCONTINUED | OUTPATIENT
Start: 2021-07-26 | End: 2021-07-26 | Stop reason: HOSPADM

## 2021-07-26 RX ORDER — CYCLOBENZAPRINE HCL 10 MG
5 TABLET ORAL
Status: DISCONTINUED | OUTPATIENT
Start: 2021-07-26 | End: 2021-07-26

## 2021-07-26 RX ORDER — TRANEXAMIC ACID 650 1/1
1300 TABLET ORAL
Status: COMPLETED | OUTPATIENT
Start: 2021-07-26 | End: 2021-07-26

## 2021-07-26 RX ORDER — SODIUM CHLORIDE 0.9 % (FLUSH) 0.9 %
5-40 SYRINGE (ML) INJECTION AS NEEDED
Status: DISCONTINUED | OUTPATIENT
Start: 2021-07-26 | End: 2021-07-27 | Stop reason: HOSPADM

## 2021-07-26 RX ORDER — ALBUTEROL SULFATE 0.83 MG/ML
2.5 SOLUTION RESPIRATORY (INHALATION) AS NEEDED
Status: DISCONTINUED | OUTPATIENT
Start: 2021-07-26 | End: 2021-07-26 | Stop reason: HOSPADM

## 2021-07-26 RX ORDER — ONDANSETRON 8 MG/1
8 TABLET, ORALLY DISINTEGRATING ORAL
Qty: 30 TABLET | Refills: 0 | Status: SHIPPED | OUTPATIENT
Start: 2021-07-26 | End: 2021-09-24

## 2021-07-26 RX ORDER — HYDROMORPHONE HYDROCHLORIDE 2 MG/ML
0.5 INJECTION, SOLUTION INTRAMUSCULAR; INTRAVENOUS; SUBCUTANEOUS
Status: DISCONTINUED | OUTPATIENT
Start: 2021-07-26 | End: 2021-07-26 | Stop reason: HOSPADM

## 2021-07-26 RX ORDER — ROPIVACAINE HYDROCHLORIDE 2 MG/ML
INJECTION, SOLUTION EPIDURAL; INFILTRATION; PERINEURAL AS NEEDED
Status: DISCONTINUED | OUTPATIENT
Start: 2021-07-26 | End: 2021-07-26 | Stop reason: HOSPADM

## 2021-07-26 RX ORDER — MIDAZOLAM HYDROCHLORIDE 1 MG/ML
2 INJECTION, SOLUTION INTRAMUSCULAR; INTRAVENOUS ONCE
Status: COMPLETED | OUTPATIENT
Start: 2021-07-26 | End: 2021-07-26

## 2021-07-26 RX ORDER — EPHEDRINE SULFATE/0.9% NACL/PF 50 MG/5 ML
SYRINGE (ML) INTRAVENOUS AS NEEDED
Status: DISCONTINUED | OUTPATIENT
Start: 2021-07-26 | End: 2021-07-26 | Stop reason: HOSPADM

## 2021-07-26 RX ORDER — CEFAZOLIN SODIUM/WATER 2 G/20 ML
2 SYRINGE (ML) INTRAVENOUS EVERY 8 HOURS
Status: COMPLETED | OUTPATIENT
Start: 2021-07-26 | End: 2021-07-26

## 2021-07-26 RX ORDER — CEFAZOLIN SODIUM/WATER 2 G/20 ML
2 SYRINGE (ML) INTRAVENOUS ONCE
Status: COMPLETED | OUTPATIENT
Start: 2021-07-26 | End: 2021-07-26

## 2021-07-26 RX ORDER — METOPROLOL SUCCINATE 50 MG/1
50 TABLET, EXTENDED RELEASE ORAL DAILY
Status: DISCONTINUED | OUTPATIENT
Start: 2021-07-27 | End: 2021-07-27 | Stop reason: HOSPADM

## 2021-07-26 RX ORDER — NITROGLYCERIN 0.4 MG/1
0.4 TABLET SUBLINGUAL AS NEEDED
Status: DISCONTINUED | OUTPATIENT
Start: 2021-07-26 | End: 2021-07-27 | Stop reason: HOSPADM

## 2021-07-26 RX ORDER — ONDANSETRON 4 MG/1
4 TABLET, ORALLY DISINTEGRATING ORAL
Status: DISCONTINUED | OUTPATIENT
Start: 2021-07-26 | End: 2021-07-27 | Stop reason: HOSPADM

## 2021-07-26 RX ADMIN — GABAPENTIN 100 MG: 100 CAPSULE ORAL at 12:27

## 2021-07-26 RX ADMIN — PROPOFOL 50 MG: 10 INJECTION, EMULSION INTRAVENOUS at 08:15

## 2021-07-26 RX ADMIN — CYCLOBENZAPRINE 5 MG: 10 TABLET, FILM COATED ORAL at 20:18

## 2021-07-26 RX ADMIN — HYDROMORPHONE HYDROCHLORIDE 1 MG: 1 INJECTION, SOLUTION INTRAMUSCULAR; INTRAVENOUS; SUBCUTANEOUS at 22:54

## 2021-07-26 RX ADMIN — ACETAMINOPHEN 1000 MG: 500 TABLET, FILM COATED ORAL at 12:27

## 2021-07-26 RX ADMIN — TRANEXAMIC ACID 1300 MG: 650 TABLET ORAL at 16:27

## 2021-07-26 RX ADMIN — SODIUM CHLORIDE 20 MCG/MIN: 900 INJECTION, SOLUTION INTRAVENOUS at 08:39

## 2021-07-26 RX ADMIN — LIDOCAINE HYDROCHLORIDE 0.1 ML: 10 INJECTION, SOLUTION INFILTRATION; PERINEURAL at 06:33

## 2021-07-26 RX ADMIN — HYDROMORPHONE HYDROCHLORIDE 2 MG: 2 TABLET ORAL at 20:17

## 2021-07-26 RX ADMIN — TRANEXAMIC ACID 1300 MG: 650 TABLET ORAL at 17:42

## 2021-07-26 RX ADMIN — LIDOCAINE HYDROCHLORIDE 80 MG: 20 INJECTION, SOLUTION EPIDURAL; INFILTRATION; INTRACAUDAL; PERINEURAL at 08:15

## 2021-07-26 RX ADMIN — CEFAZOLIN 2 G: 1 INJECTION, POWDER, FOR SOLUTION INTRAVENOUS at 08:03

## 2021-07-26 RX ADMIN — Medication 1 TABLET: at 12:28

## 2021-07-26 RX ADMIN — PHENYLEPHRINE HYDROCHLORIDE 100 MCG: 10 INJECTION INTRAVENOUS at 08:36

## 2021-07-26 RX ADMIN — PROPOFOL 100 MCG/KG/MIN: 10 INJECTION, EMULSION INTRAVENOUS at 08:20

## 2021-07-26 RX ADMIN — CEFAZOLIN SODIUM 2 G: 10 INJECTION, POWDER, FOR SOLUTION INTRAVENOUS at 16:27

## 2021-07-26 RX ADMIN — Medication 10 ML: at 21:48

## 2021-07-26 RX ADMIN — Medication 1 AMPULE: at 20:16

## 2021-07-26 RX ADMIN — Medication 81 MG: at 20:16

## 2021-07-26 RX ADMIN — Medication 10 MG: at 08:22

## 2021-07-26 RX ADMIN — DIPHENHYDRAMINE HYDROCHLORIDE 25 MG: 25 CAPSULE ORAL at 20:17

## 2021-07-26 RX ADMIN — FENTANYL CITRATE 50 MCG: 50 INJECTION INTRAMUSCULAR; INTRAVENOUS at 08:15

## 2021-07-26 RX ADMIN — CEFAZOLIN SODIUM 2 G: 10 INJECTION, POWDER, FOR SOLUTION INTRAVENOUS at 23:05

## 2021-07-26 RX ADMIN — FENTANYL CITRATE 25 MCG: 50 INJECTION INTRAMUSCULAR; INTRAVENOUS at 08:56

## 2021-07-26 RX ADMIN — GABAPENTIN 100 MG: 100 CAPSULE ORAL at 17:43

## 2021-07-26 RX ADMIN — ACETAMINOPHEN 1000 MG: 500 TABLET, FILM COATED ORAL at 23:05

## 2021-07-26 RX ADMIN — Medication 3 AMPULE: at 06:25

## 2021-07-26 RX ADMIN — PHENYLEPHRINE HYDROCHLORIDE 100 MCG: 10 INJECTION INTRAVENOUS at 08:50

## 2021-07-26 RX ADMIN — MIDAZOLAM 2 MG: 1 INJECTION INTRAMUSCULAR; INTRAVENOUS at 07:51

## 2021-07-26 RX ADMIN — ACETAMINOPHEN 1000 MG: 500 TABLET, FILM COATED ORAL at 17:43

## 2021-07-26 RX ADMIN — SODIUM CHLORIDE, SODIUM LACTATE, POTASSIUM CHLORIDE, AND CALCIUM CHLORIDE: 600; 310; 30; 20 INJECTION, SOLUTION INTRAVENOUS at 08:43

## 2021-07-26 RX ADMIN — SODIUM CHLORIDE, SODIUM LACTATE, POTASSIUM CHLORIDE, AND CALCIUM CHLORIDE 75 ML/HR: 600; 310; 30; 20 INJECTION, SOLUTION INTRAVENOUS at 06:32

## 2021-07-26 RX ADMIN — FENTANYL CITRATE 25 MCG: 50 INJECTION INTRAMUSCULAR; INTRAVENOUS at 09:49

## 2021-07-26 RX ADMIN — HYDROMORPHONE HYDROCHLORIDE 2 MG: 2 TABLET ORAL at 16:34

## 2021-07-26 RX ADMIN — TRANEXAMIC ACID 1 G: 100 INJECTION, SOLUTION INTRAVENOUS at 08:23

## 2021-07-26 RX ADMIN — ONDANSETRON 4 MG: 2 INJECTION INTRAMUSCULAR; INTRAVENOUS at 09:51

## 2021-07-26 RX ADMIN — ACETAMINOPHEN 1000 MG: 500 TABLET, FILM COATED ORAL at 06:24

## 2021-07-26 RX ADMIN — SODIUM CHLORIDE, SODIUM LACTATE, POTASSIUM CHLORIDE, AND CALCIUM CHLORIDE: 600; 310; 30; 20 INJECTION, SOLUTION INTRAVENOUS at 09:51

## 2021-07-26 NOTE — H&P
Patient ID:  Jake Max  [id number]  45 y.o.  1950    Today: July 26, 2021       CC:  Right hip pain    HPI:   The patient has end stage arthritis of the right hip. The patient was evaluated and examined in the office prior to today and was found to have a history on physical exam consistent with intra-articular pathology of the right hip. Patient complains of anterior groin pain predominately. Pain occurs during activity. Patient has difficulty putting on socks/shoes and has notable pain when getting up from a chair and getting out of a car. Patient does not complain of significant lateral hip pain or radicular pain down the leg. There have been no changes to the patient's orthopedic condition since the last office visit    Past Medical History:  Past Medical History:   Diagnosis Date    Arthritis     generalized OA    CAD (coronary artery disease) 09/11/2018    Follow[facility] by [facility].  CH[facility]congest[facility] heart failure) ([facility])     GERD (gastroesophageal reflux disease)     OTC alkaseltzer    History of kidney stones 2016    naturally passed    History of melanoma 2016    removed from nose    Hx of CABG     Hypertension 9/11/2018    PUD (peptic ulcer disease)     1970's- patient denies 10/01/18    Right hip pain     S/P AVR (aortic valve replacement)     Sleep apnea     observed to stop breathing in sleep; no CPAP       Past Surgical History:  Past Surgical History:   Procedure Laterality Date    HX AORTIC VALVE REPLACEMENT  10/03/2018    CORONARY ARTERY BYPASS GRAFT X 1, LIMA to the LAD WITH AVR 23 mm Intuity valve    HX APPENDECTOMY       15 yo    HX CORONARY ARTERY BYPASS GRAFT  10/03/2018    CABG x1, Dr. Billie Faustin  2014    right lower ext - broken put in rods and screws    HX OTHER SURGICAL      melanoma surgery to nose        Medications:     Prior to Admission medications    Medication Sig Start Date End Date Taking?  Authorizing Provider   aspirin delayed-release 81 mg tablet Take 81 mg by mouth daily. Yes Provider, Historical   atorvastatin (Lipitor) 40 mg tablet Take 40 mg by mouth daily. Yes Provider, Historical   metoprolol succinate (TOPROL-XL) 50 mg XL tablet Take 1 Tab by mouth daily. 3/12/21  Yes Yobani Vick MD   valsartan (DIOVAN) 40 mg tablet Take 1 Tab by mouth daily. 3/12/21  Yes Yobani Vick MD   spironolactone (ALDACTONE) 25 mg tablet Take 1 Tab by mouth daily. 3/12/21  Yes Yobani Vick MD   diphenhydrAMINE (BENADRYL ALLERGY) 25 mg tablet Take 50 mg by mouth nightly as needed. 10/11/18  Yes Provider, Historical   naproxen sodium (ALEVE) 220 mg cap Take 220 mg by mouth as needed. Yes Provider, Historical   multivitamin (ONE A DAY) tablet Take 1 Tab by mouth daily. Yes Provider, Historical   sildenafil citrate (Viagra) 100 mg tablet Take 1 Tab by mouth as needed for Erectile Dysfunction. 3/31/21   Yobani Vick MD   nitroglycerin (NITROSTAT) 0.4 mg SL tablet 1 Tab by SubLINGual route as needed for Chest Pain. Up to 3 doses. 10/8/18   CARLI Doyle       Family History:     Family History   Problem Relation Age of Onset    Cancer Mother         Leukemia    Alzheimer Mother     Cancer Brother         Leukemia    Hypertension Brother     No Known Problems Father     Diabetes Sister     No Known Problems Brother        Social History:      Social History     Tobacco Use    Smoking status: Never Smoker    Smokeless tobacco: Never Used   Substance Use Topics    Alcohol use: Yes     Comment: occasional         Allergies:       Allergies   Allergen Reactions    Lortab [Hydrocodone-Acetaminophen] Other (comments)     hallucinations        Vitals:      Visit Vitals  BP (!) 150/89 (BP 1 Location: Left upper arm, BP Patient Position: Sitting)   Pulse 87   Temp 97 °F (36.1 °C)   Resp 18   Ht 5' 10.5\" (1.791 m)   Wt 222 lb (100.7 kg)   SpO2 97%   BMI 31.40 kg/m²        Objective:         General: No Acute distress HEENT: Normocephalic/atramatic                   Lungs:  Breathing non-labored                   Heart:   RRR                    Abdomen: soft       Extremities:  Prior exam done in office has been consistent with end-stage hip arthritis. We have noted pain with ROM of the right hip which manifests as anterior groin pain. There is decreased internal and external rotation of the affected hip. No significant pain with palpation over the greater trochanteric bursa. No radicular pain with straight leg raise. Patient walks with and antalgic gait. Distally patient has no neurologic deficit. Patient Active Problem List   Diagnosis Code    Aortic stenosis I35.0    CAD (coronary artery disease) I25.10    Hypertension I10    Status post coronary artery bypass graft Z95.1    S/P AVR (aortic valve replacement) Z95.2    Hypoxia R09.02    Pneumothorax J93.9    Snoring R06.83       Assessment:   1. Arthritis of the Right hip    Plan:   The patient has failed previous conservative treatment for this condition. The patient has pain in the right hip which causes daily symptoms which affects the patient's activities of daily living and quality of life. The risks, benefits, alternatives and possible complications of right total hip arthroplasty have been discussed with the patient including but not limited to infection, bleeding, damage to nerves and blood vessels with particular attention given to risk of damage of the femoral, obturator, lateral femoral cutaneous, superior gluteal, inferior gluteal, and sciatic nerve, risk of dislocation, fracture both intra-op and post-op, limb length inequality, polyethylene wear, implant loosening, risk for continued pain, and risk for revision surgery secondary to but not limited to all of these discussed risks. Further we discussed risk of venous thrombo-embolism including deep vein thrombosis and pulmonary embolism despite being on prophylaxis.  We have also previously discussed the potential of morbidity and mortality associated with, but not limited to, the actual surgical procedure, anesthesia, prior medical conditions, and/or the administration of medications perioperatively. I have made no guarantees to the patient regarding outcomes and the patient has voiced understanding of that. The patient has been given the opportunity to ask questions all of which have been answered and the patient wishes to proceed. The patient will be admitted the day of surgery for right total hip replacement. The patient was counseled at length about the risks of sagrario Covid-19 during their perioperative period and any recovery window from their procedure. The patient was made aware that sagrario Covid-19  may worsen their prognosis for recovering from their procedure and lend to a higher morbidity and/or mortality risk. All material risks, benefits, and reasonable alternatives including postponing the procedure were discussed. The patient does  wish to proceed with the procedure at this time.         Signed By: Candie Pemberton MD  July 26, 2021

## 2021-07-26 NOTE — OP NOTES
Total Hip Procedure Note    Jameel Layton,  [id number],  1950    Pre-operative Diagnosis: Osteoarthritis of right hip, unspecified osteoarthritis type [M16.11]    Post-operative Diagnosis: Same    Procedure: Right Total Hip Arthroplasty    BMI: Body mass index is 31.4 kg/m². Melody Preston Location: [facility]    Surgeon: Abbie De MD    Assistant: Nathalie Coughlin CFA and Jaci Carpenter NP CFA    Anesthesia: Spinal     Complications: None    EBL: 200cc    Drains: None    Intra-op Findings: Pre-operatively leg lengths were assessed using preop Xrays and with the patient in the lateral decubitus position and operative leg was felt to be 2-3mm shorter in length compared to the contralateral leg. The operative hip showed notable cartilage loss of both the femoral head and acetabulum. No intra-operative periprosthetic fracture was encountered. Sciatic nerve was noted to be intact at the end of the procedure. We measured the distance of our resected femoral head/neck from the cut surface to the center of the femoral head to be approximately 35mm. The overall length replaced with the implanted head/neck was 37.5mm. Intra-operatively we felt that we restored the patients leg lengths to equal lengths using the method described later. Postop with the patient supine we assessed leg lengths and felt they were similar. Patient condition at completion of Procedure: Stable    Implants:   Implant Name Type Inv.  Item Serial No.  Lot No. LRB No. Used Action   SHELL ACET CLUS H 56F TRTANIUM -- TRIDENT II - [id number]  SHELL ACET CLUS H 56F TRTANIUM -- TRIDENT II  [employer] [facility] [id number] Right 1 Implanted   INSERT ACET F 10 DEG 36 MM HIP X3 TRIDENT - [id number]  INSERT ACET F 10 DEG 36 MM HIP X3 TRIDENT  [employer] [facility] [id number] Right 1 Implanted   STEM FEM SZ 5 [id number] -- ACCOLADE II V40 - [id number]  STEM FEM SZ 5 127D [id number] -- ACCOLADE II V40 [id number] [employer] [id number] Right 1 Implanted   HEAD FEM DELT V40 +2.5MM 36MM -- V40 BIOLOX - [id number]  HEAD FEM DELT V40 +2.5MM 36MM -- V40 BIOLOX [id number] [employer] [id number] Right 1 Implanted       Description of Procedure    The complexity of the total joint surgery requires the use of a first assistant for positioning, retraction and assistance in closure. Jameel Layton was brought to the operating room. Patient was transferred from the stretcher to OR bed. Anesthesia was induced. IV antibiotics were administered per protocol. Jameel Layton was positioned in the lateral decubitus position and the pelvis/torso stabilized with posts. The right leg was prepped and draped in the usual sterile fashion. A time out identifying the patient, procedure, operative side and surgeon was administered and charted by the OR Nurse. Prior to incision one gram of TXA was given intravenously. A standard posterior approach was utilized to expose the right hip. The incision was carried through the subcutaneous tissue and underlying fascia with hemostasis obtained using the bovie cauterization and Aquamantys. A Charmley retractor was inserted. We resected any redundent bursal tissue off the external rotators. We were able to identify the piriformis tendon. The short external rotators and capsule were dissected off the posterior femur as a single layer just superior to the piriformis tendon. The sciatic nerve was palpated and protected during dissection. The femoral head was dislocated. The articular surface revealed loss of cartilage, exposed bone and bone spurring. The neck was osteotomized approximately 1cm above the top of the lesser trochanter. We were careful to protect the greater trochanter during osteotomy and protect it from iatrogenic injury. Acetabular retractors were placed both anterior and posterior just superficial to the acetabular labrum.   Remaining acetabular labrum was resected and any soft tissue was removed from the acetabulum including any tissue within the codyloid fossa. The acetabular surface revealed loss of cartilage with exposed bone. The acetabulum was sequentially reamed noting proper anteversion and inclination during reaming. The transverse acetabular ligament was used as the primary anatomic landmark to determine version and inclination. The acetabulum was sized to a 56 mm acetabular component. The prepared acetabulum was irrigated of any residual reamings and soft tissue. The permanent acetabular component was impacted into place to achieve appropriate horizontal tilt, anteversion, bone coverage and stability. We visualize that the acetabular component was fully seated. A trial liner was impacted into position. We did not have to excise overhanging osteophytes anterior and posterior  in order to minimize the risk of impingement. We then turned our attention to the proximal femur. A 2-prong proximal femoral retractor was placed. We gained access to the proximal femur using a  and femoral canal finder. The canal was prepared with appropriate lateralization in which we used the initial smaller broaches to remove lateral bone to avoid varus placement of the femoral component. The canal was then broached progressively. The broach was positioned with the appropriate anatomic femoral anteversion. We broached up to a size 5 Accolade II stem. A calcar planar was used. A trial reduction with a size #5 127 degree Accolade II stem with a 2.5 neck length and 36 mm head was performed. This was found to be the most stable with maximum hip flexion and with internal/external rotation testing. We did not appreciate any evidence of component or bony impingement. Limb lengths were assessed using three techniques.  First, the position of the tip of the operative greater trochanter was compared to the center of the femoral head component and was felt to match this same anatomic relationship as the non-operative hip based on preoperative X-rays. Next, we measured the length of our resected femoral head neck and felt that the trial components matched this resected length as closely as possible when accounting for the length provided by the femoral neck/head. Finally, we compared leg lengths by comparing the possible of the patella with the patients heels together with the patient in the lateral decubitus position. Using these methods it was felt that the patients leg lengths were equilibrated and with appropriate stability as mentioned above. All trial components were removed. The final liner was impacted into place which was a 10 degree elevated liner. A cementless size 5 127 degree Accolade II stem was impacted into place carefully. We were careful to observe the calcar region for any iatrogenic fractures during insertion. The permanent femoral head was impacted into place which was a +2.5mm 36mm ceramic head. Venkat Mcfarland's hip was reduced and stability was as mentioned above. Dilute Betadine solution was allowed to sit in the surgical site for a 3 minute period and copious saline was used to irrigate the surgical site. The sciatic nerve was palpated and noted to be intact. A periarticular of ropivicaine, toradol, and morphine was injected about the surgical site with care being taken not to inject in the vicinity of neurovascular structures. Prior to wound closure one additional gram of TXA was given for a total of 2 grams. The capsule was repaired with a three #2 Fiberwires secured through drill holes placed in the posterior aspect of the trochanter. No drain was placed. The fascia was closed with a #0 Bidirectional Stratafix barbed suture. The sub-Q layer was closed with 2-0 monocril suture and a  3-0 moncril stratafix suture in a running subcuticular fashion was used to close the skin.  The incision site was thoroughly cleaned with alcohol and the Exofin wound closure system was applied to seal it off from external contamination after the overlying skin was thoroughly cleaned with alcohol. A thin layer of [place] lubricant was applied over the wound to keep the dressing from adhering to the overlying sterile bandage and said bandage was placed. Drapes were then broken down and patient was transferred carefully back to the OR stretcher. The sponge and needle counts were correct. The patient tolerated the procedure without difficulty and left the operating room in satisfactory condition.     Signed By: Rashaad Bullock MD

## 2021-07-26 NOTE — PROGRESS NOTES
Problem: Mobility Impaired (Adult and Pediatric)  Goal: *Acute Goals and Plan of Care (Insert Text)  Outcome: Progressing Towards Goal  Note: GOALS (1-4 days):  (1.)Mr. Alysha Cameron will move from supine to sit and sit to supine  in bed with STAND BY ASSIST.    (2.)Mr. Alysha Cameron will transfer from bed to chair and chair to bed with STAND BY ASSIST using the least restrictive device. (3.)Mr. Mcfarland will ambulate with STAND BY ASSIST for 200 feet with the least restrictive device. (4.)Mr. Mcfarland will ambulate up/down 2 steps with left railing with MINIMAL ASSIST with device as needed. (5.)Mr. Mcfarland will state/observe KAILEY precautions with 0 verbal cues. ________________________________________________________________________________________________       PHYSICAL THERAPY JOINT CAMP KAILEY: Initial Assessment and PM 7/26/2021  INPATIENT: Hospital Day: 1  Payor: [facility] MEDICARE / Plan[place] [facility] MEDICARE HMO/PPO / Product Type: Managed Care Medicare /      NAME/AGE/GENDER: Jordan Alvarez is a 70 y.o. male   PRIMARY DIAGNOSIS:  Osteoarthritis of right hip, unspecified osteoarthritis type [M16.11]   Procedure(s) and Anesthesia Type:     * RIGHT HIP ARTHROPLASTY TOTAL/ MARCELLA - Spinal (Right)  ICD-10: Treatment Diagnosis:    Pain in Right Hip (M25.551)  Stiffness of Right Hip, Not elsewhere classified (M25.651)  Difficulty in walking, Not elsewhere classified (R26.2)      ASSESSMENT:     Mr. Alysha Cameron presents with decreased functional mobility and gait as well as decreased rom and strength of right LE s/p right kailey. He plans to go home with HHPT.     This section established at most recent assessment   PROBLEM LIST (Impairments causing functional limitations):  Decreased Strength  Decreased ADL/Functional Activities  Decreased Transfer Abilities  Decreased Ambulation Ability/Technique  Decreased Balance  Increased Pain  Decreased Activity Tolerance  Decreased Flexibility/Joint Mobility  Decreased [place] with Home Exercise Program  Decreased strength   INTERVENTIONS PLANNED: (Benefits and precautions of physical therapy have been discussed with the patient.)  Bed Mobility  Cold  Gait Training  Home Exercise Program (HEP)  Range of Motion (ROM)  Therapeutic Activites  Therapeutic Exercise/Strengthening  Transfer Training     TREATMENT PLAN: Frequency/Duration: Follow patient BID for duration of hospital stay to address above goals. Rehabilitation Potential For Stated Goals: Good     RECOMMENDED REHABILITATION/EQUIPMENT: (at time of discharge pending progress): Continue Skilled Therapy and Home Health: Physical Therapy. HISTORY:   History of Present Injury/Illness (Reason for Referral):  S/p right kailey  Past Medical History/Comorbidities:   Mr. Anayeli Pope  has a past medical history of Arthritis, CAD (coronary artery disease) (09/11/2018), CHF (congestive heart failure) ([address] GERD (gastroesophageal reflux disease), History of kidney stones (2016), History of melanoma (2016), CABG, Hypertension (9/11/2018), PUD (peptic ulcer disease), Right hip pain, S/P AVR (aortic valve replacement), and Sleep apnea. Mr. Anayeli Pope  has a past surgical history that includes hx other surgical; hx orthopaedic (2014); hx appendectomy; hx coronary artery bypass graft (10/03/2018); and hx aortic valve replacement (10/03/2018).    Social History/Living Environment:   Home Environment: Private residence  # Steps to Enter: 2  Rails to Enter: No  One/Two Story Residence: One story  Living Alone: Yes  Support Systems: Child(jasiel)  Patient Expects to be Discharged to[de-identified] [employer]  Current DME Used/Available at Home: Walker, rolling  Tub or Shower Type: Tub/Shower combination    Prior Level of Function/Work/Activity:  independent   Number of Personal Factors/Comorbidities that affect the Plan of Care: 1-2: MODERATE COMPLEXITY   EXAMINATION:   Most Recent Physical Functioning:      Gross Assessment  AROM: Within functional limits (left LE)  Strength: Generally decreased, functional (left LE)        RLE AROM  R Hip Flexion: 90  R Hip ABduction: 10            Bed Mobility  Supine to Sit: Contact guard assistance    Transfers  Sit to Stand: Contact guard assistance;Minimum assistance  Stand to Sit: Contact guard assistance  Bed to Chair: Contact guard assistance;Minimum assistance    Balance  Sitting: Intact  Standing: With support              Weight Bearing Status  Right Side Weight Bearing: As tolerated  Distance (ft): 75 Feet (ft)  Ambulation - Level of Assistance: Contact guard assistance  Assistive Device: Walker, rolling  Speed/Amy: Delayed  Step Length: Left shortened;Right shortened  Stance: Right decreased  Gait Abnormalities: Antalgic  Interventions: Safety awareness training;Verbal cues     Braces/Orthotics:     Right Hip Cold  Type: Cold/ice packs      Body Structures Involved:  Bones  Joints  Muscles  Ligaments Body Functions Affected: Movement Related Activities and Participation Affected: Mobility   Number of elements that affect the Plan of Care: 3: MODERATE COMPLEXITY   CLINICAL PRESENTATION:   Presentation: Stable and uncomplicated: LOW COMPLEXITY   CLINICAL DECISION MAKING:   [facility] AM-PAC 6 Clicks   Basic Mobility Inpatient Short Form  How much difficulty does the patient currently have. .. Unable A Lot A Little None   1. Turning over in bed (including adjusting bedclothes, sheets and blankets)? [] 1   [] 2   [x] 3   [] 4   2. Sitting down on and standing up from a chair with arms ( e.g., wheelchair, bedside commode, etc.)   [] 1   [] 2   [x] 3   [] 4   3. Moving from lying on back to sitting on the side of the bed? [] 1   [] 2   [x] 3   [] 4   How much help from another person does the patient currently need. .. Total A Lot A Little None   4. Moving to and from a bed to a chair (including a wheelchair)? [] 1   [] 2   [x] 3   [] 4   5. Need to walk in hospital room?    [] 1   [] 2   [x] 3   [] 4 6.  Climbing 3-5 steps with a railing? [] 1   [x] 2   [] 3   [] 4   © 2007, Trustees of [address] [place] [address] [address] [id number], under license to [employer]. All rights reserved     Score:  Initial: 17 Most Recent: X (Date: -- )    Interpretation of Tool:  Represents activities that are increasingly more difficult (i.e. Bed mobility, Transfers, Gait). Medical Necessity:     Patient is expected to demonstrate progress in   strength, range of motion, and balance   to   decrease assistance required with exercises and functional mobility  . Reason for Services/Other Comments:  Patient   continues to require present interventions due to patient's inability to perform exercises and functional mobility independently  . Use of outcome tool(s) and clinical judgement create a POC that gives a: Clear prediction of patient's progress: LOW COMPLEXITY            TREATMENT:   (In addition to Assessment/Re-Assessment sessions the following treatments were rendered)     Pre-treatment Symptoms/Complaints:  none  Pain Initial:      Post Session:  0   assessment  Therapeutic Exercise: (10 Minutes):  Exercises per grid below to improve mobility and strength. Required minimal visual, verbal, and manual cues to promote proper body alignment, promote proper body posture, and promote proper body mechanics. Progressed range and repetitions as indicated. Date:  7/26 Date:   Date:     ACTIVITY/EXERCISE AM PM AM PM AM PM   GROUP THERAPY  []  []  []  []  []  []   Ankle Pumps  10       Quad Sets  10       Gluteal Sets  10       Hip ABd/ADduction  10       Straight Leg Raises         Knee Slides  10       Short Arc Quads         Long Arc Quads         Chair Slides                  B = bilateral; AA = active assistive; A = active; P = passive      Treatment/Session Assessment:     Response to Treatment:  did well.     Education:  [x] Home Exercises  [x] Fall Precautions  [x] Hip Precautions [] D/C Instruction Review  [x] Hip Prosthesis Review  [x] Walker Management/Safety [] Adaptive Equipment as Needed       Interdisciplinary Collaboration:   Occupational Therapist  Registered Nurse    After treatment position/precautions:   Up in chair  Bed/Chair-wheels locked  Bed in low position  Call light within reach    Compliance with Program/Exercises: Will assess as treatment progresses. No questions    Recommendations/Intent for next treatment session:  Treatment next visit will focus on increasing Mr. Salgados independence with bed mobility, transfers, gait training, strength/ROM exercises, modalities for pain, and patient education.       Total Treatment Duration:  PT Patient Time In/Time Out  Time In: 1400  Time Out: ROMA Estrada

## 2021-07-26 NOTE — ANESTHESIA POSTPROCEDURE EVALUATION
Procedure(s):  RIGHT HIP ARTHROPLASTY TOTAL/ MARCELLA.    spinal    Anesthesia Post Evaluation      Multimodal analgesia: multimodal analgesia used between 6 hours prior to anesthesia start to PACU discharge  Patient location during evaluation: PACU  Patient participation: complete - patient participated  Level of consciousness: responsive to verbal stimuli  Pain management: adequate  Airway patency: patent  Anesthetic complications: no  Cardiovascular status: acceptable  Respiratory status: acceptable, spontaneous ventilation and nonlabored ventilation  Hydration status: acceptable  Post anesthesia nausea and vomiting:  none      INITIAL Post-op Vital signs:   Vitals Value Taken Time   /57 07/26/21 1012   Temp 36.4 °C (97.6 °F) 07/26/21 1002   Pulse 52 07/26/21 1012   Resp 18 07/26/21 1012   SpO2 99 % 07/26/21 1012

## 2021-07-26 NOTE — PERIOP NOTES
TRANSFER - OUT REPORT:    Verbal report given to STEFANI Sharma(name) on Adrian Gil  being transferred to Ortho Unit Room 312(unit) for routine post - op       Report consisted of patients Situation, Background, Assessment and   Recommendations(SBAR). Information from the following report(s) SBAR, OR Summary, Intake/Output, MAR and Cardiac Rhythm Sinus Bradycardia was reviewed with the receiving nurse. Opportunity for questions and clarification was provided.       Patient transported with:   O2 @ 2 liters  Tech

## 2021-07-26 NOTE — ADDENDUM NOTE
Addendum  created 07/26/21 1058 by Regan Kingston CRNA    Flowsheet accepted, Intraprocedure Flowsheets edited

## 2021-07-26 NOTE — PROGRESS NOTES
patient alert and oriented in bed  dressing to hip clean dry and intact  Patient numb and unable to move toes  pedal pulses +2 bilaterally  patient oriented to bed, room, call light and menu  will call if needed

## 2021-07-26 NOTE — PROGRESS NOTES
Problem: Self Care Deficits Care Plan (Adult)  Goal: *Acute Goals and Plan of Care (Insert Text)  Outcome: Progressing Towards Goal  Note: GOALS:   DISCHARGE GOALS (in preparation for going home/rehab):  3 days  1. Mr. Fabby Crowley will perform one lower body dressing activity with minimal assistance with adaptive equipment to demonstrate improved functional mobility and safety. 2.  Mr. Fabby Crowley will perform one lower body bathing activity with minimal  assistance with adaptive equipment to demonstrate improved functional mobility and safety. 3.  Mr. Fabby Crowley will perform toileting/toilet transfer with contact guard assistance with adaptive equipment to demonstrate improved functional mobility and safety. 4.  Mr. Fabby Crowley will perform shower transfer with contact guard assistance with adaptive equipment to demonstrate improved functional mobility and safety. 5.  Mr. Fabby Crowley will state TIFFANIE precautions with two verbal cues to demonstrate improved functional mobility and safety.          JOINT CAMP OCCUPATIONAL THERAPY TIFFANIE: Initial Assessment, Treatment Day: Day of Assessment, and PM 7/26/2021  INPATIENT: Hospital Day: 1  Payor: [facility] MED[facility] / Plan: [facility] [place] / Product Type: HOSTING Care Medicare /      NAME/AGE/GENDER: Roberto Perdue is a 70 y.o. male   PRIMARY DIAGNOSIS:  Osteoarthritis of right hip, unspecified osteoarthritis type [M16.11]   Procedure(s) and Anesthesia Type:     * RIGHT HIP ARTHROPLASTY TOTAL/ MARCELLA - Spinal (Right)  ICD-10: Treatment Diagnosis:    Pain in Right Hip (M25.551)  Stiffness of Right Hip, Not elsewhere classified (M25.651)  Other lack of cordination (R27.8)  Difficulty in walking, Not elsewhere classified (R26.2)  Other abnormalities of gait and mobility (R26.89)      ASSESSMENT:     Mr. Fabby Crowley is s/p right TIFFANIE and presents with decreased weight bearing on right LE and decreased independence with functional mobility and activities of daily living as compared to prior level of function and safety. Patient would benefit from skilled Occupational Therapy to maximize independence and safety with self-care task and functional mobility. Pt would also benefit from education on lower body adaptive equipment and hip precautions post-surgery in preparation for going home. Patient plans for further rehab at home with home health services and good family support. OT reviewed therapy schedule and plan of care with patient. Patient was able to transfer and perform self care skills as charted below. Patient instructed to call for assistance when needing to get up from the recliner and all needs in reach. Patient verbalized understanding of call light. He donned clothes and ambulated in the hallway with min/CGA. He is sitting up in recliner. He was educated on Hip precautions and plans to discharege home with assist from his son. Will see in am for full ADL session. This section established at most recent assessment   PROBLEM LIST (Impairments causing functional limitations):  Decreased Strength  Decreased ADL/Functional Activities  Decreased Transfer Abilities  Increased Pain  Increased Fatigue  Decreased Flexibility/Joint Mobility  Decreased Knowledge of Precautions   INTERVENTIONS PLANNED: (Benefits and precautions of occupational therapy have been discussed with the patient.)  Activities of daily living training  Adaptive equipment training  Balance training  Clothing management  Donning&doffing training  Theraputic activity     TREATMENT PLAN: Frequency/Duration: Follow patient 1-2 times to address above goals. Rehabilitation Potential For Stated Goals: Good     RECOMMENDED REHABILITATION/EQUIPMENT: (at time of discharge pending progress): Continue Skilled Therapy. OCCUPATIONAL PROFILE AND HISTORY:   History of Present Injury/Illness (Reason for Referral): Pt presents this date s/p (right) TIFFANIE.     Past Medical History/Comorbidities:   Mr. Mclean Never has a past medical history of Arthritis, CAD (coronary artery disease) (09/11/2018), CHF (congestive heart failure) ([facility] [address] GERD (gastroesophageal reflux disease), History of kidney stones (2016), History of melanoma (2016), CABG, Hypertension (9/11/2018), PUD (peptic ulcer disease), Right hip pain, S/P AVR (aortic valve replacement), and Sleep apnea. Mr. Merrill Richards  has a past surgical history that includes hx other surgical; hx orthopaedic (2014); hx appendectomy; hx coronary artery bypass graft (10/03/2018); and hx aortic valve replacement (10/03/2018). Social History/Living Environment:   Home Environment: Private residence  # Steps to Enter: 2  Rails to Enter: No  One/Two Story Residence: One story  Living Alone: Yes  Support Systems: Child(jasiel)  Patient Expects to be Discharged to[de-identified] [employer]  Current DME Used/Available at Home: Walker, rolling  Tub or Shower Type: Tub/Shower combination    Prior Level of Function/Work/Activity:  Mod I lives alone     Number of Personal Factors/Comorbidities that affect the Plan of Care: Brief history (0):  LOW COMPLEXITY   ASSESSMENT OF OCCUPATIONAL PERFORMANCE[de-identified]   Most Recent Physical Functioning:   Balance  Sitting: Intact  Standing: With support       Gross Assessment  AROM: Within functional limits (left LE)  Strength: Generally decreased, functional (left LE)            Coordination  Fine Motor Skills-Upper: Left Intact; Right Intact  Gross Motor Skills-Upper: Left Intact; Right Intact         Mental Status  Neurologic State: Alert; Appropriate for age  Orientation Level: Appropriate for age  Cognition: Appropriate decision making; Appropriate for age attention/concentration; Appropriate safety awareness; Follows commands  Perception: Appears intact  Perseveration: No perseveration noted  Safety/Judgement: Awareness of environment; Fall prevention          RLE AROM  R Hip Flexion: 90  R Hip ABduction: 10     Basic ADLs (From Assessment) Complex ADLs (From Assessment)   Basic ADL  Feeding: Independent  Oral Facial Hygiene/Grooming: Supervision  Bathing: Minimum assistance, Moderate assistance  Upper Body Dressing: Supervision  Lower Body Dressing: Moderate assistance  Toileting: Minimum assistance     Grooming/Bathing/Dressing Activities of Daily Living     Cognitive Retraining  Safety/Judgement: Awareness of environment; Fall prevention                 Functional Transfers  Toilet Transfer : Contact guard assistance;Minimum assistance  Shower Transfer: Contact guard assistance;Minimum assistance     Bed/Mat Mobility  Supine to Sit: Contact guard assistance  Sit to Stand: Contact guard assistance;Minimum assistance  Stand to Sit: Contact guard assistance  Bed to Chair: Contact guard assistance;Minimum assistance  Scooting: Contact guard assistance         Physical Skills Involved:  Balance  Strength  Activity Tolerance Cognitive Skills Affected (resulting in the inability to perform in a timely and safe manner):  none Psychosocial Skills Affected:  Habits/Routines   Number of elements that affect the Plan of Care: 3-5:  MODERATE COMPLEXITY   CLINICAL DECISION MAKIN[address] [place] [address] AM-PAC 6 Clicks   Daily Activity Inpatient Short Form  How much help from another person does the patient currently need. .. Total A Lot A Little None   1. Putting on and taking off regular lower body clothing? [] 1   [x] 2   [] 3   [] 4   2. Bathing (including washing, rinsing, drying)? [] 1   [x] 2   [] 3   [] 4   3. Toileting, which includes using toilet, bedpan or urinal?   [] 1   [] 2   [x] 3   [] 4   4. Putting on and taking off regular upper body clothing? [] 1   [] 2   [] 3   [x] 4   5. Taking care of personal grooming such as brushing teeth? [] 1   [] 2   [] 3   [x] 4   6. Eating meals? [] 1   [] 2   [] 3   [x] 4   © , Trustees of [address] [place] [id number], under license to [employer].  All rights reserved     Score:  Initial: 19 Most Recent: X (Date: -- )    Interpretation of Tool: Represents activities that are increasingly more difficult (i.e. Bed mobility, Transfers, Gait). Medical Necessity:     · Patient is expected to demonstrate progress in   · Self care skills and functional mobility  ·     Reason for Services/Other Comments:  · Patient continues to require skilled intervention due to   · Above listed deficits     Use of outcome tool(s) and clinical judgement create a POC that gives a: LOW COMPLEXITY            TREATMENT:   (In addition to Assessment/Re-Assessment sessions the following treatments were rendered)     Pre-treatment Symptoms/Complaints:    Pain: Initial:     0/10 Post Session:  0/10 icepack given     Self Care: (10): Procedure(s) (per grid) utilized to improve and/or restore self-care/home management as related to dressing and functional mobility . Required minimal visual, verbal, manual, and tactile cueing to facilitate activities of daily living skills and compensatory activities. Assessment/Reassessment complete    Treatment/Session Assessment:     Response to Treatment:  tolerated well. Education:  [] Home Exercises  [x] Fall Precautions  [x] Hip Precautions [] Going Home Video  [] Knee/Hip Prosthesis Review  [x] Walker Management/Safety [x] Adaptive Equipment as Needed       Interdisciplinary Collaboration:   Physical Therapist  Occupational Therapist  Registered Nurse    After treatment position/precautions:   Up in chair  Bed/Chair-wheels locked  Bed in low position  Caregiver at bedside  Call light within reach  RN notified     Compliance with Program/Exercises: Will assess as treatment progresses. Recommendations/Intent for next treatment session:  Treatment next visit will focus on increasing Mr. Salgados independence with bed mobility, transfers, self care, functional mobility, modalities for pain, and patient education.       Total Treatment Duration:10  OT Patient Time In/Time Out  Time In: 2718  Time Out: [address] [place],

## 2021-07-26 NOTE — ANESTHESIA PREPROCEDURE EVALUATION
Relevant Problems   No relevant active problems       Anesthetic History   No history of anesthetic complications            Review of Systems / Medical History  Patient summary reviewed, nursing notes reviewed and pertinent labs reviewed    Pulmonary          Undiagnosed apnea         Neuro/Psych   Within defined limits           Cardiovascular    Hypertension  Valvular problems/murmurs (s/p AVR): aortic stenosis    CHF (EF 30%)    CAD, CABG and hyperlipidemia    Exercise tolerance: >4 METS     GI/Hepatic/Renal     GERD: well controlled           Endo/Other        Obesity and arthritis     Other Findings              Physical Exam    Airway  Mallampati: I    Neck ROM: normal range of motion   Mouth opening: Normal     Cardiovascular  Regular rate and rhythm,  S1 and S2 normal,  no murmur, click, rub, or gallop             Dental    Dentition: Poor dentition     Pulmonary  Breath sounds clear to auscultation               Abdominal         Other Findings            Anesthetic Plan    ASA: 4  Anesthesia type: spinal          Induction: Intravenous  Anesthetic plan and risks discussed with: Patient and Son / Daughter

## 2021-07-26 NOTE — PROGRESS NOTES
07/26/21 1555   Oxygen Therapy   O2 Sat (%) 100 %   Pulse via Oximetry 61 beats per minute   O2 Device None (Room air)   Incentive Spirometry Treatment   Actual Volume (ml) 3750 ml   Number of Attempts 1   Joint Camp Notes Reviewed. Pt working on IS. Pt encouraged to do 10 breaths per hour while awake on IS. Good NPC. No respiratory distress noted at this time. No complications noted at this time.

## 2021-07-26 NOTE — PROGRESS NOTES
Care Management Interventions  Mode of Transport at Discharge: Self  Transition of Care Consult (CM Consult): [address]: Yes  Discharge Durable Medical Equipment: No  Physical Therapy Consult: Yes  Occupational Therapy Consult: Yes  Current Support Network: Own Home  Confirm Follow Up Transport: Family  The Plan for Transition of Care is Related to the Following Treatment Goals : Return to previous level of function. The Patient and/or Patient Representative was Provided with a Choice of Provider and Agrees with the Discharge Plan?: Yes  Freedom of Choice List was Provided with Basic Dialogue that Supports the Patient's Individualized Plan of Care/Goals, Treatment Preferences and Shares the Quality Data Associated with the Providers?: Yes  Discharge Location  Discharge Placement: Home with home health    Patient is a 70y.o. year old male admitted for Right TIFFANIE . Patient plans to return home on discharge. Order received to arrange home health. Patient without preference towards agency. Referral sent to [facility]. Patient denies any equipment needs as patient has a walker and bedside commode. Will follow until discharge.

## 2021-07-26 NOTE — PROGRESS NOTES
TRANSFER - IN REPORT:    Verbal report received from Amy Henry RN(name) on [facility]  being received from PACU(unit) for routine post - op      Report consisted of patients Situation, Background, Assessment and   Recommendations(SBAR). Information from the following report(s) SBAR, Kardex, OR Summary, Procedure Summary and Intake/Output was reviewed with the receiving nurse. Opportunity for questions and clarification was provided.

## 2021-07-27 VITALS
TEMPERATURE: 98.8 F | OXYGEN SATURATION: 100 % | RESPIRATION RATE: 18 BRPM | SYSTOLIC BLOOD PRESSURE: 110 MMHG | HEIGHT: 71 IN | BODY MASS INDEX: 31.08 KG/M2 | WEIGHT: 222 LBS | DIASTOLIC BLOOD PRESSURE: 77 MMHG | HEART RATE: 78 BPM

## 2021-07-27 LAB — HGB BLD-MCNC: 11.3 G/DL (ref 13.6–17.2)

## 2021-07-27 PROCEDURE — 36415 COLL VENOUS BLD VENIPUNCTURE: CPT

## 2021-07-27 PROCEDURE — 74011250637 HC RX REV CODE- 250/637: Performed by: NURSE PRACTITIONER

## 2021-07-27 PROCEDURE — 85018 HEMOGLOBIN: CPT

## 2021-07-27 PROCEDURE — 74011250637 HC RX REV CODE- 250/637: Performed by: ORTHOPAEDIC SURGERY

## 2021-07-27 PROCEDURE — 97535 SELF CARE MNGMENT TRAINING: CPT

## 2021-07-27 PROCEDURE — 97530 THERAPEUTIC ACTIVITIES: CPT

## 2021-07-27 PROCEDURE — 74011250637 HC RX REV CODE- 250/637: Performed by: PHYSICIAN ASSISTANT

## 2021-07-27 PROCEDURE — 97110 THERAPEUTIC EXERCISES: CPT

## 2021-07-27 RX ADMIN — HYDROMORPHONE HYDROCHLORIDE 2 MG: 2 TABLET ORAL at 05:44

## 2021-07-27 RX ADMIN — Medication 81 MG: at 08:04

## 2021-07-27 RX ADMIN — DOCUSATE SODIUM 50MG AND SENNOSIDES 8.6MG 2 TABLET: 8.6; 5 TABLET, FILM COATED ORAL at 08:03

## 2021-07-27 RX ADMIN — Medication 10 ML: at 05:50

## 2021-07-27 RX ADMIN — METOPROLOL SUCCINATE 50 MG: 50 TABLET, EXTENDED RELEASE ORAL at 08:04

## 2021-07-27 RX ADMIN — Medication 1 EACH: at 08:02

## 2021-07-27 RX ADMIN — Medication 1 AMPULE: at 08:04

## 2021-07-27 RX ADMIN — SPIRONOLACTONE 25 MG: 25 TABLET ORAL at 08:03

## 2021-07-27 RX ADMIN — GABAPENTIN 100 MG: 100 CAPSULE ORAL at 08:05

## 2021-07-27 RX ADMIN — ACETAMINOPHEN 1000 MG: 500 TABLET, FILM COATED ORAL at 11:12

## 2021-07-27 RX ADMIN — ATORVASTATIN CALCIUM 40 MG: 40 TABLET, FILM COATED ORAL at 08:04

## 2021-07-27 RX ADMIN — CYCLOBENZAPRINE 10 MG: 10 TABLET, FILM COATED ORAL at 07:36

## 2021-07-27 NOTE — PROGRESS NOTES
Reinforced availability of Narcan at discharge. Patient declined prescription at this time. Education on opioid medication provided.

## 2021-07-27 NOTE — PROGRESS NOTES
07/26/21 2003   Oxygen Therapy   O2 Sat (%) 97 %   Pulse via Oximetry 55 beats per minute   O2 Device None (Room air)   O2 Flow Rate (L/min) 0 l/min   Patient placed on continuous sat monitor. Monitor history deleted prior to placing on patient. Patient working on IS achieving 3500 ml. Very good effort, technique.

## 2021-07-27 NOTE — PROGRESS NOTES
2021         Post Op day: 1 Day Post-Op     Admit Date: 2021    Admit Diagnosis: Osteoarthritis of right hip, unspecified osteoarthritis type [M16.11]; Osteoarthritis of right hip [M16.11]        Subjective: Patient stable. No acute events.       Objective:     Visit Vitals  /71 (BP 1 Location: Right arm, BP Patient Position: At rest)   Pulse 70   Temp 98 °F (36.7 °C)   Resp 16   Ht 5' 10.5\" (1.791 m)   Wt 222 lb (100.7 kg)   SpO2 98%   BMI 31.40 kg/m²    Temp (24hrs), Av.7 °F (36.5 °C), Min:96.8 °F (36 °C), Max:98.4 °F (36.9 °C)      Lab Results   Component Value Date/Time    HGB 11.3 (L) 2021 03:58 AM       Patient Active Problem List   Diagnosis Code    Aortic stenosis I35.0    CAD (coronary artery disease) I25.10    Hypertension I10    Status post coronary artery bypass graft Z95.1    S/P AVR (aortic valve replacement) Z95.2    Hypoxia R09.02    Pneumothorax J93.9    Snoring R06.83    Osteoarthritis of right hip M16.11       Current Facility-Administered Medications   Medication Dose Route Frequency    atorvastatin (LIPITOR) tablet 40 mg  40 mg Oral DAILY    multivitamin, tx-iron-ca-min (THERA-M w/ IRON) tablet 1 Tablet  1 Tablet Oral DAILY    nitroglycerin (NITROSTAT) tablet 0.4 mg  0.4 mg SubLINGual PRN    spironolactone (ALDACTONE) tablet 25 mg  25 mg Oral DAILY    valsartan (DIOVAN) tablet 40 mg  40 mg Oral DAILY    alcohol 62% (NOZIN) nasal  1 Ampule  1 Ampule Topical Q12H    0.9% sodium chloride infusion  100 mL/hr IntraVENous CONTINUOUS    sodium chloride (NS) flush 5-40 mL  5-40 mL IntraVENous Q8H    sodium chloride (NS) flush 5-40 mL  5-40 mL IntraVENous PRN    acetaminophen (TYLENOL) tablet 1,000 mg  1,000 mg Oral Q6H    HYDROmorphone (DILAUDID) tablet 2 mg  2 mg Oral Q4H PRN    HYDROmorphone (DILAUDID) injection 1 mg  1 mg IntraVENous Q3H PRN    naloxone (NARCAN) injection 0.2-0.4 mg  0.2-0.4 mg IntraVENous Q10MIN PRN    dexamethasone (DECADRON) 10 mg/mL injection 10 mg  10 mg IntraVENous ONCE    ondansetron (ZOFRAN ODT) tablet 4 mg  4 mg Oral Q4H PRN    diphenhydrAMINE (BENADRYL) capsule 25 mg  25 mg Oral Q4H PRN    senna-docusate (PERICOLACE) 8.6-50 mg per tablet 2 Tablet  2 Tablet Oral DAILY    aspirin delayed-release tablet 81 mg  81 mg Oral Q12H    gabapentin (NEURONTIN) capsule 100 mg  100 mg Oral BID    ondansetron (ZOFRAN ODT) tablet 8 mg  8 mg Oral Q8H PRN    metoprolol succinate (TOPROL-XL) XL tablet 50 mg  50 mg Oral DAILY    cyclobenzaprine (FLEXERIL) tablet 10 mg  10 mg Oral TID PRN       Extremity Exam  Dressing clean and dry   Tibialis Anterior and Gastroc-Soleus functioning normally right lower extremity  Sensation intact to light touch on operative limb  Extremity perfused  TEDS/SCDS in place  No sign of DVT     Assessment / Plan :  WBAT RLE  Hip precautions  Continue PT/OT  Continue current DVT prophylaxis in house. Discharge on ASA BID  DIspo-HH         Signed By: Erasmo Miramontes MD     I have reviewed the patients controlled substance prescription history, as maintained in the [place] prescription monitoring program, so that the prescription(s) for a  controlled substance can be given.

## 2021-07-27 NOTE — PROGRESS NOTES
Problem: Mobility Impaired (Adult and Pediatric)  Goal: *Acute Goals and Plan of Care (Insert Text)  Outcome: Progressing Towards Goal  Note: GOALS (1-4 days):  (1.)Mr. Janette Gloria will move from supine to sit and sit to supine  in bed with STAND BY ASSIST.    (2.)Mr. Janette Gloria will transfer from bed to chair and chair to bed with STAND BY ASSIST using the least restrictive device. (3.)Mr. Mcfarland will ambulate with STAND BY ASSIST for 200 feet with the least restrictive device. (4.)Mr. Mcfarland will ambulate up/down 2 steps with left railing with MINIMAL ASSIST with device as needed. (5.)Mr. Mcfarland will state/observe TIFFANIE precautions with 0 verbal cues. ________________________________________________________________________________________________       PHYSICAL THERAPY JOINT CAMP TIFFANIE: Daily Note and AM 7/27/2021  INPATIENT: Hospital Day: 2  Payor: [facility] MEDICARE / Plan: [facility] MEDICARE HMO/PPO / Product Type: Managed Care Medicare /      NAME/AGE/GENDER: Stephen Mahoney is a 70 y.o. male   PRIMARY DIAGNOSIS:  Osteoarthritis of right hip, unspecified osteoarthritis type [M16.11]   Procedure(s) and Anesthesia Type:     * RIGHT HIP ARTHROPLASTY TOTAL/ MARCELLA - Spinal (Right)  ICD-10: Treatment Diagnosis:    · Pain in Right Hip (M25.551)  · Stiffness of Right Hip, Not elsewhere classified (M25.651)  · Difficulty in walking, Not elsewhere classified (R26.2)      ASSESSMENT:     Mr. Janette Gloria was sitting up in chair on arrival. He was on 2L O2 on arrival but was 99% on RA after a few minutes. Exercises performed while sitting up in chair. He ambulated down the hallway with very slow, step to gait pattern at the beginning but was able to have a more fluid gait about half way through walk. Pt used the restroom while up. This section established at most recent assessment   PROBLEM LIST (Impairments causing functional limitations):  1. Decreased Strength  2.  Decreased ADL/Functional Activities  3. Decreased Transfer Abilities  4. Decreased Ambulation Ability/Technique  5. Decreased Balance  6. Increased Pain  7. Decreased Activity Tolerance  8. Decreased Flexibility/Joint Mobility  9. Decreased [place] with Home Exercise Program  10. Decreased strength   INTERVENTIONS PLANNED: (Benefits and precautions of physical therapy have been discussed with the patient.)  1. Bed Mobility  2. Cold  3. Gait Training  4. Home Exercise Program (HEP)  5. Range of Motion (ROM)  6. Therapeutic Activites  7. Therapeutic Exercise/Strengthening  8. Transfer Training     TREATMENT PLAN: Frequency/Duration: Follow patient BID for duration of hospital stay to address above goals. Rehabilitation Potential For Stated Goals: Good     RECOMMENDED REHABILITATION/EQUIPMENT: (at time of discharge pending progress): Continue Skilled Therapy and Home Health: Physical Therapy. HISTORY:   History of Present Injury/Illness (Reason for Referral):  S/p right kailey  Past Medical History/Comorbidities:   Mr. Christie Arreola  has a past medical history of Arthritis, CAD (coronary artery disease) (09/11/2018), CHF (congestive heart failure) ([facility] [address] GERD (gastroesophageal reflux disease), History of kidney stones (2016), History of melanoma (2016), CABG, Hypertension (9/11/2018), PUD (peptic ulcer disease), Right hip pain, S/P AVR (aortic valve replacement), and Sleep apnea. Mr. Christie Arreola  has a past surgical history that includes hx other surgical; hx orthopaedic (2014); hx appendectomy; hx coronary artery bypass graft (10/03/2018); and hx aortic valve replacement (10/03/2018).    Social History/Living Environment:   Home Environment: Private residence  # Steps to Enter: 2  Rails to Enter: No  One/Two Story Residence: One story  Living Alone: Yes  Support Systems: Child(jasiel)  Patient Expects to be Discharged to[de-identified] [employer]  Current DME Used/Available at Home: Walker, rolling  Tub or Shower Type: Tub/Shower combination    Prior Level of Function/Work/Activity:  independent   Number of Personal Factors/Comorbidities that affect the Plan of Care: 1-2: MODERATE COMPLEXITY   EXAMINATION:   Most Recent Physical Functioning:                                 Transfers  Sit to Stand: Stand-by assistance  Stand to Sit: Stand-by assistance  Bed to Chair: Stand-by assistance    Balance  Sitting: Intact  Standing: Intact              Weight Bearing Status  Right Side Weight Bearing: As tolerated  Distance (ft): 120 Feet (ft)  Ambulation - Level of Assistance: Stand-by assistance  Assistive Device: Walker, rolling  Speed/Amy: Delayed;Pace decreased (<100 feet/min)  Step Length: Left shortened;Right shortened  Stance: Right decreased  Gait Abnormalities: Antalgic;Decreased step clearance  Interventions: Safety awareness training;Verbal cues     Braces/Orthotics:     Right Hip Cold  Type: Cold/ice packs      Body Structures Involved:  1. Bones  2. Joints  3. Muscles  4. Ligaments Body Functions Affected:  1. Movement Related Activities and Participation Affected:  1. Mobility   Number of elements that affect the Plan of Care: 3: MODERATE COMPLEXITY   CLINICAL PRESENTATION:   Presentation: Stable and uncomplicated: LOW COMPLEXITY   CLINICAL DECISION MAKING:   [facility] [place] [facility] 6 Clicks   Basic Mobility Inpatient Short Form  How much difficulty does the patient currently have. .. Unable A Lot A Little None   1. Turning over in bed (including adjusting bedclothes, sheets and blankets)? [] 1   [] 2   [x] 3   [] 4   2. Sitting down on and standing up from a chair with arms ( e.g., wheelchair, bedside commode, etc.)   [] 1   [] 2   [x] 3   [] 4   3. Moving from lying on back to sitting on the side of the bed? [] 1   [] 2   [x] 3   [] 4   How much help from another person does the patient currently need. .. Total A Lot A Little None   4. Moving to and from a bed to a chair (including a wheelchair)? [] 1   [] 2   [x] 3   [] 4   5.   Need to walk in hospital room? [] 1   [] 2   [x] 3   [] 4   6. Climbing 3-5 steps with a railing? [] 1   [x] 2   [] 3   [] 4   © 2007, Trustees of [facility], under license to [employer]. All rights reserved     Score:  Initial: 17 Most Recent: X (Date: -- )    Interpretation of Tool:  Represents activities that are increasingly more difficult (i.e. Bed mobility, Transfers, Gait). Medical Necessity:     · Patient is expected to demonstrate progress in   · strength, range of motion, and balance  ·  to   · decrease assistance required with exercises and functional mobility  · . Reason for Services/Other Comments:  · Patient   · continues to require present interventions due to patient's inability to perform exercises and functional mobility independently  · . Use of outcome tool(s) and clinical judgement create a POC that gives a: Clear prediction of patient's progress: LOW COMPLEXITY            TREATMENT:   (In addition to Assessment/Re-Assessment sessions the following treatments were rendered)     Pre-treatment Symptoms/Complaints:  R hip tightness  Pain Initial: not rated     Post Session:  Not rated     Therapeutic Activity (15 Minutes): Therapeutic activity included Supine to Sit, Scooting, Ambulation on level ground, Sitting balance  and Standing balance to improve functional Mobility, Strength and Activity tolerance. Therapeutic Exercise (15 Minutes): Therapeutic exercises noted below to improve functional activity tolerance, AROM and mobility.       Date:  7/26 Date:  7/27/21 Date:     ACTIVITY/EXERCISE AM PM AM PM AM PM   GROUP THERAPY  []  []  []  []  []  []   Ankle Pumps  10 15      Quad Sets  10 15      Gluteal Sets  10 15      Hip ABd/ADduction  10 15      Straight Leg Raises         Knee Slides  10 15      Short Arc Quads   15      Long Arc Quads   15      Chair Slides                  B = bilateral; AA = active assistive; A = active; P = passive      Treatment/Session Assessment:     Response to Treatment:  Pt doing better. Education:  [x] Home Exercises  [x] Fall Precautions  [x] Hip Precautions [] D/C Instruction Review  [x] Hip Prosthesis Review  [x] Walker Management/Safety [] Adaptive Equipment as Needed       Interdisciplinary Collaboration:   o Physical Therapy Assistant  o Registered Nurse    After treatment position/precautions:   o Up in chair  o Bed/Chair-wheels locked  o Bed in low position  o Call light within reach    Compliance with Program/Exercises: Will assess as treatment progresses. No questions    Recommendations/Intent for next treatment session:  Treatment next visit will focus on increasing Mr. Mcfarland's independence with bed mobility, transfers, gait training, strength/ROM exercises, modalities for pain, and patient education.       Total Treatment Duration:  PT Patient Time In/Time Out  Time In: 1045  Time Out: [address], [facility]

## 2021-07-27 NOTE — DISCHARGE INSTRUCTIONS
[facility]   Patient Discharge Instructions    Franki Quintero / [id number] : 1950    Admitted 2021 Discharged: 2021     IF YOU HAVE ANY PROBLEMS ONCE YOU ARE AT HOME CALL THE FOLLOWING NUMBERS:   Main office number: [phone]    Take Home Medications     · It is important that you take the medication exactly as they are prescribed. · Keep your medication in the bottles provided by the pharmacist and keep a list of the medication names, dosages, and times to be taken in your wallet. · Do not take other medications without consulting your doctor. What to do at [address] your prehospital diet. If you have excessive nausea or vomitting call your doctor's office     Home Physical Therapy is arranged. Use rolling walker when walking. Patients who have had a joint replacement should not drive until you are seen for your follow up appointment by Dr. Reji Cruz. When to Call    - Call if you have a temperature greater then 101  - Unable to keep food down  - Loose control of your bladder or bowel function  - Are unable to bear any weight   - Need a pain medication refill     Patient Education        Hip Replacement Surgery (Posterior): What to Expect at Home  Your Recovery  Hip replacement surgery replaces the worn parts of your hip joint. When you leave the hospital, you will probably be walking with crutches or a walker. You may be able to climb a few stairs and get in and out of bed and chairs. But you will need someone to help you at home until you have more energy and can move around better. You will go home with a bandage and stitches, staples, skin glue, or tape strips. You can remove the bandage when your doctor tells you to. If you have stitches or staples, your doctor will remove them about 2 weeks after your surgery. Glue or tape strips will fall off on their own over time.  You may still have some mild pain, and the area may be swollen for 3 to 4 months after surgery. Your doctor may give you medicine for the pain. You will continue the rehabilitation program (rehab) you started in the hospital. The better you do with your rehab exercises, the sooner you will get your strength and movement back. Most people are able to return to work 4 weeks to 4 months after surgery. This care sheet gives you a general idea about how long it will take for you to recover. But each person recovers at a different pace. Follow the steps below to get better as quickly as possible. How can you care for yourself at home? Activity    · Your doctor may not want your affected leg to cross the center of your body toward the other leg. If so, your therapist may suggest these ideas:  ? Do not cross your legs. ? Be very careful as you get in or out of bed or a car so your leg does not cross the imaginary line in the middle of your body.     · Go slowly when you climb stairs. Make sure the lights are on. Have someone watch you, if you can. When you climb stairs:  ? Step up first with your unaffected leg. Then bring the affected leg up to the same step. Bring your crutches or cane up. ? To go down stairs, reverse the order. First, put your crutches or cane on the lower step. Then bring the affected leg down to that step. Finally, step down with the unaffected leg.     · You can ride in a car, but stop at least once every hour to get out and walk around.     · You may want to sleep on your back. Don't reach down too far to pull up blankets when you lie in bed.     · If your doctor recommends exercises, do them as directed. You can cut back on your exercises if your muscles start to ache, but don't stop doing them.     · Rest when you feel tired. You may take a nap, but don't stay in bed all day.     · Work with your physical therapist to learn the best way to exercise.  You will probably have to use a walker, crutches, or a cane for at least 4 to 6 weeks.     · Your doctor may advise you to stay away from activities that put stress on the joint. This includes sports such as tennis, football, and jogging.     · Try not to sit for too long at one time. You will feel less stiff if you take a short walk about every hour. When you sit, use chairs with arms, and don't sit in low chairs.     · Do not bend over more than 90 degrees (like the angle in a letter \"L\").     · Sleep on your back with your legs slightly apart or on your side with a pillow between your knees for about 6 weeks or as your doctor tells you. Do not sleep on your stomach or affected leg.     · Ask your doctor when you can drive again.     · Most people are able to return to work 4 weeks to 4 months after surgery.     · Ask your doctor when it is okay for you to have sex. Diet    · By the time you leave the hospital, you will probably be eating your normal diet. Your doctor may recommend that you take iron and vitamin supplements.     · Drink plenty of fluids (unless your doctor tells you not to).   · Eat healthy foods, and watch your portion sizes. Try to stay at your ideal weight. Too much weight puts more stress on your new hip joint.     · You may notice that your bowel movements are not regular right after your surgery. This is common. Try to avoid constipation and straining with bowel movements. You may want to take a fiber supplement every day. If you have not had a bowel movement after a couple of days, ask your doctor about taking a mild laxative. Medicines    · Your doctor will tell you if and when you can restart your medicines. You will also get instructions about taking any new medicines.     · If you take aspirin or some other blood thinner, ask your doctor if and when to start taking it again. Make sure that you understand exactly what your doctor wants you to do.     · Your doctor may give you a blood-thinning medicine to prevent blood clots.  If you take a blood thinner, be sure you get instructions about how to take your medicine safely. Blood thinners can cause serious bleeding problems. This medicine could be in pill form or as a shot (injection). If a shot is necessary, your doctor will tell you how to do this.     · Be safe with medicines. Take pain medicines exactly as directed. ? If the doctor gave you a prescription medicine for pain, take it as prescribed. ? If you are not taking a prescription pain medicine, ask your doctor if you can take an over-the-counter medicine.     · If you think your pain medicine is making you sick to your stomach:  ? Take your medicine after meals (unless your doctor has told you not to). ? Ask your doctor for a different pain medicine.     · If your doctor prescribed antibiotics, take them as directed. Do not stop taking them just because you feel better. You need to take the full course of antibiotics. Incision care    · If your doctor told you how to care for your cut (incision), follow your doctor's instructions. You will have a dressing over the cut. A dressing helps the incision heal and protects it. Your doctor will tell you how to take care of this.     · If you did not get instructions, follow this general advice:  ? If you have strips of tape on the cut the doctor made, leave the tape on for a week or until it falls off.  ? If you have stitches or staples, your doctor will tell you when to come back to have them removed. ? If you have skin glue on the cut, leave it on until it falls off. Skin glue is also called skin adhesive or liquid stitches. ? Change the bandage every day. ? Wash the area daily with warm water, and pat it dry. Don't use hydrogen peroxide or alcohol. They can slow healing. ? You may cover the area with a gauze bandage if it oozes fluid or rubs against clothing. ? You may shower 24 to 48 hours after surgery. Pat the incision dry. Don't swim or take a bath for the first 2 weeks, or until your doctor tells you it is okay.    Exercise    · Your physical therapist will teach you exercises to do at home. Always do them as your therapist tells you.     · Avoid activities where you might fall. Ice and elevation    · For pain, put ice or a cold pack on the area for 10 to 20 minutes at a time. Put a thin cloth between the ice and your skin.     · Your ankle may swell for about 3 months. Prop up your ankle when you ice it or anytime you sit or lie down. Try to keep it above the level of your heart. This will help reduce swelling. Other instructions    · Wear compression stockings if your doctor told you to. These may help to prevent blood clots. Your doctor will tell you how long you need to keep wearing the compression stockings.     · Try to prevent falls. To avoid falling:  ? Arrange furniture so that you will not trip on it. ? Get rid of throw rugs, and move electrical cords out of the way. ? Walk only in areas with plenty of light. ? Put grab bars in showers and bathtubs. ? Try to avoid icy or snowy sidewalks. Choose shoes with good traction, or consider using traction devices that attach to your shoes. ? Wear shoes with sturdy, flat soles. Follow-up care is a key part of your treatment and safety. Be sure to make and go to all appointments, and call your doctor if you are having problems. It's also a good idea to know your test results and keep a list of the medicines you take. When should you call for help? Call 911 anytime you think you may need emergency care. For example, call if:    · You passed out (lost consciousness).     · You have severe trouble breathing.     · You have sudden chest pain and shortness of breath, or you cough up blood. Call your doctor now or seek immediate medical care if:    · You have signs that your hip may be dislocated, including:  ? Severe pain and not being able to stand. ? A crooked leg that looks like your hip is out of position. ?  Not being able to bend or straighten your leg.     · Your leg or foot is cool or pale or changes color.     · You cannot feel or move your leg.     · You have signs of a blood clot, such as:  ? Pain in your calf, back of the knee, thigh, or groin. ? Redness and swelling in your leg or groin.     · Your incision comes open and begins to bleed, or the bleeding increases.     · You feel like your heart is racing or beating irregularly.     · You have signs of infection, such as:  ? Increased pain, swelling, warmth, or redness. ? Red streaks leading from the incision. ? Pus draining from the incision. ? A fever. Watch closely for changes in your health, and be sure to contact your doctor if:    · You do not have a bowel movement after taking a laxative.     · You do not get better as expected. Where can you learn more? Go to http://www.gray.com/  Enter Q746 in the search box to learn more about \"Hip Replacement Surgery (Posterior): What to Expect at Home. \"  Current as of: November 16, 2020               Content Version: 12.8  © 2006-2021 [employer]. Care instructions adapted under license by [employer] (which disclaims liability or warranty for this information). If you have questions about a medical condition or this instruction, always ask your healthcare professional. [place] [address] any warranty or liability for your use of this information. Information obtained by :  I understand that if any problems occur once I am at home I am to contact my physician. I understand and acknowledge receipt of the instructions indicated above.                                                                                                                                            Physician's or R.N.'s Signature                                                                  Date/Time Patient or Representative Signature                                                          Date/Time

## 2021-07-27 NOTE — PROGRESS NOTES
I have reviewed discharge instructions with the patient. The patient verbalized understanding. Prescriptions sent to pharmacy by MD. [address] to follow.

## 2021-07-27 NOTE — PROGRESS NOTES
Problem: Falls - Risk of  Goal: *Absence of Falls  Description: Document Shira Lima Fall Risk and appropriate interventions in the flowsheet.   Outcome: Progressing Towards Goal  Note: Fall Risk Interventions:  Mobility Interventions: OT consult for ADLs, Patient to call before getting OOB, Utilize walker, cane, or other assistive device, Bed/chair exit alarm    Mentation Interventions: Adequate sleep, hydration, pain control, Bed/chair exit alarm, Door open when patient unattended, Evaluate medications/consider consulting pharmacy, Increase mobility, More frequent rounding, Toileting rounds, Room close to nurse's station, Eyeglasses and hearing aids    Medication Interventions: Patient to call before getting OOB, Evaluate medications/consider consulting pharmacy, Teach patient to arise slowly, Bed/chair exit alarm    Elimination Interventions: Call light in reach, Bed/chair exit alarm, Patient to call for help with toileting needs, Stay With Me (per policy), Toilet paper/wipes in reach, Toileting schedule/hourly rounds    History of Falls Interventions: Evaluate medications/consider consulting pharmacy, Door open when patient unattended, Investigate reason for fall, Room close to nurse's station

## 2021-07-27 NOTE — PROGRESS NOTES
Problem: Self Care Deficits Care Plan (Adult)  Goal: *Acute Goals and Plan of Care (Insert Text)  Outcome: Progressing Towards Goal  Note: GOALS:   DISCHARGE GOALS (in preparation for going home/rehab):  3 days  1. Mr. Christie Arreola will perform one lower body dressing activity with minimal assistance with adaptive equipment to demonstrate improved functional mobility and safety. GOAL MET 7/27/2021  2. Mr. Christie Arreola will perform one lower body bathing activity with minimal  assistance with adaptive equipment to demonstrate improved functional mobility and safety. GOAL MET 7/27/2021  3. Mr. Christie Arreola will perform toileting/toilet transfer with contact guard assistance with adaptive equipment to demonstrate improved functional mobility and safety. GOAL MET 7/27/2021  4. Mr. Christie Arreola will perform shower transfer with contact guard assistance with adaptive equipment to demonstrate improved functional mobility and safety. GOAL MET 7/27/2021  5. Mr. Christie Arreola will state TIFFANIE precautions with two verbal cues to demonstrate improved functional mobility and safety.  GOAL MET 7/27/2021         JOINT CAMP OCCUPATIONAL THERAPY TIFFANIE: Daily Note, Discharge, Treatment Day: 2nd and AM 7/27/2021  INPATIENT: Hospital Day: 2  Payor: [facility] [place] MEDICARE / Plan: [facility] [place] MEDIC[place]/PPO / Product Type: Managed Care Medicare /      NAME/AGE/GENDER: Fatuma De Guzman is a 70 y.o. male   PRIMARY DIAGNOSIS:  Osteoarthritis of right hip, unspecified osteoarthritis type [M16.11]   Procedure(s) and Anesthesia Type:     * RIGHT HIP ARTHROPLASTY TOTAL/ MARCELLA - Spinal (Right)  ICD-10: Treatment Diagnosis:    · Pain in Right Hip (M25.551)  · Stiffness of Right Hip, Not elsewhere classified (M25.651)  · Other lack of cordination (R27.8)  · Difficulty in walking, Not elsewhere classified (R26.2)  · Other abnormalities of gait and mobility (R26.89)      ASSESSMENT:     Mr. Christie Arreola is s/p R TIFFANIE and presents with decreased weight bearing on R LE and decreased independence with functional mobility and activities of daily living. Patient completed shower and dressing as charted below in ADL grid and is ambulating with rolling walker with supervision. Patient has met 5/5 goals and plans to return home with good family support. Family able to provide patient with appropriate level of assistance at this time. OT reviewed hip precautions throughout session. Patient instructed to call for assistance when needing to get up from recliner and all needs in reach. Patient verbalized understanding of call light. This section established at most recent assessment   PROBLEM LIST (Impairments causing functional limitations):  1. Decreased Strength  2. Decreased ADL/Functional Activities  3. Decreased Transfer Abilities  4. Increased Pain  5. Increased Fatigue  6. Decreased Flexibility/Joint Mobility  7. Decreased Knowledge of Precautions   INTERVENTIONS PLANNED: (Benefits and precautions of occupational therapy have been discussed with the patient.)  1. Activities of daily living training  2. Adaptive equipment training  3. Balance training  4. Clothing management  5. Donning&doffing training  6. Theraputic activity     TREATMENT PLAN: Frequency/Duration: Follow patient 1-2 times to address above goals. Rehabilitation Potential For Stated Goals: Good     RECOMMENDED REHABILITATION/EQUIPMENT: (at time of discharge pending progress): Continue Skilled Therapy. OCCUPATIONAL PROFILE AND HISTORY:   History of Present Injury/Illness (Reason for Referral): Pt presents this date s/p (right) TIFFANIE.     Past Medical History/Comorbidities:   Mr. Mclean Never  has a past medical history of Arthritis, CAD (coronary artery disease) (09/11/2018), CHF (congestive heart failure) ([facility] [address] GERD (gastroesophageal reflux disease), History of kidney stones (2016), History of melanoma (2016), CABG, Hypertension (9/11/2018), PUD (peptic ulcer disease), Right hip pain, S/P AVR (aortic valve replacement), and Sleep apnea. Mr. Jacobo Colon  has a past surgical history that includes hx other surgical; hx orthopaedic (2014); hx appendectomy; hx coronary artery bypass graft (10/03/2018); and hx aortic valve replacement (10/03/2018). Social History/Living Environment:   Home Environment: Private residence  # Steps to Enter: 2  Rails to Enter: No  One/Two Story Residence: One story  Living Alone: Yes  Support Systems: Child(jasiel)  Patient Expects to be Discharged to[de-identified] [employer]  Current DME Used/Available at Home: Walker, rolling  Tub or Shower Type: Tub/Shower combination    Prior Level of Function/Work/Activity:  Mod I lives alone     Number of Personal Factors/Comorbidities that affect the Plan of Care: Brief history (0):  LOW COMPLEXITY   ASSESSMENT OF OCCUPATIONAL PERFORMANCE[de-identified]   Most Recent Physical Functioning:   Balance  Sitting: Intact  Standing: Intact                              Mental Status  Neurologic State: Alert  Orientation Level: Oriented X4  Cognition: Appropriate decision making; Appropriate for age attention/concentration  Perception: Appears intact  Perseveration: No perseveration noted  Safety/Judgement: Fall prevention                Basic ADLs (From Assessment) Complex ADLs (From Assessment)   Basic ADL  Feeding: Independent  Oral Facial Hygiene/Grooming: Supervision  Bathing: Minimum assistance, Moderate assistance  Type of Bath: Chlorhexidine (CHG), Full, Shower  Upper Body Dressing: Supervision  Lower Body Dressing:  Moderate assistance  Toileting: Minimum assistance     Grooming/Bathing/Dressing Activities of Daily Living   Grooming  Washing Face: Independent Cognitive Retraining  Safety/Judgement: Fall prevention   Upper Body Bathing  Bathing Assistance: Modified independent  Position Performed: Seated in chair  Adaptive Equipment: Shower chair     Lower Body Bathing  Bathing Assistance: Modified independent  Perineal  : Independent  Lower Body : Modified independent  Adaptive Equipment: Shower chair long handled sponge  Toileting  Bladder Hygiene: Independent   Upper Body Dressing Assistance  Dressing Assistance: Independent  Pullover Shirt: Independent Functional Transfers  Toilet Transfer : Modified independent  Shower Transfer: Modified independent   Lower Body Dressing Assistance  Dressing Assistance: Minimum assistance  Underpants: Minimum assistance  Pants With Elastic Waist: Minimum assistance  Socks: Minimum assistance  Slip on Shoes with Back: Minimum assistance  Adaptive Equipment Used: Reacher;Sock aid; Long handled shoe horn Bed/Mat Mobility  Sit to Stand: Modified independent  Stand to Sit: Modified independent  Bed to Chair: Modified independent         Physical Skills Involved:  1. Balance  2. Strength  3. Activity Tolerance Cognitive Skills Affected (resulting in the inability to perform in a timely and safe manner): 1. none Psychosocial Skills Affected:  1. Habits/Routines   Number of elements that affect the Plan of Care: 3-5:  MODERATE COMPLEXITY   CLINICAL DECISION MAKING:   [facility] AM-PAC 6 Clicks   Daily Activity Inpatient Short Form  How much help from another person does the patient currently need. .. Total A Lot A Little None   1. Putting on and taking off regular lower body clothing? [] 1   [x] 2   [] 3   [] 4   2. Bathing (including washing, rinsing, drying)? [] 1   [x] 2   [] 3   [] 4   3. Toileting, which includes using toilet, bedpan or urinal?   [] 1   [] 2   [x] 3   [] 4   4. Putting on and taking off regular upper body clothing? [] 1   [] 2   [] 3   [x] 4   5. Taking care of personal grooming such as brushing teeth? [] 1   [] 2   [] 3   [x] 4   6. Eating meals? [] 1   [] 2   [] 3   [x] 4   © 2007, Trustees of [facility], under license to [employer].  All rights reserved     Score:  Initial: 19 Most Recent: X (Date: -- )    Interpretation of Tool:  Represents activities that are increasingly more difficult (i.e. Bed mobility, Transfers, Gait). Use of outcome tool(s) and clinical judgement create a POC that gives a: LOW COMPLEXITY            TREATMENT:   (In addition to Assessment/Re-Assessment sessions the following treatments were rendered)     Pre-treatment Symptoms/Complaints:    Pain: Initial:   Pain Intensity 1: 3  Pain Location 1: Hip  Pain Intervention(s) 1: Repositioned, Shower 0/10 Post Session:  0/10 icepack given     Self Care: (29): Procedure(s) (per grid) utilized to improve and/or restore self-care/home management as related to dressing, bathing, toileting, grooming and functional mobility. Required minimal visual, verbal, manual, and tactile cueing to facilitate activities of daily living skills and compensatory activities. Treatment/Session Assessment:     Response to Treatment:  tolerated well. Education:  [] Home Exercises  [x] Fall Precautions  [x] Hip Precautions [] Going Home Video  [] Knee/Hip Prosthesis Review  [x] Walker Management/Safety [x] Adaptive Equipment as Needed       Interdisciplinary Collaboration:   o Physical Therapy Assistant  o Certified Occupational Therapy Assistant  o Registered Nurse    After treatment position/precautions:   o Up in chair  o Bed/Chair-wheels locked  o Bed in low position  o Caregiver at bedside  o Call light within reach  o RN notified     Compliance with Program/Exercises: Will assess as treatment progresses. Pt doing well all goals met and will do well at home with support from family. Patient will be discharged home with home health PT. No further Occupational Therapy warranted, will discharge Occupational Therapy services.       Total Treatment Duration:  OT Patient Time In/Time Out  Time In: 0251  Time Out: [address], [place]

## 2021-07-28 ENCOUNTER — HOME CARE VISIT (OUTPATIENT)
Dept: SCHEDULING | Facility: HOME HEALTH | Age: 71
End: 2021-07-28
Payer: MEDICARE

## 2021-07-28 VITALS
OXYGEN SATURATION: 98 % | RESPIRATION RATE: 12 BRPM | SYSTOLIC BLOOD PRESSURE: 134 MMHG | DIASTOLIC BLOOD PRESSURE: 76 MMHG | TEMPERATURE: 96 F | HEART RATE: 88 BPM

## 2021-07-28 PROCEDURE — 3331090002 HH PPS REVENUE DEBIT

## 2021-07-28 PROCEDURE — 3331090001 HH PPS REVENUE CREDIT

## 2021-07-28 PROCEDURE — 400013 HH SOC

## 2021-07-28 PROCEDURE — G0151 HHCP-SERV OF PT,EA 15 MIN: HCPCS

## 2021-07-28 PROCEDURE — 400018 HH-NO PAY CLAIM PROCEDURE

## 2021-07-28 NOTE — DISCHARGE SUMMARY
Total Joint Discharge Summary      Patient ID:  Cindy Ching  [id number]  57 y.o.  1950    Admit date: 7/26/2021  Discharge date: 7/27/2021 12:02 PM  Admitting Surgeon: Julio Cesar Hernandes MD  Admission Diagnoses: Osteoarthritis of right hip, unspecified osteoarthritis type [M16.11]; Osteoarthritis of right hip [M16.11]  Discharge Diagnoses: Active Problems:    Osteoarthritis of right hip (7/26/2021)      Procedure: Procedure(s) (LRB):  RIGHT HIP ARTHROPLASTY TOTAL/ MARCELLA (Right)    Brief History: Was admitted 7/26/2021 for Procedure(s) (LRB):  RIGHT HIP ARTHROPLASTY TOTAL/ MARCELLA (Right)    Hospital Course: Patient underwent surgery 7/26/2021 and under went Procedure(s) (LRB):  RIGHT HIP ARTHROPLASTY TOTAL/ MARCELLA (Right). Patient did well postop. Pain was well controlled postop. Physical therapy and occupational therapy were initiated the day of surgery. The patient was placed on dual both chemical and mechanical DVT prophylaxis after surgery. The patient progressed well with therapy postop and was deemed safe and appropriate for discharge after surgery. Patient was discharged 7/27/2021 09:86 PM.    Complications in Hospital: None                             Perioperative Antibiotics: Antibiotics per administered per protocol based on weight and prior drug allergies. If the patient was undergoing revision surgery or had a positive MRSA nasal swab the patient also received vancomycin. Hospital Medications given:   No current facility-administered medications for this encounter. Current Outpatient Medications   Medication Sig    aspirin delayed-release 81 mg tablet Take 1 Tablet by mouth two (2) times a day.  acetaminophen (TYLENOL) 500 mg tablet Take 2 Tablets by mouth every six (6) hours as needed for Pain.  HYDROmorphone (DILAUDID) 2 mg tablet Take 1 Tablet by mouth every four (4) hours as needed for Pain for up to 7 days.  Max Daily Amount: 12 mg.    gabapentin (NEURONTIN) 100 mg capsule Take 1 Capsule by mouth two (2) times a day.  cyclobenzaprine (FLEXERIL) 5 mg tablet Take 1 Tablet by mouth three (3) times daily as needed for Muscle Spasm(s).  ondansetron (ZOFRAN ODT) 8 mg disintegrating tablet Take 1 Tablet by mouth every eight (8) hours as needed for Nausea or Vomiting.  senna-docusate (PERICOLACE) 8.6-50 mg per tablet Take 1 Tablet by mouth daily.  atorvastatin (Lipitor) 40 mg tablet Take 40 mg by mouth daily.  metoprolol succinate (TOPROL-XL) 50 mg XL tablet Take 1 Tab by mouth daily.  valsartan (DIOVAN) 40 mg tablet Take 1 Tab by mouth daily.  spironolactone (ALDACTONE) 25 mg tablet Take 1 Tab by mouth daily.  diphenhydrAMINE (BENADRYL ALLERGY) 25 mg tablet Take 50 mg by mouth nightly as needed.  multivitamin (ONE A DAY) tablet Take 1 Tab by mouth daily.  sildenafil citrate (Viagra) 100 mg tablet Take 1 Tab by mouth as needed for Erectile Dysfunction.  nitroglycerin (NITROSTAT) 0.4 mg SL tablet 1 Tab by SubLINGual route as needed for Chest Pain. Up to 3 doses. Postoperative transfusions: None    Objective    Hemoglobin at discharge:   Lab Results   Component Value Date/Time    HGB 11.3 (L) 07/27/2021 03:58 AM       Extremity Exam  Dressing Clean, dry intact. Positive tibialis anterior and gastroc-soleus function in operative extremity. Sensation grossly intact  Positive PT pulse  No sign of DVT  TEDS/SCDS in place    Physical Therapy started on the day of surgery and progressed. PT/OT:          Assistive Device: Walker (comment)  RLE AROM  R Hip Flexion: 90  R Hip ABduction: 10             Discharge instructions:  -WBAT operative extremity  - Anticoagulate for 4 week period  -Resume pre hospital diet            -Resume home medications per medical reconciliation form     -Ambulate with walker, appropriate total joint protocol.  Follow posterior hip precautions for 6 weeks if patient is status post total hip replacement  -Follow up in office as scheduled     IF YOU HAVE ANY PROBLEMS ONCE YOU ARE AT HOME CALL THE FOLLOWING NUMBERS:   Main office number: [phone]    Take Home Medications     Cannot display discharge medications since this patient is not currently admitted. · It is important that you take the medication exactly as they are prescribed. · Keep your medication in the bottles provided by the pharmacist and keep a list of the medication names, dosages, and times to be taken in your wallet. · Do not take other medications without consulting your doctor. What to do at [phone] your prehospital diet. If you have excessive nausea or vomiting call your doctor's office. Home Physical Therapy is arranged. Use rolling walker when walking. Physical therapy will help advise you when it is safe to transition to a cane. Patients who have had a joint replacement should not drive until you are seen for your follow up appointment by Dr. Mary Rosado. Total Knee patients keep knee elevated above heart level to prevent and resolve swelling. Continue to ice the surgical site a few times a day until your follow-up appointment    Leave dressing alone until checked by home health. Patients with Prineo wound closure mesh may shower once at home but do not soak the wound (bath, swimming pool, hot tub, etc.). Gently pat dry wound after shower. No aggressive scrubbing as this may rub off adhesive used to keep wound sealed and irritate the wound. Further do not wear tight clothing such a spandex, yoga pants, or biker shorts as this type of clothing may pull the mesh dressing off. Wear loose fitting clothes only. Finally, do not allow anyone to remove this dressing. Dr Mary Rosado will remove the dressing in his office at your first postoperative visit. [facility] or the nursing facility will assist with dressing changes. Take it easy, you have just had major surgery.  Too much activity will cause additional soreness and swelling. When to Call    - Call if you have a temperature greater then 101  - Have increased wound drainage or wound drainage that persists after 7 days.  - Begin to notice increased redness, swelling, or pus coming from the incision  - Unable to keep food down  - Lose control of your bladder or bowel function  - Are unable to bear any weight on operative leg  - Need a pain medication refill - please have pharmacy phone number available. DO NOT WAIT UNTIL YOU ARE OUT OF MEDICATION TO CALL FOR A REFILL! WE OFTEN CANNOT FILL NARCOTIC PRESCRIPTIONS THE SAME DAY! IF YOU ARE GETTING LOW AND FEEL YOU WILL NEED MORE MEDICATION CALL AHEAD OF TIME    I have reviewed the patients controlled substance prescription history, as maintained in the [place] prescription monitoring program, so that the prescription(s) for a  controlled substance can be given.     Signed:  Rashaad Bullock MD  7/28/2021  9:27 AM

## 2021-07-29 PROCEDURE — 3331090001 HH PPS REVENUE CREDIT

## 2021-07-29 PROCEDURE — 3331090002 HH PPS REVENUE DEBIT

## 2021-07-30 PROCEDURE — 3331090002 HH PPS REVENUE DEBIT

## 2021-07-30 PROCEDURE — 3331090001 HH PPS REVENUE CREDIT

## 2021-07-31 PROCEDURE — 3331090002 HH PPS REVENUE DEBIT

## 2021-07-31 PROCEDURE — 3331090001 HH PPS REVENUE CREDIT

## 2021-08-01 PROCEDURE — 3331090002 HH PPS REVENUE DEBIT

## 2021-08-01 PROCEDURE — 3331090001 HH PPS REVENUE CREDIT

## 2021-08-02 ENCOUNTER — HOME CARE VISIT (OUTPATIENT)
Dept: SCHEDULING | Facility: HOME HEALTH | Age: 71
End: 2021-08-02
Payer: MEDICARE

## 2021-08-02 VITALS
TEMPERATURE: 96.8 F | DIASTOLIC BLOOD PRESSURE: 70 MMHG | RESPIRATION RATE: 17 BRPM | HEART RATE: 76 BPM | SYSTOLIC BLOOD PRESSURE: 130 MMHG | OXYGEN SATURATION: 93 %

## 2021-08-02 PROCEDURE — 3331090002 HH PPS REVENUE DEBIT

## 2021-08-02 PROCEDURE — 3331090001 HH PPS REVENUE CREDIT

## 2021-08-02 PROCEDURE — G0157 HHC PT ASSISTANT EA 15: HCPCS

## 2021-08-03 PROCEDURE — 3331090002 HH PPS REVENUE DEBIT

## 2021-08-03 PROCEDURE — 3331090001 HH PPS REVENUE CREDIT

## 2021-08-04 PROCEDURE — 3331090001 HH PPS REVENUE CREDIT

## 2021-08-04 PROCEDURE — 3331090002 HH PPS REVENUE DEBIT

## 2021-08-05 ENCOUNTER — HOME CARE VISIT (OUTPATIENT)
Dept: SCHEDULING | Facility: HOME HEALTH | Age: 71
End: 2021-08-05
Payer: MEDICARE

## 2021-08-05 VITALS
SYSTOLIC BLOOD PRESSURE: 122 MMHG | HEART RATE: 68 BPM | RESPIRATION RATE: 17 BRPM | DIASTOLIC BLOOD PRESSURE: 66 MMHG | OXYGEN SATURATION: 99 % | TEMPERATURE: 97 F

## 2021-08-05 PROCEDURE — 3331090002 HH PPS REVENUE DEBIT

## 2021-08-05 PROCEDURE — G0157 HHC PT ASSISTANT EA 15: HCPCS

## 2021-08-05 PROCEDURE — 3331090001 HH PPS REVENUE CREDIT

## 2021-08-06 PROCEDURE — 3331090002 HH PPS REVENUE DEBIT

## 2021-08-06 PROCEDURE — 3331090001 HH PPS REVENUE CREDIT

## 2021-08-07 PROCEDURE — 3331090002 HH PPS REVENUE DEBIT

## 2021-08-07 PROCEDURE — 3331090001 HH PPS REVENUE CREDIT

## 2021-08-08 PROCEDURE — 3331090001 HH PPS REVENUE CREDIT

## 2021-08-08 PROCEDURE — 3331090002 HH PPS REVENUE DEBIT

## 2021-08-09 ENCOUNTER — HOME CARE VISIT (OUTPATIENT)
Dept: SCHEDULING | Facility: HOME HEALTH | Age: 71
End: 2021-08-09
Payer: MEDICARE

## 2021-08-09 VITALS
DIASTOLIC BLOOD PRESSURE: 68 MMHG | HEART RATE: 74 BPM | OXYGEN SATURATION: 98 % | TEMPERATURE: 96.9 F | SYSTOLIC BLOOD PRESSURE: 120 MMHG | RESPIRATION RATE: 16 BRPM

## 2021-08-09 PROCEDURE — G0157 HHC PT ASSISTANT EA 15: HCPCS

## 2021-08-09 PROCEDURE — 3331090001 HH PPS REVENUE CREDIT

## 2021-08-09 PROCEDURE — 3331090002 HH PPS REVENUE DEBIT

## 2021-08-10 PROCEDURE — 3331090002 HH PPS REVENUE DEBIT

## 2021-08-10 PROCEDURE — 3331090001 HH PPS REVENUE CREDIT

## 2021-08-11 PROCEDURE — 3331090001 HH PPS REVENUE CREDIT

## 2021-08-11 PROCEDURE — 3331090002 HH PPS REVENUE DEBIT

## 2021-08-12 PROCEDURE — 3331090002 HH PPS REVENUE DEBIT

## 2021-08-12 PROCEDURE — 3331090001 HH PPS REVENUE CREDIT

## 2021-08-13 ENCOUNTER — HOME CARE VISIT (OUTPATIENT)
Dept: SCHEDULING | Facility: HOME HEALTH | Age: 71
End: 2021-08-13
Payer: MEDICARE

## 2021-08-13 PROCEDURE — G0157 HHC PT ASSISTANT EA 15: HCPCS

## 2021-08-13 PROCEDURE — 3331090002 HH PPS REVENUE DEBIT

## 2021-08-13 PROCEDURE — 3331090001 HH PPS REVENUE CREDIT

## 2021-08-14 PROCEDURE — 3331090002 HH PPS REVENUE DEBIT

## 2021-08-14 PROCEDURE — 3331090001 HH PPS REVENUE CREDIT

## 2021-08-15 PROCEDURE — 3331090002 HH PPS REVENUE DEBIT

## 2021-08-15 PROCEDURE — 3331090001 HH PPS REVENUE CREDIT

## 2021-08-16 ENCOUNTER — HOME CARE VISIT (OUTPATIENT)
Dept: SCHEDULING | Facility: HOME HEALTH | Age: 71
End: 2021-08-16
Payer: MEDICARE

## 2021-08-16 ENCOUNTER — HOME CARE VISIT (OUTPATIENT)
Dept: HOME HEALTH SERVICES | Facility: HOME HEALTH | Age: 71
End: 2021-08-16
Payer: MEDICARE

## 2021-08-16 VITALS
OXYGEN SATURATION: 98 % | RESPIRATION RATE: 17 BRPM | OXYGEN SATURATION: 98 % | RESPIRATION RATE: 17 BRPM | DIASTOLIC BLOOD PRESSURE: 88 MMHG | TEMPERATURE: 97.6 F | SYSTOLIC BLOOD PRESSURE: 132 MMHG | HEART RATE: 88 BPM | SYSTOLIC BLOOD PRESSURE: 130 MMHG | TEMPERATURE: 97.6 F | DIASTOLIC BLOOD PRESSURE: 74 MMHG | HEART RATE: 88 BPM

## 2021-08-16 PROCEDURE — 3331090002 HH PPS REVENUE DEBIT

## 2021-08-16 PROCEDURE — G0157 HHC PT ASSISTANT EA 15: HCPCS

## 2021-08-16 PROCEDURE — 3331090001 HH PPS REVENUE CREDIT

## 2021-08-17 PROCEDURE — 3331090002 HH PPS REVENUE DEBIT

## 2021-08-17 PROCEDURE — 3331090001 HH PPS REVENUE CREDIT

## 2021-08-18 ENCOUNTER — HOME CARE VISIT (OUTPATIENT)
Dept: SCHEDULING | Facility: HOME HEALTH | Age: 71
End: 2021-08-18
Payer: MEDICARE

## 2021-08-18 VITALS
RESPIRATION RATE: 16 BRPM | HEART RATE: 64 BPM | TEMPERATURE: 97.5 F | DIASTOLIC BLOOD PRESSURE: 90 MMHG | SYSTOLIC BLOOD PRESSURE: 140 MMHG

## 2021-08-18 PROCEDURE — 3331090001 HH PPS REVENUE CREDIT

## 2021-08-18 PROCEDURE — 3331090002 HH PPS REVENUE DEBIT

## 2021-08-18 PROCEDURE — G0151 HHCP-SERV OF PT,EA 15 MIN: HCPCS

## 2021-08-19 PROCEDURE — 3331090002 HH PPS REVENUE DEBIT

## 2021-08-19 PROCEDURE — 3331090001 HH PPS REVENUE CREDIT

## 2021-08-20 PROCEDURE — 3331090001 HH PPS REVENUE CREDIT

## 2021-08-20 PROCEDURE — 3331090002 HH PPS REVENUE DEBIT

## 2021-08-21 PROCEDURE — 3331090001 HH PPS REVENUE CREDIT

## 2021-08-21 PROCEDURE — 3331090002 HH PPS REVENUE DEBIT

## 2021-08-22 PROCEDURE — 3331090001 HH PPS REVENUE CREDIT

## 2021-08-22 PROCEDURE — 3331090002 HH PPS REVENUE DEBIT

## 2021-09-26 ENCOUNTER — HOSPITAL ENCOUNTER (EMERGENCY)
Age: 71
Discharge: HOME | DRG: 177 | End: 2021-09-26
Attending: EMERGENCY MEDICINE
Payer: MEDICARE

## 2021-09-26 ENCOUNTER — APPOINTMENT (OUTPATIENT)
Dept: GENERAL RADIOLOGY | Age: 71
DRG: 177 | End: 2021-09-26
Attending: EMERGENCY MEDICINE
Payer: MEDICARE

## 2021-09-26 VITALS
SYSTOLIC BLOOD PRESSURE: 138 MMHG | HEART RATE: 118 BPM | DIASTOLIC BLOOD PRESSURE: 99 MMHG | HEIGHT: 71 IN | RESPIRATION RATE: 18 BRPM | TEMPERATURE: 98 F | WEIGHT: 225 LBS | OXYGEN SATURATION: 96 % | BODY MASS INDEX: 31.5 KG/M2

## 2021-09-26 DIAGNOSIS — U07.1 COVID-19 VIRUS INFECTION: Primary | ICD-10-CM

## 2021-09-26 LAB
ALBUMIN SERPL-MCNC: 3.5 G/DL (ref 3.2–4.6)
ALBUMIN/GLOB SERPL: 0.6 {RATIO} (ref 1.2–3.5)
ALP SERPL-CCNC: 48 U/L (ref 50–136)
ALT SERPL-CCNC: 40 U/L (ref 12–65)
ANION GAP SERPL CALC-SCNC: 11 MMOL/L (ref 7–16)
AST SERPL-CCNC: 47 U/L (ref 15–37)
BASOPHILS # BLD: 0 K/UL (ref 0–0.2)
BASOPHILS NFR BLD: 0 % (ref 0–2)
BILIRUB SERPL-MCNC: 0.6 MG/DL (ref 0.2–1.1)
BUN SERPL-MCNC: 25 MG/DL (ref 8–23)
CALCIUM SERPL-MCNC: 8.9 MG/DL (ref 8.3–10.4)
CHLORIDE SERPL-SCNC: 98 MMOL/L (ref 98–107)
CO2 SERPL-SCNC: 22 MMOL/L (ref 21–32)
COVID-19 RAPID TEST, COVR: DETECTED
CREAT SERPL-MCNC: 1.45 MG/DL (ref 0.8–1.5)
DIFFERENTIAL METHOD BLD: ABNORMAL
EOSINOPHIL # BLD: 0 K/UL (ref 0–0.8)
EOSINOPHIL NFR BLD: 0 % (ref 0.5–7.8)
ERYTHROCYTE [DISTWIDTH] IN BLOOD BY AUTOMATED COUNT: 12.9 % (ref 11.9–14.6)
GLOBULIN SER CALC-MCNC: 5.4 G/DL (ref 2.3–3.5)
GLUCOSE SERPL-MCNC: 180 MG/DL (ref 65–100)
HCT VFR BLD AUTO: 45.1 % (ref 41.1–50.3)
HGB BLD-MCNC: 14.3 G/DL (ref 13.6–17.2)
IMM GRANULOCYTES # BLD AUTO: 0.1 K/UL (ref 0–0.5)
IMM GRANULOCYTES NFR BLD AUTO: 1 % (ref 0–5)
LYMPHOCYTES # BLD: 0.9 K/UL (ref 0.5–4.6)
LYMPHOCYTES NFR BLD: 16 % (ref 13–44)
MCH RBC QN AUTO: 27.1 PG (ref 26.1–32.9)
MCHC RBC AUTO-ENTMCNC: 31.7 G/DL (ref 31.4–35)
MCV RBC AUTO: 85.4 FL (ref 79.6–97.8)
MONOCYTES # BLD: 0.2 K/UL (ref 0.1–1.3)
MONOCYTES NFR BLD: 3 % (ref 4–12)
NEUTS SEG # BLD: 4.4 K/UL (ref 1.7–8.2)
NEUTS SEG NFR BLD: 80 % (ref 43–78)
NRBC # BLD: 0 K/UL (ref 0–0.2)
PLATELET # BLD AUTO: 147 K/UL (ref 150–450)
PLATELET COMMENTS,PCOM: SLIGHT
PMV BLD AUTO: 9.9 FL (ref 9.4–12.3)
POTASSIUM SERPL-SCNC: 4.2 MMOL/L (ref 3.5–5.1)
PROT SERPL-MCNC: 8.9 G/DL (ref 6.3–8.2)
RBC # BLD AUTO: 5.28 M/UL (ref 4.23–5.6)
RBC MORPH BLD: ABNORMAL
SODIUM SERPL-SCNC: 131 MMOL/L (ref 138–145)
SOURCE, COVRS: ABNORMAL
WBC # BLD AUTO: 5.6 K/UL (ref 4.3–11.1)
WBC MORPH BLD: ABNORMAL

## 2021-09-26 PROCEDURE — 87635 SARS-COV-2 COVID-19 AMP PRB: CPT

## 2021-09-26 PROCEDURE — 74011000636 HC RX REV CODE- 636: Performed by: NURSE PRACTITIONER

## 2021-09-26 PROCEDURE — M0243 CASIRIVI AND IMDEVI INFUSION: HCPCS

## 2021-09-26 PROCEDURE — 80053 COMPREHEN METABOLIC PANEL: CPT

## 2021-09-26 PROCEDURE — 74011250637 HC RX REV CODE- 250/637: Performed by: NURSE PRACTITIONER

## 2021-09-26 PROCEDURE — 74011250636 HC RX REV CODE- 250/636: Performed by: NURSE PRACTITIONER

## 2021-09-26 PROCEDURE — 74011000258 HC RX REV CODE- 258: Performed by: NURSE PRACTITIONER

## 2021-09-26 PROCEDURE — 71045 X-RAY EXAM CHEST 1 VIEW: CPT

## 2021-09-26 PROCEDURE — 99283 EMERGENCY DEPT VISIT LOW MDM: CPT

## 2021-09-26 PROCEDURE — 85025 COMPLETE CBC W/AUTO DIFF WBC: CPT

## 2021-09-26 RX ORDER — HYOSCYAMINE SULFATE 0.12 MG/1
0.12 TABLET SUBLINGUAL
Status: COMPLETED | OUTPATIENT
Start: 2021-09-26 | End: 2021-09-26

## 2021-09-26 RX ORDER — HYOSCYAMINE SULFATE 0.12 MG/1
0.12 TABLET SUBLINGUAL
Qty: 20 TABLET | Refills: 0 | Status: SHIPPED | OUTPATIENT
Start: 2021-09-26 | End: 2021-11-12

## 2021-09-26 RX ORDER — ALBUTEROL SULFATE 90 UG/1
2 AEROSOL, METERED RESPIRATORY (INHALATION)
Qty: 6.7 G | Refills: 0 | Status: SHIPPED | OUTPATIENT
Start: 2021-09-26

## 2021-09-26 RX ADMIN — HYOSCYAMINE SULFATE 0.12 MG: 0.12 TABLET ORAL; SUBLINGUAL at 09:07

## 2021-09-26 RX ADMIN — CASIRIVIMAB AND IMDEVIMAB: 600; 600 INJECTION, SOLUTION, CONCENTRATE INTRAVENOUS at 09:40

## 2021-09-26 RX ADMIN — SODIUM CHLORIDE 1000 ML: 900 INJECTION, SOLUTION INTRAVENOUS at 09:07

## 2021-09-26 NOTE — DISCHARGE INSTRUCTIONS
Take medication as prescribed. Monitor your oxygen level at home with oxygen monitor provided today. If level is below 90% at rest, please return to the emergency department immediately. Please quarantine at home. Increase oral hydration at home. Alternate taking Tylenol and ibuprofen every 4 hours if needed for headache, body aches, or fevers. Return to the emergency department for any new, worsening, or concerning symptoms.

## 2021-09-26 NOTE — ED NOTES
I have reviewed discharge instructions with the patient. The patient verbalized understanding. Patient left ED via Discharge Method: ambulatory to Home with self    Opportunity for questions and clarification provided. Patient given 2 scripts. To continue your aftercare when you leave the hospital, you may receive an automated call from our care team to check in on how you are doing. This is a free service and part of our promise to provide the best care and service to meet your aftercare needs.  If you have questions, or wish to unsubscribe from this service please call [phone]. Thank you for Choosing our [facility] Emergency Department.

## 2021-09-26 NOTE — ED PROVIDER NOTES
66-year-old male who presents to the emergency department today with complaint of cough, chills, and diarrhea for 1 week. Patient reports symptoms worsened on Wednesday. He denies any treatment prior to arrival.  Patient denies any known exposure to Covid. The history is provided by the patient. Past Medical History:   Diagnosis Date    Arthritis     generalized OA    CAD (coronary artery disease) 09/11/2018    Follow[facility] by [facility].     CHF (congestive heart failure) (HCC)     GERD (gastroesophageal reflux disease)     OTC alkaseltzer    History of kidney stones 2016    naturally passed    History of melanoma 2016    removed from nose    Hx of CABG     Hypertension 9/11/2018    PUD (peptic ulcer disease)     1970's- patient denies 10/01/18    Right hip pain     S/P AVR (aortic valve replacement)     Sleep apnea     observed to stop breathing in sleep; no CPAP       Past Surgical History:   Procedure Laterality Date    HX AORTIC VALVE REPLACEMENT  10/03/2018    CORONARY ARTERY BYPASS GRAFT X 1, LIMA to the LAD WITH AVR 23 mm Intuity valve    HX APPENDECTOMY       15 yo    HX CORONARY ARTERY BYPASS GRAFT  10/03/2018    CABG x1, Dr. Sheryl Gonzales ORTHOPAEDIC  2014    right lower ext - broken put in rods and screws    HX OTHER SURGICAL      melanoma surgery to nose         Family History:   Problem Relation Age of Onset    Cancer Mother         Leukemia    Alzheimer Mother     Cancer Brother         Leukemia    Hypertension Brother     No Known Problems Father     Diabetes Sister     No Known Problems Brother        Social History     Socioeconomic History    Marital status:      Spouse name: Not on file    Number of children: Not on file    Years of education: Not on file    Highest education level: Not on file   Occupational History    Not on file   Tobacco Use    Smoking status: Never Smoker    Smokeless tobacco: Never Used   Vaping Use    Vaping Use: Never used   Substance and Sexual Activity    Alcohol use: Yes     Comment: occasional    Drug use: No    Sexual activity: Not on file   Other Topics Concern    Not on file   Social History Narrative    Not on file     Social Determinants of Health     Financial Resource Strain:     Difficulty of Paying Living Expenses:    Food Insecurity:     Worried About Running Out of Food in the Last Year:     [address] in the Last Year:    Transportation Needs:     Lack of Transportation (Medical):  Lack of Transportation (Non-Medical):    Physical Activity:     Days of Exercise per Week:     Minutes of Exercise per Session:    Stress:     Feeling of Stress :    Social Connections:     Frequency of Communication with Friends and Family:     Frequency of Social Gatherings with Friends and Family:     Attends [employer] Services:     Active Member of Clubs or Organizations:     Attends Club or Organization Meetings:     Marital Status:    Intimate Partner Violence:     Fear of Current or Ex-Partner:     Emotionally Abused:     Physically Abused:     Sexually Abused: ALLERGIES: Lortab [hydrocodone-acetaminophen]    Review of Systems   Constitutional: Positive for chills, fatigue and fever. HENT: Negative for congestion and sore throat. Respiratory: Positive for cough. Negative for shortness of breath. Cardiovascular: Negative for chest pain. Gastrointestinal: Positive for diarrhea. Negative for abdominal pain, nausea and vomiting. Musculoskeletal: Positive for myalgias. All other systems reviewed and are negative. Vitals:    09/26/21 0738   BP: (!) 138/99   Pulse: (!) 118   Resp: 18   Temp: 98 °F (36.7 °C)   SpO2: 96%   Weight: 102.1 kg (225 lb)   Height: 5' 11\" (1.803 m)            Physical Exam  Vitals and nursing note reviewed. Constitutional:       General: He is not in acute distress. Appearance: Normal appearance. He is normal weight.  He is not ill-appearing, toxic-appearing or diaphoretic. HENT:      Head: Normocephalic and atraumatic. Right Ear: External ear normal.      Left Ear: External ear normal.      Nose: Rhinorrhea present. Mouth/Throat:      Mouth: Mucous membranes are moist.      Pharynx: Oropharynx is clear. Eyes:      General: No scleral icterus. Extraocular Movements: Extraocular movements intact. Conjunctiva/sclera: Conjunctivae normal.   Cardiovascular:      Rate and Rhythm: Normal rate. Pulses: Normal pulses. Heart sounds: Normal heart sounds. Pulmonary:      Effort: Pulmonary effort is normal. No respiratory distress. Breath sounds: Normal breath sounds. Abdominal:      General: Abdomen is flat. Bowel sounds are normal. There is no distension. Palpations: Abdomen is soft. Tenderness: There is no abdominal tenderness. Musculoskeletal:         General: Normal range of motion. Cervical back: Normal range of motion and neck supple. No rigidity. Right lower leg: No edema. Left lower leg: No edema. Skin:     General: Skin is warm and dry. Capillary Refill: Capillary refill takes less than 2 seconds. Neurological:      General: No focal deficit present. Mental Status: He is alert and oriented to person, place, and time. Psychiatric:         Mood and Affect: Mood normal.         Behavior: Behavior normal.         Thought Content: Thought content normal.         Judgment: Judgment normal.          MDM  Number of Diagnoses or Management Options  COVID-19 virus infection  Diagnosis management comments: 615 AM  30-year-old male who presents emergency department today with Covid-like symptoms. Physical exam is unremarkable. Lung sounds are clear. SPO2 96% on room air. Patient afebrile in the emergency department today. Patient expresses desire to receive monoclonal antibody therapy if Covid is positive. Rapid Covid ordered. 10:00 AM  Rapid Covid was positive.  Patient given Mila in the emergency department today. Patient also provided with portable SPO2 monitor and educated on use. Patient encouraged to monitor his oxygen level at home and please return if level is below 90% while at rest. Patient encouraged to increase oral hydration. Patient encouraged to take Tylenol or Motrin if needed for fever, body aches, or headache. I have discussed the results of all labs, procedures, radiographs, and/or treatments with the patient and available family members. Safia Rodarte is agreed upon by the patient and the patient is ready for discharge.  Questions about treatment in the ED and differential diagnosis of presenting condition were answered. Marilynn Nieves was given verbal discharge instructions including, but not limited to, importance of returning to the emergency department for any concern of worsening or continued symptoms.  Instructions were given to follow up with a primary care provider or specialist within 1-2 days. Chick Price effects of medications, if prescribed, were discussed and patient was advised to refrain from significant physical activity until followed up by primary care physician and to not drive or operate heavy machinery after taking any sedating substances.      During patient's visit to the emergency department in addition to providing emergency care I conducted a COVID-19 counseling visit. He is aware that he needs immediate isolation away from others and that he is to inform any close contacts about him condition. I have also reviewed the signs and symptoms of COVID-19 including the potential for progression of the disease including return precautions and an outpatient plan. We have discussed ways to be successful in the quarantine process and provided instructions about when it is safe to stop strict isolation. He has been informed that the public health department should be contacting him.       Yris Meneses NP; 9/26/2021 @12:43 PM Voice dictation software was used during the making of  this note. This software is not perfect and grammatical and other typographical errors  may be present. This note has not been proofread for errors. Amount and/or Complexity of Data Reviewed  Clinical lab tests: reviewed  Tests in the radiology section of CPT®: reviewed    Risk of Complications, Morbidity, and/or Mortality  Presenting problems: moderate  Diagnostic procedures: moderate  Management options: moderate    Patient Progress  Patient progress: improved    ED Course as of Sep 26 1240   Sun Sep 26, 2021   0843 Rapid COVID positive. Regeneron ordered. [BC]   [id number] IMPRESSION  1. Mild peripheral opacities in the right mid, and bilateral lower lung fields. The appearance raises concern for an early peripheral infiltrate as can occur  with an atypical pneumonia such as Covid 19. Recommend clinical correlation. XR CHEST PORT [BC]      ED Course User Index  [BC] Juice Castillo NP     Recent Results (from the past 8 hour(s))   CBC WITH AUTOMATED DIFF    Collection Time: 09/26/21  8:15 AM   Result Value Ref Range    WBC 5.6 4.3 - 11.1 K/uL    RBC 5.28 4.23 - 5.6 M/uL    HGB 14.3 13.6 - 17.2 g/dL    HCT 45.1 41.1 - 50.3 %    MCV 85.4 79.6 - 97.8 FL    MCH 27.1 26.1 - 32.9 PG    MCHC 31.7 31.4 - 35.0 g/dL    RDW 12.9 11.9 - 14.6 %    PLATELET 149 (L) 982 - 450 K/uL    MPV 9.9 9.4 - 12.3 FL    ABSOLUTE NRBC 0.00 0.0 - 0.2 K/uL    NEUTROPHILS 80 (H) 43 - 78 %    LYMPHOCYTES 16 13 - 44 %    MONOCYTES 3 (L) 4.0 - 12.0 %    EOSINOPHILS 0 (L) 0.5 - 7.8 %    BASOPHILS 0 0.0 - 2.0 %    IMMATURE GRANULOCYTES 1 0.0 - 5.0 %    ABS. NEUTROPHILS 4.4 1.7 - 8.2 K/UL    ABS. LYMPHOCYTES 0.9 0.5 - 4.6 K/UL    ABS. MONOCYTES 0.2 0.1 - 1.3 K/UL    ABS. EOSINOPHILS 0.0 0.0 - 0.8 K/UL    ABS. BASOPHILS 0.0 0.0 - 0.2 K/UL    ABS. IMM.  GRANS. 0.1 0.0 - 0.5 K/UL    RBC COMMENTS NORMOCYTIC/NORMOCHROMIC      WBC COMMENTS Result Confirmed By Smear      PLATELET COMMENTS SLIGHT      DF AUTOMATED METABOLIC PANEL, COMPREHENSIVE    Collection Time: 09/26/21  8:15 AM   Result Value Ref Range    Sodium 131 (L) 138 - 145 mmol/L    Potassium 4.2 3.5 - 5.1 mmol/L    Chloride 98 98 - 107 mmol/L    CO2 22 21 - 32 mmol/L    Anion gap 11 7 - 16 mmol/L    Glucose 180 (H) 65 - 100 mg/dL    BUN 25 (H) 8 - 23 MG/DL    Creatinine 1.45 0.8 - 1.5 MG/DL    GFR est AA >60 >60 ml/min/1.73m2    GFR est non-AA 51 (L) >60 ml/min/1.73m2    Calcium 8.9 8.3 - 10.4 MG/DL    Bilirubin, total 0.6 0.2 - 1.1 MG/DL    ALT (SGPT) 40 12 - 65 U/L    AST (SGOT) 47 (H) 15 - 37 U/L    Alk.  phosphatase 48 (L) 50 - 136 U/L    Protein, total 8.9 (H) 6.3 - 8.2 g/dL    Albumin 3.5 3.2 - 4.6 g/dL    Globulin 5.4 (H) 2.3 - 3.5 g/dL    A-G Ratio 0.6 (L) 1.2 - 3.5     COVID-19 RAPID TEST    Collection Time: 09/26/21  8:15 AM   Result Value Ref Range    Specimen source NASAL      COVID-19 rapid test Detected (AA) NOTD         Procedures

## 2021-09-26 NOTE — ED TRIAGE NOTES
Pt ambulatory unassisted in triage with mask in place. Pt complains of generalized body aches, cough and fatigue onset last weekend. Unvaccinated for Covid-19.

## 2021-09-28 ENCOUNTER — HOSPITAL ENCOUNTER (INPATIENT)
Age: 71
LOS: 2 days | Discharge: HOME | DRG: 177 | End: 2021-09-30
Attending: EMERGENCY MEDICINE | Admitting: FAMILY MEDICINE
Payer: MEDICARE

## 2021-09-28 ENCOUNTER — APPOINTMENT (OUTPATIENT)
Dept: GENERAL RADIOLOGY | Age: 71
DRG: 177 | End: 2021-09-28
Attending: EMERGENCY MEDICINE
Payer: MEDICARE

## 2021-09-28 DIAGNOSIS — R09.02 HYPOXIA: ICD-10-CM

## 2021-09-28 DIAGNOSIS — U07.1 COVID-19: Primary | ICD-10-CM

## 2021-09-28 PROBLEM — I50.22 SYSTOLIC CHF, CHRONIC (HCC): Status: ACTIVE | Noted: 2021-09-28

## 2021-09-28 LAB
ALBUMIN SERPL-MCNC: 3.1 G/DL (ref 3.2–4.6)
ALBUMIN/GLOB SERPL: 0.6 {RATIO} (ref 1.2–3.5)
ALP SERPL-CCNC: 39 U/L (ref 50–136)
ALT SERPL-CCNC: 42 U/L (ref 12–65)
ANION GAP SERPL CALC-SCNC: 9 MMOL/L (ref 7–16)
AST SERPL-CCNC: 74 U/L (ref 15–37)
BASOPHILS # BLD: 0 K/UL (ref 0–0.2)
BASOPHILS NFR BLD: 0 % (ref 0–2)
BILIRUB SERPL-MCNC: 0.6 MG/DL (ref 0.2–1.1)
BUN SERPL-MCNC: 25 MG/DL (ref 8–23)
CALCIUM SERPL-MCNC: 8.8 MG/DL (ref 8.3–10.4)
CHLORIDE SERPL-SCNC: 101 MMOL/L (ref 98–107)
CO2 SERPL-SCNC: 23 MMOL/L (ref 21–32)
CREAT SERPL-MCNC: 1.29 MG/DL (ref 0.8–1.5)
CRP SERPL-MCNC: 8.7 MG/DL (ref 0–0.9)
DIFFERENTIAL METHOD BLD: ABNORMAL
EOSINOPHIL # BLD: 0 K/UL (ref 0–0.8)
EOSINOPHIL NFR BLD: 0 % (ref 0.5–7.8)
ERYTHROCYTE [DISTWIDTH] IN BLOOD BY AUTOMATED COUNT: 13.1 % (ref 11.9–14.6)
GLOBULIN SER CALC-MCNC: 5.2 G/DL (ref 2.3–3.5)
GLUCOSE SERPL-MCNC: 173 MG/DL (ref 65–100)
HCT VFR BLD AUTO: 41.8 % (ref 41.1–50.3)
HGB BLD-MCNC: 13.8 G/DL (ref 13.6–17.2)
IMM GRANULOCYTES # BLD AUTO: 0 K/UL (ref 0–0.5)
IMM GRANULOCYTES NFR BLD AUTO: 1 % (ref 0–5)
LYMPHOCYTES # BLD: 1.1 K/UL (ref 0.5–4.6)
LYMPHOCYTES NFR BLD: 16 % (ref 13–44)
MCH RBC QN AUTO: 27.7 PG (ref 26.1–32.9)
MCHC RBC AUTO-ENTMCNC: 33 G/DL (ref 31.4–35)
MCV RBC AUTO: 83.9 FL (ref 79.6–97.8)
MONOCYTES # BLD: 0.3 K/UL (ref 0.1–1.3)
MONOCYTES NFR BLD: 4 % (ref 4–12)
NEUTS SEG # BLD: 5.5 K/UL (ref 1.7–8.2)
NEUTS SEG NFR BLD: 80 % (ref 43–78)
NRBC # BLD: 0 K/UL (ref 0–0.2)
PLATELET # BLD AUTO: 138 K/UL (ref 150–450)
PMV BLD AUTO: 9.7 FL (ref 9.4–12.3)
POTASSIUM SERPL-SCNC: 4.4 MMOL/L (ref 3.5–5.1)
PROCALCITONIN SERPL-MCNC: 0.9 NG/ML
PROT SERPL-MCNC: 8.3 G/DL (ref 6.3–8.2)
RBC # BLD AUTO: 4.98 M/UL (ref 4.23–5.6)
SODIUM SERPL-SCNC: 133 MMOL/L (ref 136–145)
TROPONIN-HIGH SENSITIVITY: 64.8 PG/ML (ref 0–14)
WBC # BLD AUTO: 6.8 K/UL (ref 4.3–11.1)

## 2021-09-28 PROCEDURE — 85025 COMPLETE CBC W/AUTO DIFF WBC: CPT

## 2021-09-28 PROCEDURE — 77030027138 HC INCENT SPIROMETER -A

## 2021-09-28 PROCEDURE — 71046 X-RAY EXAM CHEST 2 VIEWS: CPT

## 2021-09-28 PROCEDURE — 74011250636 HC RX REV CODE- 250/636: Performed by: NURSE PRACTITIONER

## 2021-09-28 PROCEDURE — 82306 VITAMIN D 25 HYDROXY: CPT

## 2021-09-28 PROCEDURE — 93005 ELECTROCARDIOGRAM TRACING: CPT | Performed by: NURSE PRACTITIONER

## 2021-09-28 PROCEDURE — 65660000000 HC RM CCU STEPDOWN

## 2021-09-28 PROCEDURE — 86140 C-REACTIVE PROTEIN: CPT

## 2021-09-28 PROCEDURE — 99285 EMERGENCY DEPT VISIT HI MDM: CPT

## 2021-09-28 PROCEDURE — 84484 ASSAY OF TROPONIN QUANT: CPT

## 2021-09-28 PROCEDURE — 80053 COMPREHEN METABOLIC PANEL: CPT

## 2021-09-28 PROCEDURE — 84145 PROCALCITONIN (PCT): CPT

## 2021-09-28 RX ORDER — ACETAMINOPHEN 650 MG/1
650 SUPPOSITORY RECTAL
Status: DISCONTINUED | OUTPATIENT
Start: 2021-09-28 | End: 2021-09-30 | Stop reason: HOSPADM

## 2021-09-28 RX ORDER — SODIUM CHLORIDE 0.9 % (FLUSH) 0.9 %
5-40 SYRINGE (ML) INJECTION EVERY 8 HOURS
Status: DISCONTINUED | OUTPATIENT
Start: 2021-09-28 | End: 2021-09-30 | Stop reason: HOSPADM

## 2021-09-28 RX ORDER — DEXAMETHASONE SODIUM PHOSPHATE 100 MG/10ML
10 INJECTION INTRAMUSCULAR; INTRAVENOUS
Status: COMPLETED | OUTPATIENT
Start: 2021-09-28 | End: 2021-09-28

## 2021-09-28 RX ORDER — ALBUTEROL SULFATE 90 UG/1
2 AEROSOL, METERED RESPIRATORY (INHALATION)
Status: DISCONTINUED | OUTPATIENT
Start: 2021-09-28 | End: 2021-09-30 | Stop reason: HOSPADM

## 2021-09-28 RX ORDER — ASCORBIC ACID 500 MG
500 TABLET ORAL 2 TIMES DAILY
Status: DISCONTINUED | OUTPATIENT
Start: 2021-09-28 | End: 2021-09-30 | Stop reason: HOSPADM

## 2021-09-28 RX ORDER — ONDANSETRON 2 MG/ML
4 INJECTION INTRAMUSCULAR; INTRAVENOUS
Status: DISCONTINUED | OUTPATIENT
Start: 2021-09-28 | End: 2021-09-30 | Stop reason: HOSPADM

## 2021-09-28 RX ORDER — SODIUM CHLORIDE 0.9 % (FLUSH) 0.9 %
5-40 SYRINGE (ML) INJECTION AS NEEDED
Status: DISCONTINUED | OUTPATIENT
Start: 2021-09-28 | End: 2021-09-30 | Stop reason: HOSPADM

## 2021-09-28 RX ORDER — ASPIRIN 81 MG/1
81 TABLET ORAL 2 TIMES DAILY
Status: DISCONTINUED | OUTPATIENT
Start: 2021-09-28 | End: 2021-09-30 | Stop reason: HOSPADM

## 2021-09-28 RX ORDER — ACETAMINOPHEN 325 MG/1
650 TABLET ORAL
Status: DISCONTINUED | OUTPATIENT
Start: 2021-09-28 | End: 2021-09-30 | Stop reason: HOSPADM

## 2021-09-28 RX ORDER — HYOSCYAMINE SULFATE 0.12 MG/1
0.12 TABLET SUBLINGUAL
Status: DISCONTINUED | OUTPATIENT
Start: 2021-09-28 | End: 2021-09-30 | Stop reason: HOSPADM

## 2021-09-28 RX ORDER — DEXAMETHASONE SODIUM PHOSPHATE 100 MG/10ML
6 INJECTION INTRAMUSCULAR; INTRAVENOUS EVERY 24 HOURS
Status: DISCONTINUED | OUTPATIENT
Start: 2021-09-29 | End: 2021-09-30 | Stop reason: HOSPADM

## 2021-09-28 RX ORDER — POLYETHYLENE GLYCOL 3350 17 G/17G
17 POWDER, FOR SOLUTION ORAL DAILY PRN
Status: DISCONTINUED | OUTPATIENT
Start: 2021-09-28 | End: 2021-09-30 | Stop reason: HOSPADM

## 2021-09-28 RX ORDER — VALSARTAN 40 MG/1
40 TABLET ORAL DAILY
Status: DISCONTINUED | OUTPATIENT
Start: 2021-09-29 | End: 2021-09-30 | Stop reason: HOSPADM

## 2021-09-28 RX ORDER — ENOXAPARIN SODIUM 100 MG/ML
40 INJECTION SUBCUTANEOUS DAILY
Status: DISCONTINUED | OUTPATIENT
Start: 2021-09-29 | End: 2021-09-30 | Stop reason: HOSPADM

## 2021-09-28 RX ORDER — ONDANSETRON 4 MG/1
4 TABLET, ORALLY DISINTEGRATING ORAL
Status: DISCONTINUED | OUTPATIENT
Start: 2021-09-28 | End: 2021-09-30 | Stop reason: HOSPADM

## 2021-09-28 RX ORDER — METOPROLOL SUCCINATE 50 MG/1
50 TABLET, EXTENDED RELEASE ORAL DAILY
Status: DISCONTINUED | OUTPATIENT
Start: 2021-09-29 | End: 2021-09-30 | Stop reason: HOSPADM

## 2021-09-28 RX ORDER — SODIUM CHLORIDE 9 MG/ML
125 INJECTION, SOLUTION INTRAVENOUS ONCE
Status: COMPLETED | OUTPATIENT
Start: 2021-09-28 | End: 2021-09-28

## 2021-09-28 RX ORDER — ZINC SULFATE 50(220)MG
1 CAPSULE ORAL DAILY
Status: DISCONTINUED | OUTPATIENT
Start: 2021-09-29 | End: 2021-09-30 | Stop reason: HOSPADM

## 2021-09-28 RX ADMIN — DEXAMETHASONE SODIUM PHOSPHATE 10 MG: 10 INJECTION, SOLUTION INTRAMUSCULAR; INTRAVENOUS at 18:33

## 2021-09-28 RX ADMIN — SODIUM CHLORIDE 125 ML/HR: 900 INJECTION, SOLUTION INTRAVENOUS at 18:33

## 2021-09-28 NOTE — ED TRIAGE NOTES
Pt arrives via [facility] to triage. Reports having covid. Tested positive Sunday, has had symptoms for 10 days. Pt did not get vaccinated for covid. States decreased appetite, loss of taste, and a sore throat. Pt is not a smoker. Has cow valve in heart. NAD. Masked.

## 2021-09-28 NOTE — ED NOTES
My interpretation of \"twelve-lead EKG shows normal sinus rhythm at 95. Left bundle branch block with secondary ST-T changes. No ectopy.   Normal QT interval.

## 2021-09-28 NOTE — ED PROVIDER NOTES
77-year-old male presents emergency department today with complaints of increased fatigue over the past couple of days. Patient states he was seen in the emergency department on Sunday and diagnosed with Covid. Patient states since being discharged he has gotten progressively more weak. He has shortness of breath with exertion. Patient states he was checking his oxygen level at home and it was getting down to 80% after walking to the bathroom. Patient also reports he has not been able to eat due to loss of appetite but has been drinking protein shakes. He states he also has a dry, sore throat. He states he has also had diarrhea but diarrhea has been slowly improving. Patient denies any vomiting or fever. The history is provided by the patient. Positive For Covid-19  Progression:  Worsening  Relieved by:  Nothing  Worsened by:  Exertion  Associated symptoms: cough, diarrhea and sore throat    Associated symptoms: no nausea and no vomiting         Past Medical History:   Diagnosis Date    Arthritis     generalized OA    CAD (coronary artery disease) 09/11/2018    Followed by [facility].     CHF (congestive heart failure) (HCC)     GERD (gastroesophageal reflux disease)     OTC alkaseltzer    History of kidney stones 2016    naturally passed    History of melanoma 2016    removed from nose    Hx of CABG     Hypertension 9/11/2018    PUD (peptic ulcer disease)     1970's- patient denies 10/01/18    Right hip pain     S/P AVR (aortic valve replacement)     Sleep apnea     observed to stop breathing in sleep; no CPAP       Past Surgical History:   Procedure Laterality Date    HX AORTIC VALVE REPLACEMENT  10/03/2018    CORONARY ARTERY BYPASS GRAFT X 1, LIMA to the LAD WITH AVR 23 mm Intuity valve    HX APPENDECTOMY       15 yo    HX CORONARY ARTERY BYPASS GRAFT  10/03/2018    CABG x1, Dr. Charito Kasper  2014    right lower ext - broken put in rods and screws    HX OTHER SURGICAL      melanoma surgery to nose         Family History:   Problem Relation Age of Onset    Cancer Mother         Leukemia    Alzheimer Mother    Hamilton Mccain Cancer Brother         Leukemia    Hypertension Brother     No Known Problems Father     Diabetes Sister     No Known Problems Brother        Social History     Socioeconomic History    Marital status:      Spouse name: Not on file    Number of children: Not on file    Years of education: Not on file    Highest education level: Not on file   Occupational History    Not on file   Tobacco Use    Smoking status: Never Smoker    Smokeless tobacco: Never Used   Vaping Use    Vaping Use: Never used   Substance and Sexual Activity    Alcohol use: Yes     Comment: occasional    Drug use: No    Sexual activity: Not on file   Other Topics Concern    Not on file   Social History Narrative    Not on file     Social Determinants of Health     Financial Resource Strain:     Difficulty of Paying Living Expenses:    Food Insecurity:     Worried About Running Out of Food in the Last Year:     [address] in the Last Year:    Transportation Needs:     Lack of Transportation (Medical):  Lack of Transportation (Non-Medical):    Physical Activity:     Days of Exercise per Week:     Minutes of Exercise per Session:    Stress:     Feeling of Stress :    Social Connections:     Frequency of Communication with Friends and Family:     Frequency of Social Gatherings with Friends and Family:     Attends [employer] Services:     Active Member of Clubs or Organizations:     Attends Club or Organization Meetings:     Marital Status:    Intimate Partner Violence:     Fear of Current or Ex-Partner:     Emotionally Abused:     Physically Abused:     Sexually Abused: ALLERGIES: Lortab [hydrocodone-acetaminophen]    Review of Systems   Constitutional: Positive for appetite change and fatigue. Negative for fever.    HENT: Positive for sore throat. Respiratory: Positive for cough and shortness of breath. Gastrointestinal: Positive for abdominal pain and diarrhea. Negative for nausea and vomiting. All other systems reviewed and are negative. Vitals:    09/28/21 1516 09/28/21 1630   BP: 108/76    Pulse: 73 94   Resp: 17    Temp: 97.9 °F (36.6 °C)    SpO2: 92% (!) 82%            Physical Exam  Vitals and nursing note reviewed. Constitutional:       General: He is not in acute distress. Appearance: Normal appearance. He is ill-appearing. He is not toxic-appearing or diaphoretic. Comments: Patient appears uncomfortable but is in no acute distress. HENT:      Head: Normocephalic and atraumatic. Right Ear: External ear normal.      Left Ear: External ear normal.      Nose: Rhinorrhea present. Mouth/Throat:      Mouth: Mucous membranes are dry. Pharynx: Oropharynx is clear. Eyes:      General: No scleral icterus. Extraocular Movements: Extraocular movements intact. Conjunctiva/sclera: Conjunctivae normal.   Cardiovascular:      Rate and Rhythm: Normal rate. Pulses: Normal pulses. Heart sounds: Normal heart sounds. Pulmonary:      Effort: Pulmonary effort is normal. No respiratory distress. Breath sounds: No wheezing or rales. Abdominal:      General: Abdomen is flat. Bowel sounds are normal. There is no distension. Palpations: Abdomen is soft. Tenderness: There is no abdominal tenderness. Musculoskeletal:         General: Normal range of motion. Cervical back: Normal range of motion and neck supple. No rigidity. Right lower leg: No edema. Left lower leg: No edema. Skin:     General: Skin is warm and dry. Capillary Refill: Capillary refill takes less than 2 seconds. Neurological:      General: No focal deficit present. Mental Status: He is alert and oriented to person, place, and time.    Psychiatric:         Mood and Affect: Mood normal. Behavior: Behavior normal.         Thought Content: Thought content normal.         Judgment: Judgment normal.          MDM  Number of Diagnoses or Management Options  COVID-19  Hypoxia  Diagnosis management comments: Mr. Ab Rivas is a pleasant 59-year-old male who presents to the emergency department today with complaint of worsening Covid symptoms. Patient has becoming increasingly fatigued. He reports he gets short of breath with mild exertion, just walking to the bathroom in his home. He also reports oxygen level of 80% at home after walking to the bathroom. Patient with significant medical history of coronary artery disease and aortic valve replacement. I did have patient stand and march in place. Patient became hypoxic only with going from supine to sitting position. Patient to be admitted with hospitalist.        Amount and/or Complexity of Data Reviewed  Clinical lab tests: reviewed  Tests in the radiology section of CPT®: reviewed  Discuss the patient with other providers: yes (Dr. Darrell Ruiz)  Independent visualization of images, tracings, or specimens: yes      ED Course as of Sep 28 1648   Tue Sep 28, 2021   1630 Oxygen level monitored while having patient sit up from a lying position. O2 dropped from 93% to 82% with mild exertion. Patient became very fatigued with mild exertion. Heart rate also increased from 70s to 100s with mild exertion. Plan to admit for Covid and hypoxia. Patient is agreeable to admission. [BC]   1634 IMPRESSION  1. Minimal left basilar reticular airspace opacities and improved aeration of  the right lung base. 2. Pulmonary venous hypertension status post aortic valve replacement.    XR CHEST PA LAT [BC]   1640 Case discussed with Dr. Darrell Ruiz    [BC]   21  Perfect serve message to Dr. Addison Sarmiento with hospitalist    [BC]      ED Course User Index  [BC] Zoë Leiva NP     Recent Results (from the past 8 hour(s))   CBC WITH AUTOMATED DIFF    Collection Time: 09/28/21 3:27 PM   Result Value Ref Range    WBC 6.8 4.3 - 11.1 K/uL    RBC 4.98 4.23 - 5.6 M/uL    HGB 13.8 13.6 - 17.2 g/dL    HCT 41.8 41.1 - 50.3 %    MCV 83.9 79.6 - 97.8 FL    MCH 27.7 26.1 - 32.9 PG    MCHC 33.0 31.4 - 35.0 g/dL    RDW 13.1 11.9 - 14.6 %    PLATELET 315 (L) 177 - 450 K/uL    MPV 9.7 9.4 - 12.3 FL    ABSOLUTE NRBC 0.00 0.0 - 0.2 K/uL    DF AUTOMATED      NEUTROPHILS 80 (H) 43 - 78 %    LYMPHOCYTES 16 13 - 44 %    MONOCYTES 4 4.0 - 12.0 %    EOSINOPHILS 0 (L) 0.5 - 7.8 %    BASOPHILS 0 0.0 - 2.0 %    IMMATURE GRANULOCYTES 1 0.0 - 5.0 %    ABS. NEUTROPHILS 5.5 1.7 - 8.2 K/UL    ABS. LYMPHOCYTES 1.1 0.5 - 4.6 K/UL    ABS. MONOCYTES 0.3 0.1 - 1.3 K/UL    ABS. EOSINOPHILS 0.0 0.0 - 0.8 K/UL    ABS. BASOPHILS 0.0 0.0 - 0.2 K/UL    ABS. IMM. GRANS. 0.0 0.0 - 0.5 K/UL   METABOLIC PANEL, COMPREHENSIVE    Collection Time: 09/28/21  3:27 PM   Result Value Ref Range    Sodium 133 (L) 136 - 145 mmol/L    Potassium 4.4 3.5 - 5.1 mmol/L    Chloride 101 98 - 107 mmol/L    CO2 23 21 - 32 mmol/L    Anion gap 9 7 - 16 mmol/L    Glucose 173 (H) 65 - 100 mg/dL    BUN 25 (H) 8 - 23 MG/DL    Creatinine 1.29 0.8 - 1.5 MG/DL    GFR est AA >60 >60 ml/min/1.73m2    GFR est non-AA 58 (L) >60 ml/min/1.73m2    Calcium 8.8 8.3 - 10.4 MG/DL    Bilirubin, total 0.6 0.2 - 1.1 MG/DL    ALT (SGPT) 42 12 - 65 U/L    AST (SGOT) 74 (H) 15 - 37 U/L    Alk.  phosphatase 39 (L) 50 - 136 U/L    Protein, total 8.3 (H) 6.3 - 8.2 g/dL    Albumin 3.1 (L) 3.2 - 4.6 g/dL    Globulin 5.2 (H) 2.3 - 3.5 g/dL    A-G Ratio 0.6 (L) 1.2 - 3.5         Procedures

## 2021-09-28 NOTE — H&P
Hospitalist Admission History and Physical     NAME:  Sandra Goldstein   Age:  70 y.o.  :   1950   MRN:   [id number]  PCP: David Patel MD  Consulting MD:  Treatment Team: Attending Provider: Vero Gooden DO; Nurse Practitioner: George Quintana NP    Chief Complaint   Patient presents with    Positive For Covid-19         HPI:   Patient is a 70 y.o. male who presented to the ED for cc fatigue since being diagnosed with COVID this past () with poor PO intake and worsening SOB. Hx of CAD s/p CABG, HTN, s/p aortic valve replacement, sCHF EF 30%, pulmonary HTN, melanoma several years ago. Vitals - Hypoxic during exertion on room air as low as 82%. Labs- Na 133, AST 74. Past Medical History:   Diagnosis Date    Arthritis     generalized OA    CAD (coronary artery disease) 2018    Followed by [facility].     CHF (congestive heart failure) (HCC)     GERD (gastroesophageal reflux disease)     OTC alkaseltzer    History of kidney stones 2016    naturally passed    History of melanoma 2016    removed from nose    Hx of CABG     Hypertension 2018    PUD (peptic ulcer disease)     1970's- patient denies 10/01/18    Right hip pain     S/P AVR (aortic valve replacement)     Sleep apnea     observed to stop breathing in sleep; no CPAP        Past Surgical History:   Procedure Laterality Date    HX AORTIC VALVE REPLACEMENT  10/03/2018    CORONARY ARTERY BYPASS GRAFT X 1, LIMA to the LAD WITH AVR 23 mm Intuity valve    HX APPENDECTOMY       15 yo    HX CORONARY ARTERY BYPASS GRAFT  10/03/2018    CABG x1, Dr. Daniel Goncalves      right lower ext - broken put in rods and screws    HX OTHER SURGICAL      melanoma surgery to nose        Family History   Problem Relation Age of Onset    Cancer Mother         Leukemia    Alzheimer Mother     Cancer Brother         Leukemia    Hypertension Brother     No Known Problems Father     Diabetes Sister  No Known Problems Brother        Social History     Social History Narrative    Not on file        Social History     Tobacco Use    Smoking status: Never Smoker    Smokeless tobacco: Never Used   Substance Use Topics    Alcohol use: Yes     Comment: occasional        Social History     Substance and Sexual Activity   Drug Use No         Allergies   Allergen Reactions    Lortab [Hydrocodone-Acetaminophen] Other (comments)     hallucinations       Prior to Admission medications    Medication Sig Start Date End Date Taking? Authorizing Provider   hyoscyamine SL (LEVSIN/SL) 0.125 mg SL tablet 1 Tablet by SubLINGual route every four (4) hours as needed for Cramping. 9/26/21   Jazzy Pittman MD   albuterol (Ventolin HFA) 90 mcg/actuation inhaler Take 2 Puffs by inhalation every four (4) hours as needed for Wheezing, Shortness of Breath or Cough. 9/26/21   Jazzy Pittman MD   cyanocobalamin 1,000 mcg tablet Take 500 mcg by mouth daily. Provider, Historical   aspirin delayed-release 81 mg tablet Take 1 Tablet by mouth two (2) times a day. 7/26/21   Andrea Boogie MD   acetaminophen (TYLENOL) 500 mg tablet Take 2 Tablets by mouth every six (6) hours as needed for Pain. 7/26/21   Andrea Boogie MD   atorvastatin (Lipitor) 40 mg tablet Take 40 mg by mouth daily. Provider, Historical   metoprolol succinate (TOPROL-XL) 50 mg XL tablet Take 1 Tab by mouth daily. 3/12/21   Amena Peters MD   valsartan (DIOVAN) 40 mg tablet Take 1 Tab by mouth daily. 3/12/21   Amena Peters MD   spironolactone (ALDACTONE) 25 mg tablet Take 1 Tab by mouth daily. 3/12/21   Amena Peters MD   multivitamin (ONE A DAY) tablet Take 1 Tab by mouth daily.     Provider, Historical           Review of Systems    Constitutional:generalized weakness  Eyes:  no change in visual acuity, no photophobia  Ears, nose, mouth, throat, and face: no  Odynphagia, dysphagia, no thrush or exudate, negative for chronic sinus congestion, recurrent headaches  Respiratory: SOB even at rest.   Cardiovascular: negative for CP, palpitations, or PND  Gastrointestinal: abdominal cramping. Poor PO intake  Genitourinary: no urgency, frequency, or dysuria, no nocturia  Integument/breast: negative for skin rash or skin lesions  Hematologic/lymphatic: negative for known bleeding disorder  Musculoskeletal:negative for joint pain or joint tenderness  Neurological: generalized weakness  Behavioral/Psych: negative for depression or chronic anxiety,   Endocrine: negative for polydyspia, polyuria or intolerance to heat or cold  Allergic/Immunologic: negative for chronic allergic rhinitis, or known connective tissue disorder      Objective:     Patient Vitals for the past 24 hrs:   Temp Pulse Resp BP SpO2   09/28/21 1643     97 %   09/28/21 1630  94   (!) 82 %   09/28/21 1516 97.9 °F (36.6 °C) 73 17 108/76 92 %        No intake/output data recorded. No intake/output data recorded. Data Review:   Recent Results (from the past 24 hour(s))   CBC WITH AUTOMATED DIFF    Collection Time: 09/28/21  3:27 PM   Result Value Ref Range    WBC 6.8 4.3 - 11.1 K/uL    RBC 4.98 4.23 - 5.6 M/uL    HGB 13.8 13.6 - 17.2 g/dL    HCT 41.8 41.1 - 50.3 %    MCV 83.9 79.6 - 97.8 FL    MCH 27.7 26.1 - 32.9 PG    MCHC 33.0 31.4 - 35.0 g/dL    RDW 13.1 11.9 - 14.6 %    PLATELET 854 (L) 972 - 450 K/uL    MPV 9.7 9.4 - 12.3 FL    ABSOLUTE NRBC 0.00 0.0 - 0.2 K/uL    DF AUTOMATED      NEUTROPHILS 80 (H) 43 - 78 %    LYMPHOCYTES 16 13 - 44 %    MONOCYTES 4 4.0 - 12.0 %    EOSINOPHILS 0 (L) 0.5 - 7.8 %    BASOPHILS 0 0.0 - 2.0 %    IMMATURE GRANULOCYTES 1 0.0 - 5.0 %    ABS. NEUTROPHILS 5.5 1.7 - 8.2 K/UL    ABS. LYMPHOCYTES 1.1 0.5 - 4.6 K/UL    ABS. MONOCYTES 0.3 0.1 - 1.3 K/UL    ABS. EOSINOPHILS 0.0 0.0 - 0.8 K/UL    ABS. BASOPHILS 0.0 0.0 - 0.2 K/UL    ABS. IMM.  GRANS. 0.0 0.0 - 0.5 K/UL   METABOLIC PANEL, COMPREHENSIVE    Collection Time: 09/28/21  3:27 PM   Result Value Ref Range Sodium 133 (L) 136 - 145 mmol/L    Potassium 4.4 3.5 - 5.1 mmol/L    Chloride 101 98 - 107 mmol/L    CO2 23 21 - 32 mmol/L    Anion gap 9 7 - 16 mmol/L    Glucose 173 (H) 65 - 100 mg/dL    BUN 25 (H) 8 - 23 MG/DL    Creatinine 1.29 0.8 - 1.5 MG/DL    GFR est AA >60 >60 ml/min/1.73m2    GFR est non-AA 58 (L) >60 ml/min/1.73m2    Calcium 8.8 8.3 - 10.4 MG/DL    Bilirubin, total 0.6 0.2 - 1.1 MG/DL    ALT (SGPT) 42 12 - 65 U/L    AST (SGOT) 74 (H) 15 - 37 U/L    Alk. phosphatase 39 (L) 50 - 136 U/L    Protein, total 8.3 (H) 6.3 - 8.2 g/dL    Albumin 3.1 (L) 3.2 - 4.6 g/dL    Globulin 5.2 (H) 2.3 - 3.5 g/dL    A-G Ratio 0.6 (L) 1.2 - 3.5         Physical Exam:     General:  Alert but tired appearing, cooperative, no distress, appears stated age. Mild increased work of breathing noted. Eyes:  Conjunctivae/corneas clear. Ears:  Normal TMs and external ear canals both ears. Nose: Nares normal.    Mouth/Throat: Dry tongue   Neck:  no JVD. Back:   deferred   Lungs:   Limited breathe sound at LLL   Heart:  Regular rate and rhythm, S1, S2 normal   Abdomen:   Soft, non-tender. Bowel sounds normal. Obese   Extremities: Extremities normal, atraumatic, no cyanosis or edema. Pulses: 2+ and symmetric all extremities. Skin: Skin color, texture, turgor normal. No rashes or lesions   Lymph nodes: Cervical, supraclavicular, and axillary nodes normal.   Neurologic: CNII-XII intact. Limited ROM in bed. Assessment and Plan     Principal Problem:    COVID-19 (9/28/2021)    Active Problems:     Aortic stenosis (9/11/2018)      CAD (coronary artery disease) (9/11/2018)      Hypertension (9/11/2018)      Status post coronary artery bypass graft (10/3/2018)      Overview: 10/3/18 Dr. Mikki Leal      CABG X 1, LIMA to the LAD WITH AVR 23 mm Intuity valve      S/P AVR (aortic valve replacement) (10/3/2018)      Overview: 10/3/18  Dr. Mikki Leal      CABG X 1, LIMA to the LAD WITH AVR 23 mm Intuity valve      Hypoxia (20/9/1246)      Systolic CHF, chronic (HCC) (9/28/2021)    COVID with hypoxia - decadron 6mg daily. Albuterol. Vitamin C, Zinc, check vitamin D level. PPI BID. Check CRP and procal    sCHF EF 30% - Place on cardiac monitor. Hold spironolactone due to poor PO intake and dry on exam. Order 250cc NS. BB. ARB. Dry on exam    CAD - ASA. Hold statin    Lives alone. PPD. PT/OT    He would like full liquid diet for now.      FULL CODE    DVT prophylaxis - Lovenox    Signed By: Jenny Cunningham,    September 28, 2021

## 2021-09-29 PROBLEM — J96.01 ACUTE HYPOXEMIC RESPIRATORY FAILURE DUE TO COVID-19 (HCC): Status: ACTIVE | Noted: 2021-09-28

## 2021-09-29 PROBLEM — E66.9 CLASS 1 OBESITY WITH SERIOUS COMORBIDITY AND BODY MASS INDEX (BMI) OF 31.0 TO 31.9 IN ADULT: Status: ACTIVE | Noted: 2021-09-29

## 2021-09-29 PROBLEM — J12.82 PNEUMONIA DUE TO COVID-19 VIRUS: Status: ACTIVE | Noted: 2018-10-03

## 2021-09-29 PROBLEM — U07.1 PNEUMONIA DUE TO COVID-19 VIRUS: Status: ACTIVE | Noted: 2018-10-03

## 2021-09-29 LAB
25(OH)D3 SERPL-MCNC: 31.2 NG/ML (ref 30–100)
ALBUMIN SERPL-MCNC: 2.9 G/DL (ref 3.2–4.6)
ALBUMIN/GLOB SERPL: 0.6 {RATIO} (ref 1.2–3.5)
ALP SERPL-CCNC: 42 U/L (ref 50–136)
ALT SERPL-CCNC: 49 U/L (ref 12–65)
ANION GAP SERPL CALC-SCNC: 10 MMOL/L (ref 7–16)
AST SERPL-CCNC: 73 U/L (ref 15–37)
ATRIAL RATE: 93 BPM
BASOPHILS # BLD: 0 K/UL (ref 0–0.2)
BASOPHILS NFR BLD: 0 % (ref 0–2)
BILIRUB SERPL-MCNC: 0.6 MG/DL (ref 0.2–1.1)
BUN SERPL-MCNC: 25 MG/DL (ref 8–23)
CALCIUM SERPL-MCNC: 8.6 MG/DL (ref 8.3–10.4)
CALCULATED P AXIS, ECG09: 35 DEGREES
CALCULATED R AXIS, ECG10: 79 DEGREES
CALCULATED T AXIS, ECG11: -62 DEGREES
CHLORIDE SERPL-SCNC: 102 MMOL/L (ref 98–107)
CO2 SERPL-SCNC: 23 MMOL/L (ref 21–32)
CREAT SERPL-MCNC: 1.21 MG/DL (ref 0.8–1.5)
DIAGNOSIS, 93000: NORMAL
DIFFERENTIAL METHOD BLD: ABNORMAL
EOSINOPHIL # BLD: 0 K/UL (ref 0–0.8)
EOSINOPHIL NFR BLD: 0 % (ref 0.5–7.8)
ERYTHROCYTE [DISTWIDTH] IN BLOOD BY AUTOMATED COUNT: 12.9 % (ref 11.9–14.6)
GLOBULIN SER CALC-MCNC: 5.1 G/DL (ref 2.3–3.5)
GLUCOSE SERPL-MCNC: 193 MG/DL (ref 65–100)
HCT VFR BLD AUTO: 41.6 % (ref 41.1–50.3)
HGB BLD-MCNC: 13.1 G/DL (ref 13.6–17.2)
IMM GRANULOCYTES # BLD AUTO: 0 K/UL (ref 0–0.5)
IMM GRANULOCYTES NFR BLD AUTO: 0 % (ref 0–5)
LYMPHOCYTES # BLD: 0.6 K/UL (ref 0.5–4.6)
LYMPHOCYTES NFR BLD: 14 % (ref 13–44)
MCH RBC QN AUTO: 26.6 PG (ref 26.1–32.9)
MCHC RBC AUTO-ENTMCNC: 31.5 G/DL (ref 31.4–35)
MCV RBC AUTO: 84.6 FL (ref 79.6–97.8)
MONOCYTES # BLD: 0.2 K/UL (ref 0.1–1.3)
MONOCYTES NFR BLD: 4 % (ref 4–12)
NEUTS SEG # BLD: 3.4 K/UL (ref 1.7–8.2)
NEUTS SEG NFR BLD: 82 % (ref 43–78)
NRBC # BLD: 0 K/UL (ref 0–0.2)
P-R INTERVAL, ECG05: 198 MS
PLATELET # BLD AUTO: 131 K/UL (ref 150–450)
PMV BLD AUTO: 9.4 FL (ref 9.4–12.3)
POTASSIUM SERPL-SCNC: 4.4 MMOL/L (ref 3.5–5.1)
PROT SERPL-MCNC: 8 G/DL (ref 6.3–8.2)
Q-T INTERVAL, ECG07: 414 MS
QRS DURATION, ECG06: 172 MS
QTC CALCULATION (BEZET), ECG08: 514 MS
RBC # BLD AUTO: 4.92 M/UL (ref 4.23–5.6)
SODIUM SERPL-SCNC: 135 MMOL/L (ref 138–145)
VENTRICULAR RATE, ECG03: 93 BPM
WBC # BLD AUTO: 4.2 K/UL (ref 4.3–11.1)

## 2021-09-29 PROCEDURE — 77010033678 HC OXYGEN DAILY

## 2021-09-29 PROCEDURE — 86580 TB INTRADERMAL TEST: CPT | Performed by: FAMILY MEDICINE

## 2021-09-29 PROCEDURE — 74011000258 HC RX REV CODE- 258: Performed by: FAMILY MEDICINE

## 2021-09-29 PROCEDURE — 94640 AIRWAY INHALATION TREATMENT: CPT

## 2021-09-29 PROCEDURE — 36415 COLL VENOUS BLD VENIPUNCTURE: CPT

## 2021-09-29 PROCEDURE — 80053 COMPREHEN METABOLIC PANEL: CPT

## 2021-09-29 PROCEDURE — 94760 N-INVAS EAR/PLS OXIMETRY 1: CPT

## 2021-09-29 PROCEDURE — 97161 PT EVAL LOW COMPLEX 20 MIN: CPT | Performed by: PHYSICAL THERAPIST

## 2021-09-29 PROCEDURE — 74011250637 HC RX REV CODE- 250/637: Performed by: FAMILY MEDICINE

## 2021-09-29 PROCEDURE — 97165 OT EVAL LOW COMPLEX 30 MIN: CPT

## 2021-09-29 PROCEDURE — 74011000250 HC RX REV CODE- 250: Performed by: FAMILY MEDICINE

## 2021-09-29 PROCEDURE — 97535 SELF CARE MNGMENT TRAINING: CPT

## 2021-09-29 PROCEDURE — 74011250636 HC RX REV CODE- 250/636: Performed by: FAMILY MEDICINE

## 2021-09-29 PROCEDURE — 65660000000 HC RM CCU STEPDOWN

## 2021-09-29 PROCEDURE — 85025 COMPLETE CBC W/AUTO DIFF WBC: CPT

## 2021-09-29 PROCEDURE — 97530 THERAPEUTIC ACTIVITIES: CPT | Performed by: PHYSICAL THERAPIST

## 2021-09-29 RX ORDER — AZITHROMYCIN 250 MG/1
500 TABLET, FILM COATED ORAL DAILY
Status: DISCONTINUED | OUTPATIENT
Start: 2021-09-29 | End: 2021-09-30 | Stop reason: HOSPADM

## 2021-09-29 RX ORDER — FAMOTIDINE 20 MG/1
20 TABLET, FILM COATED ORAL 2 TIMES DAILY
Status: DISCONTINUED | OUTPATIENT
Start: 2021-09-29 | End: 2021-09-30 | Stop reason: HOSPADM

## 2021-09-29 RX ORDER — ATORVASTATIN CALCIUM 40 MG/1
40 TABLET, FILM COATED ORAL
Status: DISCONTINUED | OUTPATIENT
Start: 2021-09-29 | End: 2021-09-30 | Stop reason: HOSPADM

## 2021-09-29 RX ADMIN — ENOXAPARIN SODIUM 40 MG: 40 INJECTION SUBCUTANEOUS at 08:39

## 2021-09-29 RX ADMIN — Medication 1 EACH: at 01:09

## 2021-09-29 RX ADMIN — VALSARTAN 40 MG: 40 TABLET, FILM COATED ORAL at 08:40

## 2021-09-29 RX ADMIN — METOPROLOL SUCCINATE 50 MG: 50 TABLET, EXTENDED RELEASE ORAL at 08:39

## 2021-09-29 RX ADMIN — ALBUTEROL SULFATE 2 PUFF: 90 AEROSOL, METERED RESPIRATORY (INHALATION) at 13:47

## 2021-09-29 RX ADMIN — ALBUTEROL SULFATE 2 PUFF: 90 AEROSOL, METERED RESPIRATORY (INHALATION) at 08:54

## 2021-09-29 RX ADMIN — ASPIRIN 81 MG: 81 TABLET ORAL at 17:12

## 2021-09-29 RX ADMIN — FAMOTIDINE 20 MG: 20 TABLET ORAL at 17:13

## 2021-09-29 RX ADMIN — ASPIRIN 81 MG: 81 TABLET ORAL at 08:40

## 2021-09-29 RX ADMIN — ATORVASTATIN CALCIUM 40 MG: 40 TABLET, FILM COATED ORAL at 21:43

## 2021-09-29 RX ADMIN — Medication 10 ML: at 21:43

## 2021-09-29 RX ADMIN — DEXAMETHASONE SODIUM PHOSPHATE 6 MG: 10 INJECTION, SOLUTION INTRAMUSCULAR; INTRAVENOUS at 17:12

## 2021-09-29 RX ADMIN — FAMOTIDINE 20 MG: 10 INJECTION INTRAVENOUS at 08:39

## 2021-09-29 RX ADMIN — Medication 1 CAPSULE: at 08:40

## 2021-09-29 RX ADMIN — OXYCODONE HYDROCHLORIDE AND ACETAMINOPHEN 500 MG: 500 TABLET ORAL at 08:40

## 2021-09-29 RX ADMIN — Medication 10 ML: at 06:58

## 2021-09-29 RX ADMIN — ALBUTEROL SULFATE 2 PUFF: 90 AEROSOL, METERED RESPIRATORY (INHALATION) at 11:35

## 2021-09-29 RX ADMIN — Medication 10 ML: at 13:25

## 2021-09-29 RX ADMIN — TUBERCULIN PURIFIED PROTEIN DERIVATIVE 5 UNITS: 5 INJECTION, SOLUTION INTRADERMAL at 06:48

## 2021-09-29 RX ADMIN — ALBUTEROL SULFATE 2 PUFF: 90 AEROSOL, METERED RESPIRATORY (INHALATION) at 20:00

## 2021-09-29 RX ADMIN — OXYCODONE HYDROCHLORIDE AND ACETAMINOPHEN 500 MG: 500 TABLET ORAL at 17:13

## 2021-09-29 RX ADMIN — CEFTRIAXONE 1 G: 1 INJECTION, POWDER, FOR SOLUTION INTRAMUSCULAR; INTRAVENOUS at 13:25

## 2021-09-29 RX ADMIN — AZITHROMYCIN MONOHYDRATE 500 MG: 250 TABLET ORAL at 13:25

## 2021-09-29 NOTE — ED NOTES
TRANSFER - OUT REPORT:    Verbal report given to Abbie Davis RN on Rylee Kohler  being transferred to 6th Floor for routine progression of care       Report consisted of patients Situation, Background, Assessment and   Recommendations(SBAR). Information from the following report(s) SBAR, Kardex, ED Summary and Recent Results was reviewed with the receiving nurse. Lines:   Peripheral IV 09/28/21 Left Wrist (Active)   Site Assessment Clean, dry, & intact 09/28/21 1520   Phlebitis Assessment 0 09/28/21 1520   Infiltration Assessment 0 09/28/21 1520   Dressing Status Clean, dry, & intact 09/28/21 1520        Opportunity for questions and clarification was provided.       Patient transported with:   [employer]

## 2021-09-29 NOTE — PROGRESS NOTES
ACUTE PHYSICAL THERAPY GOALS:  (Developed with and agreed upon by patient and/or caregiver. )    LTG:  (1.)Mr. Patria Tyler will move from supine to sit and sit to supine , scoot up and down and roll side to side in bed with INDEPENDENT within 7 treatment day(s). (2.)Mr. Patria Tyler will transfer from bed to chair and chair to bed with INDEPENDENT using the least restrictive device within 7 treatment day(s). (3.)Mr. Mcfarland will ambulate with INDEPENDENT for 300 feet with the least restrictive device within 7 treatment day(s). (4.)Mr. Patria Tyler will tolerate at least 23 min of dynamic standing activity to assist standing ADLs with the least restrictive device within 7 treatment days. (5.)Mr. Patria Tyler will climb at least 7 steps with INDEPENDENT with the least restrictive device within 7 treatment days.     ________________________________________________________________________________________________        PHYSICAL THERAPY ASSESSMENT: Initial Assessment, Daily Note and AM PT Treatment Day # 1      Adina Kuhn is a 70 y.o. male   PRIMARY DIAGNOSIS: Acute hypoxemic respiratory failure due to COVID-19 ([facility]  COVID-19 [U07.1]  Hypoxia [R09.02]       Reason for Referral:    ICD-10: Treatment Diagnosis: Generalized Muscle Weakness (M62.81)  Other lack of cordination (R27.8)  Difficulty in walking, Not elsewhere classified (R26.2)  Other abnormalities of gait and mobility (R26.89)  History of falling (Z91.81)  Dizziness and Giddiness (R42)  INPATIENT: Payor: [facility] MEDICARE / [place] / Product Type: Managed Care Medicare /     ASSESSMENT:     REHAB RECOMMENDATIONS:   Recommendation to date pending progress:  [address] [place] [address]  Equipment:    To Be Determined     PRIOR LEVEL OF FUNCTION:  (Prior to Hospitalization) INITIAL/CURRENT LEVEL OF FUNCTION:  (Most Recently Demonstrated)   Bed Mobility:   Independent  Sit to Stand:   Independent  Transfers:   Independent  Gait/Mobility:   Independent Bed Mobility:   Contact Guard Assistance  Sit to Stand:   Contact Guard Assistance  Transfers:   Contact Guard Assistance  Gait/Mobility:   Contact Guard Assistance     ASSESSMENT:  Mr. Omega Krause presents in supine on Room Air. His original O2% is 98, but post room ambulation, he is noted to de-sat to low 80s and required 2L to recover. Upon entering, Pnt is agreeable to PT treatment. he reports no pain in his chest at rest. Pnt performed supine > sit with CGA, sitting EOB with good sitting balance control. Sit > stand with CGAx2 using HHA. Gait x 20 ft with CGA, cues for step length and posture. Gait is noted to be mildly unsteady. Stand > sit with CGA, followed by positioning for comfort. At end of session pt up in bedside chair w/ 2L donned with all needs within reach, alarm activated for safety, RN notified. Overall, he presents as functioning below his baseline, with deficits in mobility including transfers, gait, balance, and activity tolerance. Pt will continue to benefit from skilled therapy services to address stated deficits to promote return to highest level of function, independence, and safety. Will continue to follow.          SUBJECTIVE:   Mr. 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 )  OBJECTIVE:     PAIN: VITAL SIGNS: LINES/DRAINS:   Pre Treatment: Pain Screen  Pain Scale 1: Numeric (0 - 10)  Pain Intensity 1: 0  Post Treatment: 0   none  O2 Device: Nasal cannula     GROSS EVALUATION:   Within Functional Limits Abnormal/ Functional Abnormal/ Non-Functional (see comments) Not Tested Comments:   AROM [] [x] [] [] Decreased global extension   PROM [] [x] [] []    Strength [] [x] [] []    Balance [] [] [] []    Posture [x] [] [] [] Sensation [x] [] [] []    Coordination [x] [] [] []    Tone [x] [] [] []    Edema [] [] [] []    Activity Tolerance [] [x] [] [] De-saturates with in room ambulation    [] [] [] []      COGNITION/  PERCEPTION: Intact Impaired   (see comments) Comments:   Orientation [x] []    Vision [x] []    Hearing [x] []    Command Following [x] []    Safety Awareness [x] []     [] []      MOBILITY: I Mod I S SBA CGA Min Mod Max Total  NT x2 Comments:   Bed Mobility    Rolling [] [] [] [] [x] [] [] [] [] [] []    Supine to Sit [] [] [] [] [x] [] [] [] [] [] []    Scooting [] [] [] [] [x] [] [] [] [] [] []    Sit to Supine [] [] [] [] [x] [] [] [] [] [] []    Transfers    Sit to Stand [] [] [] [] [x] [] [] [] [] [] []    Bed to Chair [] [] [] [] [x] [] [] [] [] [] []    Stand to Sit [] [] [] [] [x] [] [] [] [] [] []    I=Independent, Mod I=Modified Independent, S=Supervision, SBA=Standby Assistance, CGA=Contact Guard Assistance,   Min=Minimal Assistance, Mod=Moderate Assistance, Max=Maximal Assistance, Total=Total Assistance, NT=Not Tested  GAIT: I Mod I S SBA CGA Min Mod Max Total  NT x2 Comments:   Level of Assistance [] [] [] [] [x] [] [] [] [] [] []    Distance x20'    DME N/A    Gait Quality slowed    Weightbearing Status N/A     I=Independent, Mod I=Modified Independent, S=Supervision, SBA=Standby Assistance, CGA=Contact Guard Assistance,   Min=Minimal Assistance, Mod=Moderate Assistance, Max=Maximal Assistance, Total=Total Assistance, NT=Not Tested    [address] \[facility] [place] [facility]\"" [address] [address] AM-[facility] Form       How much difficulty does the patient currently have. .. Unable A Lot A Little None   1. Turning over in bed (including adjusting bedclothes, sheets and blankets)? [] 1   [] 2   [x] 3   [] 4   2. Sitting down on and standing up from a chair with arms ( e.g., wheelchair, bedside commode, etc.)   [] 1   [] 2   [x] 3   [] 4   3. Moving from lying on back to sitting on the side of the bed?    [] 1 [] 2   [x] 3   [] 4   How much help from another person does the patient currently need. .. Total A Lot A Little None   4. Moving to and from a bed to a chair (including a wheelchair)? [] 1   [] 2   [x] 3   [] 4   5. Need to walk in hospital room? [] 1   [] 2   [x] 3   [] 4   6. Climbing 3-5 steps with a railing? [] 1   [] 2   [x] 3   [] 4   © 2007, Trustees of [address] [place] [address] [address] [address], under license to [employer]. All rights reserved     Score:  Initial: 18 Most Recent: X (Date: -- )    Interpretation of Tool:  Represents activities that are increasingly more difficult (i.e. Bed mobility, Transfers, Gait). PLAN:   FREQUENCY/DURATION: PT Plan of Care: 3 times/week for duration of hospital stay or until stated goals are met, whichever comes first.    PROBLEM LIST:   (Skilled intervention is medically necessary to address:)  1. Decreased ADL/Functional Activities  2. Decreased Activity Tolerance  3. Decreased AROM/PROM  4. Decreased Balance  5. Decreased Coordination  6. Decreased Gait Ability  7. Decreased Strength  8. Decreased Transfer Abilities  9. Increased Pain   INTERVENTIONS PLANNED:   (Benefits and precautions of physical therapy have been discussed with the patient.)  1. Therapeutic Activity  2. Therapeutic Exercise/HEP  3. Neuromuscular Re-education  4. Gait Training  5. Manual Therapy  6. Education     TREATMENT:     EVALUATION: Low Complexity : (Untimed Charge)    TREATMENT:   ($$ Therapeutic Activity: 8-22 mins    )  Co-Treatment PT/OT necessary due to patient's decreased overall endurance/tolerance levels, as well as need for high level skilled assistance to complete functional transfers/mobility and functional tasks  Therapeutic Activity (15 Minutes): Therapeutic activity included Ambulation on level ground, Sitting balance , Standing balance and pursed-lip breathing to improve functional Mobility, Strength, ROM and Activity tolerance.     TREATMENT GRID:  N/A    AFTER TREATMENT POSITION/PRECAUTIONS:  Chair, Needs within reach, RN notified and 2L donned    INTERDISCIPLINARY COLLABORATION:  RN/PCT, PT/PTA and OT/SALDANA    TOTAL TREATMENT DURATION:  PT Patient Time In/Time Out  Time In: 1035  Time Out: [address]

## 2021-09-29 NOTE — PROGRESS NOTES
Hospitalist Progress Note   Admit Date:  2021  4:07 PM   Name:  Jv Aranda   Age:  70 y.o. Sex:  male  :  1950   MRN:  [id number]   Room:  [facility]    Presenting Complaint: Positive For Covid-19    Reason(s) for Admission: COVID-19 [U07.1]  Hypoxia [facility] Course & Interval History:   Patient is a 70 y.o. male who presented to the ED for cc fatigue since being diagnosed with COVID this past () with poor PO intake and worsening SOB. Hx of CAD s/p CABG, HTN, s/p aortic valve replacement, sCHF EF 30%, pulmonary HTN, melanoma several years ago. Unvaccinated Vitals - Hypoxic during exertion on room air as low as 82%. Subjective (21): Weaned to RA at rest but did require oxygen with exertion per my discussion with PT/OT. Patient is feeling some better. He declines experimental drugs for covid. Assessment & Plan:     Acute hypoxemic respiratory failure 2/2 COVID-19 and superimposed bacterial PNA. Unvaccinated. Declines actemra/baricitinib  procal 0.90. start azithromycin x 3 days plus rocephin x 5   Wean o2 as tolerated. PT/OT   Cont. Decadron and albuterol     HFrEF EF 30% - euvolemic. Cont BB and valsartan. Cont. Holding aldactone, restart tomorrow if stable.      Obesity - increases risk of severe/critical covid       Dispo/Discharge Planning:      Home 1-2 days     Diet:  ADULT DIET Regular  DVT PPx: lmwh   Code status: Full Code    Hospital Problems as of 2021 Date Reviewed: 2021        Codes Class Noted - Resolved POA    Class 1 obesity with serious comorbidity and body mass index (BMI) of 31.0 to 31.9 in adult ICD-10-CM: E66.9, Z68.31  ICD-9-CM: 278.00, V85.31  2021 - Present Yes        * (Principal) Acute hypoxemic respiratory failure due to COVID-19 [facility]) ICD-10-CM: U07.1, J96.01  ICD-9-CM: 518.81, 079.89, 799.02  2021 - Present         Systolic CHF, chronic (HCC) ICD-10-CM: I50.22  ICD-9-CM: 428.22, 428.0  2021 - Present Unknown        Status post coronary artery bypass graft ICD-10-CM: Z95.1  ICD-9-CM: V45.81  10/3/2018 - Present Yes    Overview Signed 10/3/2018 12:20 PM by Joce Nguyen NP     10/3/18 Dr. Keon Brady  CABG X 1, LIMA to the LAD WITH AVR 23 mm Intuity valve             S/P AVR (aortic valve replacement) ICD-10-CM: Z95.2  ICD-9-CM: V43.3  10/3/2018 - Present Yes    Overview Signed 10/3/2018 12:21 PM by Joce Nguyen NP     10/3/18  Dr. Keon Brady  CABG X 1, LIMA to the LAD WITH AVR 23 mm Intuity valve             Pneumonia due to COVID-19 virus ICD-10-CM: U07.1, J12.82  ICD-9-CM: 480.8, 079.89  10/3/2018 - Present         Aortic stenosis ICD-10-CM: I35.0  ICD-9-CM: 424.1  9/11/2018 - Present Yes        CAD (coronary artery disease) (Chronic) ICD-10-CM: I25.10  ICD-9-CM: 414.00  9/11/2018 - Present Yes        Hypertension (Chronic) ICD-10-CM: I10  ICD-9-CM: 401.9  9/11/2018 - Present Yes              Objective:     Patient Vitals for the past 24 hrs:   Temp Pulse Resp BP SpO2   09/29/21 1135     91 %   09/29/21 1056 98.6 °F (37 °C) 96 18 126/78 92 %   09/29/21 0854     92 %   09/29/21 0757 97.9 °F (36.6 °C) 63 20 125/83 92 %   09/29/21 0223 97.6 °F (36.4 °C) 85 20 128/80 95 %   09/28/21 2331 98.7 °F (37.1 °C) 85 20 121/76 90 %   09/28/21 2201  84  125/77 94 %   09/28/21 2132  97  119/75    09/28/21 1931  86 29 117/75 97 %   09/28/21 1902  86 18 119/77 94 %   09/28/21 1831  84 (!) 32 129/78 100 %   09/28/21 1801  87 29 122/75 96 %   09/28/21 1731  89 (!) 35 118/72 (!) 86 %   09/28/21 1701  93 24 131/74 97 %   09/28/21 1643     97 %   09/28/21 1631  (!) 101  120/63 93 %   09/28/21 1630  94   (!) 82 %   09/28/21 1616  92  123/77 94 %   09/28/21 1516 97.9 °F (36.6 °C) 73 17 108/76 92 %     Oxygen Therapy  O2 Sat (%): 91 % (09/29/21 1135)  Pulse via Oximetry: 74 beats per minute (09/29/21 1135)  O2 Device: Nasal cannula (09/29/21 1135)  Skin Assessment: Clean, dry, & intact (09/29/21 1135)  O2 Flow Rate (L/min): 2 l/min (09/29/21 1135)    Estimated body mass index is 31.42 kg/m² as calculated from the following:    Height as of this encounter: 5' 10\" (1.778 m). Weight as of this encounter: 99.3 kg (218 lb 15.3 oz). No intake or output data in the 24 hours ending 09/29/21 1206      Physical Exam:     Physical Exam  Vitals and nursing note reviewed. Constitutional:       Appearance: He is obese. He is not ill-appearing. HENT:      Head:      Comments: Poor dentition   Cardiovascular:      Rate and Rhythm: Normal rate and regular rhythm. Pulmonary:      Effort: Pulmonary effort is normal. No respiratory distress. Abdominal:      Palpations: Abdomen is soft. Neurological:      Mental Status: He is alert and oriented to person, place, and time. Psychiatric:         Mood and Affect: Mood normal.         I have reviewed ordered lab tests and independently visualized imaging below:    Last 24hr Labs:  Recent Results (from the past 24 hour(s))   VITAMIN D, 25 HYDROXY    Collection Time: 09/28/21  3:20 PM   Result Value Ref Range    Vitamin D 25-Hydroxy 31.2 30.0 - 100.0 ng/mL   CBC WITH AUTOMATED DIFF    Collection Time: 09/28/21  3:27 PM   Result Value Ref Range    WBC 6.8 4.3 - 11.1 K/uL    RBC 4.98 4.23 - 5.6 M/uL    HGB 13.8 13.6 - 17.2 g/dL    HCT 41.8 41.1 - 50.3 %    MCV 83.9 79.6 - 97.8 FL    MCH 27.7 26.1 - 32.9 PG    MCHC 33.0 31.4 - 35.0 g/dL    RDW 13.1 11.9 - 14.6 %    PLATELET 027 (L) 078 - 450 K/uL    MPV 9.7 9.4 - 12.3 FL    ABSOLUTE NRBC 0.00 0.0 - 0.2 K/uL    DF AUTOMATED      NEUTROPHILS 80 (H) 43 - 78 %    LYMPHOCYTES 16 13 - 44 %    MONOCYTES 4 4.0 - 12.0 %    EOSINOPHILS 0 (L) 0.5 - 7.8 %    BASOPHILS 0 0.0 - 2.0 %    IMMATURE GRANULOCYTES 1 0.0 - 5.0 %    ABS. NEUTROPHILS 5.5 1.7 - 8.2 K/UL    ABS. LYMPHOCYTES 1.1 0.5 - 4.6 K/UL    ABS. MONOCYTES 0.3 0.1 - 1.3 K/UL    ABS. EOSINOPHILS 0.0 0.0 - 0.8 K/UL    ABS. BASOPHILS 0.0 0.0 - 0.2 K/UL    ABS. IMM. GRANS. 0.0 0.0 - 0.5 K/UL   METABOLIC PANEL, COMPREHENSIVE    Collection Time: 09/28/21  3:27 PM   Result Value Ref Range    Sodium 133 (L) 136 - 145 mmol/L    Potassium 4.4 3.5 - 5.1 mmol/L    Chloride 101 98 - 107 mmol/L    CO2 23 21 - 32 mmol/L    Anion gap 9 7 - 16 mmol/L    Glucose 173 (H) 65 - 100 mg/dL    BUN 25 (H) 8 - 23 MG/DL    Creatinine 1.29 0.8 - 1.5 MG/DL    GFR est AA >60 >60 ml/min/1.73m2    GFR est non-AA 58 (L) >60 ml/min/1.73m2    Calcium 8.8 8.3 - 10.4 MG/DL    Bilirubin, total 0.6 0.2 - 1.1 MG/DL    ALT (SGPT) 42 12 - 65 U/L    AST (SGOT) 74 (H) 15 - 37 U/L    Alk. phosphatase 39 (L) 50 - 136 U/L    Protein, total 8.3 (H) 6.3 - 8.2 g/dL    Albumin 3.1 (L) 3.2 - 4.6 g/dL    Globulin 5.2 (H) 2.3 - 3.5 g/dL    A-G Ratio 0.6 (L) 1.2 - 3.5     C REACTIVE PROTEIN, QT    Collection Time: 09/28/21  3:27 PM   Result Value Ref Range    C-Reactive protein 8.7 (H) 0.0 - 0.9 mg/dL   PROCALCITONIN    Collection Time: 09/28/21  3:27 PM   Result Value Ref Range    Procalcitonin 0.90 ng/mL   TROPONIN-HIGH SENSITIVITY    Collection Time: 09/28/21  3:27 PM   Result Value Ref Range    Troponin-High Sensitivity 64.8 (H) 0 - 14 pg/mL   EKG, 12 LEAD, INITIAL    Collection Time: 09/28/21  5:00 PM   Result Value Ref Range    Ventricular Rate 93 BPM    Atrial Rate 93 BPM    P-R Interval 198 ms    QRS Duration 172 ms    Q-T Interval 414 ms    QTC Calculation (Bezet) 514 ms    Calculated P Axis 35 degrees    Calculated R Axis 79 degrees    Calculated T Axis -62 degrees    Diagnosis       !! AGE AND GENDER SPECIFIC ECG ANALYSIS !!   Normal sinus rhythm with first degree avb  Left bundle branch block  Abnormal ECG  When compared with ECG of 28-SEP-2021 10:05,  Left anterior fascicular block is no longer Present  Left bundle branch block is now Present  Confirmed by Faustina Laguna MD (), AURELIO GAINES ([id number]) on 9/29/2021 2:28:64 AM     METABOLIC PANEL, COMPREHENSIVE    Collection Time: 09/29/21  6:13 AM   Result Value Ref Range Sodium 135 (L) 138 - 145 mmol/L    Potassium 4.4 3.5 - 5.1 mmol/L    Chloride 102 98 - 107 mmol/L    CO2 23 21 - 32 mmol/L    Anion gap 10 7 - 16 mmol/L    Glucose 193 (H) 65 - 100 mg/dL    BUN 25 (H) 8 - 23 MG/DL    Creatinine 1.21 0.8 - 1.5 MG/DL    GFR est AA >60 >60 ml/min/1.73m2    GFR est non-AA >60 >60 ml/min/1.73m2    Calcium 8.6 8.3 - 10.4 MG/DL    Bilirubin, total 0.6 0.2 - 1.1 MG/DL    ALT (SGPT) 49 12 - 65 U/L    AST (SGOT) 73 (H) 15 - 37 U/L    Alk. phosphatase 42 (L) 50 - 136 U/L    Protein, total 8.0 6.3 - 8.2 g/dL    Albumin 2.9 (L) 3.2 - 4.6 g/dL    Globulin 5.1 (H) 2.3 - 3.5 g/dL    A-G Ratio 0.6 (L) 1.2 - 3.5     CBC WITH AUTOMATED DIFF    Collection Time: 09/29/21  6:13 AM   Result Value Ref Range    WBC 4.2 (L) 4.3 - 11.1 K/uL    RBC 4.92 4.23 - 5.6 M/uL    HGB 13.1 (L) 13.6 - 17.2 g/dL    HCT 41.6 41.1 - 50.3 %    MCV 84.6 79.6 - 97.8 FL    MCH 26.6 26.1 - 32.9 PG    MCHC 31.5 31.4 - 35.0 g/dL    RDW 12.9 11.9 - 14.6 %    PLATELET 866 (L) 724 - 450 K/uL    MPV 9.4 9.4 - 12.3 FL    ABSOLUTE NRBC 0.00 0.0 - 0.2 K/uL    NEUTROPHILS 82 (H) 43 - 78 %    LYMPHOCYTES 14 13 - 44 %    MONOCYTES 4 4.0 - 12.0 %    EOSINOPHILS 0 (L) 0.5 - 7.8 %    BASOPHILS 0 0.0 - 2.0 %    IMMATURE GRANULOCYTES 0 0.0 - 5.0 %    ABS. NEUTROPHILS 3.4 1.7 - 8.2 K/UL    ABS. LYMPHOCYTES 0.6 0.5 - 4.6 K/UL    ABS. MONOCYTES 0.2 0.1 - 1.3 K/UL    ABS. EOSINOPHILS 0.0 0.0 - 0.8 K/UL    ABS. BASOPHILS 0.0 0.0 - 0.2 K/UL    ABS. IMM. GRANS. 0.0 0.0 - 0.5 K/UL    DF AUTOMATED         All Micro Results     None          Other Studies:  XR CHEST PA LAT    Result Date: 9/28/2021  EXAM: 2 view chest radiograph. INDICATION: Chest pain and shortness of breath. Covid 19 positive. COMPARISON: Chest radiograph dated September 26, 2021. FINDINGS: Postsurgical change of aortic valve replacement. No pneumothorax. Mild left basilar reticular airspace opacities. Improved aeration of the right lung base. Heart appears normal in size.  Pulmonary venous hypertension without overt pulmonary edema. Sternal wires are intact. 1. Minimal left basilar reticular airspace opacities and improved aeration of the right lung base. 2. Pulmonary venous hypertension status post aortic valve replacement. Current Meds:  Current Facility-Administered Medications   Medication Dose Route Frequency    lip protectant (BLISTEX) ointment 1 Each  1 Each Topical PRN    sodium chloride (NS) flush 5-40 mL  5-40 mL IntraVENous Q8H    sodium chloride (NS) flush 5-40 mL  5-40 mL IntraVENous PRN    acetaminophen (TYLENOL) tablet 650 mg  650 mg Oral Q6H PRN    Or    acetaminophen (TYLENOL) suppository 650 mg  650 mg Rectal Q6H PRN    polyethylene glycol (MIRALAX) packet 17 g  17 g Oral DAILY PRN    ondansetron (ZOFRAN ODT) tablet 4 mg  4 mg Oral Q8H PRN    Or    ondansetron (ZOFRAN) injection 4 mg  4 mg IntraVENous Q6H PRN    enoxaparin (LOVENOX) injection 40 mg  40 mg SubCUTAneous DAILY    famotidine (PF) (PEPCID) 20 mg in 0.9% sodium chloride 10 mL injection  20 mg IntraVENous BID    ascorbic acid (vitamin C) (VITAMIN C) tablet 500 mg  500 mg Oral BID    zinc sulfate (ZINCATE) 50 mg zinc (220 mg) capsule 1 Capsule  1 Capsule Oral DAILY    albuterol (PROVENTIL HFA, VENTOLIN HFA, PROAIR HFA) inhaler 2 Puff  2 Puff Inhalation Q4H RT    tuberculin injection 5 Units  5 Units IntraDERMal ONCE    aspirin delayed-release tablet 81 mg  81 mg Oral BID    hyoscyamine SL (LEVSIN/SL) tablet 0.125 mg  0.125 mg SubLINGual Q4H PRN    metoprolol succinate (TOPROL-XL) XL tablet 50 mg  50 mg Oral DAILY    valsartan (DIOVAN) tablet 40 mg  40 mg Oral DAILY    dexamethasone (DECADRON) 10 mg/mL injection 6 mg  6 mg IntraVENous Q24H       Signed:  Amanda Yeager DO    Part of this note may have been written by using a voice dictation software. The note has been proof read but may still contain some grammatical/other typographical errors.

## 2021-09-29 NOTE — ACP (ADVANCE CARE PLANNING)
ACP discussion completed with patient this day via phone, due to [place] isolation. Per chart review, no HCPOA or advance directive on file. Patient's legal NOK is jt Florez [phone] and jt Cee. The patient is legally  according to patient, and has two sons. CM was receptive. CM remains available. Advance Care Planning     Advance Care Planning Activator (Inpatient)  Conversation Note      Date of ACP Conversation: 09/29/21     Conversation Conducted with:   Patient with Decision Making Capacity    ACP Activator: Micky Decision Maker:    Current Designated Health Care Decision Maker:     Primary Decision Maker: Ronal Harman - [phone]  Click here to complete [address] including selection of the Healthcare Decision Maker Relationship (ie \"Primary\")  Today we documented Decision Maker(s) consistent with Legal Next of Kin hierarchy. Care Preferences    Ventilation: \"If you were in your present state of health and suddenly became very ill and were unable to breathe on your own, what would your preference be about the use of a ventilator (breathing machine) if it were available to you? \"      If patient would desire the use of a ventilator (breathing machine), answer \"yes\", if not \"no\":yes    \"If your health worsens and it becomes clear that your chance of recovery is unlikely, what would your preference be about the use of a ventilator (breathing machine) if it were available to you? \"     Would the patient desire the use of a ventilator (breathing machine)? NO      Resuscitation  \"CPR works best to restart the heart when there is a sudden event, like a heart attack, in someone who is otherwise healthy. Unfortunately, CPR does not typically restart the heart for people who have serious health conditions or who are very sick. \"    \"In the event your heart stopped as a result of an underlying serious health condition, would you want attempts to be made to restart your heart (answer \"yes\" for attempt to resuscitate) or would you prefer a natural death (answer \"no\" for do not attempt to resuscitate)? \" yes      [x] Yes  [] No   Educated Patient / [place] regarding differences between Advance Directives and portable DNR orders.     Length of ACP Conversation in minutes:  15    Conversation Outcomes:  [x] ACP discussion completed  [] Existing advance directive reviewed with patient; no changes to patient's previously recorded wishes     [] New Advance Directive completed   [] Portable Do Not Resuscitate prepared for Provider review and signature  [] POLST/POST/MOLST/MOST prepared for Provider review and signature      Follow-up plan:    [] Schedule follow-up conversation to continue planning  [] Referred individual to Provider for additional questions/concerns   [] Advised patient/agent/surrogate to review completed ACP document and update if needed with changes in condition, patient preferences or care setting     [x] This note routed to one or more involved healthcare providers

## 2021-09-29 NOTE — PROGRESS NOTES
Hourly rounds complete this shift, no new complaints at this time, Patient on 2L NC: bed in low, locked position, call light and bedside table within reach,  all needs met. Will continue to monitor Report to day shift nurse.

## 2021-09-29 NOTE — PROGRESS NOTES
CM contacted the patient via phone in room, to complete assessment. Demographic information verified by the patient. The patient lives alone in a mobile home with 7 steps at the front entrance and 2 steps at the back entrance. Patient confirmed he is independent with completing ADL's and drives. 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 DME: patient confirmed he does not use any DME. Patient obtains his prescription medications from [facility] in [facility]. Patient voiced no difficulty with obtaining medications in the community. Discharge planning; PT/OT has been consulted. Patient confirmed a history of [facility] services. Per chart review, patient has a history of REHAB. Patient anticipates returning home when medically stable. No discharge needs voiced at this time. CM continues to follow plan of care. Care Management Interventions  PCP Verified by CM: Yes (Patient is in the process of arranging a new PCP in [facility]. )  Mode of Transport at Discharge: Other (see comment) (Family. )  Transition of Care Consult (CM Consult): Discharge Planning  Discharge Durable Medical Equipment: No  Physical Therapy Consult: Yes  Occupational Therapy Consult: Yes  Speech Therapy Consult: No  Support Systems: Other (Comment) (Patient lives alone in a mobile home. )  Confirm Follow Up Transport: Self  The Plan for Transition of Care is Related to the Following Treatment Goals : Return to Baseline.    The Patient and/or Patient Representative was Provided with a Choice of Provider and Agrees with the Discharge Plan?: Yes  [place] Resource Information Provided?: No  Discharge Location  Discharge Placement: Home

## 2021-09-29 NOTE — PROGRESS NOTES
09/28/21 8461   Dual Skin Pressure Injury Assessment   Dual Skin Pressure Injury Assessment WDL  (Healed CABG, and  R hip surgery scars)   Second Care Provider (Based on [address] [place] [address]) Edson Quintero

## 2021-09-29 NOTE — PROGRESS NOTES
TRANSFER - IN REPORT:    Verbal report received from [facility]  on Rylee Kohler  being received from ER  for routine progression of care      Report consisted of patients Situation, Background, Assessment and   Recommendations(SBAR). Information from the following report(s) Kardex was reviewed with the receiving nurse. Opportunity for questions and clarification was provided. Assessment completed upon patients arrival to unit and care assumed.

## 2021-09-29 NOTE — PROGRESS NOTES
ACUTE OT GOALS:  (Developed with and agreed upon by patient and/or caregiver.)  1) Patient will complete lower body bathing and dressing with modified independence and adaptive equipment as needed. 2) Patient will complete toileting with modified independence. 3) Patient will complete functional transfers with modified independence and adaptive equipment as needed. 4) Patient will tolerate at least 20 minutes of OT activity with as needed rest breaks while maintaining O2 sats >90%. 5) Patient will verbalize at least 3 energy conservation technique to utilize during ADL/IADL. Timeframe: 7 visits       OCCUPATIONAL THERAPY ASSESSMENT: Initial Assessment and Daily Note OT Treatment Day # 1    Edith Anders is a 70 y.o. male   PRIMARY DIAGNOSIS: Acute hypoxemic respiratory failure due to COVID-19 ([facility] [address]  COVID-19 [U07.1]  Hypoxia [R09.02]       Reason for Referral:  Decreased activity tolerance secondary to COVID-19    ICD-10: Treatment Diagnosis: History of falling (Z91.81)  INPATIENT: Payor: [employer] [place] [employer] / [place] / Product Type: Managed Care Medicare /   ASSESSMENT:     REHAB RECOMMENDATIONS:   Recommendation to date pending progress:  [address] [place] [address] Therapy  Equipment:    To Be Determined     PRIOR LEVEL OF FUNCTION:  (Prior to Hospitalization)  INITIAL/CURRENT LEVEL OF FUNCTION:  (Based on today's evaluation)   Bathing:   Independent  Dressing:   Independent  Feeding/Grooming:   Independent  Toileting:   Independent  Functional Mobility:   Independent Bathing:   Minimal Assistance  Dressing:   Minimal Assistance  Feeding/Grooming:   Minimal Assistance  Toileting:   Standby Assistance  Functional Mobility:   Contact Guard Assistance     ASSESSMENT:  Mr. Omega Krause is a 70year old male admitted for SOB due to COVID-19. At baseline he lives alone and is independent with ADLs, still working.  Today on RA patient's O2 sats dropped to 84% with activity & he was visibly fatigued/ SOB. Required 4L, seated rest break, and max cues for deep breathing to return to 90%. Participated in toileting and lower body dressing ADLs. He is functioning below his baseline because of decreased endurance related to respiratory status. Will follow. SUBJECTIVE:   Mr. Weston Torrez states, \"My ponytail used to be longer. \"    SOCIAL HISTORY/LIVING ENVIRONMENT: Lives alone. Has son in area that helps out if needed.  Independent with ADLs, driving,   Support Systems: Other (Comment) (Patient lives alone in a mobile home. )    OBJECTIVE:     PAIN: VITAL SIGNS: LINES/DRAINS:   Pre Treatment: Pain Screen  Pain Scale 1: Numeric (0 - 10)  Pain Intensity 1: 0  Post Treatment: 0 Vital Signs  O2 Sat (%): (!) 84 % (with activity )  O2 Device: None (Room air) IV  O2 Device: Nasal cannula     GROSS EVALUATION:  BUE Within Functional Limits Abnormal/ Functional Abnormal/ Non-Functional (see comments) Not Tested Comments:   AROM [x] [] [] []    PROM [] [] [] [x]    Strength [x] [] [] []    Balance [] [x] [] []    Posture [] [] [] [x]    Sensation [] [] [] [x]    Coordination [] [] [] [x]    Tone [] [] [] [x]    Edema [] [] [] [x]    Activity Tolerance [] [] [x] [] Compared to baseline    [] [] [] []      COGNITION/  PERCEPTION: Intact Impaired   (see comments) Comments:   Orientation [x] []    Vision [x] []    Hearing [x] []    Judgment/ Insight [x] []    Attention [x] []    Memory [x] []    Command Following [x] []    Emotional Regulation [x] []     [] []      ACTIVITIES OF DAILY LIVING: I Mod I S SBA CGA Min Mod Max Total NT Comments   BASIC ADLs:              Bathing/ Showering [] [] [] [] [] [] [] [] [] []    Toileting [] [] [] [x] [] [] [] [] [] []    Dressing [] [] [] [x] [] [] [] [] [] []    Feeding [] [] [] [] [] [] [] [] [] []    Grooming [] [] [] [x] [] [] [] [] [] [] In standing      Personal Device Care [] [] [] [] [] [] [] [] [] []    Functional Mobility [] [] [] [x] [] [] [] [] [] []    I=Independent, Mod I=Modified Independent, S=Supervision, SBA=Standby Assistance, CGA=Contact Guard Assistance,   Min=Minimal Assistance, Mod=Moderate Assistance, Max=Maximal Assistance, Total=Total Assistance, NT=Not Tested    MOBILITY: I Mod I S SBA CGA Min Mod Max Total  NT x2 Comments:   Supine to sit [] [] [] [x] [] [] [] [] [] [] []    Sit to supine [] [] [] [] [] [] [] [] [] [x] []    Sit to stand [] [] [] [] [x] [] [] [] [] [] []    Bed to chair [] [] [] [] [x] [] [] [] [] [] []    I=Independent, Mod I=Modified Independent, S=Supervision, SBA=Standby Assistance, CGA=Contact Guard Assistance,   Min=Minimal Assistance, Mod=Moderate Assistance, Max=Maximal Assistance, Total=Total Assistance, NT=Not [facility]   Daily Activity Inpatient Short Form        How much help from another person does the patient currently need. .. Total A Lot A Little None   1. Putting on and taking off regular lower body clothing? [] 1   [] 2   [x] 3   [] 4   2. Bathing (including washing, rinsing, drying)? [] 1   [] 2   [x] 3   [] 4   3. Toileting, which includes using toilet, bedpan or urinal?   [] 1   [] 2   [x] 3   [] 4   4. Putting on and taking off regular upper body clothing? [] 1   [] 2   [x] 3   [] 4   5. Taking care of personal grooming such as brushing teeth? [] 1   [] 2   [x] 3   [] 4   6. Eating meals? [] 1   [] 2   [x] 3   [] 4   © 2007, Trustees of [address] [place] [address] [address] [id number], under license to [employer]. All rights reserved     Score:  Initial:18 Most Recent: X (Date: -- )   Interpretation of Tool:  Represents activities that are increasingly more difficult (i.e. Bed mobility, Transfers, Gait). PLAN:   FREQUENCY/DURATION: OT Plan of Care: 3 times/week for duration of hospital stay or until stated goals are met, whichever comes first.    PROBLEM LIST:   (Skilled intervention is medically necessary to address:)  1. Decreased ADL/Functional Activities  2.  Decreased Activity Tolerance  3. Decreased Balance  4. Decreased Transfer Abilities   INTERVENTIONS PLANNED:   (Benefits and precautions of occupational therapy have been discussed with the patient.)  1. Self Care Training  2. Therapeutic Activity  3. Therapeutic Exercise/HEP  4. Neuromuscular Re-education  5. Education     TREATMENT:     EVALUATION: Low Complexity : (Untimed Charge)    TREATMENT:   ($$ Self Care/Home Management: 8-22 mins    )  Self Care (8 Minutes): Self care including Toileting, Lower Body Dressing, Grooming and education on deep breathing, rest breaks to increase independence and activity tolerance. Based on results of today's evaluation, co-treatment between PT and OT is not warranted at future sessions.      TREATMENT GRID:  N/A    AFTER TREATMENT POSITION/PRECAUTIONS:  Chair, Needs within reach and RN notified    INTERDISCIPLINARY COLLABORATION:  MD/PA/NP, RN/PCT, PT/PTA and OT/SALDANA    TOTAL TREATMENT DURATION:  OT Patient Time In/Time Out  Time In: 6333  Time Out: JAMES Kerns/MACIEL

## 2021-09-30 VITALS
HEIGHT: 70 IN | RESPIRATION RATE: 18 BRPM | DIASTOLIC BLOOD PRESSURE: 79 MMHG | BODY MASS INDEX: 30.9 KG/M2 | OXYGEN SATURATION: 96 % | TEMPERATURE: 97.6 F | SYSTOLIC BLOOD PRESSURE: 116 MMHG | WEIGHT: 215.8 LBS | HEART RATE: 73 BPM

## 2021-09-30 LAB
ALBUMIN SERPL-MCNC: 2.7 G/DL (ref 3.2–4.6)
ALBUMIN/GLOB SERPL: 0.6 {RATIO} (ref 1.2–3.5)
ALP SERPL-CCNC: 40 U/L (ref 50–136)
ALT SERPL-CCNC: 66 U/L (ref 12–65)
ANION GAP SERPL CALC-SCNC: 11 MMOL/L (ref 7–16)
AST SERPL-CCNC: 78 U/L (ref 15–37)
BILIRUB SERPL-MCNC: 0.6 MG/DL (ref 0.2–1.1)
BUN SERPL-MCNC: 31 MG/DL (ref 8–23)
CALCIUM SERPL-MCNC: 8.5 MG/DL (ref 8.3–10.4)
CHLORIDE SERPL-SCNC: 105 MMOL/L (ref 98–107)
CO2 SERPL-SCNC: 20 MMOL/L (ref 21–32)
CREAT SERPL-MCNC: 1.11 MG/DL (ref 0.8–1.5)
CRP SERPL-MCNC: 1.9 MG/DL (ref 0–0.9)
ERYTHROCYTE [DISTWIDTH] IN BLOOD BY AUTOMATED COUNT: 12.9 % (ref 11.9–14.6)
GLOBULIN SER CALC-MCNC: 4.4 G/DL (ref 2.3–3.5)
GLUCOSE SERPL-MCNC: 163 MG/DL (ref 65–100)
HCT VFR BLD AUTO: 37.2 % (ref 41.1–50.3)
HGB BLD-MCNC: 12 G/DL (ref 13.6–17.2)
MCH RBC QN AUTO: 27.1 PG (ref 26.1–32.9)
MCHC RBC AUTO-ENTMCNC: 32.3 G/DL (ref 31.4–35)
MCV RBC AUTO: 84 FL (ref 79.6–97.8)
MM INDURATION POC: 0 0MM (ref 0–5)
NRBC # BLD: 0 K/UL (ref 0–0.2)
PLATELET # BLD AUTO: 143 K/UL (ref 150–450)
PMV BLD AUTO: 10.2 FL (ref 9.4–12.3)
POTASSIUM SERPL-SCNC: 4.5 MMOL/L (ref 3.5–5.1)
PPD POC: NEGATIVE NEGATIVE
PROT SERPL-MCNC: 7.1 G/DL (ref 6.3–8.2)
RBC # BLD AUTO: 4.43 M/UL (ref 4.23–5.6)
SODIUM SERPL-SCNC: 136 MMOL/L (ref 138–145)
WBC # BLD AUTO: 8.5 K/UL (ref 4.3–11.1)

## 2021-09-30 PROCEDURE — 86140 C-REACTIVE PROTEIN: CPT

## 2021-09-30 PROCEDURE — 94760 N-INVAS EAR/PLS OXIMETRY 1: CPT

## 2021-09-30 PROCEDURE — 36415 COLL VENOUS BLD VENIPUNCTURE: CPT

## 2021-09-30 PROCEDURE — 74011250636 HC RX REV CODE- 250/636: Performed by: FAMILY MEDICINE

## 2021-09-30 PROCEDURE — 85027 COMPLETE CBC AUTOMATED: CPT

## 2021-09-30 PROCEDURE — 80053 COMPREHEN METABOLIC PANEL: CPT

## 2021-09-30 PROCEDURE — 94640 AIRWAY INHALATION TREATMENT: CPT

## 2021-09-30 PROCEDURE — 94761 N-INVAS EAR/PLS OXIMETRY MLT: CPT

## 2021-09-30 PROCEDURE — 74011250637 HC RX REV CODE- 250/637: Performed by: FAMILY MEDICINE

## 2021-09-30 RX ORDER — DEXAMETHASONE 6 MG/1
6 TABLET ORAL
Qty: 8 TABLET | Refills: 0 | Status: SHIPPED | OUTPATIENT
Start: 2021-09-30 | End: 2021-10-04 | Stop reason: ALTCHOICE

## 2021-09-30 RX ORDER — CEFPODOXIME PROXETIL 100 MG/1
100 TABLET, FILM COATED ORAL 2 TIMES DAILY
Qty: 6 TABLET | Refills: 0 | Status: SHIPPED | OUTPATIENT
Start: 2021-09-30 | End: 2021-10-04

## 2021-09-30 RX ORDER — FAMOTIDINE 20 MG/1
20 TABLET, FILM COATED ORAL 2 TIMES DAILY
Qty: 28 TABLET | Refills: 0 | Status: SHIPPED | OUTPATIENT
Start: 2021-09-30 | End: 2021-10-04 | Stop reason: ALTCHOICE

## 2021-09-30 RX ORDER — AZITHROMYCIN 500 MG/1
500 TABLET, FILM COATED ORAL DAILY
Qty: 3 TABLET | Refills: 0 | Status: SHIPPED | OUTPATIENT
Start: 2021-09-30 | End: 2021-10-03

## 2021-09-30 RX ADMIN — ALBUTEROL SULFATE 2 PUFF: 90 AEROSOL, METERED RESPIRATORY (INHALATION) at 08:25

## 2021-09-30 RX ADMIN — ASPIRIN 81 MG: 81 TABLET ORAL at 08:25

## 2021-09-30 RX ADMIN — Medication 1 CAPSULE: at 08:25

## 2021-09-30 RX ADMIN — FAMOTIDINE 20 MG: 20 TABLET ORAL at 08:25

## 2021-09-30 RX ADMIN — ACETAMINOPHEN 650 MG: 325 TABLET ORAL at 04:16

## 2021-09-30 RX ADMIN — Medication 10 ML: at 05:27

## 2021-09-30 RX ADMIN — METOPROLOL SUCCINATE 50 MG: 50 TABLET, EXTENDED RELEASE ORAL at 08:25

## 2021-09-30 RX ADMIN — ENOXAPARIN SODIUM 40 MG: 40 INJECTION SUBCUTANEOUS at 08:35

## 2021-09-30 RX ADMIN — AZITHROMYCIN MONOHYDRATE 500 MG: 250 TABLET ORAL at 08:25

## 2021-09-30 RX ADMIN — OXYCODONE HYDROCHLORIDE AND ACETAMINOPHEN 500 MG: 500 TABLET ORAL at 08:25

## 2021-09-30 NOTE — PROGRESS NOTES
Assumed care of the patient. Off going report given by [facility] TREASURE KO. The patient is AO x 4 at this time and is resting in bed. IV access: PIV is saline locked. Oxygen needs: Stable on room air. Pain assessment: NAD  Safety: Bed is low and call light is within reach. Patient understands to call for assistance. Disaster charting initiated.

## 2021-09-30 NOTE — PROGRESS NOTES
Oxygen Qualifier       Room air: SpO2 with O2 and liter flow   Resting SpO2  96% N/A   Ambulating SpO2  96% No O2 during ambulation          Completed by:    Meghan Maria, PT, DPT

## 2021-09-30 NOTE — PROGRESS NOTES
Pt currently resting on RA, O2 sat >90%. Pt denies needs at this time. Hourly rounds completed, bed in low, locked position, call light within reach, and all needs met at this time. End of shift report given to on coming nurse.

## 2021-09-30 NOTE — DISCHARGE SUMMARY
Hospitalist Discharge Summary     Admit Date:  2021  4:07 PM   DC note date: 2021  Name:  Adina Kuhn   Age:  70 y.o.  :  1950   MRN:  [id number]   PCP:  Lakeisha Walker MD  Treatment Team: Attending Provider: Elizabeth Cárdenas MD; Utilization Review: Ammon Lock; Care Manager: Beni Gatica; Utilization Review: Daxa Santiago, STEFANI; Primary Nurse: Avtar Saha RN    Problem List for this Hospitalization:  Hospital Problems as of 2021 Date Reviewed: 2021        Codes Class Noted - Resolved POA    Class 1 obesity with serious comorbidity and body mass index (BMI) of 31.0 to 31.9 in adult ICD-10-CM: E66.9, Z68.31  ICD-9-CM: 278.00, V85.31  2021 - Present Yes        * (Principal) Acute hypoxemic respiratory failure due to COVID-19 [facility]) ICD-10-CM: U07.1, J96.01  ICD-9-CM: 518.81, 079.89, 799.02  2021 - Present         Systolic CHF, chronic ([facility] ICD-10-CM: I50.22  ICD-9-CM: 428.22, 428.0  2021 - Present Unknown        Status post coronary artery bypass graft ICD-10-CM: Z95.1  ICD-9-CM: V45.81  10/3/2018 - Present Yes    Overview Signed 10/3/2018 12:20 PM by Keyon Mccabe NP     10/3/18 Dr. Lucia Benitez  CABG X 1, LIMA to the LAD WITH AVR 23 mm Intuity valve             S/P AVR (aortic valve replacement) ICD-10-CM: Z95.2  ICD-9-CM: V43.3  10/3/2018 - Present Yes    Overview Signed 10/3/2018 12:21 PM by Keyon Mccabe NP     10/3/18  Dr. Lucia Benitez  CABG X 1, LIMA to the LAD WITH AVR 23 mm Intuity valve             Pneumonia due to COVID-19 virus ICD-10-CM: U07.1, J12.82  ICD-9-CM: 480.8, 079.89  10/3/2018 - Present         Aortic stenosis ICD-10-CM: I35.0  ICD-9-CM: 424.1  2018 - Present Yes        CAD (coronary artery disease) (Chronic) ICD-10-CM: I25.10  ICD-9-CM: 414.00  2018 - Present Yes        Hypertension (Chronic) ICD-10-CM: I10  ICD-9-CM: 401.9  2018 - Present Yes            Admission HPI from 2021:    \" Patient is a 70 y.o. male who presented to the ED for cc fatigue since being diagnosed with COVID this past Sunday(9/26) with poor PO intake and worsening SOB. Hx of CAD s/p CABG, HTN, s/p aortic valve replacement, sCHF EF 30%, pulmonary HTN, melanoma several years ago. \"    Hospital Course:  Patient is admitted with exertional hypoxia up to 82% on room air. Unvaccinated. COVID-19 positive. Patient started on supplemental oxygen and supportive care. CRP elevated. He declined Actemra/baricitinib. On Decadron. Elevated procalcitonin so started on empiric antibiotic for CAP coverage. Symptoms improved. Patient weaned to room air. Oxygen qualifier as below. All other chronic comorbidities stable and we continued essential home meds. PT/OT recommend home health but patient refused. Patient is stable to discharge home today with close follow-up with PCP. Oxygen Qualifier          Room air: SpO2 with O2 and liter flow   Resting SpO2  96% N/A   Ambulating SpO2  96% No O2 during ambulation           Disposition: Home or Self Care  Activity: Activity as tolerated  Diet: ADULT DIET Regular  Code Status: Full Code    Follow Up Orders: Follow-up Appointments   Procedures    FOLLOW UP VISIT Appointment in: 3 - 5 Days PCP     PCP     Standing Status:   Standing     Number of Occurrences:   1     Order Specific Question:   Appointment in     Answer:   3 - 5 Days       Follow-up Information     Follow up With Specialties Details Why Contact Info    Other, MD Luis    Patient can only remember the practice name and not the physician            Discharge meds at bottom of this note. Plan was discussed with patient. All questions answered. Patient was stable at time of discharge. Given instructions to call a physician or return if any concerns. Discharge summary and encounter summary was sent to PCP electronically via \"Comm [facility]gt\" link in [facility], if possible.     Diagnostic Imaging/Tests:   XR CHEST PA LAT    Result Date: 9/28/2021  EXAM: 2 view chest radiograph. INDICATION: Chest pain and shortness of breath. Covid 19 positive. COMPARISON: Chest radiograph dated September 26, 2021. FINDINGS: Postsurgical change of aortic valve replacement. No pneumothorax. Mild left basilar reticular airspace opacities. Improved aeration of the right lung base. Heart appears normal in size. Pulmonary venous hypertension without overt pulmonary edema. Sternal wires are intact. 1. Minimal left basilar reticular airspace opacities and improved aeration of the right lung base. 2. Pulmonary venous hypertension status post aortic valve replacement. XR CHEST PORT    Result Date: 9/26/2021  CHEST X-RAY, single portable view  9/26/2021 History: Cough. Technique: Single frontal view of the chest. Comparison: Chest x-ray 10/8/2018 Findings: Stable post surgical changes overlie the mediastinal silhouette. The cardiac silhouette is mildly enlarged although stable. The lungs are expanded without evidence for pneumothorax. Mild peripheral opacities are seen in the right mid, and bilateral lower lung fields. The appearance raises concern for an early peripheral infiltrate. No significant associated pleural effusion is seen. 1.  Mild peripheral opacities in the right mid, and bilateral lower lung fields. The appearance raises concern for an early peripheral infiltrate as can occur with an atypical pneumonia such as Covid 19. Recommend clinical correlation. This report was made using voice transcription. Despite my best efforts to avoid any, transcription errors may persist. If there is any question about the accuracy of the report or need for clarification, then please call [phone], or text me through perfectserv for clarification or correction. Echocardiogram results:  No results found for this visit on 09/28/21.     Procedures done this admission:  * No surgery found *    All Micro Results     None          [unfilled]    Labs: Results:       BMP, Mg, Phos Recent Labs     09/30/21 0603 09/29/21 0613 09/28/21  1527   * 135* 133*   K 4.5 4.4 4.4    102 101   CO2 20* 23 23   AGAP 11 10 9   BUN 31* 25* 25*   CREA 1.11 1.21 1.29   CA 8.5 8.6 8.8   * 193* 173*      CBC Recent Labs     09/30/21 0603 09/29/21 0613 09/28/21  1527   WBC 8.5 4.2* 6.8   RBC 4.43 4.92 4.98   HGB 12.0* 13.1* 13.8   HCT 37.2* 41.6 41.8   * 131* 138*   GRANS  --  82* 80*   LYMPH  --  14 16   EOS  --  0* 0*   MONOS  --  4 4   BASOS  --  0 0   IG  --  0 1   ANEU  --  3.4 5.5   ABL  --  0.6 1.1   DREW  --  0.0 0.0   ABM  --  0.2 0.3   ABB  --  0.0 0.0   AIG  --  0.0 0.0      LFT Recent Labs     09/30/21 0603 09/29/21 0613 09/28/21  1527   ALT 66* 49 42   AP 40* 42* 39*   TP 7.1 8.0 8.3*   ALB 2.7* 2.9* 3.1*   GLOB 4.4* 5.1* 5.2*   AGRAT 0.6* 0.6* 0.6*      Cardiac Testing No results found for: BNPP, BNP, CPK, RCK1, RCK2, RCK3, RCK4, CKMB, CKNDX, CKND1, TROPT, TROIQ   Coagulation Tests Lab Results   Component Value Date/Time    Prothrombin time 13.3 07/06/2021 10:18 AM    Prothrombin time 18.0 (H) 10/03/2018 12:20 PM    Prothrombin time 12.6 10/01/2018 07:54 AM    INR 1.0 07/06/2021 10:18 AM    INR 1.6 10/03/2018 12:20 PM    INR 1.0 10/01/2018 07:54 AM    aPTT 28.2 07/06/2021 10:18 AM    aPTT 31.4 10/03/2018 12:20 PM    aPTT 30.0 10/01/2018 07:54 AM      A1c Lab Results   Component Value Date/Time    Hemoglobin A1c 6.3 07/06/2021 10:18 AM    Hemoglobin A1c 6.3 (H) 10/01/2018 07:45 AM      Lipid Panel Lab Results   Component Value Date/Time    Cholesterol, total 182 03/12/2021 09:28 AM    HDL Cholesterol 41 03/12/2021 09:28 AM    LDL, calculated 95.2 03/12/2021 09:28 AM    VLDL, calculated 45.8 (H) 03/12/2021 09:28 AM    Triglyceride 229 (H) 03/12/2021 09:28 AM    CHOL/HDL Ratio 4.4 03/12/2021 09:28 AM      Thyroid Panel Lab Results   Component Value Date/Time    TSH 2.210 09/05/2018 12:22 PM        Most Recent UA Lab Results   Component Value Date/Time    Color YELLOW 10/01/2018 08:40 AM    Appearance CLEAR 10/01/2018 08:40 AM    Specific gravity 1.019 10/01/2018 08:40 AM    pH (UA) 5.0 10/01/2018 08:40 AM    Protein NEGATIVE  10/01/2018 08:40 AM    Glucose NEGATIVE  10/01/2018 08:40 AM    Ketone NEGATIVE  10/01/2018 08:40 AM    Bilirubin NEGATIVE  10/01/2018 08:40 AM    Blood NEGATIVE  10/01/2018 08:40 AM    Urobilinogen 0.2 10/01/2018 08:40 AM    Nitrites NEGATIVE  10/01/2018 08:40 AM    Leukocyte Esterase NEGATIVE  10/01/2018 08:40 AM    WBC  09/30/2019 11:22 AM    RBC 0-3 09/30/2019 11:22 AM    Epithelial cells 0 09/30/2019 11:22 AM    Bacteria 0 09/30/2019 11:22 AM    Casts 10-20 09/30/2019 11:22 AM        Allergies   Allergen Reactions    Lortab [Hydrocodone-Acetaminophen] Other (comments)     hallucinations     Immunization History   Administered Date(s) Administered    TB Skin Test (PPD) Intradermal 10/03/2018, 09/29/2021       All Labs from Last 24 Hrs:  Recent Results (from the past 24 hour(s))   METABOLIC PANEL, COMPREHENSIVE    Collection Time: 09/30/21  6:03 AM   Result Value Ref Range    Sodium 136 (L) 138 - 145 mmol/L    Potassium 4.5 3.5 - 5.1 mmol/L    Chloride 105 98 - 107 mmol/L    CO2 20 (L) 21 - 32 mmol/L    Anion gap 11 7 - 16 mmol/L    Glucose 163 (H) 65 - 100 mg/dL    BUN 31 (H) 8 - 23 MG/DL    Creatinine 1.11 0.8 - 1.5 MG/DL    GFR est AA >60 >60 ml/min/1.73m2    GFR est non-AA >60 >60 ml/min/1.73m2    Calcium 8.5 8.3 - 10.4 MG/DL    Bilirubin, total 0.6 0.2 - 1.1 MG/DL    ALT (SGPT) 66 (H) 12 - 65 U/L    AST (SGOT) 78 (H) 15 - 37 U/L    Alk.  phosphatase 40 (L) 50 - 136 U/L    Protein, total 7.1 6.3 - 8.2 g/dL    Albumin 2.7 (L) 3.2 - 4.6 g/dL    Globulin 4.4 (H) 2.3 - 3.5 g/dL    A-G Ratio 0.6 (L) 1.2 - 3.5     CBC W/O DIFF    Collection Time: 09/30/21  6:03 AM   Result Value Ref Range    WBC 8.5 4.3 - 11.1 K/uL    RBC 4.43 4.23 - 5.6 M/uL    HGB 12.0 (L) 13.6 - 17.2 g/dL    HCT 37.2 (L) 41.1 - 50.3 %    MCV 84.0 79.6 - 97.8 FL    MCH 27.1 26.1 - 32.9 PG    MCHC 32.3 31.4 - 35.0 g/dL    RDW 12.9 11.9 - 14.6 %    PLATELET 597 (L) 028 - 450 K/uL    MPV 10.2 9.4 - 12.3 FL    ABSOLUTE NRBC 0.00 0.0 - 0.2 K/uL   PLEASE READ & DOCUMENT PPD TEST IN 24 HRS    Collection Time: 09/30/21  6:08 AM   Result Value Ref Range    PPD Negative Negative    mm Induration 0 0 - 5 0mm       Discharge Exam:  Patient Vitals for the past 24 hrs:   Temp Pulse Resp BP SpO2   09/30/21 0904     90 %   09/30/21 0800  77      09/30/21 0729 98.3 °F (36.8 °C) 66 20 112/75 90 %   09/30/21 0500 97.6 °F (36.4 °C) 79 20 115/75 91 %   09/30/21 0400  72      09/30/21 0013 98.4 °F (36.9 °C) 78 18 113/72 91 %   09/30/21 0000  84      09/29/21 2000  83      09/29/21 1954 97.8 °F (36.6 °C) 82 18 (!) 148/70 96 %   09/29/21 1547     91 %   09/29/21 1537 98 °F (36.7 °C) 79 18 105/70 93 %   09/29/21 1135     91 %   09/29/21 1056 98.6 °F (37 °C) 96 18 126/78 92 %     Oxygen Therapy  O2 Sat (%): 90 % (09/30/21 0904)  Pulse via Oximetry: 78 beats per minute (09/29/21 1547)  O2 Device: None (Room air) (09/30/21 0750)  Skin Assessment: Clean, dry, & intact (09/30/21 0750)  O2 Flow Rate (L/min): 2 l/min (09/29/21 1135)    Estimated body mass index is 30.96 kg/m² as calculated from the following:    Height as of this encounter: 5' 10\" (1.778 m). Weight as of this encounter: 97.9 kg (215 lb 12.8 oz). Intake/Output Summary (Last 24 hours) at 9/30/2021 1040  Last data filed at 9/30/2021 0934  Gross per 24 hour   Intake 240 ml   Output    Net 240 ml       *Note that automatically entered I/Os may not be accurate; dependent on patient compliance with collection and accurate  by assistants. General:    Well nourished. Alert. Eyes:   Normal sclerae. Extraocular movements intact. ENT:  Normocephalic, atraumatic. Moist mucous membranes  CV:   Regular rate and rhythm. No murmur, rub, or gallop. Lungs:  Clear to auscultation bilaterally.   No wheezing, rhonchi, or rales.  Abdomen: Soft, nontender, nondistended. Extremities: Warm and dry. No cyanosis or edema. Neurologic: CN II-XII grossly intact. No gross focal deficits   Skin:     No rashes or jaundice. Psych:  Normal mood and affect.     Current Med List in Hospital:   Current Facility-Administered Medications   Medication Dose Route Frequency    lip protectant (BLISTEX) ointment 1 Each  1 Each Topical PRN    cefTRIAXone (ROCEPHIN) 1 g in 0.9% sodium chloride (MBP/ADV) 50 mL MBP  1 g IntraVENous Q24H    azithromycin (ZITHROMAX) tablet 500 mg  500 mg Oral DAILY    famotidine (PEPCID) tablet 20 mg  20 mg Oral BID    atorvastatin (LIPITOR) tablet 40 mg  40 mg Oral QHS    sodium chloride (NS) flush 5-40 mL  5-40 mL IntraVENous Q8H    sodium chloride (NS) flush 5-40 mL  5-40 mL IntraVENous PRN    acetaminophen (TYLENOL) tablet 650 mg  650 mg Oral Q6H PRN    Or    acetaminophen (TYLENOL) suppository 650 mg  650 mg Rectal Q6H PRN    polyethylene glycol (MIRALAX) packet 17 g  17 g Oral DAILY PRN    ondansetron (ZOFRAN ODT) tablet 4 mg  4 mg Oral Q8H PRN    Or    ondansetron (ZOFRAN) injection 4 mg  4 mg IntraVENous Q6H PRN    enoxaparin (LOVENOX) injection 40 mg  40 mg SubCUTAneous DAILY    ascorbic acid (vitamin C) (VITAMIN C) tablet 500 mg  500 mg Oral BID    zinc sulfate (ZINCATE) 50 mg zinc (220 mg) capsule 1 Capsule  1 Capsule Oral DAILY    albuterol (PROVENTIL HFA, VENTOLIN HFA, PROAIR HFA) inhaler 2 Puff  2 Puff Inhalation Q4H RT    aspirin delayed-release tablet 81 mg  81 mg Oral BID    hyoscyamine SL (LEVSIN/SL) tablet 0.125 mg  0.125 mg SubLINGual Q4H PRN    metoprolol succinate (TOPROL-XL) XL tablet 50 mg  50 mg Oral DAILY    valsartan (DIOVAN) tablet 40 mg  40 mg Oral DAILY    dexamethasone (DECADRON) 10 mg/mL injection 6 mg  6 mg IntraVENous Q24H       Discharge Info:   Current Discharge Medication List      START taking these medications    Details   azithromycin (ZITHROMAX) 500 mg tab Take 1 Tablet by mouth daily for 3 days. Qty: 3 Tablet, Refills: 0  Start date: 9/30/2021, End date: 10/3/2021      dexAMETHasone (DECADRON) 6 mg tablet Take 1 Tablet by mouth Daily (before breakfast) for 8 days. Qty: 8 Tablet, Refills: 0  Start date: 9/30/2021, End date: 10/8/2021      cefpodoxime (VANTIN) 100 mg tablet Take 1 Tablet by mouth two (2) times a day for 3 days. Qty: 6 Tablet, Refills: 0  Start date: 9/30/2021, End date: 10/3/2021      famotidine (PEPCID) 20 mg tablet Take 1 Tablet by mouth two (2) times a day for 14 days. Qty: 28 Tablet, Refills: 0  Start date: 9/30/2021, End date: 10/14/2021         CONTINUE these medications which have NOT CHANGED    Details   hyoscyamine SL (LEVSIN/SL) 0.125 mg SL tablet 1 Tablet by SubLINGual route every four (4) hours as needed for Cramping. Qty: 20 Tablet, Refills: 0      albuterol (Ventolin HFA) 90 mcg/actuation inhaler Take 2 Puffs by inhalation every four (4) hours as needed for Wheezing, Shortness of Breath or Cough. Qty: 6.7 g, Refills: 0      aspirin delayed-release 81 mg tablet Take 1 Tablet by mouth two (2) times a day. Qty: 60 Tablet, Refills: 0      acetaminophen (TYLENOL) 500 mg tablet Take 2 Tablets by mouth every six (6) hours as needed for Pain. Qty: 60 Tablet, Refills: 0      atorvastatin (Lipitor) 40 mg tablet Take 40 mg by mouth daily. metoprolol succinate (TOPROL-XL) 50 mg XL tablet Take 1 Tab by mouth daily. Qty: 90 Tab, Refills: 3    Associated Diagnoses: Systolic CHF, chronic (HCC)      valsartan (DIOVAN) 40 mg tablet Take 1 Tab by mouth daily. Qty: 90 Tab, Refills: 3    Associated Diagnoses: Systolic CHF, chronic (HCC)      spironolactone (ALDACTONE) 25 mg tablet Take 1 Tab by mouth daily. Qty: 90 Tab, Refills: 3    Associated Diagnoses: Systolic CHF, chronic (HCC)      multivitamin (ONE A DAY) tablet Take 1 Tab by mouth daily. Time spent in patient discharge planning and coordination 34 minutes.     Signed:  Sunday Blake Zeferino Sandy MD

## 2021-09-30 NOTE — PROGRESS NOTES
Patient is being discharged in stable condition. Patient leaves A&Ox 4. Transportation arranged with patients son to go home. AVS reviewed with patient and was given an opportunity to ask questions. Vitals:   Visit Vitals  /79   Pulse 73   Temp 97.6 °F (36.4 °C)   Resp 18   Ht 5' 10\" (1.778 m)   Wt 97.9 kg (215 lb 12.8 oz)   SpO2 96%   BMI 30.96 kg/m²     Oxygen needs: Stable on room air.   DME needs: None

## 2021-09-30 NOTE — PROGRESS NOTES
CM contacted the patient to discuss discharge planning. Per PT/OT Eval 9/29, [facility]. Patient feels services are not needed and nicely declined [facility] therapy. CM informed the patient if services are needed within the near future, PCP can assist with this need. CM continues to follow.

## 2021-09-30 NOTE — PROGRESS NOTES
The patient is medically stable for discharge. CM spoke with patient regarding discharge needs. Patient denies any discharge/supportive care needs at this time. According to o2 qualifier, no o2 needed. Patient to discharge home this day. CM stressed the importance of primary care follow up. CM offered assistance with assisting with a new PCP. Patient confirmed he has a preferred practice on [address] in [facility]. Patient is unable to confirm name of practice and preferred PCP name. Patient stated he will contact office, after he is able to location this information. Patient's son Humberto Smiley [phone] to transport the patient home. Please consult or notify CM if any needs shall advise. CM remains available. Care Management Interventions  PCP Verified by CM: Yes (Patient is in the process of arranging a new PCP in [facility]. )  Mode of Transport at Discharge: Other (see comment) (Family. )  Transition of Care Consult (CM Consult): Discharge Planning  Discharge Durable Medical Equipment: No  Physical Therapy Consult: Yes  Occupational Therapy Consult: Yes  Speech Therapy Consult: No  Support Systems: Other (Comment) (Patient lives alone in a mobile home. )  Confirm Follow Up Transport: Self  The Plan for Transition of Care is Related to the Following Treatment Goals : Return to Baseline.    The Patient and/or Patient Representative was Provided with a Choice of Provider and Agrees with the Discharge Plan?: Yes   Resource Information Provided?: No  Discharge Location  Discharge Placement: Home

## 2021-10-03 ENCOUNTER — HOSPITAL ENCOUNTER (INPATIENT)
Age: 71
LOS: 2 days | Discharge: HOME HEALTH | DRG: 177 | End: 2021-10-06
Attending: EMERGENCY MEDICINE | Admitting: FAMILY MEDICINE
Payer: MEDICARE

## 2021-10-03 ENCOUNTER — APPOINTMENT (OUTPATIENT)
Dept: GENERAL RADIOLOGY | Age: 71
DRG: 177 | End: 2021-10-03
Attending: EMERGENCY MEDICINE
Payer: MEDICARE

## 2021-10-03 DIAGNOSIS — I44.7 LEFT BUNDLE BRANCH BLOCK: ICD-10-CM

## 2021-10-03 DIAGNOSIS — U07.1 COVID-19 VIRUS INFECTION: ICD-10-CM

## 2021-10-03 DIAGNOSIS — R09.02 HYPOXEMIA: Primary | ICD-10-CM

## 2021-10-03 DIAGNOSIS — R77.8 ELEVATED TROPONIN: ICD-10-CM

## 2021-10-03 LAB
ALBUMIN SERPL-MCNC: 3 G/DL (ref 3.2–4.6)
ALBUMIN/GLOB SERPL: 0.6 {RATIO} (ref 1.2–3.5)
ALP SERPL-CCNC: 48 U/L (ref 50–136)
ALT SERPL-CCNC: 69 U/L (ref 12–65)
ANION GAP SERPL CALC-SCNC: 10 MMOL/L (ref 7–16)
AST SERPL-CCNC: 35 U/L (ref 15–37)
BASOPHILS # BLD: 0 K/UL (ref 0–0.2)
BASOPHILS NFR BLD: 0 % (ref 0–2)
BILIRUB SERPL-MCNC: 0.9 MG/DL (ref 0.2–1.1)
BNP SERPL-MCNC: 4143 PG/ML (ref 5–125)
BUN SERPL-MCNC: 24 MG/DL (ref 8–23)
CALCIUM SERPL-MCNC: 8.8 MG/DL (ref 8.3–10.4)
CHLORIDE SERPL-SCNC: 102 MMOL/L (ref 98–107)
CO2 SERPL-SCNC: 24 MMOL/L (ref 21–32)
CREAT SERPL-MCNC: 1.21 MG/DL (ref 0.8–1.5)
CRP SERPL-MCNC: 4.4 MG/DL (ref 0–0.9)
DIFFERENTIAL METHOD BLD: NORMAL
EOSINOPHIL # BLD: 0.1 K/UL (ref 0–0.8)
EOSINOPHIL NFR BLD: 1 % (ref 0.5–7.8)
ERYTHROCYTE [DISTWIDTH] IN BLOOD BY AUTOMATED COUNT: 13.1 % (ref 11.9–14.6)
GLOBULIN SER CALC-MCNC: 5.1 G/DL (ref 2.3–3.5)
GLUCOSE SERPL-MCNC: 149 MG/DL (ref 65–100)
HCT VFR BLD AUTO: 42.8 % (ref 41.1–50.3)
HGB BLD-MCNC: 14 G/DL (ref 13.6–17.2)
IMM GRANULOCYTES # BLD AUTO: 0.1 K/UL (ref 0–0.5)
IMM GRANULOCYTES NFR BLD AUTO: 1 % (ref 0–5)
LACTATE SERPL-SCNC: 1.4 MMOL/L (ref 0.4–2)
LYMPHOCYTES # BLD: 1.4 K/UL (ref 0.5–4.6)
LYMPHOCYTES NFR BLD: 15 % (ref 13–44)
MAGNESIUM SERPL-MCNC: 2.3 MG/DL (ref 1.8–2.4)
MCH RBC QN AUTO: 28 PG (ref 26.1–32.9)
MCHC RBC AUTO-ENTMCNC: 32.7 G/DL (ref 31.4–35)
MCV RBC AUTO: 85.6 FL (ref 79.6–97.8)
MONOCYTES # BLD: 0.6 K/UL (ref 0.1–1.3)
MONOCYTES NFR BLD: 7 % (ref 4–12)
NEUTS SEG # BLD: 7 K/UL (ref 1.7–8.2)
NEUTS SEG NFR BLD: 76 % (ref 43–78)
NRBC # BLD: 0 K/UL (ref 0–0.2)
PLATELET # BLD AUTO: 174 K/UL (ref 150–450)
PMV BLD AUTO: 9.9 FL (ref 9.4–12.3)
POTASSIUM SERPL-SCNC: 4.1 MMOL/L (ref 3.5–5.1)
PROCALCITONIN SERPL-MCNC: 0.07 NG/ML
PROT SERPL-MCNC: 8.1 G/DL (ref 6.3–8.2)
RBC # BLD AUTO: 5 M/UL (ref 4.23–5.6)
SODIUM SERPL-SCNC: 136 MMOL/L (ref 136–145)
TROPONIN-HIGH SENSITIVITY: 1057.8 PG/ML (ref 0–14)
WBC # BLD AUTO: 9.2 K/UL (ref 4.3–11.1)

## 2021-10-03 PROCEDURE — 93005 ELECTROCARDIOGRAM TRACING: CPT | Performed by: EMERGENCY MEDICINE

## 2021-10-03 PROCEDURE — 85025 COMPLETE CBC W/AUTO DIFF WBC: CPT

## 2021-10-03 PROCEDURE — 84484 ASSAY OF TROPONIN QUANT: CPT

## 2021-10-03 PROCEDURE — 83880 ASSAY OF NATRIURETIC PEPTIDE: CPT

## 2021-10-03 PROCEDURE — 83735 ASSAY OF MAGNESIUM: CPT

## 2021-10-03 PROCEDURE — 99284 EMERGENCY DEPT VISIT MOD MDM: CPT

## 2021-10-03 PROCEDURE — 83605 ASSAY OF LACTIC ACID: CPT

## 2021-10-03 PROCEDURE — 71045 X-RAY EXAM CHEST 1 VIEW: CPT

## 2021-10-03 PROCEDURE — 86140 C-REACTIVE PROTEIN: CPT

## 2021-10-03 PROCEDURE — 80053 COMPREHEN METABOLIC PANEL: CPT

## 2021-10-03 PROCEDURE — 84145 PROCALCITONIN (PCT): CPT

## 2021-10-03 RX ORDER — SODIUM CHLORIDE 0.9 % (FLUSH) 0.9 %
5-10 SYRINGE (ML) INJECTION AS NEEDED
Status: DISCONTINUED | OUTPATIENT
Start: 2021-10-03 | End: 2021-10-06 | Stop reason: HOSPADM

## 2021-10-03 RX ORDER — SODIUM CHLORIDE 0.9 % (FLUSH) 0.9 %
5-10 SYRINGE (ML) INJECTION EVERY 8 HOURS
Status: DISCONTINUED | OUTPATIENT
Start: 2021-10-03 | End: 2021-10-06 | Stop reason: HOSPADM

## 2021-10-03 NOTE — Clinical Note
Status[de-identified] INPATIENT [101]   Type of Bed: Telemetry [19]   Cardiac Monitoring Required?: Yes   Inpatient Hospitalization Certified Necessary for the Following Reasons: 3.  Patient receiving treatment that can only be provided in an inpatient setting (further clarification in H&P documentation)   Admitting Diagnosis: NSTEMI (non-ST elevated myocardial infarction) [facility]) [[id number]]   Admitting Physician: Wilver Seen [[id number]]   Attending Physician: Wilver Seen [[id number]]   Estimated Length of Stay: 3-4 Midnights   Discharge Plan[de-identified] Home with Office Follow-up

## 2021-10-04 ENCOUNTER — APPOINTMENT (OUTPATIENT)
Dept: CT IMAGING | Age: 71
DRG: 177 | End: 2021-10-04
Attending: FAMILY MEDICINE
Payer: MEDICARE

## 2021-10-04 PROBLEM — U07.1 HYPERCOAGULABLE STATE ASSOCIATED WITH COVID-19 (HCC): Status: ACTIVE | Noted: 2021-10-04

## 2021-10-04 PROBLEM — I25.10 CAD (CORONARY ARTERY DISEASE): Status: ACTIVE | Noted: 2018-09-11

## 2021-10-04 PROBLEM — I21.4 NSTEMI (NON-ST ELEVATED MYOCARDIAL INFARCTION) (HCC): Status: ACTIVE | Noted: 2021-10-04

## 2021-10-04 PROBLEM — E66.2 CLASS 1 OBESITY WITH ALVEOLAR HYPOVENTILATION, SERIOUS COMORBIDITY, AND BODY MASS INDEX (BMI) OF 30.0 TO 30.9 IN ADULT (HCC): Status: ACTIVE | Noted: 2021-09-29

## 2021-10-04 PROBLEM — D68.69 HYPERCOAGULABLE STATE ASSOCIATED WITH COVID-19 (HCC): Status: ACTIVE | Noted: 2021-10-04

## 2021-10-04 PROBLEM — R77.8 ELEVATED TROPONIN: Status: ACTIVE | Noted: 2021-10-04

## 2021-10-04 LAB
APTT PPP: 32.5 SEC (ref 24.1–35.1)
ATRIAL RATE: 91 BPM
CALCULATED P AXIS, ECG09: 38 DEGREES
CALCULATED R AXIS, ECG10: 82 DEGREES
CALCULATED T AXIS, ECG11: -67 DEGREES
D DIMER PPP FEU-MCNC: >20 UG/ML(FEU)
DIAGNOSIS, 93000: NORMAL
INR PPP: 1.6
P-R INTERVAL, ECG05: 176 MS
PROTHROMBIN TIME: 18.9 SEC (ref 12.6–14.5)
Q-T INTERVAL, ECG07: 424 MS
QRS DURATION, ECG06: 176 MS
QTC CALCULATION (BEZET), ECG08: 521 MS
TROPONIN-HIGH SENSITIVITY: 943.1 PG/ML (ref 0–14)
UFH PPP CHRO-ACNC: 0.43 IU/ML (ref 0.3–0.7)
UFH PPP CHRO-ACNC: <0.1 IU/ML (ref 0.3–0.7)
VENTRICULAR RATE, ECG03: 91 BPM

## 2021-10-04 PROCEDURE — 74011250636 HC RX REV CODE- 250/636: Performed by: INTERNAL MEDICINE

## 2021-10-04 PROCEDURE — 65620000000 HC RM CCU GENERAL

## 2021-10-04 PROCEDURE — 84484 ASSAY OF TROPONIN QUANT: CPT

## 2021-10-04 PROCEDURE — 85520 HEPARIN ASSAY: CPT

## 2021-10-04 PROCEDURE — 99233 SBSQ HOSP IP/OBS HIGH 50: CPT | Performed by: INTERNAL MEDICINE

## 2021-10-04 PROCEDURE — 74011250637 HC RX REV CODE- 250/637: Performed by: FAMILY MEDICINE

## 2021-10-04 PROCEDURE — 97165 OT EVAL LOW COMPLEX 30 MIN: CPT

## 2021-10-04 PROCEDURE — 85610 PROTHROMBIN TIME: CPT

## 2021-10-04 PROCEDURE — 97161 PT EVAL LOW COMPLEX 20 MIN: CPT

## 2021-10-04 PROCEDURE — 74011250636 HC RX REV CODE- 250/636: Performed by: FAMILY MEDICINE

## 2021-10-04 PROCEDURE — 74011000636 HC RX REV CODE- 636: Performed by: FAMILY MEDICINE

## 2021-10-04 PROCEDURE — 97535 SELF CARE MNGMENT TRAINING: CPT

## 2021-10-04 PROCEDURE — 74011000258 HC RX REV CODE- 258: Performed by: FAMILY MEDICINE

## 2021-10-04 PROCEDURE — 85379 FIBRIN DEGRADATION QUANT: CPT

## 2021-10-04 PROCEDURE — 97530 THERAPEUTIC ACTIVITIES: CPT

## 2021-10-04 PROCEDURE — 85730 THROMBOPLASTIN TIME PARTIAL: CPT

## 2021-10-04 PROCEDURE — 74011250636 HC RX REV CODE- 250/636: Performed by: EMERGENCY MEDICINE

## 2021-10-04 PROCEDURE — 71260 CT THORAX DX C+: CPT

## 2021-10-04 RX ORDER — SODIUM CHLORIDE 0.9 % (FLUSH) 0.9 %
5-40 SYRINGE (ML) INJECTION EVERY 8 HOURS
Status: DISCONTINUED | OUTPATIENT
Start: 2021-10-04 | End: 2021-10-06 | Stop reason: HOSPADM

## 2021-10-04 RX ORDER — SPIRONOLACTONE 25 MG/1
25 TABLET ORAL DAILY
Status: DISCONTINUED | OUTPATIENT
Start: 2021-10-04 | End: 2021-10-06 | Stop reason: HOSPADM

## 2021-10-04 RX ORDER — SODIUM CHLORIDE 0.9 % (FLUSH) 0.9 %
10 SYRINGE (ML) INJECTION
Status: COMPLETED | OUTPATIENT
Start: 2021-10-04 | End: 2021-10-04

## 2021-10-04 RX ORDER — HEPARIN SODIUM 5000 [USP'U]/ML
40 INJECTION, SOLUTION INTRAVENOUS; SUBCUTANEOUS ONCE
Status: COMPLETED | OUTPATIENT
Start: 2021-10-04 | End: 2021-10-04

## 2021-10-04 RX ORDER — MORPHINE SULFATE 4 MG/ML
4 INJECTION INTRAVENOUS
Status: DISCONTINUED | OUTPATIENT
Start: 2021-10-04 | End: 2021-10-06 | Stop reason: HOSPADM

## 2021-10-04 RX ORDER — ATORVASTATIN CALCIUM 40 MG/1
40 TABLET, FILM COATED ORAL DAILY
Status: DISCONTINUED | OUTPATIENT
Start: 2021-10-04 | End: 2021-10-06 | Stop reason: HOSPADM

## 2021-10-04 RX ORDER — LANOLIN ALCOHOL/MO/W.PET/CERES
500 CREAM (GRAM) TOPICAL DAILY
Status: DISCONTINUED | OUTPATIENT
Start: 2021-10-04 | End: 2021-10-06 | Stop reason: HOSPADM

## 2021-10-04 RX ORDER — ASPIRIN 81 MG/1
81 TABLET ORAL 2 TIMES DAILY
Status: DISCONTINUED | OUTPATIENT
Start: 2021-10-04 | End: 2021-10-06 | Stop reason: HOSPADM

## 2021-10-04 RX ORDER — POLYETHYLENE GLYCOL 3350 17 G/17G
17 POWDER, FOR SOLUTION ORAL DAILY PRN
Status: DISCONTINUED | OUTPATIENT
Start: 2021-10-04 | End: 2021-10-06 | Stop reason: HOSPADM

## 2021-10-04 RX ORDER — ACETAMINOPHEN 325 MG/1
650 TABLET ORAL
Status: DISCONTINUED | OUTPATIENT
Start: 2021-10-04 | End: 2021-10-06 | Stop reason: HOSPADM

## 2021-10-04 RX ORDER — ONDANSETRON 8 MG/1
4 TABLET, ORALLY DISINTEGRATING ORAL
Status: DISCONTINUED | OUTPATIENT
Start: 2021-10-04 | End: 2021-10-06 | Stop reason: HOSPADM

## 2021-10-04 RX ORDER — ONDANSETRON 2 MG/ML
4 INJECTION INTRAMUSCULAR; INTRAVENOUS
Status: DISCONTINUED | OUTPATIENT
Start: 2021-10-04 | End: 2021-10-06 | Stop reason: HOSPADM

## 2021-10-04 RX ORDER — DEXAMETHASONE SODIUM PHOSPHATE 100 MG/10ML
6 INJECTION INTRAMUSCULAR; INTRAVENOUS ONCE
Status: COMPLETED | OUTPATIENT
Start: 2021-10-04 | End: 2021-10-04

## 2021-10-04 RX ORDER — METOPROLOL SUCCINATE 50 MG/1
50 TABLET, EXTENDED RELEASE ORAL DAILY
Status: DISCONTINUED | OUTPATIENT
Start: 2021-10-04 | End: 2021-10-06 | Stop reason: HOSPADM

## 2021-10-04 RX ORDER — HEPARIN SODIUM 5000 [USP'U]/100ML
12-25 INJECTION, SOLUTION INTRAVENOUS
Status: DISCONTINUED | OUTPATIENT
Start: 2021-10-04 | End: 2021-10-06

## 2021-10-04 RX ORDER — FUROSEMIDE 10 MG/ML
60 INJECTION INTRAMUSCULAR; INTRAVENOUS ONCE
Status: COMPLETED | OUTPATIENT
Start: 2021-10-04 | End: 2021-10-04

## 2021-10-04 RX ORDER — ACETAMINOPHEN 650 MG/1
650 SUPPOSITORY RECTAL
Status: DISCONTINUED | OUTPATIENT
Start: 2021-10-04 | End: 2021-10-06 | Stop reason: HOSPADM

## 2021-10-04 RX ORDER — NITROGLYCERIN 0.4 MG/1
0.4 TABLET SUBLINGUAL AS NEEDED
Status: DISCONTINUED | OUTPATIENT
Start: 2021-10-04 | End: 2021-10-06 | Stop reason: HOSPADM

## 2021-10-04 RX ORDER — FAMOTIDINE 20 MG/1
20 TABLET, FILM COATED ORAL 2 TIMES DAILY
Status: DISCONTINUED | OUTPATIENT
Start: 2021-10-04 | End: 2021-10-06 | Stop reason: HOSPADM

## 2021-10-04 RX ORDER — HEPARIN SODIUM 5000 [USP'U]/ML
60 INJECTION, SOLUTION INTRAVENOUS; SUBCUTANEOUS ONCE
Status: COMPLETED | OUTPATIENT
Start: 2021-10-04 | End: 2021-10-04

## 2021-10-04 RX ORDER — SODIUM CHLORIDE 0.9 % (FLUSH) 0.9 %
5-40 SYRINGE (ML) INJECTION AS NEEDED
Status: DISCONTINUED | OUTPATIENT
Start: 2021-10-04 | End: 2021-10-06 | Stop reason: HOSPADM

## 2021-10-04 RX ORDER — VALSARTAN 40 MG/1
40 TABLET ORAL DAILY
Status: DISCONTINUED | OUTPATIENT
Start: 2021-10-04 | End: 2021-10-06 | Stop reason: HOSPADM

## 2021-10-04 RX ORDER — ENOXAPARIN SODIUM 100 MG/ML
30 INJECTION SUBCUTANEOUS EVERY 12 HOURS
Status: DISCONTINUED | OUTPATIENT
Start: 2021-10-04 | End: 2021-10-04

## 2021-10-04 RX ORDER — ALBUTEROL SULFATE 90 UG/1
2 AEROSOL, METERED RESPIRATORY (INHALATION)
Status: DISCONTINUED | OUTPATIENT
Start: 2021-10-04 | End: 2021-10-06 | Stop reason: HOSPADM

## 2021-10-04 RX ORDER — HYOSCYAMINE SULFATE 0.12 MG/1
0.12 TABLET SUBLINGUAL
Status: DISCONTINUED | OUTPATIENT
Start: 2021-10-04 | End: 2021-10-06 | Stop reason: HOSPADM

## 2021-10-04 RX ORDER — DEXAMETHASONE 4 MG/1
6 TABLET ORAL DAILY
Status: DISCONTINUED | OUTPATIENT
Start: 2021-10-05 | End: 2021-10-06 | Stop reason: HOSPADM

## 2021-10-04 RX ORDER — GUAIFENESIN/DEXTROMETHORPHAN 100-10MG/5
5 SYRUP ORAL
Status: DISCONTINUED | OUTPATIENT
Start: 2021-10-04 | End: 2021-10-06 | Stop reason: HOSPADM

## 2021-10-04 RX ADMIN — Medication 10 ML: at 22:17

## 2021-10-04 RX ADMIN — DEXAMETHASONE SODIUM PHOSPHATE 6 MG: 10 INJECTION, SOLUTION INTRAMUSCULAR; INTRAVENOUS at 01:46

## 2021-10-04 RX ADMIN — ATORVASTATIN CALCIUM 40 MG: 40 TABLET, FILM COATED ORAL at 08:58

## 2021-10-04 RX ADMIN — ASPIRIN 81 MG: 81 TABLET ORAL at 08:58

## 2021-10-04 RX ADMIN — SODIUM CHLORIDE 100 ML: 900 INJECTION, SOLUTION INTRAVENOUS at 02:06

## 2021-10-04 RX ADMIN — FAMOTIDINE 20 MG: 20 TABLET ORAL at 08:58

## 2021-10-04 RX ADMIN — ONDANSETRON 4 MG: 2 INJECTION INTRAMUSCULAR; INTRAVENOUS at 03:46

## 2021-10-04 RX ADMIN — HEPARIN SODIUM AND DEXTROSE 12 UNITS/KG/HR: 5000; 5 INJECTION INTRAVENOUS at 01:48

## 2021-10-04 RX ADMIN — HEPARIN SODIUM AND DEXTROSE 16 UNITS/KG/HR: 5000; 5 INJECTION INTRAVENOUS at 16:53

## 2021-10-04 RX ADMIN — Medication 10 ML: at 02:06

## 2021-10-04 RX ADMIN — VALSARTAN 40 MG: 40 TABLET, FILM COATED ORAL at 11:26

## 2021-10-04 RX ADMIN — Medication 10 ML: at 22:18

## 2021-10-04 RX ADMIN — HEPARIN SODIUM AND DEXTROSE 16 UNITS/KG/HR: 5000; 5 INJECTION INTRAVENOUS at 21:50

## 2021-10-04 RX ADMIN — ACETAMINOPHEN 650 MG: 325 TABLET ORAL at 16:56

## 2021-10-04 RX ADMIN — CYANOCOBALAMIN TAB 1000 MCG 500 MCG: 1000 TAB at 11:26

## 2021-10-04 RX ADMIN — HEPARIN SODIUM 5700 UNITS: 5000 INJECTION INTRAVENOUS; SUBCUTANEOUS at 01:46

## 2021-10-04 RX ADMIN — ONDANSETRON 4 MG: 2 INJECTION INTRAMUSCULAR; INTRAVENOUS at 19:52

## 2021-10-04 RX ADMIN — ASPIRIN 81 MG: 81 TABLET ORAL at 19:51

## 2021-10-04 RX ADMIN — HEPARIN SODIUM 3800 UNITS: 5000 INJECTION INTRAVENOUS; SUBCUTANEOUS at 16:50

## 2021-10-04 RX ADMIN — MORPHINE SULFATE 4 MG: 4 INJECTION INTRAVENOUS at 03:51

## 2021-10-04 RX ADMIN — SPIRONOLACTONE 25 MG: 25 TABLET ORAL at 11:26

## 2021-10-04 RX ADMIN — METOPROLOL SUCCINATE 50 MG: 50 TABLET, EXTENDED RELEASE ORAL at 10:17

## 2021-10-04 RX ADMIN — IOPAMIDOL 100 ML: 755 INJECTION, SOLUTION INTRAVENOUS at 02:06

## 2021-10-04 RX ADMIN — FAMOTIDINE 20 MG: 20 TABLET ORAL at 19:51

## 2021-10-04 RX ADMIN — FUROSEMIDE 60 MG: 10 INJECTION, SOLUTION INTRAMUSCULAR; INTRAVENOUS at 01:45

## 2021-10-04 NOTE — PROGRESS NOTES
Hospitalist Progress Note   Admit Date:  10/3/2021 10:01 PM   Name:  Sandra Goldstein   Age:  70 y.o. Sex:  male  :  1950   MRN:  [id number]   Room:  ER12/    Presenting Complaint: Positive For Covid-19    Reason(s) for Admission: Acute hypoxemic respiratory failure due to COVID-19 [facility]) [U07.1, J96.01]  NSTEMI (non-ST elevated myocardial infarction) [facility]) [I21.4]     Hospital Course & Interval History:   Sandra Goldstein is a 70 y.o. male with medical history of Hx of CAD s/p CABG, HTN, severe AS s/p aortic valve replacement, sCHF EF 30%, pulmonary HTN who presented with worsening SOB and new left sided chest pain associated with chest tightness and malaise. He was hypoxic with minimal exertion requiring 2L o2. His troponin was over 1000 and NT pr BNP 4000s. EKG showed no ischemic changes. CXR vascular congestion or atypical PNA. ED d/w cardiology who recommended heparin bolus and gtt for possible thrombus.      He was recently hospitalized for COVID PNA , was weaned off of supplemental O2, and discharged home on  with decadron. He is unvaccinated and declined baricitinib at that time. Subjective (10/04/21):  Patient A&O x 4. Denies chest pain, abdominal pain, SOB. Breathing feels stable. Endorses overall malaise. Denies cough.     Assessment & Plan:     Principal Problem:  Acute hypoxemic respiratory failure due to COVID-19 [facility])   Pneumonia due to COVID-19 virus  -Recent admission for COVID PNA, weaned off supplemental O2, discharged home on  with Decadron  -Hypoxic on presentation, placed on 3L NC, continued to drop to 85-86% with minimal exertion  -Continue Decadron daily  -Procal 0.07 down from 0.9 on , no empiric antibiotics at this time  -DDimer > 20  -CT Chest negative for PE, bilateral GGO suggesting viral pneumonia  -CRP 4.4, does not qualify for Baricitinib or Actemra  -Continue supplemental O2, currently at 4L NC, wean as tolerated  -Prone positioning as tolerated, aggressive IS use  -PT/OT evals    Active Problems:  Hypertension   -Continue BB, ARB  -Cardiology following    CAD (coronary artery disease)  Status post coronary artery bypass graft 3/2021  Severe AS S/P AVR (aortic valve replacement)  Chronic L BBB  -Noted, cardiology following    Elevated troponin   -Suspected NSTEMI vs. Myocarditis 2/2 COVID-19 vs. Demand ischemia  -Telemetry monitoring  -Trop 1057 --> 943  -ASA, BB, ARB, Aldactone, statin, prn nitro and morphine, Heparin gtt  -Cardiology consulted - no symptoms suggestive of angina, trops trending down, likely demand ischemia    Acute on chronic Systolic CHF (HCC)  -EF 01%  -s/p Lasix 40 mg IV x 1 in ED  -Continue ARB, BB, Aldactone  -Cardiology consulted  -Strict I&O's, daily weight  -Monitor volume status closely    Class 1 obesity with alveolar hypoventilation, serious comorbidity, and body mass index (BMI) of 30.0 to 30.9 in [facility])   -Associated with increased mortality, recommend lifestyle and diet modifications    Hypercoagulable state associated with COVID-19 ([facility] [address]   -DDimer > 20  -CT Chest negative for PE  -Heparin gtt per cards    Dispo/Discharge Planning:  Pending clinical course    Diet:  ADULT DIET Regular; Low Fat/Low Chol/High Fiber/SHAI  DVT PPx: Heparin gtt  Code status: Full Code    Hospital Problems as of 10/4/2021 Date Reviewed: 8/17/2021        Codes Class Noted - Resolved POA    NSTEMI (non-ST elevated myocardial infarction) ([facility] [address] ICD-10-CM: I21.4  ICD-9-CM: 410.70  10/4/2021 - Present         Hypercoagulable state associated with COVID-19 [facility]) ICD-10-CM: U07.1, D68.69  ICD-9-CM: 289.82, 079.89  10/4/2021 - Present Yes        Elevated troponin ICD-10-CM: R77.8  ICD-9-CM: 790.6  10/4/2021 - Present Unknown        Class 1 obesity with alveolar hypoventilation, serious comorbidity, and body mass index (BMI) of 30.0 to 30.9 in [facility]) ICD-10-CM: Y24.2, Z68.30  ICD-9-CM: 278.03, V85.30  9/29/2021 - Present Yes        * (Principal) Acute hypoxemic respiratory failure due to COVID-19 [facility]) ICD-10-CM: U07.1, J96.01  ICD-9-CM: 518.81, 079.89, 799.02  9/28/2021 - Present Unknown        Systolic CHF, chronic (HCC) ICD-10-CM: I50.22  ICD-9-CM: 428.22, 428.0  9/28/2021 - Present Yes        Status post coronary artery bypass graft ICD-10-CM: Z95.1  ICD-9-CM: V45.81  10/3/2018 - Present Yes    Overview Signed 10/3/2018 12:20 PM by Meredith Paniagua, ANAHI     10/3/18 Dr. Uzma Blanton  CABG X 1, LIMA to the LAD WITH AVR 23 mm Intuity valve             S/P AVR (aortic valve replacement) ICD-10-CM: Z95.2  ICD-9-CM: V43.3  10/3/2018 - Present Yes    Overview Signed 10/3/2018 12:21 PM by Meredith Paniagua NP     10/3/18  Dr. Uzma Blanton  CABG X 1, LIMA to the LAD WITH AVR 23 mm Intuity valve             Pneumonia due to COVID-19 virus ICD-10-CM: U07.1, J12.82  ICD-9-CM: 480.8, 079.89  10/3/2018 - Present Yes        CAD (coronary artery disease) ICD-10-CM: I25.10  ICD-9-CM: 414.00  9/11/2018 - Present Yes        Hypertension (Chronic) ICD-10-CM: I10  ICD-9-CM: 401.9  9/11/2018 - Present Yes              Objective:     Patient Vitals for the past 24 hrs:   Temp Pulse Resp BP SpO2   10/04/21 1017 -- 75 -- 107/69 --   10/04/21 0944 -- 73 18 -- 94 %   10/04/21 0932 -- 77 19 118/74 90 %   10/04/21 0925 -- 87 24 133/86 91 %   10/04/21 0913 -- 90 -- (!) 109/58 93 %   10/04/21 0901 -- 80 -- 111/72 93 %   10/04/21 0807 -- 88 -- -- 94 %   10/04/21 0802 -- 74 -- 118/74 --   10/04/21 0702 -- 71 -- 99/64 --   10/04/21 0602 -- 67 -- 104/68 --   10/04/21 0531 -- 66 -- 105/68 --   10/04/21 0502 -- 70 -- 100/67 --   10/04/21 0431 -- 74 -- 104/66 --   10/04/21 0415 -- 78 -- 102/63 --   10/04/21 0331 -- 77 -- 114/79 92 %   10/04/21 0301 -- 77 25 118/77 94 %   10/04/21 0146 -- 79 22 128/78 --   10/03/21 2206 -- 86 -- 121/76 93 %   10/03/21 2157 -- -- -- -- (!) 85 %   10/03/21 2151 97.6 °F (36.4 °C) 96 24 123/74 (!) 85 %     Oxygen Therapy  O2 Sat (%): 94 % (10/04/21 6365)  Pulse via Oximetry: 74 beats per minute (10/04/21 0944)  O2 Device: Nasal cannula (10/03/21 2157)  O2 Flow Rate (L/min): 2 l/min (10/03/21 2157)    Estimated body mass index is 29.71 kg/m² as calculated from the following:    Height as of this encounter: 5' 10.5\" (1.791 m). Weight as of this encounter: 95.3 kg (210 lb). No intake or output data in the 24 hours ending 10/04/21 1255      Physical Exam:   General:    Ill-appearing, no acute distress  Head:  Normocephalic, atraumatic  Eyes:  Sclerae appear normal.  Pupils equally round. Glasses on. ENT:  Nares appear normal, no drainage. Moist oral mucosa  Neck:  No restricted ROM. Trachea midline   CV:   Regular rate and rhythm, + murmur  Lungs:   Bilateral crackles posterior bases, respirations even, no conversational dyspnea, no accessory muscle use  Abdomen: Bowel sounds present. Soft, nontender, nondistended. Extremities: No cyanosis or clubbing. No peripheral edema  Skin:     No rashes and normal coloration. Warm and dry. Neuro:  Cranial nerves II-XII grossly intact. Sensation intact. A&Ox3  Psych:  Normal mood and affect.       I have reviewed ordered lab tests and independently visualized imaging below:    Last 24hr Labs:  Recent Results (from the past 24 hour(s))   EKG, 12 LEAD, INITIAL    Collection Time: 10/03/21  9:52 PM   Result Value Ref Range    Ventricular Rate 91 BPM    Atrial Rate 91 BPM    P-R Interval 176 ms    QRS Duration 176 ms    Q-T Interval 424 ms    QTC Calculation (Bezet) 521 ms    Calculated P Axis 38 degrees    Calculated R Axis 82 degrees    Calculated T Axis -67 degrees    Diagnosis       Normal sinus rhythm  Possible Left atrial enlargement  Left bundle branch block  Abnormal ECG  When compared with ECG of 28-SEP-2021 17:00,  No significant change was found     C REACTIVE PROTEIN, QT    Collection Time: 10/03/21  9:58 PM   Result Value Ref Range    C-Reactive protein 4.4 (H) 0.0 - 0.9 mg/dL   PROCALCITONIN Collection Time: 10/03/21  9:58 PM   Result Value Ref Range    Procalcitonin 0.07 ng/mL   TROPONIN-HIGH SENSITIVITY    Collection Time: 10/03/21  9:58 PM   Result Value Ref Range    Troponin-High Sensitivity 1,057.8 (HH) 0 - 14 pg/mL   CBC WITH AUTOMATED DIFF    Collection Time: 10/03/21  9:59 PM   Result Value Ref Range    WBC 9.2 4.3 - 11.1 K/uL    RBC 5.00 4.23 - 5.6 M/uL    HGB 14.0 13.6 - 17.2 g/dL    HCT 42.8 41.1 - 50.3 %    MCV 85.6 79.6 - 97.8 FL    MCH 28.0 26.1 - 32.9 PG    MCHC 32.7 31.4 - 35.0 g/dL    RDW 13.1 11.9 - 14.6 %    PLATELET 724 618 - 794 K/uL    MPV 9.9 9.4 - 12.3 FL    ABSOLUTE NRBC 0.00 0.0 - 0.2 K/uL    DF AUTOMATED      NEUTROPHILS 76 43 - 78 %    LYMPHOCYTES 15 13 - 44 %    MONOCYTES 7 4.0 - 12.0 %    EOSINOPHILS 1 0.5 - 7.8 %    BASOPHILS 0 0.0 - 2.0 %    IMMATURE GRANULOCYTES 1 0.0 - 5.0 %    ABS. NEUTROPHILS 7.0 1.7 - 8.2 K/UL    ABS. LYMPHOCYTES 1.4 0.5 - 4.6 K/UL    ABS. MONOCYTES 0.6 0.1 - 1.3 K/UL    ABS. EOSINOPHILS 0.1 0.0 - 0.8 K/UL    ABS. BASOPHILS 0.0 0.0 - 0.2 K/UL    ABS. IMM. GRANS. 0.1 0.0 - 0.5 K/UL   METABOLIC PANEL, COMPREHENSIVE    Collection Time: 10/03/21  9:59 PM   Result Value Ref Range    Sodium 136 136 - 145 mmol/L    Potassium 4.1 3.5 - 5.1 mmol/L    Chloride 102 98 - 107 mmol/L    CO2 24 21 - 32 mmol/L    Anion gap 10 7 - 16 mmol/L    Glucose 149 (H) 65 - 100 mg/dL    BUN 24 (H) 8 - 23 MG/DL    Creatinine 1.21 0.8 - 1.5 MG/DL    GFR est AA >60 >60 ml/min/1.73m2    GFR est non-AA >60 >60 ml/min/1.73m2    Calcium 8.8 8.3 - 10.4 MG/DL    Bilirubin, total 0.9 0.2 - 1.1 MG/DL    ALT (SGPT) 69 (H) 12 - 65 U/L    AST (SGOT) 35 15 - 37 U/L    Alk.  phosphatase 48 (L) 50 - 136 U/L    Protein, total 8.1 6.3 - 8.2 g/dL    Albumin 3.0 (L) 3.2 - 4.6 g/dL    Globulin 5.1 (H) 2.3 - 3.5 g/dL    A-G Ratio 0.6 (L) 1.2 - 3.5     MAGNESIUM    Collection Time: 10/03/21  9:59 PM   Result Value Ref Range    Magnesium 2.3 1.8 - 2.4 mg/dL   NT-PRO BNP    Collection Time: 10/03/21 9:59 PM   Result Value Ref Range    NT pro-BNP 4,143 (H) 5 - 125 PG/ML   LACTIC ACID    Collection Time: 10/03/21 10:30 PM   Result Value Ref Range    Lactic acid 1.4 0.4 - 2.0 MMOL/L   D DIMER    Collection Time: 10/04/21 12:15 AM   Result Value Ref Range    D DIMER >20.00 (H) <0.56 ug/ml(FEU)   PROTHROMBIN TIME + INR    Collection Time: 10/04/21 12:15 AM   Result Value Ref Range    Prothrombin time 18.9 (H) 12.6 - 14.5 sec    INR 1.6     PTT    Collection Time: 10/04/21 12:15 AM   Result Value Ref Range    aPTT 32.5 24.1 - 35.1 SEC   TROPONIN-HIGH SENSITIVITY    Collection Time: 10/04/21 12:32 AM   Result Value Ref Range    Troponin-High Sensitivity 943.1 (HH) 0 - 14 pg/mL   HEPARIN XA UFH    Collection Time: 10/04/21  8:09 AM   Result Value Ref Range    Heparin Xa UFH 0.43 0.3 - 0.7 IU/mL       All Micro Results     None          Other Studies:  XR CHEST PORT    Result Date: 10/3/2021  Clinical history: Syncope TECHNIQUE: AP portable chest COMPARISON: September 28, 2021. FINDINGS: Heart is mildly enlarged. Mediastinal contour is within normal limits. Stable midline sternotomy wires. Mild increased interstitial prominence. No pleural effusion or pneumothorax. Surrounding bones are stable. 1. Mild increased interstitial prominence, likely vascular congestion or atypical infection. 2. No significant change compared to prior exam.    CT CHEST PULMONARY EMBOLISM    Result Date: 10/4/2021  Clinical history: Covid positive. Shortness of breath and generalized aches. TECHNIQUE: Axial and coronal CT of the chest with 100 cc of IV contrast. CT dose reduction was achieved through use of a standardized protocol tailored for this examination and automatic exposure control for dose modulation. COMPARISON: Chest x-ray yesterday. FINDINGS: There is no pulmonary embolism. There are bilateral peripheral groundglass consolidations. There is no pneumothorax or pleural effusion. There is no endobronchial lesion.  The heart is enlarged. There is no pericardial effusion. Prosthetic aortic valve is noted. Sternotomy wires are present. The thoracic aorta is normal in course and caliber. There is no lymphadenopathy. Included upper abdomen demonstrates cholelithiasis. Nonobstructing right renal stones are noted. Right renal cysts are suggested. There are degenerative changes of the spine. 1. Negative for pulmonary embolism. 2. Bilateral peripheral groundglass consolidations, suggesting pneumonia (including viral etiology). 3. Cholelithiasis.       Current Meds:  Current Facility-Administered Medications   Medication Dose Route Frequency    sodium chloride (NS) flush 5-40 mL  5-40 mL IntraVENous Q8H    sodium chloride (NS) flush 5-40 mL  5-40 mL IntraVENous PRN    acetaminophen (TYLENOL) tablet 650 mg  650 mg Oral Q6H PRN    Or    acetaminophen (TYLENOL) suppository 650 mg  650 mg Rectal Q6H PRN    polyethylene glycol (MIRALAX) packet 17 g  17 g Oral DAILY PRN    ondansetron (ZOFRAN ODT) tablet 4 mg  4 mg Oral Q8H PRN    Or    ondansetron (ZOFRAN) injection 4 mg  4 mg IntraVENous Q6H PRN    famotidine (PEPCID) tablet 20 mg  20 mg Oral BID    guaiFENesin-dextromethorphan (ROBITUSSIN DM) 100-10 mg/5 mL syrup 5 mL  5 mL Oral Q4H PRN    albuterol (PROVENTIL HFA, VENTOLIN HFA, PROAIR HFA) inhaler 2 Puff  2 Puff Inhalation Q4H PRN    aspirin delayed-release tablet 81 mg  81 mg Oral BID    atorvastatin (LIPITOR) tablet 40 mg  40 mg Oral DAILY    hyoscyamine SL (LEVSIN/SL) tablet 0.125 mg  0.125 mg SubLINGual Q4H PRN    metoprolol succinate (TOPROL-XL) XL tablet 50 mg  50 mg Oral DAILY    spironolactone (ALDACTONE) tablet 25 mg  25 mg Oral DAILY    valsartan (DIOVAN) tablet 40 mg  40 mg Oral DAILY    cyanocobalamin tablet 500 mcg  500 mcg Oral DAILY    heparin 25,000 units in dextrose 500 mL infusion  12-25 Units/kg/hr IntraVENous TITRATE    nitroglycerin (NITROSTAT) tablet 0.4 mg  0.4 mg SubLINGual PRN    morphine injection 4 mg 4 mg IntraVENous Q3H PRN    [START ON 10/5/2021] dexAMETHasone (DECADRON) tablet 6 mg  6 mg Oral DAILY    sodium chloride (NS) flush 5-10 mL  5-10 mL IntraVENous Q8H    sodium chloride (NS) flush 5-10 mL  5-10 mL IntraVENous PRN     Current Outpatient Medications   Medication Sig    hyoscyamine SL (LEVSIN/SL) 0.125 mg SL tablet 1 Tablet by SubLINGual route every four (4) hours as needed for Cramping.  albuterol (Ventolin HFA) 90 mcg/actuation inhaler Take 2 Puffs by inhalation every four (4) hours as needed for Wheezing, Shortness of Breath or Cough.  cyanocobalamin 1,000 mcg tablet Take 500 mcg by mouth daily.  aspirin delayed-release 81 mg tablet Take 1 Tablet by mouth two (2) times a day.  acetaminophen (TYLENOL) 500 mg tablet Take 2 Tablets by mouth every six (6) hours as needed for Pain.  atorvastatin (Lipitor) 40 mg tablet Take 40 mg by mouth daily.  metoprolol succinate (TOPROL-XL) 50 mg XL tablet Take 1 Tab by mouth daily.  valsartan (DIOVAN) 40 mg tablet Take 1 Tab by mouth daily.  spironolactone (ALDACTONE) 25 mg tablet Take 1 Tab by mouth daily.  multivitamin (ONE A DAY) tablet Take 1 Tab by mouth daily. Labs, vital signs, diagnostic testing reviewed. Case discussed with patient, care team, Dr. Wally Oquendo. Signed:  Courtney Suazo NP    Part of this note may have been written by using a voice dictation software. The note has been proof read but may still contain some grammatical/other typographical errors.

## 2021-10-04 NOTE — H&P
[facility] Cardiology Initial Cardiac Evaluation                 Date of  Admission: 10/3/2021 10:01 PM     Primary Care Physician: Luis Santos MD  Primary Cardiologist: Dr Velasquez Hernandez  Referring Physician: Dr Pham Richards  Attending Physician: Dr Velasquez Hernandez    CC: elevated troponin       Vasu Graf is a 70 y.o. male admitted for Acute hypoxemic respiratory failure due to COVID-19 ([facility] [address] [U07.1, J96.01]  NSTEMI (non-ST elevated myocardial infarction) ([facility] [address] [I21.4]. He has a h/o CAD s/p CABG w LIMA to ALD w bioprosthetic AVR in 2018, echo 3-2021 w EF 30-35% w WMA,  LHC 3-2021 w patent LIMA to LAD and otherwise minimal CAD. He underwent TIFFANIE 7-2021. No h/o tobacco use, no f/h of CAD, not vaccinated for Covid. He was d/c 9-28 on antibiotics and prednisone. He returned to the ER last night feeling \"bad\". He has increasing SOB, aches and fevers. He has had occasional soreness under his L arm on and off only since dx of Covid, which lasts a few minutes, is not exertional, resolves without treatment, no nausea or diaphoresis associated with L axillary pain- pain does not radiate and is mild. He has a mild dry cough, no dizziness, palpitations, syncope or LE edema. No weight gain. In ER WBC 9, hgb 14, d-dimer greater than 20, , K 4.1, cr 1.21, lactic acid 1.4, HS trop 1067 repeat 943, pBNP 4143, crp 4.4. CT chest no PE, B ground glass consolidations suggesting PNA including viral etiology. EKG L BBB (old) w rate 91. 116/74. Pt has been admitted, started on heparin, decadron, given lasix. He has missed some of his doses due to nausea and abdominal pain.         Soc:No h/o tobacco use  Fh: no f/h of CAD  Covid: Not vaccinated for Covid      Patient Active Problem List   Diagnosis Code    Aortic stenosis I35.0    CAD (coronary artery disease) I25.10    Hypertension I10    Status post coronary artery bypass graft Z95.1    S/P AVR (aortic valve replacement) Z95.2    Pneumonia due to COVID-19 virus U07.1, J12.82  Pneumothorax J93.9    Snoring R06.83    Osteoarthritis of right hip M16.11    Acute hypoxemic respiratory failure due to COVID-19 ([facility]) U07.1, B45.94    Systolic CHF, chronic ([facility]) I50.22    Class 1 obesity with alveolar hypoventilation, serious comorbidity, and body mass index (BMI) of 30.0 to 30.9 in adult ([facility]) E66.2, Z68.30    NSTEMI (non-ST elevated myocardial infarction) ([facility] [address] I21.4    Hypercoagulable state associated with COVID-19 ([facility]) U07.1, D68.69       Past Medical History:   Diagnosis Date    Arthritis     generalized OA    CAD (coronary artery disease) 09/11/2018    Followed by [facility].     CHF (congestive heart failure) ([facility])     GERD (gastroesophageal reflux disease)     OTC alkaseltzer    History of kidney stones 2016    naturally passed    History of melanoma 2016    removed from nose    Hx of CABG     Hypertension 9/11/2018    PUD (peptic ulcer disease)     1970's- patient denies 10/01/18    Right hip pain     S/P AVR (aortic valve replacement)     Sleep apnea     observed to stop breathing in sleep; no CPAP      Past Surgical History:   Procedure Laterality Date    HX AORTIC VALVE REPLACEMENT  10/03/2018    CORONARY ARTERY BYPASS GRAFT X 1, LIMA to the LAD WITH AVR 23 mm Intuity valve    HX APPENDECTOMY       15 yo    HX CORONARY ARTERY BYPASS GRAFT  10/03/2018    CABG x1, Dr. Fredi Lepe  2014    right lower ext - broken put in rods and screws    HX OTHER SURGICAL      melanoma surgery to nose     Allergies   Allergen Reactions    Lortab [Hydrocodone-Acetaminophen] Other (comments)     hallucinations      Family History   Problem Relation Age of Onset    Cancer Mother         Leukemia    Alzheimer Mother     Cancer Brother         Leukemia    Hypertension Brother     No Known Problems Father     Diabetes Sister     No Known Problems Brother       Social History     Tobacco Use    Smoking status: Never Smoker    Smokeless tobacco: Never Used   Substance Use Topics    Alcohol use: Yes     Comment: occasional        Current Facility-Administered Medications   Medication Dose Route Frequency    sodium chloride (NS) flush 5-40 mL  5-40 mL IntraVENous Q8H    sodium chloride (NS) flush 5-40 mL  5-40 mL IntraVENous PRN    acetaminophen (TYLENOL) tablet 650 mg  650 mg Oral Q6H PRN    Or    acetaminophen (TYLENOL) suppository 650 mg  650 mg Rectal Q6H PRN    polyethylene glycol (MIRALAX) packet 17 g  17 g Oral DAILY PRN    ondansetron (ZOFRAN ODT) tablet 4 mg  4 mg Oral Q8H PRN    Or    ondansetron (ZOFRAN) injection 4 mg  4 mg IntraVENous Q6H PRN    famotidine (PEPCID) tablet 20 mg  20 mg Oral BID    guaiFENesin-dextromethorphan (ROBITUSSIN DM) 100-10 mg/5 mL syrup 5 mL  5 mL Oral Q4H PRN    albuterol (PROVENTIL HFA, VENTOLIN HFA, PROAIR HFA) inhaler 2 Puff  2 Puff Inhalation Q4H PRN    aspirin delayed-release tablet 81 mg  81 mg Oral BID    atorvastatin (LIPITOR) tablet 40 mg  40 mg Oral DAILY    hyoscyamine SL (LEVSIN/SL) tablet 0.125 mg  0.125 mg SubLINGual Q4H PRN    metoprolol succinate (TOPROL-XL) XL tablet 50 mg  50 mg Oral DAILY    spironolactone (ALDACTONE) tablet 25 mg  25 mg Oral DAILY    valsartan (DIOVAN) tablet 40 mg  40 mg Oral DAILY    cyanocobalamin tablet 500 mcg  500 mcg Oral DAILY    heparin 25,000 units in dextrose 500 mL infusion  12-25 Units/kg/hr IntraVENous TITRATE    nitroglycerin (NITROSTAT) tablet 0.4 mg  0.4 mg SubLINGual PRN    morphine injection 4 mg  4 mg IntraVENous Q3H PRN    [START ON 10/5/2021] dexAMETHasone (DECADRON) tablet 6 mg  6 mg Oral DAILY    sodium chloride (NS) flush 5-10 mL  5-10 mL IntraVENous Q8H    sodium chloride (NS) flush 5-10 mL  5-10 mL IntraVENous PRN     Current Outpatient Medications   Medication Sig    hyoscyamine SL (LEVSIN/SL) 0.125 mg SL tablet 1 Tablet by SubLINGual route every four (4) hours as needed for Cramping.     albuterol (Ventolin HFA) 90 mcg/actuation inhaler Take 2 Puffs by inhalation every four (4) hours as needed for Wheezing, Shortness of Breath or Cough.  cyanocobalamin 1,000 mcg tablet Take 500 mcg by mouth daily.  aspirin delayed-release 81 mg tablet Take 1 Tablet by mouth two (2) times a day.  acetaminophen (TYLENOL) 500 mg tablet Take 2 Tablets by mouth every six (6) hours as needed for Pain.  atorvastatin (Lipitor) 40 mg tablet Take 40 mg by mouth daily.  metoprolol succinate (TOPROL-XL) 50 mg XL tablet Take 1 Tab by mouth daily.  valsartan (DIOVAN) 40 mg tablet Take 1 Tab by mouth daily.  spironolactone (ALDACTONE) 25 mg tablet Take 1 Tab by mouth daily.  multivitamin (ONE A DAY) tablet Take 1 Tab by mouth daily. Review of Symptoms:  General: no weight gain,  + weakness, fever or chills  Skin: no rashes, lumps, or other skin changes  HEENT: no headache, dizziness, lightheadedness, vision changes, hearing changes, tinnitus, vertigo, sinus pressure/pain, bleeding gums, sore throat, or hoarseness  Neck: no swollen glands, goiter, pain or stiffness  Respiratory: { cough, sputum, hemoptysis, { dyspnea, wheezing  Cardiovascular: + as per HPI  Gastrointestinal: no GERD, constipation, diarrhea, liver problems, or h/o GI bleed  Urinary: no frequency, urgency , hematuria, burning/pain with urination, recent flank pain, polyuria, nocturia, or difficulty urinating  Peripheral Vascular: no claudication, leg cramps, prior DVTs, swelling of calves, legs, or feet, color change, or swelling with redness or tenderness  Musculoskeletal: aches  Psychiatric: no depression or excessive stress  Neurological: no sensory or motor loss, seizures, syncope, tremors, numbness, no dementia  Hematologic: no anemia, easy bruising or bleeding  Endocrine: no thyroid problems, heat or cold intolerance, excessive sweating, polyuria, polydipsia, no diabetes.        Physical Exam  Vitals:    10/04/21 0502 10/04/21 0531 10/04/21 0602 10/04/21 0702   BP: 100/67 105/68 104/68 99/64   Pulse: 70 66 67 71   Resp:       Temp:       SpO2:       Weight:       Height:           Physical Exam:  General: Well Developed, Well Nourished, No Acute Distress but appears lethargic and c/o feeling \"terrible\"  Head: normocephalic atraumatic   ENT: pupils equal and round, no abnormalities noted  Neck: supple, no JVD, no carotid bruits  Heart: S1S2 with RRR   Lungs: Crackles at bases   Abd: soft, nontender, nondistended, with good bowel sounds  Ext: warm, no edema  Skin: warm and dry  Psychiatric: Normal mood and affect  Neurologic: Alert and oriented X 3      Cardiographics    Telemetry: NSR w L BBB    Labs:   Recent Labs     10/04/21  0015 10/03/21  2159   NA  --  136   K  --  4.1   MG  --  2.3   BUN  --  24*   CREA  --  1.21   GLU  --  149*   WBC  --  9.2   HGB  --  14.0   HCT  --  42.8   PLT  --  174   INR 1.6  --         Assessment/Plan:     Assessment:   Acute hypoxemic respiratory failure due to COVID-19 - decadron    CAD (coronary artery disease) (9/11/2018)- w elevated troponin, s/p CABG w [facility] 3-2021 w stable disease, L BBB chronic, no symptoms suggestive of angina; troponin trending down, cont ASA, lipitor, heparin, toprol, arb, aldactone     Hypertension (9/11/2018)- BB, ARB     S/P AVR (aortic valve replacement) - stable on echo     Pneumonia due to COVID-19 virus (80/8/2827)    Systolic CHF, chronic (HCC) (9/28/2021)- EF 30-35%, s/p IV lasix, cont ARB, BB, monitor volume status closely      Class 1 obesity with alveolar hypoventilation, serious comorbidity, and body mass index (BMI) of 30.0 to 30.9 in adult [facility]) (9/29/2021)    Hypercoagulable state associated with COVID-19 ([facility] [address] (10/4/2021)- heparin      Thank you very much for this referral. We appreciate the opportunity to participate in this patient's care. We will follow along with above stated plan.     Melvena Angelucci, PA-C  Consulting MD: Hayley Monae

## 2021-10-04 NOTE — PROGRESS NOTES
ACUTE PHYSICAL THERAPY GOALS:  (Developed with and agreed upon by patient and/or caregiver. )  LTG:  (1.)Mr. Leora Rowe will move from supine to sit and sit to supine , scoot up and down and roll side to side in bed with INDEPENDENT within 7 treatment day(s). (2.)Mr. Leora Rowe will transfer from bed to chair and chair to bed with INDEPENDENT using the least restrictive device within 7 treatment day(s). (3.)Mr. Mcfarland will ambulate with SUPERVISION for 250+ feet with the least restrictive device within 7 treatment day(s) while maintaining normal vital signs. (4.)Mr. Leora Rowe will perform 23+ minutes of therapeutic activity within 7 treatment days while maintaining normal vital signs.    ________________________________________________________________________________________________       PHYSICAL THERAPY ASSESSMENT: Initial Assessment and AM PT Treatment Day # 1      Jennifer Murray is a 70 y.o. male   PRIMARY DIAGNOSIS: Acute hypoxemic respiratory failure due to COVID-19 [facility])  Acute hypoxemic respiratory failure due to COVID-19 ([facility] [address] [U07.1, J96.01]  NSTEMI (non-ST elevated myocardial infarction) [facility] [I21.4]       Reason for Referral:    ICD-10: Treatment Diagnosis: Generalized Muscle Weakness (M62.81)  Difficulty in walking, Not elsewhere classified (R26.2)  INPATIENT: Payor: [employer] [facility] MEDICARE / Plan: [place] [employer] [place] MED[place]O / Product Type: Managed Care Medicare /     ASSESSMENT:     REHAB RECOMMENDATIONS:   Recommendation to date pending progress:  [address] [place] [address]  Equipment:    To Be Determined     PRIOR LEVEL OF FUNCTION:  (Prior to Hospitalization) INITIAL/CURRENT LEVEL OF FUNCTION:  (Most Recently Demonstrated)   Bed Mobility:   Independent  Sit to Stand:   Independent  Transfers:   Independent  Gait/Mobility:   Independent Bed Mobility:   Contact Guard Assistance  Sit to Stand:   Contact Guard Assistance  Transfers:   Contact Guard Assistance  Gait/Mobility:   Contact Guard Assistance     ASSESSMENT:  Mr. Lillian Brown presents to hospital 10/3/21 with SOB and weakness from home; recently admitted 9/26-9/30/21 with COVID 19 virus. Pt was discharged home on RA. Pt now on 4.5 L O2 in ED, stats 94% at rest. Pt reports lives alone, son next door and assists as needed. Pt states is independent at baseline, hasn't done much since being home. Pt today required CGA for all mobility, O2 stats 89-90% in standing and side steps at EOB. Pt overall appears to be functioning below baseline of independent. PT to cont to follow for acute care needs. SUBJECTIVE:   Mr. Lillian Brown states, \"I am just tired. \"    SOCIAL HISTORY/LIVING ENVIRONMENT: lives alone, son nextdoor; independent at baseline  Home Environment: Private residence  # Steps to Enter: 2  One/Two Story Residence: One story  Living Alone: Yes  Support Systems: Child(jasiel)  OBJECTIVE:     PAIN: VITAL SIGNS: LINES/DRAINS:   Pre Treatment: Pain Screen  Pain Scale 1: Numeric (0 - 10)  Pain Intensity 1: 0  Post Treatment: 0 Vital Signs  O2 Sat (%): 94 %  O2 Device: Nasal cannula  O2 Flow Rate (L/min): 4.5 l/min Continuous Pulse Oximetry and IV  O2 Device: Nasal cannula     GROSS EVALUATION:  B LE Within Functional Limits Abnormal/ Functional Abnormal/ Non-Functional (see comments) Not Tested Comments:   AROM [x] [] [] []    PROM [x] [] [] []    Strength [x] [] [] []    Balance [x] [] [] []    Posture [x] [] [] []    Sensation [x] [] [] []    Coordination [x] [] [] []    Tone [x] [] [] []    Edema [x] [] [] []    Activity Tolerance [] [x] [] [] 4 L O2    [] [] [] []      COGNITION/  PERCEPTION: Intact Impaired   (see comments) Comments:   Orientation [x] []    Vision [x] []    Hearing [x] []    Command Following [x] []    Safety Awareness [x] []     [] []      MOBILITY: I Mod I S SBA CGA Min Mod Max Total  NT x2 Comments:   Bed Mobility    Rolling [] [] [] [x] [x] [] [] [] [] [] []    Supine to Sit [] [] [] [] [x] [] [] [] [] [] []    Scooting [x] [] [] [] [] [] [] [] [] [] []    Sit to Supine [] [] [] [] [x] [] [] [] [] [] []    Transfers    Sit to Stand [] [] [] [] [x] [] [] [] [] [] []    Bed to Chair [] [] [] [] [] [] [] [] [] [x] []    Stand to Sit [] [] [] [] [x] [] [] [] [] [] []    I=Independent, Mod I=Modified Independent, S=Supervision, SBA=Standby Assistance, CGA=Contact Guard Assistance,   Min=Minimal Assistance, Mod=Moderate Assistance, Max=Maximal Assistance, Total=Total Assistance, NT=Not Tested  GAIT: I Mod I S SBA CGA Min Mod Max Total  NT x2 Comments:   Level of Assistance [] [] [] [] [x] [] [] [] [] [] []    Distance 3'    DME None    Gait Quality Shuffled side steps to [facility]    Weightbearing Status N/A     I=Independent, Mod I=Modified Independent, S=Supervision, SBA=Standby Assistance, CGA=Contact Guard Assistance,   Min=Minimal Assistance, Mod=Moderate Assistance, Max=Maximal Assistance, Total=Total Assistance, NT=Not Tested    [address] [facility] [address] [address] AM-[facility] Form       How much difficulty does the patient currently have. .. Unable A Lot A Little None   1. Turning over in bed (including adjusting bedclothes, sheets and blankets)? [] 1   [] 2   [] 3   [x] 4   2. Sitting down on and standing up from a chair with arms ( e.g., wheelchair, bedside commode, etc.)   [] 1   [] 2   [x] 3   [] 4   3. Moving from lying on back to sitting on the side of the bed? [] 1   [] 2   [x] 3   [] 4   How much help from another person does the patient currently need. .. Total A Lot A Little None   4. Moving to and from a bed to a chair (including a wheelchair)? [] 1   [] 2   [x] 3   [] 4   5. Need to walk in hospital room? [] 1   [] 2   [x] 3   [] 4   6. Climbing 3-5 steps with a railing? [] 1   [] 2   [x] 3   [] 4   © 2007, Trustees of [address] [place] [address] [address] [id number], under license to [facility].  All rights reserved     Score:  Initial: 19 Most Recent: X (Date: -- )    Interpretation of Tool:  Represents activities that are increasingly more difficult (i.e. Bed mobility, Transfers, Gait). PLAN:   FREQUENCY/DURATION: PT Plan of Care: 3 times/week for duration of hospital stay or until stated goals are met, whichever comes first.    PROBLEM LIST:   (Skilled intervention is medically necessary to address:)  1. Decreased ADL/Functional Activities  2. Decreased Activity Tolerance  3. Decreased Balance  4. Decreased Gait Ability  5. Decreased Strength  6. Decreased Transfer Abilities   INTERVENTIONS PLANNED:   (Benefits and precautions of physical therapy have been discussed with the patient.)  1. Therapeutic Activity  2. Therapeutic Exercise/HEP  3. Neuromuscular Re-education  4. Gait Training  5. Manual Therapy  6. Education     TREATMENT:     EVALUATION: Low Complexity : (Untimed Charge)    TREATMENT:   ($$ Therapeutic Activity: 8-22 mins    )  Therapeutic Activity (10 Minutes): Therapeutic activity included Supine to Sit, Sit to Supine, Scooting, Transfer Training, Ambulation on level ground, Sitting balance  and Standing balance to improve functional Mobility, Strength, Activity tolerance and balance.     TREATMENT GRID:  N/A    AFTER TREATMENT POSITION/PRECAUTIONS:  Bed, Needs within reach and RN notified    INTERDISCIPLINARY COLLABORATION:  RN/PCT, PT/PTA and OT/SALDANA    TOTAL TREATMENT DURATION:  PT Patient Time In/Time Out  Time In: 6514  Time Out: 2[facility],

## 2021-10-04 NOTE — ED PROVIDER NOTES
80-year-old male complains of 10-hour history of increasing shortness of breath cough and generalized ache. A little bit of fever as well. Much fatigue. Nonproductive cough. No particular swelling in his legs. No chest or abdominal pain particularly. No headache or syncope. No palpitations. Records are reviewed and the patient has a history hypertension coronary disease bypass grafting. History aortic valve replacement. Also appendectomy as well. More recently, patient seen here September 26 after about a week of fever chills and diarrhea and tested positive for Covid. He represented 2 days later and was admitted for 2 days due to hypoxemia. He was placed on Decadron. He refused other treatments. He was able to go home on room air. States his symptoms are started up and got worse. The history is provided by the patient. Shortness of Breath  This is a new problem. The current episode started 6 to 12 hours ago. The problem has not changed since onset. Associated symptoms include a fever, cough and wheezing. Pertinent negatives include no sputum production, no hemoptysis, no chest pain, no syncope, no vomiting, no abdominal pain, no leg pain and no leg swelling. Associated medical issues include CAD and heart failure. Associated medical issues do not include asthma, COPD, PE, DVT or recent surgery. Past Medical History:   Diagnosis Date    Arthritis     generalized OA    CAD (coronary artery disease) 09/11/2018    Followed by [facility].     [facility]congestive [facility] failure) [facility])     GERD (gastroesophageal reflux disease)     OTC alkaseltzer    History of kidney stones 2016    naturally passed    History of melanoma 2016    removed from nose    Hx of CABG     Hypertension 9/11/2018    PUD (peptic ulcer disease)     1970's- patient denies 10/01/18    Right hip pain     S/P AVR (aortic valve replacement)     Sleep apnea     observed to stop breathing in sleep; no CPAP       Past Surgical History:   Procedure Laterality Date    HX AORTIC VALVE REPLACEMENT  10/03/2018    CORONARY ARTERY BYPASS GRAFT X 1, LIMA to the LAD WITH AVR 23 mm Intuity valve    HX APPENDECTOMY       15 yo    HX CORONARY ARTERY BYPASS GRAFT  10/03/2018    CABG x1, Dr. Jalil Abernathy  2014    right lower ext - broken put in rods and screws    HX OTHER SURGICAL      melanoma surgery to nose         Family History:   Problem Relation Age of Onset    Cancer Mother         Leukemia    Alzheimer Mother     Cancer Brother         Leukemia    Hypertension Brother     No Known Problems Father     Diabetes Sister     No Known Problems Brother        Social History     Socioeconomic History    Marital status:      Spouse name: Not on file    Number of children: Not on file    Years of education: Not on file    Highest education level: Not on file   Occupational History    Not on file   Tobacco Use    Smoking status: Never Smoker    Smokeless tobacco: Never Used   Vaping Use    Vaping Use: Never used   Substance and Sexual Activity    Alcohol use: Yes     Comment: occasional    Drug use: No    Sexual activity: Not on file   Other Topics Concern    Not on file   Social History Narrative    Not on file     Social Determinants of Health     Financial Resource Strain:     Difficulty of Paying Living Expenses:    Food Insecurity:     Worried About Running Out of Food in the Last Year:     Ran Out of Food in the Last Year:    Transportation Needs:     Lack of Transportation (Medical):      Lack of Transportation (Non-Medical):    Physical Activity:     Days of Exercise per Week:     Minutes of Exercise per Session:    Stress:     Feeling of Stress :    Social Connections:     Frequency of Communication with Friends and Family:     Frequency of Social Gatherings with Friends and Family:     Attends Synagogue Services:     Active Member of Clubs or Organizations:     Attends [employer] or Organization Meetings:     Marital Status:    Intimate Partner Violence:     Fear of Current or Ex-Partner:     Emotionally Abused:     Physically Abused:     Sexually Abused: ALLERGIES: Lortab [hydrocodone-acetaminophen]    Review of Systems   Constitutional: Positive for chills, fatigue and fever. Respiratory: Positive for cough, shortness of breath and wheezing. Negative for hemoptysis and sputum production. Cardiovascular: Negative for chest pain, leg swelling and syncope. Gastrointestinal: Negative for abdominal pain, diarrhea and vomiting. Musculoskeletal: Positive for arthralgias and myalgias. All other systems reviewed and are negative. Vitals:    10/03/21 2151 10/03/21 2157 10/03/21 2206   BP: 123/74  121/76   Pulse: 96  86   Resp: 24     Temp: 97.6 °F (36.4 °C)     SpO2: (!) 85% (!) 85% 93%   Weight: 95.3 kg (210 lb)     Height: 5' 10.5\" (1.791 m)              Physical Exam  Vitals and nursing note reviewed. Constitutional:       General: He is not in acute distress. Appearance: He is well-developed. HENT:      Head: Normocephalic and atraumatic. Right Ear: External ear normal.      Left Ear: External ear normal.      Mouth/Throat:      Pharynx: No oropharyngeal exudate. Eyes:      Conjunctiva/sclera: Conjunctivae normal.      Pupils: Pupils are equal, round, and reactive to light. Cardiovascular:      Rate and Rhythm: Normal rate and regular rhythm. Heart sounds: No murmur heard. Pulmonary:      Effort: No respiratory distress. Breath sounds: Rales (Bibasilar) present. Abdominal:      General: Bowel sounds are normal.      Palpations: Abdomen is soft. There is no mass. Tenderness: There is no abdominal tenderness. There is no guarding or rebound. Hernia: No hernia is present. Musculoskeletal:      Cervical back: Normal range of motion and neck supple. Skin:     General: Skin is warm and dry.    Neurological:      Mental Status: He is alert and oriented to person, place, and time. Gait: Gait normal.      Comments: Nl speech   Psychiatric:         Speech: Speech normal.          MDM  Number of Diagnoses or Management Options  Diagnosis management comments: Worsening shortness of breath and patient recent diagnosis of Covid also has a history of congestive failure according to the patient. Screening lab work. Check chest x-ray. Screen for sepsis. Of note, patient was hypoxemic in triage and placed on 3 L of oxygen. Oxygen level dropped to 85 to 86% even on 3 L of oxygen with minimal ambulation. Amount and/or Complexity of Data Reviewed  Clinical lab tests: ordered and reviewed  Tests in the radiology section of CPT®: ordered and reviewed  Tests in the medicine section of CPT®: ordered and reviewed  Review and summarize past medical records: yes  Independent visualization of images, tracings, or specimens: yes (Interpretation EKG shows normal sinus rhythm with left bundle branch block. Rate of 70. No primary T wave changes.   Normal QT interval.  No ectopy)    Risk of Complications, Morbidity, and/or Mortality  Presenting problems: moderate  Diagnostic procedures: minimal  Management options: low           Procedures      Results Include:    Recent Results (from the past 24 hour(s))   C REACTIVE PROTEIN, QT    Collection Time: 10/03/21  9:58 PM   Result Value Ref Range    C-Reactive protein 4.4 (H) 0.0 - 0.9 mg/dL   PROCALCITONIN    Collection Time: 10/03/21  9:58 PM   Result Value Ref Range    Procalcitonin 0.07 ng/mL   TROPONIN-HIGH SENSITIVITY    Collection Time: 10/03/21  9:58 PM   Result Value Ref Range    Troponin-High Sensitivity 1,057.8 (HH) 0 - 14 pg/mL   CBC WITH AUTOMATED DIFF    Collection Time: 10/03/21  9:59 PM   Result Value Ref Range    WBC 9.2 4.3 - 11.1 K/uL    RBC 5.00 4.23 - 5.6 M/uL    HGB 14.0 13.6 - 17.2 g/dL    HCT 42.8 41.1 - 50.3 %    MCV 85.6 79.6 - 97.8 FL    MCH 28.0 26.1 - 32.9 PG    MCHC 32.7 31.4 - 35.0 g/dL    RDW 13.1 11.9 - 14.6 %    PLATELET 029 998 - 626 K/uL    MPV 9.9 9.4 - 12.3 FL    ABSOLUTE NRBC 0.00 0.0 - 0.2 K/uL    DF AUTOMATED      NEUTROPHILS 76 43 - 78 %    LYMPHOCYTES 15 13 - 44 %    MONOCYTES 7 4.0 - 12.0 %    EOSINOPHILS 1 0.5 - 7.8 %    BASOPHILS 0 0.0 - 2.0 %    IMMATURE GRANULOCYTES 1 0.0 - 5.0 %    ABS. NEUTROPHILS 7.0 1.7 - 8.2 K/UL    ABS. LYMPHOCYTES 1.4 0.5 - 4.6 K/UL    ABS. MONOCYTES 0.6 0.1 - 1.3 K/UL    ABS. EOSINOPHILS 0.1 0.0 - 0.8 K/UL    ABS. BASOPHILS 0.0 0.0 - 0.2 K/UL    ABS. IMM. GRANS. 0.1 0.0 - 0.5 K/UL   METABOLIC PANEL, COMPREHENSIVE    Collection Time: 10/03/21  9:59 PM   Result Value Ref Range    Sodium 136 136 - 145 mmol/L    Potassium 4.1 3.5 - 5.1 mmol/L    Chloride 102 98 - 107 mmol/L    CO2 24 21 - 32 mmol/L    Anion gap 10 7 - 16 mmol/L    Glucose 149 (H) 65 - 100 mg/dL    BUN 24 (H) 8 - 23 MG/DL    Creatinine 1.21 0.8 - 1.5 MG/DL    GFR est AA >60 >60 ml/min/1.73m2    GFR est non-AA >60 >60 ml/min/1.73m2    Calcium 8.8 8.3 - 10.4 MG/DL    Bilirubin, total 0.9 0.2 - 1.1 MG/DL    ALT (SGPT) 69 (H) 12 - 65 U/L    AST (SGOT) 35 15 - 37 U/L    Alk. phosphatase 48 (L) 50 - 136 U/L    Protein, total 8.1 6.3 - 8.2 g/dL    Albumin 3.0 (L) 3.2 - 4.6 g/dL    Globulin 5.1 (H) 2.3 - 3.5 g/dL    A-G Ratio 0.6 (L) 1.2 - 3.5     MAGNESIUM    Collection Time: 10/03/21  9:59 PM   Result Value Ref Range    Magnesium 2.3 1.8 - 2.4 mg/dL   NT-PRO BNP    Collection Time: 10/03/21  9:59 PM   Result Value Ref Range    NT pro-BNP 4,143 (H) 5 - 125 PG/ML   LACTIC ACID    Collection Time: 10/03/21 10:30 PM   Result Value Ref Range    Lactic acid 1.4 0.4 - 2.0 MMOL/L     XR CHEST PORT    Result Date: 10/3/2021  Clinical history: Syncope TECHNIQUE: AP portable chest COMPARISON: September 28, 2021. FINDINGS: Heart is mildly enlarged. Mediastinal contour is within normal limits. Stable midline sternotomy wires. Mild increased interstitial prominence.  No pleural effusion or pneumothorax. Surrounding bones are stable. 1. Mild increased interstitial prominence, likely vascular congestion or atypical infection. 2. No significant change compared to prior exam.      11:53 PM  Patient denies any chest pain. Troponin is significantly elevated from previous. Do not see any evidence for sepsis. Troponin elevation may be due to some hypoxia and supply demand mismatch or perhaps myocarditis. We'll discussed with cardiology and internist for admission.

## 2021-10-04 NOTE — H&P
Hospitalist History and Physical   Admit Date:  10/3/2021 10:01 PM   Name:  Heidy Hunt   Age:  70 y.o. Sex:  male  :  1950   MRN:  [id number]   Room:  ER12/    Presenting Complaint: Positive For Covid-19    Reason(s) for Admission: Acute hypoxemic respiratory failure due to COVID-19 ([facility] [address] [U07.1, J96.01]  NSTEMI (non-ST elevated myocardial infarction) ([facility] [address] [I21.4]     History of Present Illness:   Heidy Hunt is a 70 y.o. male with medical history of Hx of CAD s/p CABG, HTN, s/p aortic valve replacement, sCHF EF 30%, pulmonary HTN who presented with worsening SOB and new left sided chest pain associated with chest tightness and malaise. He was hypoxic with minimal exertion requiring 2L o2. His troponin was over 1000 and NT pr BNP 4000s. EKG showed no ischemic changes. CXR vascular congestion or atypical PNA. ED d/w cardiology who recommended heparin bolus and gtt for possible thrombus. He was recently hospitalized for COVID PNA and was weaned off of supplemental o2 and discharged home on  with decadron. He is unvaccinated and declined baricitinib at that time. Review of Systems   Constitutional: Positive for malaise/fatigue. HENT: Positive for hearing loss. Respiratory: Positive for cough and shortness of breath. Cardiovascular: Positive for chest pain. Gastrointestinal: Positive for nausea. Genitourinary: Negative for dysuria. Musculoskeletal: Positive for myalgias. Skin: Negative for rash. Neurological: Positive for weakness. Endo/Heme/Allergies: Negative for environmental allergies. Psychiatric/Behavioral: The patient has insomnia. Assessment & Plan:     Suspected NSTEMI versus myocarditis 2/2 COVID-19   Admit to telemetry  Start heparin bolus and gtt  Trend troponin. ASA, BB, Statin, prn nitro, prn morphine  Cardiology consulted. Acute hypoxemic respiratory failure due to COVID-19   COVID+   Unvaccinated.    2L O2   Declined baricitinib   Admin decadron 6 mg IV f/b PO tomorrow   Check D dimer - if elevated obtain CTPE     Acute on chronic HFrEF EF 30%   Admin lasix 40 mg IV x 1   Resume home valsartan, aldactone, metoprolol, statin, asa     Obesity - associated with increased mortality     Severe AVS s/p tavr     CAD s/p CABG     GALO - no CPAP at home     Recent bacterial PNA - procal normalized. Do not continue abx       Dispo/Discharge Planning:     Home in 2-3 days     Diet: ADULT DIET Regular; Low Fat/Low Chol/High Fiber/SHAI  VTE ppx: heparin   Code status: Full Code    Hospital Problems as of 10/4/2021 Date Reviewed: 8/17/2021        Codes Class Noted - Resolved POA    * (Principal) NSTEMI (non-ST elevated myocardial infarction) ([facility] [address] ICD-10-CM: I21.4  ICD-9-CM: 410.70  10/4/2021 - Present Unknown        Class 1 obesity with serious comorbidity and body mass index (BMI) of 31.0 to 31.9 in adult ICD-10-CM: E66.9, Z68.31  ICD-9-CM: 278.00, V85.31  9/29/2021 - Present Yes        Acute hypoxemic respiratory failure due to COVID-19 [facility]) ICD-10-CM: U07.1, J96.01  ICD-9-CM: 518.81, 079.89, 799.02  9/28/2021 - Present Unknown        Systolic CHF, chronic (HCC) ICD-10-CM: I50.22  ICD-9-CM: 428.22, 428.0  9/28/2021 - Present Yes        S/P AVR (aortic valve replacement) ICD-10-CM: Z95.2  ICD-9-CM: V43.3  10/3/2018 - Present Yes    Overview Signed 10/3/2018 12:21 PM by Gavino Phillips NP     10/3/18  Dr. Cristel Mitchell  CABG X 1, LIMA to the LAD WITH AVR 23 mm Intuity valve             Pneumonia due to COVID-19 virus ICD-10-CM: U07.1, J12.82  ICD-9-CM: 480.8, 079.89  10/3/2018 - Present Yes        CAD (coronary artery disease) (Chronic) ICD-10-CM: I25.10  ICD-9-CM: 414.00  9/11/2018 - Present Yes        Hypertension (Chronic) ICD-10-CM: I10  ICD-9-CM: 401.9  9/11/2018 - Present Yes              Past Medical History:   Diagnosis Date    Arthritis     generalized OA    CAD (coronary artery disease) 09/11/2018    Followed by [facility].     CHF (congestive heart failure) (HCC)     GERD (gastroesophageal reflux disease)     OTC alkaseltzer    History of kidney stones 2016    naturally passed    History of melanoma 2016    removed from nose    Hx of CABG     Hypertension 9/11/2018    PUD (peptic ulcer disease)     1970's- patient denies 10/01/18    Right hip pain     S/P AVR (aortic valve replacement)     Sleep apnea     observed to stop breathing in sleep; no CPAP     Past Surgical History:   Procedure Laterality Date    HX AORTIC VALVE REPLACEMENT  10/03/2018    CORONARY ARTERY BYPASS GRAFT X 1, LIMA to the LAD WITH AVR 23 mm Intuity valve    HX APPENDECTOMY       15 yo    HX CORONARY ARTERY BYPASS GRAFT  10/03/2018    CABG x1, Dr. Iman Moon HX ORTHOPAEDIC  2014    right lower ext - broken put in rods and screws    HX OTHER SURGICAL      melanoma surgery to nose      Allergies   Allergen Reactions    Lortab [Hydrocodone-Acetaminophen] Other (comments)     hallucinations      Social History     Tobacco Use    Smoking status: Never Smoker    Smokeless tobacco: Never Used   Substance Use Topics    Alcohol use: Yes     Comment: occasional      Family History   Problem Relation Age of Onset    Cancer Mother         Leukemia    Alzheimer Mother     Cancer Brother         Leukemia    Hypertension Brother     No Known Problems Father     Diabetes Sister     No Known Problems Brother       Family history reviewed and negative except as noted above.   Immunization History   Administered Date(s) Administered    TB Skin Test (PPD) Intradermal 10/03/2018, 09/29/2021     PTA Medications:  Current Outpatient Medications   Medication Instructions    acetaminophen (TYLENOL) 1,000 mg, Oral, EVERY 6 HOURS AS NEEDED    albuterol (Ventolin HFA) 90 mcg/actuation inhaler 2 Puffs, Inhalation, EVERY 4 HOURS AS NEEDED    aspirin delayed-release 81 mg, Oral, 2 TIMES DAILY    atorvastatin (LIPITOR) 40 mg, Oral, DAILY    cyanocobalamin 500 mcg, Oral, DAILY    hyoscyamine SL (LEVSIN/SL) 0.125 mg, SubLINGual, EVERY 4 HOURS AS NEEDED    metoprolol succinate (TOPROL-XL) 50 mg, Oral, DAILY    multivitamin (ONE A DAY) tablet 1 Tablet, Oral, DAILY    spironolactone (ALDACTONE) 25 mg, Oral, DAILY    valsartan (DIOVAN) 40 mg, Oral, DAILY       Objective:     Patient Vitals for the past 24 hrs:   Temp Pulse Resp BP SpO2   10/04/21 0146 -- 79 22 128/78 --   10/03/21 2206 -- 86 -- 121/76 93 %   10/03/21 2157 -- -- -- -- (!) 85 %   10/03/21 2151 97.6 °F (36.4 °C) 96 24 123/74 (!) 85 %     Oxygen Therapy  O2 Sat (%): 93 % (10/03/21 2206)  Pulse via Oximetry: 86 beats per minute (10/03/21 2206)  O2 Device: Nasal cannula (10/03/21 2157)  O2 Flow Rate (L/min): 2 l/min (10/03/21 2157)    Estimated body mass index is 29.71 kg/m² as calculated from the following:    Height as of this encounter: 5' 10.5\" (1.791 m). Weight as of this encounter: 95.3 kg (210 lb). No intake or output data in the 24 hours ending 10/04/21 0157      Physical Exam:  Physical Exam  Vitals and nursing note reviewed. Constitutional:       General: He is in acute distress. Appearance: He is obese. He is ill-appearing. HENT:      Head: Normocephalic and atraumatic. Nose: No congestion. Mouth/Throat:      Mouth: Mucous membranes are moist.   Eyes:      Extraocular Movements: Extraocular movements intact. Conjunctiva/sclera: Conjunctivae normal.      Pupils: Pupils are equal, round, and reactive to light. Cardiovascular:      Rate and Rhythm: Normal rate and regular rhythm. Heart sounds: Murmur heard. Pulmonary:      Comments: Increased WOB; mild tachypnea and conversational dyspnea,no accessory muscle usage    Abdominal:      General: Abdomen is flat. Bowel sounds are normal.      Palpations: Abdomen is soft. Musculoskeletal:      Cervical back: Neck supple. Right lower leg: No edema. Left lower leg: No edema.    Neurological:      Mental Status: He is alert.   Psychiatric:      Comments: Anxious          I have reviewed ordered lab tests and independently visualized imaging below:    Last 24hr Labs:  Recent Results (from the past 24 hour(s))   C REACTIVE PROTEIN, QT    Collection Time: 10/03/21  9:58 PM   Result Value Ref Range    C-Reactive protein 4.4 (H) 0.0 - 0.9 mg/dL   PROCALCITONIN    Collection Time: 10/03/21  9:58 PM   Result Value Ref Range    Procalcitonin 0.07 ng/mL   TROPONIN-HIGH SENSITIVITY    Collection Time: 10/03/21  9:58 PM   Result Value Ref Range    Troponin-High Sensitivity 1,057.8 (HH) 0 - 14 pg/mL   CBC WITH AUTOMATED DIFF    Collection Time: 10/03/21  9:59 PM   Result Value Ref Range    WBC 9.2 4.3 - 11.1 K/uL    RBC 5.00 4.23 - 5.6 M/uL    HGB 14.0 13.6 - 17.2 g/dL    HCT 42.8 41.1 - 50.3 %    MCV 85.6 79.6 - 97.8 FL    MCH 28.0 26.1 - 32.9 PG    MCHC 32.7 31.4 - 35.0 g/dL    RDW 13.1 11.9 - 14.6 %    PLATELET 865 824 - 498 K/uL    MPV 9.9 9.4 - 12.3 FL    ABSOLUTE NRBC 0.00 0.0 - 0.2 K/uL    DF AUTOMATED      NEUTROPHILS 76 43 - 78 %    LYMPHOCYTES 15 13 - 44 %    MONOCYTES 7 4.0 - 12.0 %    EOSINOPHILS 1 0.5 - 7.8 %    BASOPHILS 0 0.0 - 2.0 %    IMMATURE GRANULOCYTES 1 0.0 - 5.0 %    ABS. NEUTROPHILS 7.0 1.7 - 8.2 K/UL    ABS. LYMPHOCYTES 1.4 0.5 - 4.6 K/UL    ABS. MONOCYTES 0.6 0.1 - 1.3 K/UL    ABS. EOSINOPHILS 0.1 0.0 - 0.8 K/UL    ABS. BASOPHILS 0.0 0.0 - 0.2 K/UL    ABS. IMM.  GRANS. 0.1 0.0 - 0.5 K/UL   METABOLIC PANEL, COMPREHENSIVE    Collection Time: 10/03/21  9:59 PM   Result Value Ref Range    Sodium 136 136 - 145 mmol/L    Potassium 4.1 3.5 - 5.1 mmol/L    Chloride 102 98 - 107 mmol/L    CO2 24 21 - 32 mmol/L    Anion gap 10 7 - 16 mmol/L    Glucose 149 (H) 65 - 100 mg/dL    BUN 24 (H) 8 - 23 MG/DL    Creatinine 1.21 0.8 - 1.5 MG/DL    GFR est AA >60 >60 ml/min/1.73m2    GFR est non-AA >60 >60 ml/min/1.73m2    Calcium 8.8 8.3 - 10.4 MG/DL    Bilirubin, total 0.9 0.2 - 1.1 MG/DL    ALT (SGPT) 69 (H) 12 - 65 U/L    AST (SGOT) 35 15 - 37 U/L    Alk. phosphatase 48 (L) 50 - 136 U/L    Protein, total 8.1 6.3 - 8.2 g/dL    Albumin 3.0 (L) 3.2 - 4.6 g/dL    Globulin 5.1 (H) 2.3 - 3.5 g/dL    A-G Ratio 0.6 (L) 1.2 - 3.5     MAGNESIUM    Collection Time: 10/03/21  9:59 PM   Result Value Ref Range    Magnesium 2.3 1.8 - 2.4 mg/dL   NT-PRO BNP    Collection Time: 10/03/21  9:59 PM   Result Value Ref Range    NT pro-BNP 4,143 (H) 5 - 125 PG/ML   LACTIC ACID    Collection Time: 10/03/21 10:30 PM   Result Value Ref Range    Lactic acid 1.4 0.4 - 2.0 MMOL/L   D DIMER    Collection Time: 10/04/21 12:15 AM   Result Value Ref Range    D DIMER >20.00 (H) <0.56 ug/ml(FEU)   PROTHROMBIN TIME + INR    Collection Time: 10/04/21 12:15 AM   Result Value Ref Range    Prothrombin time 18.9 (H) 12.6 - 14.5 sec    INR 1.6     PTT    Collection Time: 10/04/21 12:15 AM   Result Value Ref Range    aPTT 32.5 24.1 - 35.1 SEC   TROPONIN-HIGH SENSITIVITY    Collection Time: 10/04/21 12:32 AM   Result Value Ref Range    Troponin-High Sensitivity 943.1 (HH) 0 - 14 pg/mL       All Micro Results     None          Other Studies:  XR CHEST PORT    Result Date: 10/3/2021  Clinical history: Syncope TECHNIQUE: AP portable chest COMPARISON: September 28, 2021. FINDINGS: Heart is mildly enlarged. Mediastinal contour is within normal limits. Stable midline sternotomy wires. Mild increased interstitial prominence. No pleural effusion or pneumothorax. Surrounding bones are stable. 1. Mild increased interstitial prominence, likely vascular congestion or atypical infection. 2. No significant change compared to prior exam.              Signed:  Dian Marshall DO    Part of this note may have been written by using a voice dictation software. The note has been proof read but may still contain some grammatical/other typographical errors.

## 2021-10-04 NOTE — PROGRESS NOTES
ACUTE OT GOALS:  (Developed with and agreed upon by patient and/or caregiver.)  1. Pt will toilet independently (MET)  2. Pt will complete functional mobility for ADLs with SBA (MET)    Timeframe: 7 days      OCCUPATIONAL THERAPY ASSESSMENT: Initial Assessment, Daily Note and Discharge OT Treatment Day # 1    Christina Og is a 70 y.o. male   PRIMARY DIAGNOSIS: Acute hypoxemic respiratory failure due to COVID-19 [facility])  Acute hypoxemic respiratory failure due to COVID-19 ([facility] [U07.1, J96.01]  NSTEMI (non-ST elevated myocardial infarction) ([facility] [address] [I21.4]       Reason for Referral:  Generalized weakness, respiratory failure  ICD-10: Treatment Diagnosis: Generalized Muscle Weakness (M62.81)  INPATIENT: Payor: [facility] MEDICARE / [place] [employer] / Product Type: Managed Care Medicare /   ASSESSMENT:     REHAB RECOMMENDATIONS:   Recommendation to date pending progress:  Setting:   No further skilled therapy   Equipment:    3 in 1 Bedside Commode     PRIOR LEVEL OF FUNCTION:  (Prior to Hospitalization)  INITIAL/CURRENT LEVEL OF FUNCTION:  (Based on today's evaluation)   Bathing:   Independent  Dressing:   Independent  Feeding/Grooming:   Independent  Toileting:   Independent  Functional Mobility:   Independent Bathing:   Independent  Dressing:   Independent  Feeding/Grooming:   Independent  Toileting:   Independent  Functional Mobility:   Independent     ASSESSMENT:  Mr. Lexi Mckeon was admitted with respiratory failure d/t COVID infection. Pt presented with decreased activity tolerance and > O2 needs. Pt on 2L O2, SpO2 94% at rest, 89-90% with activity. Pt educated on PLB and pacing techniques and SpO2 increased to 90% with short rest break. Pt remains independent with ADLs and does not require further skilled OT services at this time, no d/c needs. SUBJECTIVE:   Mr. Lexi Mckeon states, \"I've been better. \"    SOCIAL HISTORY/LIVING ENVIRONMENT: Lives alone with family next door. Independent w/ ADLs, no AD, no falls, has had difficulty with IADLs since recent admission for COVID.  Tub shower with grab bars, 1L home, 2 steps       OBJECTIVE:     PAIN: VITAL SIGNS: LINES/DRAINS:   Pre Treatment: Pain Screen  Pain Scale 1: Numeric (0 - 10)  Pain Intensity 1: 0  Post Treatment: 0   IV  O2 Device: Nasal cannula     GROSS EVALUATION:  BUE Within Functional Limits Abnormal/ Functional Abnormal/ Non-Functional (see comments) Not Tested Comments:   AROM [] [x] [] [] Decreased at shoulders d/t pre-existing rotator cuff problems   PROM [] [] [] []    Strength [x] [] [] []    Balance [] [x] [] []    Posture [] [] [] []    Sensation [] [] [] []    Coordination [x] [] [] []    Tone [] [] [] []    Edema [] [] [] []    Activity Tolerance [] [x] [] []     [] [] [] []      COGNITION/  PERCEPTION: Intact Impaired   (see comments) Comments:   Orientation [x] []    Vision [x] []    Hearing [x] []    Judgment/ Insight [x] []    Attention [x] []    Memory [x] []    Command Following [x] []    Emotional Regulation [x] []     [] []      ACTIVITIES OF DAILY LIVING: I Mod I S SBA CGA Min Mod Max Total NT Comments   BASIC ADLs:              Bathing/ Showering [] [] [] [] [] [] [] [] [] []    Toileting [x] [] [] [] [] [] [] [] [] []    Dressing [] [] [] [] [] [] [] [] [] []    Feeding [] [] [] [] [] [] [] [] [] []    Grooming [] [] [] [] [] [] [] [] [] []    Personal Device Care [] [] [] [] [] [] [] [] [] []    Functional Mobility [] [] [x] [] [] [] [] [] [] []    I=Independent, Mod I=Modified Independent, S=Supervision, SBA=Standby Assistance, CGA=Contact Guard Assistance,   Min=Minimal Assistance, Mod=Moderate Assistance, Max=Maximal Assistance, Total=Total Assistance, NT=Not Tested    MOBILITY: I Mod I S SBA CGA Min Mod Max Total  NT x2 Comments:   Supine to sit [] [x] [] [] [] [] [] [] [] [] []    Sit to supine [] [x] [] [] [] [] [] [] [] [] []    Sit to stand [] [] [x] [] [] [] [] [] [] [] []    Bed to chair [] [] [] [] [] [] [] [] [] [] []    I=Independent, Mod I=Modified Independent, S=Supervision, SBA=Standby Assistance, CGA=Contact Guard Assistance,   Min=Minimal Assistance, Mod=Moderate Assistance, Max=Maximal Assistance, Total=Total Assistance, NT=Not [facility]   Daily Activity Inpatient Short Form        How much help from another person does the patient currently need. .. Total A Lot A Little None   1. Putting on and taking off regular lower body clothing? [] 1   [] 2   [] 3   [x] 4   2. Bathing (including washing, rinsing, drying)? [] 1   [] 2   [] 3   [x] 4   3. Toileting, which includes using toilet, bedpan or urinal?   [] 1   [] 2   [] 3   [x] 4   4. Putting on and taking off regular upper body clothing? [] 1   [] 2   [] 3   [x] 4   5. Taking care of personal grooming such as brushing teeth? [] 1   [] 2   [] 3   [x] 4   6. Eating meals? [] 1   [] 2   [] 3   [x] 4   © 2007, Trustees of [address] [place] [address], under license to [employer]. All rights reserved     Score:  Initial: 24 Most Recent: X (Date: -- )   Interpretation of Tool:  Represents activities that are increasingly more difficult (i.e. Bed mobility, Transfers, Gait). TREATMENT:     EVALUATION: Low Complexity : (Untimed Charge)    TREATMENT:   ($$ Self Care/Home Management: 8-22 mins    )  Self Care (8 Minutes): Self care including Toileting and Energy Conservation Training to increase independence and decrease level of assistance required.     TREATMENT GRID:  N/A    AFTER TREATMENT POSITION/PRECAUTIONS:  Bed, Needs within reach and RN notified    INTERDISCIPLINARY COLLABORATION:  RN/PCT and PT/PTA    TOTAL TREATMENT DURATION:  OT Patient Time In/Time Out  Time In: 2526  Time Out: 70 [facility]

## 2021-10-04 NOTE — ED NOTES
TRANSFER - OUT REPORT:    Verbal report given to Ninfa Hardy on Tito Marx  being transferred to 3rd floor for routine progression of care       Report consisted of patients Situation, Background, Assessment and   Recommendations(SBAR). Information from the following report(s) ED Summary was reviewed with the receiving nurse. Lines:   Peripheral IV 10/03/21 Left Antecubital (Active)   Site Assessment Clean, dry, & intact 10/03/21 2158   Phlebitis Assessment 0 10/03/21 2158   Infiltration Assessment 0 10/03/21 2158   Dressing Status Clean, dry, & intact 10/03/21 2158   Dressing Type Transparent 10/03/21 2158   Hub Color/Line Status Red 10/03/21 2158       Peripheral IV 10/04/21 Right Antecubital (Active)       Peripheral IV 10/04/21 Anterior;Proximal;Right Forearm (Active)        Opportunity for questions and clarification was provided.       Patient transported with:   Registered Nurse

## 2021-10-04 NOTE — PROGRESS NOTES
TRANSFER - IN REPORT:    Verbal report received from Maralee Litten, [facility] (name) on Bethena July  being received from ED. (unit) for routine progression of care      Report consisted of patients Situation, Background, Assessment and   Recommendations(SBAR). Information from the following report(s) Kardex, ED Summary, Procedure Summary, Intake/Output, MAR and Cardiac Rhythm SR was reviewed with the receiving nurse. Opportunity for questions and clarification was provided. Assessment completed upon patients arrival to unit and care assumed.

## 2021-10-05 ENCOUNTER — APPOINTMENT (OUTPATIENT)
Dept: ULTRASOUND IMAGING | Age: 71
DRG: 177 | End: 2021-10-05
Attending: INTERNAL MEDICINE
Payer: MEDICARE

## 2021-10-05 LAB
ALBUMIN SERPL-MCNC: 2.5 G/DL (ref 3.2–4.6)
ALBUMIN/GLOB SERPL: 0.5 {RATIO} (ref 1.2–3.5)
ALP SERPL-CCNC: 40 U/L (ref 50–136)
ALT SERPL-CCNC: 52 U/L (ref 12–65)
ANION GAP SERPL CALC-SCNC: 11 MMOL/L (ref 7–16)
APTT PPP: 163.3 SEC (ref 24.1–35.1)
AST SERPL-CCNC: 25 U/L (ref 15–37)
BASOPHILS # BLD: 0 K/UL (ref 0–0.2)
BASOPHILS NFR BLD: 0 % (ref 0–2)
BILIRUB SERPL-MCNC: 0.7 MG/DL (ref 0.2–1.1)
BUN SERPL-MCNC: 36 MG/DL (ref 8–23)
CALCIUM SERPL-MCNC: 8.7 MG/DL (ref 8.3–10.4)
CHLORIDE SERPL-SCNC: 100 MMOL/L (ref 98–107)
CO2 SERPL-SCNC: 23 MMOL/L (ref 21–32)
CREAT SERPL-MCNC: 1.18 MG/DL (ref 0.8–1.5)
CRP SERPL-MCNC: 9.2 MG/DL (ref 0–0.9)
DIFFERENTIAL METHOD BLD: ABNORMAL
EOSINOPHIL # BLD: 0.1 K/UL (ref 0–0.8)
EOSINOPHIL NFR BLD: 1 % (ref 0.5–7.8)
ERYTHROCYTE [DISTWIDTH] IN BLOOD BY AUTOMATED COUNT: 13.1 % (ref 11.9–14.6)
FERRITIN SERPL-MCNC: 381 NG/ML (ref 8–388)
GLOBULIN SER CALC-MCNC: 4.9 G/DL (ref 2.3–3.5)
GLUCOSE SERPL-MCNC: 139 MG/DL (ref 65–100)
HCT VFR BLD AUTO: 38.3 % (ref 41.1–50.3)
HGB BLD-MCNC: 12.4 G/DL (ref 13.6–17.2)
IMM GRANULOCYTES # BLD AUTO: 0.1 K/UL (ref 0–0.5)
IMM GRANULOCYTES NFR BLD AUTO: 1 % (ref 0–5)
INR PPP: 1.1
LDH SERPL L TO P-CCNC: 407 U/L (ref 110–210)
LYMPHOCYTES # BLD: 1.3 K/UL (ref 0.5–4.6)
LYMPHOCYTES NFR BLD: 12 % (ref 13–44)
MCH RBC QN AUTO: 27.1 PG (ref 26.1–32.9)
MCHC RBC AUTO-ENTMCNC: 32.4 G/DL (ref 31.4–35)
MCV RBC AUTO: 83.6 FL (ref 79.6–97.8)
MONOCYTES # BLD: 0.7 K/UL (ref 0.1–1.3)
MONOCYTES NFR BLD: 6 % (ref 4–12)
NEUTS SEG # BLD: 8.8 K/UL (ref 1.7–8.2)
NEUTS SEG NFR BLD: 81 % (ref 43–78)
NRBC # BLD: 0 K/UL (ref 0–0.2)
PLATELET # BLD AUTO: 192 K/UL (ref 150–450)
PMV BLD AUTO: 10 FL (ref 9.4–12.3)
POTASSIUM SERPL-SCNC: 4 MMOL/L (ref 3.5–5.1)
PROT SERPL-MCNC: 7.4 G/DL (ref 6.3–8.2)
PROTHROMBIN TIME: 15 SEC (ref 12.6–14.5)
RBC # BLD AUTO: 4.58 M/UL (ref 4.23–5.6)
SODIUM SERPL-SCNC: 134 MMOL/L (ref 136–145)
TROPONIN-HIGH SENSITIVITY: 641.4 PG/ML (ref 0–14)
UFH PPP CHRO-ACNC: 0.52 IU/ML (ref 0.3–0.7)
UFH PPP CHRO-ACNC: 0.64 IU/ML (ref 0.3–0.7)
UFH PPP CHRO-ACNC: 0.71 IU/ML (ref 0.3–0.7)
WBC # BLD AUTO: 10.9 K/UL (ref 4.3–11.1)

## 2021-10-05 PROCEDURE — 74011250636 HC RX REV CODE- 250/636: Performed by: FAMILY MEDICINE

## 2021-10-05 PROCEDURE — 86140 C-REACTIVE PROTEIN: CPT

## 2021-10-05 PROCEDURE — 93970 EXTREMITY STUDY: CPT

## 2021-10-05 PROCEDURE — 77010033678 HC OXYGEN DAILY

## 2021-10-05 PROCEDURE — 80053 COMPREHEN METABOLIC PANEL: CPT

## 2021-10-05 PROCEDURE — 65660000000 HC RM CCU STEPDOWN

## 2021-10-05 PROCEDURE — 74011250637 HC RX REV CODE- 250/637: Performed by: INTERNAL MEDICINE

## 2021-10-05 PROCEDURE — 99232 SBSQ HOSP IP/OBS MODERATE 35: CPT | Performed by: INTERNAL MEDICINE

## 2021-10-05 PROCEDURE — 36415 COLL VENOUS BLD VENIPUNCTURE: CPT

## 2021-10-05 PROCEDURE — 83615 LACTATE (LD) (LDH) ENZYME: CPT

## 2021-10-05 PROCEDURE — 85520 HEPARIN ASSAY: CPT

## 2021-10-05 PROCEDURE — 82728 ASSAY OF FERRITIN: CPT

## 2021-10-05 PROCEDURE — 74011250637 HC RX REV CODE- 250/637: Performed by: FAMILY MEDICINE

## 2021-10-05 PROCEDURE — 85610 PROTHROMBIN TIME: CPT

## 2021-10-05 PROCEDURE — 97530 THERAPEUTIC ACTIVITIES: CPT

## 2021-10-05 PROCEDURE — 85025 COMPLETE CBC W/AUTO DIFF WBC: CPT

## 2021-10-05 PROCEDURE — 74011250636 HC RX REV CODE- 250/636: Performed by: EMERGENCY MEDICINE

## 2021-10-05 PROCEDURE — 85730 THROMBOPLASTIN TIME PARTIAL: CPT

## 2021-10-05 PROCEDURE — 84484 ASSAY OF TROPONIN QUANT: CPT

## 2021-10-05 RX ORDER — MIRTAZAPINE 15 MG/1
7.5 TABLET, FILM COATED ORAL
Status: DISCONTINUED | OUTPATIENT
Start: 2021-10-05 | End: 2021-10-06 | Stop reason: HOSPADM

## 2021-10-05 RX ADMIN — METOPROLOL SUCCINATE 50 MG: 50 TABLET, EXTENDED RELEASE ORAL at 09:32

## 2021-10-05 RX ADMIN — ONDANSETRON 4 MG: 2 INJECTION INTRAMUSCULAR; INTRAVENOUS at 09:55

## 2021-10-05 RX ADMIN — ASPIRIN 81 MG: 81 TABLET ORAL at 17:27

## 2021-10-05 RX ADMIN — ASPIRIN 81 MG: 81 TABLET ORAL at 09:32

## 2021-10-05 RX ADMIN — CYANOCOBALAMIN TAB 1000 MCG 500 MCG: 1000 TAB at 09:00

## 2021-10-05 RX ADMIN — ATORVASTATIN CALCIUM 40 MG: 40 TABLET, FILM COATED ORAL at 09:31

## 2021-10-05 RX ADMIN — Medication 10 ML: at 15:02

## 2021-10-05 RX ADMIN — VALSARTAN 40 MG: 40 TABLET, FILM COATED ORAL at 09:31

## 2021-10-05 RX ADMIN — HEPARIN SODIUM AND DEXTROSE 15 UNITS/KG/HR: 5000; 5 INJECTION INTRAVENOUS at 16:00

## 2021-10-05 RX ADMIN — FAMOTIDINE 20 MG: 20 TABLET ORAL at 09:32

## 2021-10-05 RX ADMIN — ACETAMINOPHEN 650 MG: 325 TABLET ORAL at 21:28

## 2021-10-05 RX ADMIN — SPIRONOLACTONE 25 MG: 25 TABLET ORAL at 09:31

## 2021-10-05 RX ADMIN — Medication 10 ML: at 21:20

## 2021-10-05 RX ADMIN — Medication 10 ML: at 05:09

## 2021-10-05 RX ADMIN — DEXAMETHASONE 6 MG: 4 TABLET ORAL at 09:30

## 2021-10-05 RX ADMIN — FAMOTIDINE 20 MG: 20 TABLET ORAL at 17:28

## 2021-10-05 RX ADMIN — MIRTAZAPINE 7.5 MG: 15 TABLET, FILM COATED ORAL at 21:02

## 2021-10-05 RX ADMIN — PHENOL 1 SPRAY: 1.5 LIQUID ORAL at 15:58

## 2021-10-05 NOTE — PROGRESS NOTES
Pt is in bed resting on 2L NC with sats reading between 90-93%. Pt denies any chest pain. Pt c/o sore throat. Provider informed. PRN meds ordered. IV heparin infusing without difficulty at this time. Pt gets up with assistance x 1 due to weakness to use the bathroom. Bed low/locked, call light within reach, bedside table is at bedside. Will continue to monitor and provide care.

## 2021-10-05 NOTE — PROGRESS NOTES
ACUTE PHYSICAL THERAPY GOALS:  (Developed with and agreed upon by patient and/or caregiver. )  LTG:  (1.)Mr. Ab Rivas will move from supine to sit and sit to supine , scoot up and down and roll side to side in bed with INDEPENDENT within 7 treatment day(s). (2.)Mr. Ab Rivas will transfer from bed to chair and chair to bed with INDEPENDENT using the least restrictive device within 7 treatment day(s). (3.)Mr. Mcfarland will ambulate with SUPERVISION for 250+ feet with the least restrictive device within 7 treatment day(s) while maintaining normal vital signs. (4.)Mr. Ab Rivas will perform 23+ minutes of therapeutic activity within 7 treatment days while maintaining normal vital signs. PHYSICAL THERAPY: Daily Note and AM Treatment Day # 2    Placido Oneil is a 70 y.o. male   PRIMARY DIAGNOSIS: Acute hypoxemic respiratory failure due to COVID-19 [facility])  Acute hypoxemic respiratory failure due to COVID-19 ([facility] [address] [U07.1, J96.01]  NSTEMI (non-ST elevated myocardial infarction) ([facility] [address] [I21.4]         ASSESSMENT:     REHAB RECOMMENDATIONS: CURRENT LEVEL OF FUNCTION:  (Most Recently Demonstrated)   Recommendation to date pending progress:  Setting:   No further skilled therapy   Equipment:    None Bed Mobility:   Standby Assistance  Sit to Stand:   Contact Guard Assistance  Transfers:   Contact Guard Assistance  Gait/Mobility:   Contact Guard Assistance     ASSESSMENT:  Mr. Ab Rivas seen in CCU, on 2 L O2 stats 90%, reports feeling a little better. Pt required SBA to CGA for mobility, appears less fatigued today with activity. Pt with standing marching and dynamic standing activities at bedside, then bed to chair without assistive device. Pt O2 889-90% throughout. PT to cont to follow for acute care needs, making good progress this date. SUBJECTIVE:   Mr. Ab Rivas states, \"I am feeling a little better today. \"    SOCIAL HISTORY/ LIVING ENVIRONMENT: lives alone, son next door, independent at baseline  Home Environment: Private residence  # Steps to Enter: 2  One/Two Story Residence: One story  Living Alone: Yes  Support Systems: Child(jasiel)  OBJECTIVE:     PAIN: VITAL SIGNS: LINES/DRAINS:   Pre Treatment: Pain Screen  Pain Scale 1: Numeric (0 - 10)  Pain Intensity 1: 0  Post Treatment: 0 Vital Signs  O2 Sat (%): 90 %  O2 Device: Nasal cannula  O2 Flow Rate (L/min): 2 l/min Continuous Pulse Oximetry and IV  O2 Device: Nasal cannula     MOBILITY: I Mod I S SBA CGA Min Mod Max Total  NT x2 Comments:   Bed Mobility    Rolling [] [] [] [x] [] [] [] [] [] [] []    Supine to Sit [] [] [] [x] [] [] [] [] [] [] []    Scooting [] [] [] [x] [] [] [] [] [] [] []    Sit to Supine [] [] [] [] [] [] [] [] [] [x] [] chair   Transfers    Sit to Stand [] [] [] [] [x] [] [] [] [] [] []    Bed to Chair [] [] [] [] [x] [] [] [] [] [] []    Stand to Sit [] [] [] [] [x] [] [] [] [] [] []    I=Independent, Mod I=Modified Independent, S=Supervision, SBA=Standby Assistance, CGA=Contact Guard Assistance,   Min=Minimal Assistance, Mod=Moderate Assistance, Max=Maximal Assistance, Total=Total Assistance, NT=Not Tested    BALANCE: Good Fair+ Fair Fair- Poor NT Comments   Sitting Static [x] [] [] [] [] []    Sitting Dynamic [x] [] [] [] [] []              Standing Static [x] [] [] [] [] []    Standing Dynamic [] [x] [] [] [] []      GAIT: I Mod I S SBA CGA Min Mod Max Total  NT x2 Comments:   Level of Assistance [] [] [] [x] [x] [] [] [] [] [] []    Distance 4'    DME None    Gait Quality slow    Weightbearing  Status N/A     I=Independent, Mod I=Modified Independent, S=Supervision, SBA=Standby Assistance, CGA=Contact Guard Assistance,   Min=Minimal Assistance, Mod=Moderate Assistance, Max=Maximal Assistance, Total=Total Assistance, NT=Not Tested    PLAN:   FREQUENCY/DURATION: PT Plan of Care: 3 times/week for duration of hospital stay or until stated goals are met, whichever comes first.  TREATMENT:     TREATMENT:   ($$ Therapeutic Activity: 23-81 mins    )  Therapeutic Activity (23 Minutes): Therapeutic activity included Rolling, Supine to Sit, Scooting, Transfer Training, Ambulation on level ground, Sitting balance  and Standing balance to improve functional Mobility, Strength, Activity tolerance and balance.     TREATMENT GRID:  N/A    AFTER TREATMENT POSITION/PRECAUTIONS:  Chair, Needs within reach and RN notified    INTERDISCIPLINARY COLLABORATION:  RN/PCT and PT/PTA    TOTAL TREATMENT DURATION:  PT Patient Time In/Time Out  Time In: 1105  Time Out: 149 Osmin Wetzel PT

## 2021-10-05 NOTE — ACP (ADVANCE CARE PLANNING)
Advance Care Planning     General Advance Care Planning (ACP) Conversation      Date of Conversation: 10/3/2021      Healthcare Decision Maker: all children    Primary Decision Maker: Rosemarie Mcfarlane - [phone]    Primary Decision Maker: Nicole Peter here to [address] including selection of the Healthcare Decision Maker Relationship (ie \"Primary\")      Today we documented Decision Maker(s) consistent with Legal Next of Kin hierarchy. Content/Action Overview:   Pt . Children/all legal NOK as no current LW/HCPOA on file. Full code per MD orders.      Length of Voluntary ACP Conversation in minutes:  <16 minutes (Non-Billable)    Sam Klein RN

## 2021-10-05 NOTE — PROGRESS NOTES
TRANSFER - IN REPORT:    Verbal report received from STEFANI Diaz on Pian Steward  being received from CCU for routine progression of care      Report consisted of patients Situation, Background, Assessment and   Recommendations(SBAR). Information from the following report(s) SBAR was reviewed with the receiving nurse. Opportunity for questions and clarification was provided. Assessment completed upon patients arrival to unit and care assumed.          Pt is on 2L NC  Heparin gtt @15gtts/min  Telemetry

## 2021-10-05 NOTE — PROGRESS NOTES
[facility] Cardiology Initial Cardiac Evaluation                 CC: elevated troponin       Cain Curling is a 70 y.o. male admitted for Acute hypoxemic respiratory failure due to COVID-19 ([facility] [address] [U07.1, J96.01]  NSTEMI (non-ST elevated myocardial infarction) ([facility] [I21.4]. He has a h/o CAD s/p CABG w LIMA to ALD w bioprosthetic AVR in 2018, echo 3-2021 w EF 30-35% w WMA,  LHC 3-2021 w patent LIMA to LAD and otherwise minimal CAD. He underwent TIFFANIE 7-2021. No h/o tobacco use, no f/h of CAD, not vaccinated for Covid. He was d/c 9-28 on antibiotics and prednisone. He returned to the ER last night feeling \"bad\". He has increasing SOB, aches and fevers. He has had occasional soreness under his L arm on and off only since dx of Covid, which lasts a few minutes, is not exertional, resolves without treatment, no nausea or diaphoresis associated with L axillary pain- pain does not radiate and is mild. He has a mild dry cough, no dizziness, palpitations, syncope or LE edema. No weight gain. In ER WBC 9, hgb 14, d-dimer greater than 20, , K 4.1, cr 1.21, lactic acid 1.4, HS trop 1067 repeat 943, pBNP 4143, crp 4.4. CT chest no PE, B ground glass consolidations suggesting PNA including viral etiology. EKG L BBB (old) w rate 91. 116/74. Pt has been admitted, started on heparin, decadron, given lasix. He has missed some of his doses due to nausea and abdominal pain. Patient is in the CCU. Heparin is being titrated to the therapeutic range.       Soc:No h/o tobacco use  Fh: no f/h of CAD  Covid: Not vaccinated for Covid      Patient Active Problem List   Diagnosis Code    Aortic stenosis I35.0    CAD (coronary artery disease) I25.10    Hypertension I10    Status post coronary artery bypass graft Z95.1    S/P AVR (aortic valve replacement) Z95.2    Pneumonia due to COVID-19 virus U07.1, J12.82    Pneumothorax J93.9    Snoring R06.83    Osteoarthritis of right hip M16.11    Acute hypoxemic respiratory failure due to COVID-19 ([facility]) U07.1, U36.03    Systolic CHF, chronic ([facility]) I50.22    Class 1 obesity with alveolar hypoventilation, serious comorbidity, and body mass index (BMI) of 30.0 to 30.9 in adult ([facility]) E66.2, Z68.30    NSTEMI (non-ST elevated myocardial infarction) ([facility] I21.4    Hypercoagulable state associated with COVID-19 ([facility]) U07.1, D68.69    Elevated troponin R77.8       Past Medical History:   Diagnosis Date    Arthritis     generalized OA    CAD (coronary artery disease) 09/11/2018    Followed by [facility].     CHF (congestive heart failure) ([facility])     GERD (gastroesophageal reflux disease)     OTC alkaseltzer    History of kidney stones 2016    naturally passed    History of melanoma 2016    removed from nose    Hx of CABG     Hypertension 9/11/2018    PUD (peptic ulcer disease)     1970's- patient denies 10/01/18    Right hip pain     S/P AVR (aortic valve replacement)     Sleep apnea     observed to stop breathing in sleep; no CPAP      Past Surgical History:   Procedure Laterality Date    HX AORTIC VALVE REPLACEMENT  10/03/2018    CORONARY ARTERY BYPASS GRAFT X 1, LIMA to the LAD WITH AVR 23 mm Intuity valve    HX APPENDECTOMY       15 yo    HX CORONARY ARTERY BYPASS GRAFT  10/03/2018    CABG x1, Dr. Kash Elam  2014    right lower ext - broken put in rods and screws    HX OTHER SURGICAL      melanoma surgery to nose     Allergies   Allergen Reactions    Lortab [Hydrocodone-Acetaminophen] Other (comments)     hallucinations      Family History   Problem Relation Age of Onset    Cancer Mother         Leukemia    Alzheimer Mother     Cancer Brother         Leukemia    Hypertension Brother     No Known Problems Father     Diabetes Sister     No Known Problems Brother       Social History     Tobacco Use    Smoking status: Never Smoker    Smokeless tobacco: Never Used   Substance Use Topics    Alcohol use: Yes     Comment: occasional Current Facility-Administered Medications   Medication Dose Route Frequency    sodium chloride (NS) flush 5-40 mL  5-40 mL IntraVENous Q8H    sodium chloride (NS) flush 5-40 mL  5-40 mL IntraVENous PRN    acetaminophen (TYLENOL) tablet 650 mg  650 mg Oral Q6H PRN    Or    acetaminophen (TYLENOL) suppository 650 mg  650 mg Rectal Q6H PRN    polyethylene glycol (MIRALAX) packet 17 g  17 g Oral DAILY PRN    ondansetron (ZOFRAN ODT) tablet 4 mg  4 mg Oral Q8H PRN    Or    ondansetron (ZOFRAN) injection 4 mg  4 mg IntraVENous Q6H PRN    famotidine (PEPCID) tablet 20 mg  20 mg Oral BID    guaiFENesin-dextromethorphan (ROBITUSSIN DM) 100-10 mg/5 mL syrup 5 mL  5 mL Oral Q4H PRN    albuterol (PROVENTIL HFA, VENTOLIN HFA, PROAIR HFA) inhaler 2 Puff  2 Puff Inhalation Q4H PRN    aspirin delayed-release tablet 81 mg  81 mg Oral BID    atorvastatin (LIPITOR) tablet 40 mg  40 mg Oral DAILY    hyoscyamine SL (LEVSIN/SL) tablet 0.125 mg  0.125 mg SubLINGual Q4H PRN    metoprolol succinate (TOPROL-XL) XL tablet 50 mg  50 mg Oral DAILY    spironolactone (ALDACTONE) tablet 25 mg  25 mg Oral DAILY    valsartan (DIOVAN) tablet 40 mg  40 mg Oral DAILY    cyanocobalamin tablet 500 mcg  500 mcg Oral DAILY    heparin 25,000 units in dextrose 500 mL infusion  12-25 Units/kg/hr IntraVENous TITRATE    nitroglycerin (NITROSTAT) tablet 0.4 mg  0.4 mg SubLINGual PRN    morphine injection 4 mg  4 mg IntraVENous Q3H PRN    dexAMETHasone (DECADRON) tablet 6 mg  6 mg Oral DAILY    sodium chloride (NS) flush 5-10 mL  5-10 mL IntraVENous Q8H    sodium chloride (NS) flush 5-10 mL  5-10 mL IntraVENous PRN       Review of Symptoms:  General: no weight gain,  + weakness, fever or chills  Skin: no rashes, lumps, or other skin changes  HEENT: no headache, dizziness, lightheadedness, vision changes, hearing changes, tinnitus, vertigo, sinus pressure/pain, bleeding gums, sore throat, or hoarseness  Neck: no swollen glands, goiter, pain or stiffness  Respiratory: { cough, sputum, hemoptysis, { dyspnea, wheezing  Cardiovascular: + as per HPI  Gastrointestinal: no GERD, constipation, diarrhea, liver problems, or h/o GI bleed. Positive for nausea and abdominal pain  Urinary: no frequency, urgency , hematuria, burning/pain with urination, recent flank pain, polyuria, nocturia, or difficulty urinating  Peripheral Vascular: no claudication, leg cramps, prior DVTs, swelling of calves, legs, or feet, color change, or swelling with redness or tenderness  Musculoskeletal: aches  Psychiatric: no depression or excessive stress  Neurological: no sensory or motor loss, seizures, syncope, tremors, numbness, no dementia  Hematologic: no anemia, easy bruising or bleeding  Endocrine: no thyroid problems, heat or cold intolerance, excessive sweating, polyuria, polydipsia, no diabetes. Physical Exam  Vitals:    10/05/21 0300 10/05/21 0400 10/05/21 0500 10/05/21 0600   BP: 100/63 113/70 128/86 128/75   Pulse: 62 61 71 73   Resp: 17 17 19 20   Temp: 97.7 °F (36.5 °C)      SpO2: 94% 93% 91% 95%   Weight:       Height:           Physical Exam:  General: Well Developed, Well Nourished, No Acute Distress.  AAOx4  Heart: S1S2 with RRR   Lungs: Crackles at bases      Cardiographics    Telemetry: NSR w L BBB    Labs:   Recent Labs     10/05/21  0317 10/04/21  0015 10/03/21  2159   *  --  136   K 4.0  --  4.1   MG  --   --  2.3   BUN 36*  --  24*   CREA 1.18  --  1.21   *  --  149*   WBC 10.9  --  9.2   HGB 12.4*  --  14.0   HCT 38.3*  --  42.8     --  174   INR 1.1 1.6  --         Assessment/Plan:     Assessment:   Acute hypoxemic respiratory failure due to COVID-19 - decadron    CAD (coronary artery disease) (9/11/2018)- w elevated troponin, s/p CABG w [facility] 3-2021 w stable disease, L BBB chronic, no symptoms suggestive of angina; troponin trending down, cont ASA, lipitor, heparin, toprol, arb, aldactone     Systolic CHF, chronic ([email or web address] (9/28/2021)- EF 30-35%, s/p IV lasix, cont ARB, BB, monitor volume status closely     Hypercoagulable state associated with COVID-19 ([address] (10/4/2021)- heparin   PTT elevated. Not in the therapeutic range. Titrate to therapeutic range. Currently on 15 units. Recheck Heparin Xa level    Patient appears to have had a type II troponin leak secondary to COVID-19 hypoxemia. It does not appear to be a NSTEMI. Troponin now decreasing. Keep on Heparin 15 units. Can adjust units if needed. Patient has likely a type II troponin leak and from a cardiac standpoint would only need heparin for 24 to 48 hours.   If decision to continue heparin for his potential Covid hypercoagulability after 48 hours and is made we will defer this to hospitalist but the patient is not an acute plaque rupture and therefore would not require continued anticoagulation

## 2021-10-05 NOTE — PROGRESS NOTES
Chart reviewed s/p admission to CCU covid positive isolation. Currently on 1L O2 via NC and heparin gtt. Tx orders to tele floor. Pt recently here. Lives alone, independent of ADL's and drives self. Works full-time. Able to get rx and states has PCP. Declined HH at d/c previously. Sons, legal NOK. CM will continue to follow for d/c needs/POC. Care Management Interventions  PCP Verified by CM: Yes  Mode of Transport at Discharge:  Other (see comment)  Transition of Care Consult (CM Consult): Discharge Planning  Discharge Durable Medical Equipment:  (none)  Support Systems: Child(jasiel)  Confirm Follow Up Transport: Family  Freedom of Choice List was Provided with Basic Dialogue that Supports the Patient's Individualized Plan of Care/Goals, Treatment Preferences and Shares the Quality Data Associated with the Providers?: Yes  [facility] Resource Information Provided?:  [facility])  Discharge Location  Discharge Placement: Unable to determine at this time

## 2021-10-05 NOTE — PROGRESS NOTES
..Bedside and verbal shift change report received from  Criselda Ahumada, RN (offgoing nurse). Report included the following information SBAR, Kardex, ED Summary, Intake/Output, MAR, Recent Results, Med Rec Status, Cardiac Rhythm NSR, Alarm Parameters , Quality Measures and Dual Neuro Assessment. Dual skin assessment completed at bedside: Warm and Intact;  Bilateral AC Perif IVs; Tattoos (list pertinent skin assessment findings)    Dual verification of gtts completed (name of gtts verified): Heparin at 16 Units/kg/hr

## 2021-10-05 NOTE — PROGRESS NOTES
Hospitalist Progress Note   Admit Date:  10/3/2021 10:01 PM   Name:  Lord Somers   Age:  70 y.o. Sex:  male  :  1950   MRN:  [id number]   Room:  [facility]/    Presenting Complaint: Positive For Covid-19    Reason(s) for Admission: Acute hypoxemic respiratory failure due to COVID-19 ([facility] [address] [U07.1, J96.01]  NSTEMI (non-ST elevated myocardial infarction) [facility]) [I21.4]     Hospital Course & Interval History:     Lord Somers is a 70 y.o. male with medical history of Hx of CAD s/p CABG, HTN, s/p aortic valve replacement, sCHF EF 30%, pulmonary HTN who presented with worsening SOB and new left sided chest pain associated with chest tightness and malaise. He was hypoxic with minimal exertion requiring 2L o2. His troponin was over 1000 and NT pr BNP 4000s. EKG showed no ischemic changes. CXR vascular congestion or atypical PNA. ED d/w cardiology who recommended heparin bolus and gtt for possible thrombus.      He was recently hospitalized for COVID PNA and was weaned off of supplemental o2 and discharged home on  with decadron. He is unvaccinated and declined baricitinib at that time. ER workup notable for new oxygen requirement and elevated troponin. CT scan negative for PE>     Cardiology has seen - interprets as demand ischemia. Subjective (10/05/21):  Pt reports sore throat. Denies chest pain or pressure. No dyspnea or significant cough per pt.  Currently on 1L NC>    Assessment & Plan:     # acute hypoxic respiratory failure  # covid -19 pneumonia  - Decadron 6mg po daily  - pt declined baricitinib on admission  - requiring 1L NC  - will need repeat 6min walk test prior to dsicharge    # elevated troponin  # hx of CAD s/p CABG  # Type II troponin leak (demand ischemi)  - cardiology following  - troponin trending down - 1000--->641  - continue ASA, statin, toprol, arb, aldactone  - heparin gtt - defer to cardiology on end point.   - Cardiology does not think this represents NSTEMI    # Elevated DDimer  - CT chest negative for PE  - Check duplex LE    # Chronic systolic CHF  - EF 44%, s/p IV lasix  - appears euvolemic on exam today  - ARB, BB.      # Debility  - PT/OT following - recommending HHC  - pt was recommended STR at most recent visit and declined      Dispo/Discharge Planning:      Likely home in 1-2 days pending above - HHC vs STR?    10/5 - updated son by phone, questions answered    Diet:  ADULT DIET Regular; Low Fat/Low Chol/High Fiber/SHAI  DVT PPx: hep gtt  Code status: Full Code    Hospital Problems as of 10/5/2021 Date Reviewed: 8/17/2021        Codes Class Noted - Resolved POA    NSTEMI (non-ST elevated myocardial infarction) ([facility] [place] [address] ICD-10-CM: I21.4  ICD-9-CM: 410.70  10/4/2021 - Present         Hypercoagulable state associated with [address]) ICD-10-CM: U07.1, D68.69  ICD-9-CM: 289.82, 079.89  10/4/2021 - Present Yes        Elevated troponin ICD-10-CM: R77.8  ICD-9-CM: 790.6  10/4/2021 - Present Unknown        Class 1 obesity with alveolar hypoventilation, serious comorbidity, and body mass index (BMI) of 30.0 to 30.9 in adult [facility]) ICD-10-CM: E66.2, Z68.30  ICD-9-CM: 278.03, V85.30  9/29/2021 - Present Yes        * (Principal) [facility] [place] [address]) ICD-10-CM: U07.1, J96.01  ICD-9-CM: 518.81, 079.89, 799.02  9/28/2021 - Present Unknown        Systolic CHF, chronic (HCC) ICD-10-CM: I50.22  ICD-9-CM: 428.22, 428.0  9/28/2021 - Present Yes        Status post coronary artery bypass graft ICD-10-CM: Z95.1  ICD-9-CM: V45.81  10/3/2018 - Present Yes    Overview Signed 10/3/2018 12:20 PM by Wolf Bass, NP     10/3/18 Dr. Brett Collins  CABG X 1, LIMA to the LAD WITH AVR 23 mm Intuity valve             S/P AVR (aortic valve replacement) ICD-10-CM: Z95.2  ICD-9-CM: V43.3  10/3/2018 - Present Yes    Overview Signed 10/3/2018 12:21 PM by Wolf Bass, NP     10/3/18  Dr. Brett Collins  CABG X 1, LIMA to the LAD WITH AVR 23 mm Intuity valve Pneumonia due to COVID-19 virus ICD-10-CM: U07.1, J12.82  ICD-9-CM: 480.8, 079.89  10/3/2018 - Present Yes        CAD (coronary artery disease) ICD-10-CM: I25.10  ICD-9-CM: 414.00  9/11/2018 - Present Yes        Hypertension (Chronic) ICD-10-CM: I10  ICD-9-CM: 401.9  9/11/2018 - Present Yes              Objective:     Patient Vitals for the past 24 hrs:   Temp Pulse Resp BP SpO2   10/05/21 1218 98 °F (36.7 °C) 80 20 114/65 90 %   10/05/21 1105 -- -- -- -- 90 %   10/05/21 1059 -- 79 (!) 35 104/67 93 %   10/05/21 1003 -- 80 (!) 34 118/66 91 %   10/05/21 0900 -- 91 (!) 33 117/86 91 %   10/05/21 0842 97.8 °F (36.6 °C) -- -- -- --   10/05/21 0814 -- 73 20 112/70 93 %   10/05/21 0600 -- 73 20 128/75 95 %   10/05/21 0500 -- 71 19 128/86 91 %   10/05/21 0400 -- 61 17 113/70 93 %   10/05/21 0300 97.7 °F (36.5 °C) 62 17 100/63 94 %   10/05/21 0200 -- 65 19 117/70 94 %   10/05/21 0100 -- 64 19 113/72 95 %   10/05/21 0000 -- 65 17 118/70 93 %   10/04/21 2359 -- 63 -- -- --   10/04/21 2300 97.7 °F (36.5 °C) 63 20 115/66 94 %   10/04/21 2200 -- 63 19 114/70 94 %   10/04/21 2100 -- 65 16 (!) 87/56 95 %   10/04/21 2000 -- 66 26 100/64 94 %   10/04/21 1900 97.6 °F (36.4 °C) 70 19 (!) 90/52 98 %   10/04/21 1802 -- 67 -- 97/60 95 %   10/04/21 1752 -- 68 -- -- 95 %   10/04/21 1731 -- 70 -- 109/63 93 %   10/04/21 1602 -- 65 14 (!) 93/55 93 %     Oxygen Therapy  O2 Sat (%): 90 % (10/05/21 1218)  Pulse via Oximetry: 90 beats per minute (10/05/21 1221)  O2 Device: Nasal cannula (10/05/21 1221)  Skin Assessment: Clean, dry, & intact (10/05/21 1221)  Skin Protection for O2 Device: N/A (10/04/21 1900)  O2 Flow Rate (L/min): 2 l/min (10/05/21 1221)    Estimated body mass index is 30.5 kg/m² as calculated from the following:    Height as of this encounter: 5' 10.5\" (1.791 m). Weight as of this encounter: 97.8 kg (215 lb 9.8 oz).     Intake/Output Summary (Last 24 hours) at 10/5/2021 1532  Last data filed at 10/5/2021 0842  Gross per 24 hour   Intake 1155.83 ml   Output 1850 ml   Net -694.17 ml         Physical Exam:   General:    Well nourished. No overt distress  Head:  Normocephalic, atraumatic  Eyes:  Sclerae appear normal.  Pupils equally round. ENT:  Nares appear normal, no drainage. Moist oral mucosa  Neck:  No restricted ROM. Trachea midline   CV:   RRR. No m/r/g. No jugular venous distension. Lungs:   diminished  No wheezing, rhonchi, or rales. Respirations even, unlabored  Abdomen: Bowel sounds present. Soft, nontender, nondistended. Extremities: No cyanosis or clubbing. No edema  Skin:     No rashes and normal coloration. Warm and dry. Neuro:  Cranial nerves II-XII grossly intact. Sensation intact. A&Ox3  Psych:  Normal mood and affect. I have reviewed ordered lab tests and independently visualized imaging below:    Last 24hr Labs:  Recent Results (from the past 24 hour(s))   HEPARIN XA UFH    Collection Time: 10/05/21 12:24 AM   Result Value Ref Range    Heparin Xa UFH 0.71 (H) 0.3 - 0.7 IU/mL   CBC WITH AUTOMATED DIFF    Collection Time: 10/05/21  3:17 AM   Result Value Ref Range    WBC 10.9 4.3 - 11.1 K/uL    RBC 4.58 4.23 - 5.6 M/uL    HGB 12.4 (L) 13.6 - 17.2 g/dL    HCT 38.3 (L) 41.1 - 50.3 %    MCV 83.6 79.6 - 97.8 FL    MCH 27.1 26.1 - 32.9 PG    MCHC 32.4 31.4 - 35.0 g/dL    RDW 13.1 11.9 - 14.6 %    PLATELET 467 135 - 076 K/uL    MPV 10.0 9.4 - 12.3 FL    ABSOLUTE NRBC 0.00 0.0 - 0.2 K/uL    DF AUTOMATED      NEUTROPHILS 81 (H) 43 - 78 %    LYMPHOCYTES 12 (L) 13 - 44 %    MONOCYTES 6 4.0 - 12.0 %    EOSINOPHILS 1 0.5 - 7.8 %    BASOPHILS 0 0.0 - 2.0 %    IMMATURE GRANULOCYTES 1 0.0 - 5.0 %    ABS. NEUTROPHILS 8.8 (H) 1.7 - 8.2 K/UL    ABS. LYMPHOCYTES 1.3 0.5 - 4.6 K/UL    ABS. MONOCYTES 0.7 0.1 - 1.3 K/UL    ABS. EOSINOPHILS 0.1 0.0 - 0.8 K/UL    ABS. BASOPHILS 0.0 0.0 - 0.2 K/UL    ABS. IMM.  GRANS. 0.1 0.0 - 0.5 K/UL   METABOLIC PANEL, COMPREHENSIVE    Collection Time: 10/05/21  3:17 AM   Result Value Ref Range    Sodium 134 (L) 136 - 145 mmol/L    Potassium 4.0 3.5 - 5.1 mmol/L    Chloride 100 98 - 107 mmol/L    CO2 23 21 - 32 mmol/L    Anion gap 11 7 - 16 mmol/L    Glucose 139 (H) 65 - 100 mg/dL    BUN 36 (H) 8 - 23 MG/DL    Creatinine 1.18 0.8 - 1.5 MG/DL    GFR est AA >60 >60 ml/min/1.73m2    GFR est non-AA >60 >60 ml/min/1.73m2    Calcium 8.7 8.3 - 10.4 MG/DL    Bilirubin, total 0.7 0.2 - 1.1 MG/DL    ALT (SGPT) 52 12 - 65 U/L    AST (SGOT) 25 15 - 37 U/L    Alk. phosphatase 40 (L) 50 - 136 U/L    Protein, total 7.4 6.3 - 8.2 g/dL    Albumin 2.5 (L) 3.2 - 4.6 g/dL    Globulin 4.9 (H) 2.3 - 3.5 g/dL    A-G Ratio 0.5 (L) 1.2 - 3.5     FERRITIN    Collection Time: 10/05/21  3:17 AM   Result Value Ref Range    Ferritin 381 8 - 388 NG/ML   LD    Collection Time: 10/05/21  3:17 AM   Result Value Ref Range     (H) 110 - 210 U/L   C REACTIVE PROTEIN, QT    Collection Time: 10/05/21  3:17 AM   Result Value Ref Range    C-Reactive protein 9.2 (H) 0.0 - 0.9 mg/dL   PROTHROMBIN TIME + INR    Collection Time: 10/05/21  3:17 AM   Result Value Ref Range    Prothrombin time 15.0 (H) 12.6 - 14.5 sec    INR 1.1     PTT    Collection Time: 10/05/21  3:17 AM   Result Value Ref Range    aPTT 163.3 (HH) 24.1 - 35.1 SEC   TROPONIN-HIGH SENSITIVITY    Collection Time: 10/05/21  3:17 AM   Result Value Ref Range    Troponin-High Sensitivity 641.4 (HH) 0 - 14 pg/mL   HEPARIN XA UFH    Collection Time: 10/05/21 10:03 AM   Result Value Ref Range    Heparin Xa UFH 0.64 0.3 - 0.7 IU/mL       All Micro Results     None          Other Studies:  No results found.     Current Meds:  Current Facility-Administered Medications   Medication Dose Route Frequency    phenol throat spray (CHLORASEPTIC) 1 Spray  1 Spray Oral PRN    sodium chloride (NS) flush 5-40 mL  5-40 mL IntraVENous Q8H    sodium chloride (NS) flush 5-40 mL  5-40 mL IntraVENous PRN    acetaminophen (TYLENOL) tablet 650 mg  650 mg Oral Q6H PRN    Or    acetaminophen (TYLENOL) suppository 650 mg  650 mg Rectal Q6H PRN    polyethylene glycol (MIRALAX) packet 17 g  17 g Oral DAILY PRN    ondansetron (ZOFRAN ODT) tablet 4 mg  4 mg Oral Q8H PRN    Or    ondansetron (ZOFRAN) injection 4 mg  4 mg IntraVENous Q6H PRN    famotidine (PEPCID) tablet 20 mg  20 mg Oral BID    guaiFENesin-dextromethorphan (ROBITUSSIN DM) 100-10 mg/5 mL syrup 5 mL  5 mL Oral Q4H PRN    albuterol (PROVENTIL HFA, VENTOLIN HFA, PROAIR HFA) inhaler 2 Puff  2 Puff Inhalation Q4H PRN    aspirin delayed-release tablet 81 mg  81 mg Oral BID    atorvastatin (LIPITOR) tablet 40 mg  40 mg Oral DAILY    hyoscyamine SL (LEVSIN/SL) tablet 0.125 mg  0.125 mg SubLINGual Q4H PRN    metoprolol succinate (TOPROL-XL) XL tablet 50 mg  50 mg Oral DAILY    spironolactone (ALDACTONE) tablet 25 mg  25 mg Oral DAILY    valsartan (DIOVAN) tablet 40 mg  40 mg Oral DAILY    cyanocobalamin tablet 500 mcg  500 mcg Oral DAILY    heparin 25,000 units in dextrose 500 mL infusion  12-25 Units/kg/hr IntraVENous TITRATE    nitroglycerin (NITROSTAT) tablet 0.4 mg  0.4 mg SubLINGual PRN    morphine injection 4 mg  4 mg IntraVENous Q3H PRN    dexAMETHasone (DECADRON) tablet 6 mg  6 mg Oral DAILY    sodium chloride (NS) flush 5-10 mL  5-10 mL IntraVENous Q8H    sodium chloride (NS) flush 5-10 mL  5-10 mL IntraVENous PRN       Signed:  Artemio Floyd DO    Part of this note may have been written by using a voice dictation software. The note has been proof read but may still contain some grammatical/other typographical errors.

## 2021-10-06 ENCOUNTER — HOME HEALTH ADMISSION (OUTPATIENT)
Dept: HOME HEALTH SERVICES | Facility: HOME HEALTH | Age: 71
End: 2021-10-06
Payer: MEDICARE

## 2021-10-06 LAB
25(OH)D3 SERPL-MCNC: 33.8 NG/ML (ref 30–100)
ANION GAP SERPL CALC-SCNC: 7 MMOL/L (ref 7–16)
BASOPHILS # BLD: 0 K/UL (ref 0–0.2)
BASOPHILS NFR BLD: 0 % (ref 0–2)
BUN SERPL-MCNC: 28 MG/DL (ref 8–23)
CALCIUM SERPL-MCNC: 9 MG/DL (ref 8.3–10.4)
CHLORIDE SERPL-SCNC: 100 MMOL/L (ref 98–107)
CHOLEST SERPL-MCNC: 153 MG/DL
CO2 SERPL-SCNC: 25 MMOL/L (ref 21–32)
CREAT SERPL-MCNC: 1.13 MG/DL (ref 0.8–1.5)
CRP SERPL-MCNC: 3.5 MG/DL (ref 0–0.9)
DIFFERENTIAL METHOD BLD: ABNORMAL
EOSINOPHIL # BLD: 0 K/UL (ref 0–0.8)
EOSINOPHIL NFR BLD: 0 % (ref 0.5–7.8)
ERYTHROCYTE [DISTWIDTH] IN BLOOD BY AUTOMATED COUNT: 12.9 % (ref 11.9–14.6)
EST. AVERAGE GLUCOSE BLD GHB EST-MCNC: 151 MG/DL
GLUCOSE SERPL-MCNC: 107 MG/DL (ref 65–100)
HBA1C MFR BLD: 6.9 % (ref 4.2–6.3)
HCT VFR BLD AUTO: 39.1 % (ref 41.1–50.3)
HDLC SERPL-MCNC: 49 MG/DL (ref 40–60)
HDLC SERPL: 3.1 {RATIO}
HGB BLD-MCNC: 12.6 G/DL (ref 13.6–17.2)
IMM GRANULOCYTES # BLD AUTO: 0.1 K/UL (ref 0–0.5)
IMM GRANULOCYTES NFR BLD AUTO: 1 % (ref 0–5)
LDLC SERPL CALC-MCNC: 74 MG/DL
LYMPHOCYTES # BLD: 1 K/UL (ref 0.5–4.6)
LYMPHOCYTES NFR BLD: 13 % (ref 13–44)
MCH RBC QN AUTO: 27.2 PG (ref 26.1–32.9)
MCHC RBC AUTO-ENTMCNC: 32.2 G/DL (ref 31.4–35)
MCV RBC AUTO: 84.4 FL (ref 79.6–97.8)
MONOCYTES # BLD: 0.6 K/UL (ref 0.1–1.3)
MONOCYTES NFR BLD: 7 % (ref 4–12)
NEUTS SEG # BLD: 6.1 K/UL (ref 1.7–8.2)
NEUTS SEG NFR BLD: 79 % (ref 43–78)
NRBC # BLD: 0 K/UL (ref 0–0.2)
PLATELET # BLD AUTO: 206 K/UL (ref 150–450)
PMV BLD AUTO: 9.7 FL (ref 9.4–12.3)
POTASSIUM SERPL-SCNC: 4.2 MMOL/L (ref 3.5–5.1)
RBC # BLD AUTO: 4.63 M/UL (ref 4.23–5.6)
SODIUM SERPL-SCNC: 132 MMOL/L (ref 138–145)
STREP,MOLECULAR STRPM: NOT DETECTED
T4 FREE SERPL-MCNC: 1.5 NG/DL (ref 0.78–1.46)
TRIGL SERPL-MCNC: 150 MG/DL (ref 35–150)
TSH SERPL DL<=0.005 MIU/L-ACNC: 0.33 UIU/ML (ref 0.36–3.74)
UFH PPP CHRO-ACNC: 0.41 IU/ML (ref 0.3–0.7)
VLDLC SERPL CALC-MCNC: 30 MG/DL (ref 6–23)
WBC # BLD AUTO: 7.7 K/UL (ref 4.3–11.1)

## 2021-10-06 PROCEDURE — 80061 LIPID PANEL: CPT

## 2021-10-06 PROCEDURE — 85025 COMPLETE CBC W/AUTO DIFF WBC: CPT

## 2021-10-06 PROCEDURE — 99222 1ST HOSP IP/OBS MODERATE 55: CPT | Performed by: INTERNAL MEDICINE

## 2021-10-06 PROCEDURE — 83036 HEMOGLOBIN GLYCOSYLATED A1C: CPT

## 2021-10-06 PROCEDURE — 74011250637 HC RX REV CODE- 250/637: Performed by: FAMILY MEDICINE

## 2021-10-06 PROCEDURE — 84439 ASSAY OF FREE THYROXINE: CPT

## 2021-10-06 PROCEDURE — 80048 BASIC METABOLIC PNL TOTAL CA: CPT

## 2021-10-06 PROCEDURE — 86140 C-REACTIVE PROTEIN: CPT

## 2021-10-06 PROCEDURE — 82306 VITAMIN D 25 HYDROXY: CPT

## 2021-10-06 PROCEDURE — 87651 STREP A DNA AMP PROBE: CPT

## 2021-10-06 PROCEDURE — 74011250636 HC RX REV CODE- 250/636: Performed by: FAMILY MEDICINE

## 2021-10-06 PROCEDURE — 84443 ASSAY THYROID STIM HORMONE: CPT

## 2021-10-06 PROCEDURE — 36415 COLL VENOUS BLD VENIPUNCTURE: CPT

## 2021-10-06 PROCEDURE — 85520 HEPARIN ASSAY: CPT

## 2021-10-06 PROCEDURE — 97530 THERAPEUTIC ACTIVITIES: CPT

## 2021-10-06 RX ORDER — DEXAMETHASONE 6 MG/1
6 TABLET ORAL
Qty: 8 TABLET | Refills: 0 | Status: SHIPPED | OUTPATIENT
Start: 2021-10-06 | End: 2021-10-14

## 2021-10-06 RX ORDER — GUAIFENESIN/DEXTROMETHORPHAN 100-10MG/5
5 SYRUP ORAL
Qty: 1 EACH | Refills: 0 | Status: SHIPPED | OUTPATIENT
Start: 2021-10-06 | End: 2021-10-16

## 2021-10-06 RX ADMIN — CYANOCOBALAMIN TAB 1000 MCG 500 MCG: 1000 TAB at 08:26

## 2021-10-06 RX ADMIN — VALSARTAN 40 MG: 40 TABLET, FILM COATED ORAL at 08:26

## 2021-10-06 RX ADMIN — ATORVASTATIN CALCIUM 40 MG: 40 TABLET, FILM COATED ORAL at 08:26

## 2021-10-06 RX ADMIN — SPIRONOLACTONE 25 MG: 25 TABLET ORAL at 08:27

## 2021-10-06 RX ADMIN — FAMOTIDINE 20 MG: 20 TABLET ORAL at 08:26

## 2021-10-06 RX ADMIN — Medication 10 ML: at 05:22

## 2021-10-06 RX ADMIN — DEXAMETHASONE 6 MG: 4 TABLET ORAL at 08:26

## 2021-10-06 RX ADMIN — APIXABAN 10 MG: 5 TABLET, FILM COATED ORAL at 08:31

## 2021-10-06 RX ADMIN — ASPIRIN 81 MG: 81 TABLET ORAL at 08:26

## 2021-10-06 NOTE — PROGRESS NOTES
ACUTE PHYSICAL THERAPY GOALS:  (Developed with and agreed upon by patient and/or caregiver. )  LTG:  (1.)Mr. Beryl Chou will move from supine to sit and sit to supine , scoot up and down and roll side to side in bed with INDEPENDENT within 7 treatment day(s). goal met 10/06/21  (2.)Mr. Beryl Chou will transfer from bed to chair and chair to bed with INDEPENDENT using the least restrictive device within 7 treatment day(s). Goal met 10/06/21   (3.)Mr. Mcfarland will ambulate with SUPERVISION for 250+ feet with the least restrictive device within 7 treatment day(s) while maintaining normal vital signs. (4.)Mr. Beryl Chou will perform 23+ minutes of therapeutic activity within 7 treatment days while maintaining normal vital signs. PHYSICAL THERAPY: Daily Note and AM Treatment Day # 3    Fabiana Vasquez is a 70 y.o. male   PRIMARY DIAGNOSIS: Acute hypoxemic respiratory failure due to COVID-19 [facility])  Acute hypoxemic respiratory failure due to COVID-19 ([facility] [address] [U07.1, J96.01]  NSTEMI (non-ST elevated myocardial infarction) ([facility] [address] [I21.4]         ASSESSMENT:     REHAB RECOMMENDATIONS: CURRENT LEVEL OF FUNCTION:  (Most Recently Demonstrated)   Recommendation to date pending progress:  [address] [place] [address] Therapy  Equipment:    None Bed Mobility:   Independent  Sit to Stand:   Supervision  Transfers:   Supervision  Gait/Mobility:   Supervision     ASSESSMENT:  Mr. Beryl Chou is in bed and agreeable to therapy  reports feeling a better today. Bed mobility is independent. Sitting balance good edge f bed. Sit to stand and gait training are with supervision, however the patient is doing really good. Patient is returned to sitting edge of bed and PCT present to get vitals. Patient does participate in LE exercises. Good session. Making progress towards goals. Pleasant and cooperative and a kelsey to work with. PT to cont to follow for acute care needs. Home with HHPT.      SUBJECTIVE:   Mr. Beryl Chou states, \"I am feeling a little better today. \"    SOCIAL HISTORY/ LIVING ENVIRONMENT: lives alone, son next door, independent at baseline  Home Environment: Private residence  # Steps to Enter: 2  One/Two Story Residence: One story  Living Alone: Yes  Support Systems: Child(jasiel)  OBJECTIVE:     PAIN: VITAL SIGNS: LINES/DRAINS:   Pre Treatment: Pain Screen  Pain Scale 1: Numeric (0 - 10)  Pain Intensity 1: 0  Post Treatment: 0   Continuous Pulse Oximetry and O2  O2 Device: Nasal cannula     MOBILITY: I Mod I S SBA CGA Min Mod Max Total  NT x2 Comments:   Bed Mobility    Rolling [x] [] [] [] [] [] [] [] [] [] []    Supine to Sit [x] [] [] [] [] [] [] [] [] [] []    Scooting [x] [] [] [] [] [] [] [] [] [] []    Sit to Supine [] [] [] [] [] [] [] [] [] [] []    Transfers    Sit to Stand [] [] [x] [] [] [] [] [] [] [] []    Bed to Chair [] [] [] [] [] [] [] [] [] [x] []    Stand to Sit [] [] [x] [] [] [] [] [] [] [] []    I=Independent, Mod I=Modified Independent, S=Supervision, SBA=Standby Assistance, CGA=Contact Guard Assistance,   Min=Minimal Assistance, Mod=Moderate Assistance, Max=Maximal Assistance, Total=Total Assistance, NT=Not Tested    BALANCE: Good Fair+ Fair Fair- Poor NT Comments   Sitting Static [x] [] [] [] [] []    Sitting Dynamic [x] [] [] [] [] []              Standing Static [x] [] [] [] [] []    Standing Dynamic [x] [] [] [] [] []      GAIT: I Mod I S SBA CGA Min Mod Max Total  NT x2 Comments:   Level of Assistance [] [] [x] [] [] [] [] [] [] [] []    Distance 40 feet     DME None    Gait Quality slow    Weightbearing  Status N/A     I=Independent, Mod I=Modified Independent, S=Supervision, SBA=Standby Assistance, CGA=Contact Guard Assistance,   Min=Minimal Assistance, Mod=Moderate Assistance, Max=Maximal Assistance, Total=Total Assistance, NT=Not Tested    PLAN:   FREQUENCY/DURATION: PT Plan of Care: 3 times/week for duration of hospital stay or until stated goals are met, whichever comes first.  TREATMENT:     TREATMENT:   ($$ Therapeutic Activity: 23-37 mins    )  Therapeutic Activity (23 Minutes): Therapeutic activity included Rolling, Supine to Sit, Scooting, Transfer Training, Ambulation on level ground, Sitting balance , Standing balance and LE exercises to improve functional Mobility, Strength, Activity tolerance and balance.     TREATMENT GRID:  N/A    AFTER TREATMENT POSITION/PRECAUTIONS:  Needs within reach, RN notified and sitting edge of bed    INTERDISCIPLINARY COLLABORATION:  RN/PCT and PT/PTA    TOTAL TREATMENT DURATION:  PT Patient Time In/Time Out  Time In: 1105  Time Out: Juice Boyer-Gely, PTA

## 2021-10-06 NOTE — SUICIDE SAFETY PLAN
Discharge instructions given. Education provided. All questions answered and verbally voiced understanding. Medication changes and follow up appointments discussed. AVS reviewed, signed, and placed in chart. Copy provided for pt. Awaiting family to arrive.  All needs met at this time

## 2021-10-06 NOTE — PROGRESS NOTES
Discharge instructions given. Education provided. All questions answered and verbally voiced understanding. Medication changes and follow up appointments discussed. AVS reviewed, signed, and placed in chart. Copy provided for pt. Pt son aware of discharge and is planning to come  pt. Pt aware to call desk when ready to discharge. IV access out. All need met at this time.

## 2021-10-06 NOTE — PROGRESS NOTES
Oxygen Qualifier       Room air: SpO2 with O2 and liter flow   Resting SpO2  94%    Ambulating SpO2  84% 94% on 4L       Completed by:    Lisa Alfaro, RT

## 2021-10-06 NOTE — PROGRESS NOTES
[facility] Cardiology Initial Cardiac Evaluation                 CC: elevated troponin       Zelalem Ly is a 70 y.o. male admitted for Acute hypoxemic respiratory failure due to COVID-19 ([facility] [address] [U07.1, J96.01]  NSTEMI (non-ST elevated myocardial infarction) ([facility] [I21.4]. He has a h/o CAD s/p CABG w LIMA to ALD w bioprosthetic AVR in 2018, echo 3-2021 w EF 30-35% w WMA,  LHC 3-2021 w patent LIMA to LAD and otherwise minimal CAD. He underwent TIFFANIE 7-2021. No h/o tobacco use, no f/h of CAD, not vaccinated for Covid. He was d/c 9-28 on antibiotics and prednisone. He returned to the ER last night feeling \"bad\". He has increasing SOB, aches and fevers. He has had occasional soreness under his L arm on and off only since dx of Covid, which lasts a few minutes, is not exertional, resolves without treatment, no nausea or diaphoresis associated with L axillary pain- pain does not radiate and is mild. He has a mild dry cough, no dizziness, palpitations, syncope or LE edema. No weight gain. In ER WBC 9, hgb 14, d-dimer greater than 20, , K 4.1, cr 1.21, lactic acid 1.4, HS trop 1067 repeat 943, pBNP 4143, crp 4.4. CT chest no PE, B ground glass consolidations suggesting PNA including viral etiology. EKG L BBB (old) w rate 91. 116/74. Pt has been admitted, started on heparin, decadron, given lasix. He has missed some of his doses due to nausea and abdominal pain. Patient has been moved to floor and is no longer in CCU. Heparin 15 units is now in the therapeutic range.       Soc:No h/o tobacco use  Fh: no f/h of CAD  Covid: Not vaccinated for Covid      Patient Active Problem List   Diagnosis Code    Aortic stenosis I35.0    CAD (coronary artery disease) I25.10    Hypertension I10    Status post coronary artery bypass graft Z95.1    S/P AVR (aortic valve replacement) Z95.2    Pneumonia due to COVID-19 virus U07.1, J12.82    Pneumothorax J93.9    Snoring R06.83    Osteoarthritis of right hip M16.11    Acute hypoxemic respiratory failure due to COVID-19 ([facility]) U07.1, Y40.21    Systolic CHF, chronic ([facility]) I50.22    Class 1 obesity with alveolar hypoventilation, serious comorbidity, and body mass index (BMI) of 30.0 to 30.9 in adult ([facility]) E66.2, Z68.30    NSTEMI (non-ST elevated myocardial infarction) ([facility] [address] I21.4    Hypercoagulable state associated with COVID-19 ([facility]) U07.1, D68.69    Elevated troponin R77.8       Past Medical History:   Diagnosis Date    Arthritis     generalized OA    CAD (coronary artery disease) 09/11/2018    Followed by [facility].     CHF (congestive heart failure) ([facility])     GERD (gastroesophageal reflux disease)     OTC alkaseltzer    History of kidney stones 2016    naturally passed    History of melanoma 2016    removed from nose    Hx of CABG     Hypertension 9/11/2018    PUD (peptic ulcer disease)     1970's- patient denies 10/01/18    Right hip pain     S/P AVR (aortic valve replacement)     Sleep apnea     observed to stop breathing in sleep; no CPAP      Past Surgical History:   Procedure Laterality Date    HX AORTIC VALVE REPLACEMENT  10/03/2018    CORONARY ARTERY BYPASS GRAFT X 1, LIMA to the LAD WITH AVR 23 mm Intuity valve    HX APPENDECTOMY       15 yo    HX CORONARY ARTERY BYPASS GRAFT  10/03/2018    CABG x1, Dr. Villa Delacruz  2014    right lower ext - broken put in rods and screws    HX OTHER SURGICAL      melanoma surgery to nose     Allergies   Allergen Reactions    Lortab [Hydrocodone-Acetaminophen] Other (comments)     hallucinations      Family History   Problem Relation Age of Onset    Cancer Mother         Leukemia    Alzheimer Mother     Cancer Brother         Leukemia    Hypertension Brother     No Known Problems Father     Diabetes Sister     No Known Problems Brother       Social History     Tobacco Use    Smoking status: Never Smoker    Smokeless tobacco: Never Used   Substance Use Topics    Alcohol use: Yes     Comment: occasional        Current Facility-Administered Medications   Medication Dose Route Frequency    phenol throat spray (CHLORASEPTIC) 1 Spray  1 Spray Oral PRN    mirtazapine (REMERON) tablet 7.5 mg  7.5 mg Oral QHS PRN    sodium chloride (NS) flush 5-40 mL  5-40 mL IntraVENous Q8H    sodium chloride (NS) flush 5-40 mL  5-40 mL IntraVENous PRN    acetaminophen (TYLENOL) tablet 650 mg  650 mg Oral Q6H PRN    Or    acetaminophen (TYLENOL) suppository 650 mg  650 mg Rectal Q6H PRN    polyethylene glycol (MIRALAX) packet 17 g  17 g Oral DAILY PRN    ondansetron (ZOFRAN ODT) tablet 4 mg  4 mg Oral Q8H PRN    Or    ondansetron (ZOFRAN) injection 4 mg  4 mg IntraVENous Q6H PRN    famotidine (PEPCID) tablet 20 mg  20 mg Oral BID    guaiFENesin-dextromethorphan (ROBITUSSIN DM) 100-10 mg/5 mL syrup 5 mL  5 mL Oral Q4H PRN    albuterol (PROVENTIL HFA, VENTOLIN HFA, PROAIR HFA) inhaler 2 Puff  2 Puff Inhalation Q4H PRN    aspirin delayed-release tablet 81 mg  81 mg Oral BID    atorvastatin (LIPITOR) tablet 40 mg  40 mg Oral DAILY    hyoscyamine SL (LEVSIN/SL) tablet 0.125 mg  0.125 mg SubLINGual Q4H PRN    metoprolol succinate (TOPROL-XL) XL tablet 50 mg  50 mg Oral DAILY    spironolactone (ALDACTONE) tablet 25 mg  25 mg Oral DAILY    valsartan (DIOVAN) tablet 40 mg  40 mg Oral DAILY    cyanocobalamin tablet 500 mcg  500 mcg Oral DAILY    heparin 25,000 units in dextrose 500 mL infusion  12-25 Units/kg/hr IntraVENous TITRATE    nitroglycerin (NITROSTAT) tablet 0.4 mg  0.4 mg SubLINGual PRN    morphine injection 4 mg  4 mg IntraVENous Q3H PRN    dexAMETHasone (DECADRON) tablet 6 mg  6 mg Oral DAILY    sodium chloride (NS) flush 5-10 mL  5-10 mL IntraVENous Q8H    sodium chloride (NS) flush 5-10 mL  5-10 mL IntraVENous PRN       Review of Symptoms:  General: no weight gain,  + weakness, fever or chills  Skin: no rashes, lumps, or other skin changes  HEENT: no headache, dizziness, lightheadedness, vision changes, hearing changes, tinnitus, vertigo, sinus pressure/pain, bleeding gums, sore throat, or hoarseness  Neck: no swollen glands, goiter, pain or stiffness  Respiratory: { cough, sputum, hemoptysis, { dyspnea, wheezing  Cardiovascular: + as per HPI  Gastrointestinal: no GERD, constipation, diarrhea, liver problems, or h/o GI bleed. Positive for nausea and abdominal pain  Urinary: no frequency, urgency , hematuria, burning/pain with urination, recent flank pain, polyuria, nocturia, or difficulty urinating  Peripheral Vascular: no claudication, leg cramps, prior DVTs, swelling of calves, legs, or feet, color change, or swelling with redness or tenderness  Musculoskeletal: aches  Psychiatric: no depression or excessive stress  Neurological: no sensory or motor loss, seizures, syncope, tremors, numbness, no dementia  Hematologic: no anemia, easy bruising or bleeding  Endocrine: no thyroid problems, heat or cold intolerance, excessive sweating, polyuria, polydipsia, no diabetes. Physical Exam  Vitals:    10/06/21 0030 10/06/21 0500 10/06/21 0512 10/06/21 0654   BP: 106/73 126/66  117/79   Pulse: 66 (!) 53  (!) 57   Resp: 18 19  20   Temp: 97.6 °F (36.4 °C) 98.5 °F (36.9 °C)  98.5 °F (36.9 °C)   SpO2: 94% 93%  95%   Weight:   96.5 kg (212 lb 11.9 oz)    Height:           Physical Exam:  General: Well Developed, Well Nourished, No Acute Distress.  AAOx4  Heart: S1S2 with RRR   Lungs: Crackles at bases      Cardiographics    Telemetry: NSR w L BBB    Labs:   Recent Labs     10/06/21  0540 10/05/21  0317 10/04/21  0015 10/03/21  2159 10/03/21  2159   * 134*  --    < > 136   K 4.2 4.0  --    < > 4.1   MG  --   --   --   --  2.3   BUN 28* 36*  --    < > 24*   CREA 1.13 1.18  --    < > 1.21   * 139*  --    < > 149*   WBC 7.7 10.9  --    < > 9.2   HGB 12.6* 12.4*  --    < > 14.0   HCT 39.1* 38.3*  --    < > 42.8    192  --    < > 174   INR  --  1.1 1.6  --   --     < > = values in this interval not displayed. Assessment/Plan:     Assessment:   CAD (coronary artery disease) (9/11/2018)- w elevated troponin, s/p CABG w [facility] 3-2021 w stable disease, L BBB chronic, no symptoms suggestive of angina; troponin trending down, cont ASA, lipitor, heparin, toprol, arb, aldactone     Systolic CHF, chronic ([facility] [address] (9/28/2021)- EF 30-35%, s/p IV lasix, cont ARB, BB, monitor volume status closely     Hypercoagulable state associated with COVID-19 ([facility] [address] (10/4/2021)- heparin   Heparin drip at theurapetic range at 15 units. Titrate to therapeutic range. Currently on 15 units. Nonocclusive thrombus   Seen on LE duplex on right   Continue heparin drip at 15 units   Defer to hospitalist for DVT management    Patient appears to have had a type II troponin leak secondary to COVID-19 hypoxemia. It does not appear to be a NSTEMI. Troponin now decreasing. Patient has likely a type II troponin leak and from a cardiac standpoint would only need heparin for 24 to 48 hours. We recommend the continuation of heparin due to new thrombus finding on LE Duplex but we will be signing off on this patient as there is no further cardiology management at this time.   Patient can be transitioned to direct oral anticoagulant at any time for his DVT

## 2021-10-06 NOTE — PROGRESS NOTES
Physician Progress Note      Felice Bowman  CSN #:                  [id number]  :                       1950  ADMIT DATE:       10/3/2021 10:01 PM  [address] [place] DATE:        10/6/2021 3:39 PM  RESPONDING  PROVIDER #:        Ann-Marie Hernadez MD          QUERY TEXT:    Patient admitted with COVID-19. Noted documentation of NSTEMI on 10-4-21. In order to support the diagnosis of NSTEMI, please include additional clinical indicators in your documentation. Or please document if the diagnosis of NSTEMI has been ruled out after further study. The medical record reflects the following:  Risk Factors: history of CAD  Clinical Indicators: trop-HS 1057.8  943.1  Treatment: Cardiology consult, serial labs, telemetry monitoring  Options provided:  -- NSTEMI present as evidenced by, Please document evidence. -- NSTEMI was ruled out  -- Other - I will add my own diagnosis  -- Disagree - Not applicable / Not valid  -- Disagree - Clinically unable to determine / Unknown  -- Refer to Clinical Documentation Reviewer    PROVIDER RESPONSE TEXT:    NSTEMI was ruled out after study.     Query created by: Israel Shoemaker on 10/6/2021 2:52 PM      Electronically signed by:  Ann-Marie Hernadez MD 10/6/2021 3:49 PM

## 2021-10-06 NOTE — DISCHARGE INSTRUCTIONS
Patient Education   Tips to Prevent Blood Clots: After Your Visit  Your Care Instructions  Blood clots can lead to serious problems, including deep vein thrombosis (DVT) and pulmonary embolism. A DVT is a blood clot in certain veins of the legs, pelvis, or arms. If not treated, blood clots in these veins can get bigger, break loose, and travel through the bloodstream to the lungs. Pulmonary embolism is the sudden blockage of an artery in the lung, which can be life-threatening. Anything that makes you more likely to form blood clots increases your risk of DVT and pulmonary embolism. It is important to know your risk factors, as well as the steps you and your doctor can take to reduce your risk for blood clots. Follow-up care is a key part of your treatment and safety. Be sure to make and go to all appointments, and call your doctor if you are having problems. It's also a good idea to know your test results and keep a list of the medicines you take. How can you care for yourself at home? Know what increases your risk for blood clots  · You might be at risk for blood clots because of a medical procedure or hospital stay. Things that increase your risk include:  ¨ Recent surgery that involved the legs or belly. ¨ Staying in bed for 72 hours or more after surgery or a serious illness. ¨ Some diseases, such as cancer, heart failure, stroke, or a severe infection. ¨ Pregnancy and childbirth (especially if you had a  section). · Other things can increase your risk for blood clots, including:  ¨ Being inactive for long periods, or sitting for 6 hours or longer, such as on a flight or car trip. ¨ Having blood that clots too easily, a problem that may run in families. ¨ Taking birth control pills or hormone therapy. ¨ Smoking. ¨ Being overweight. To prevent a DVT or pulmonary embolism  · If your doctor has prescribed anticoagulant medicines, take them exactly as directed.   · Get up out of bed as soon as possible after an illness or surgery. · Exercise. Keep blood moving in your legs to keep clots from forming. If you are traveling by car, stop every hour or so. Get out and walk around for a few minutes. If you are traveling by bus, train, or plane, get out of your seat and walk up and down the aisles every hour or so. You also can do leg exercises while you are seated. Pump your feet up and down by pulling your toes up toward your knees and then pointing them down. · Do not smoke. If you need help quitting, talk to your doctor about stop-smoking programs and medicines. These can increase your chances of quitting for good. · Check with your doctor before taking birth control pills or hormone replacement therapy. These may increase your risk of blot clots. · Ask your doctor about wearing compression stockings to help prevent blood clots in your legs. You can buy these with a prescription at medical supply stores and some drugstores. When should you call for help? Call 911 anytime you think you may need emergency care. For example, call if:  · You have signs of pulmonary embolism. These may include:  ¨ Sudden shortness of breath. ¨ Sudden, sharp chest pain that may get worse when you breathe deeply or cough. ¨ Coughing up blood or pink, foamy mucus. ¨ Fast heart rate. ¨ Severe anxiety. ¨ Fainting. Call your doctor now or seek immediate medical care if:  · You have signs of a deep vein thrombosis. These may include:  ¨ Swelling. ¨ Warmth. ¨ Pain or tenderness. The pain may be in the calf or thigh and may be present only when the affected area is touched or when standing or walking. ¨ Redness. Watch closely for changes in your health, and be sure to contact your doctor if:  · You do not get better as expected. Where can you learn more? Go to [employer] ([employer]be  Enter C858 in the search box to learn more about \"Tips to Prevent Blood Clots: After Your Visit. \"   © 3668-0560 Healthwise, Incorporated. Care instructions adapted under license by [employer] (which disclaims liability or warranty for this information). This care instruction is for use with your licensed healthcare professional. If you have questions about a medical condition or this instruction, always ask your healthcare professional. [address] any warranty or liability for your use of this information. Content Version: 9.2.114160; Last Revised: June 1, 2011           DISCHARGE SUMMARY from Nurse    PATIENT INSTRUCTIONS:    After general anesthesia or intravenous sedation, for 24 hours or while taking prescription Narcotics:  · Limit your activities  · Do not drive and operate hazardous machinery  · Do not make important personal or business decisions  · Do  not drink alcoholic beverages  · If you have not urinated within 8 hours after discharge, please contact your surgeon on call. Report the following to your surgeon:  · Excessive pain, swelling, redness or odor of or around the surgical area  · Temperature over 100.5  · Nausea and vomiting lasting longer than 4 hours or if unable to take medications  · Any signs of decreased circulation or nerve impairment to extremity: change in color, persistent  numbness, tingling, coldness or increase pain  · Any questions    What to do at Home:  Recommended activity: Activity as tolerated        *  Please give a list of your current medications to your Primary Care Provider. *  Please update this list whenever your medications are discontinued, doses are      changed, or new medications (including over-the-counter products) are added. *  Please carry medication information at all times in case of emergency situations. These are general instructions for a healthy lifestyle:    No smoking/ No tobacco products/ Avoid exposure to second hand smoke  Surgeon General's Warning:  Quitting smoking now greatly reduces serious risk to your health.     Obesity, smoking, and sedentary lifestyle greatly increases your risk for illness    A healthy diet, regular physical exercise & weight monitoring are important for maintaining a healthy lifestyle    You may be retaining fluid if you have a history of heart failure or if you experience any of the following symptoms:  Weight gain of 3 pounds or more overnight or 5 pounds in a week, increased swelling in our hands or feet or shortness of breath while lying flat in bed. Please call your doctor as soon as you notice any of these symptoms; do not wait until your next office visit. The discharge information has been reviewed with the patient. The patient verbalized understanding. Discharge medications reviewed with the [address] [place] [address] After Treatment for COVID-19: Care Instructions  Overview     You are being sent home from the hospital after being treated for COVID-19. Being in the hospital can be hard, especially if you've been in the intensive care unit (ICU). Even though you're going home, you probably don't feel well yet. Healing from COVID-19 takes time. You may feel very tired for weeks or months afterward, especially if you were on a ventilator. It will take time to get back to your old level of activity. Some people may have long-lasting health problems. But most people can look forward to feeling a little better every day. If you were on a ventilator, your throat may be sore and your voice hoarse or raspy for a while. After leaving the hospital, some people have feelings of anxiety and depression. They may have nightmares. Or in their mind they may relive events that happened in the hospital (flashbacks). Reach out to your doctor if you're having trouble with these symptoms. Your doctor will tell you if you need to isolate yourself at home, and when you can end isolation. How can you self-isolate when you have COVID-19?   If you have COVID-19, there are things you can do to help avoid spreading the virus to others. · Limit contact with people in your home. If possible, stay in a separate bedroom and use a separate bathroom. · Wear a mask when you are around other people. · If you have to leave home, avoid crowds and try to stay at least 6 feet away from other people. · Avoid contact with pets and other animals. · Cover your mouth and nose with a tissue when you cough or sneeze. Then throw it in the trash right away. · Wash your hands often, especially after you cough or sneeze. Use soap and water, and scrub for at least 20 seconds. If soap and water aren't available, use an alcohol-based hand . · Don't share personal household items. These include bedding, towels, cups and glasses, and eating utensils. · [address] in the warmest water allowed for the fabric type, and dry it completely. It's okay to wash other people's laundry with yours. · Clean and disinfect your home. Use household  and disinfectant wipes or sprays. Follow-up care is a key part of your treatment and safety. Be sure to make and go to all appointments, and call your doctor if you are having problems. It's also a good idea to know your test results and keep a list of the medicines you take. How can you care for yourself at home? · Get plenty of rest. It can help you feel better. · Be kind to yourself if it's taking longer than you expected to feel better. You've been through a stressful time. · Get up and walk around every hour or two while you're resting. Slowly increase your activity as you start to feel better. · Eat healthy foods. · Drink plenty of fluids. If you have kidney, heart, or liver disease and have to limit fluids, talk with your doctor before you increase the amount of fluids you drink. · If needed, take acetaminophen (Tylenol) or ibuprofen (Advil, Motrin) to reduce a fever. It may also help with muscle aches. Read and follow all instructions on the label.   When should you call for help? Call 911 anytime you think you may need emergency care. For example, call if you have life-threatening symptoms, such as:    · You have severe trouble breathing. (You can't talk at all.)     · You have constant chest pain or pressure.     · You are severely dizzy or lightheaded.     · You are confused or can't think clearly.     · You have pale, gray, or blue-colored skin or lips.     · You pass out (lose consciousness) or are very hard to wake up. Call your doctor now or seek immediate medical care if:    · You have moderate trouble breathing. (You can't speak a full sentence.)     · You are coughing up blood (more than about 1 teaspoon).     · You have signs of low blood pressure. These include feeling lightheaded; being too weak to stand; and having cold, pale, clammy skin. Watch closely for changes in your health, and be sure to contact your doctor if:    · Your symptoms get worse.     · You are not getting better as expected.     · You have new or worse symptoms of anxiety, depression, nightmares, or flashbacks. Call before you go to the doctor's office. Follow their instructions. And wear a mask. Current as of: July 1, 2021               Content Version: 13.0  © 2006-2021 Healthwise, Incorporated. Care instructions adapted under license by [employer] (which disclaims liability or warranty for this information). If you have questions about a medical condition or this instruction, always ask your healthcare professional. [place] [address] any warranty or liability for your use of this information. and appropriate educational materials and side effects teaching were provided.   ___________________________________________________________________________________________________________________________________

## 2021-10-06 NOTE — PROGRESS NOTES
Hourly rounding completed on this shift. No new complaints at this time. Patient slept well overnight and all needs were met. Pt is currently resting in bed on 2L O2 via NC. Heparin drip continuing at therapeutic rate, pending this am lab results. Will give report to oncoming nurse.

## 2021-10-06 NOTE — PROGRESS NOTES
Attempted to call son to review discharge instructions however unable to reach at this time. Left call back number.

## 2021-10-06 NOTE — DISCHARGE SUMMARY
Hospitalist Discharge Summary     Admit Date:  10/3/2021 10:01 PM   DC note date: 10/6/2021  Name:  Amita Cruz   Age:  70 y.o.  :  1950   MRN:  [id number]   PCP:  Lolis Yarbrough MD  Treatment Team: Attending Provider: Marry Blanc MD; Utilization Review: Abisai Paredes; Care Manager: Patrizia Leger RN; Charge Nurse: Glory Gerard; Physical Therapy Assistant: Josiah Lewis PTA    Problem List for this Hospitalization:  Hospital Problems as of 10/6/2021 Date Reviewed: 2021        Codes Class Noted - Resolved POA    NSTEMI (non-ST elevated myocardial infarction) ([facility] [address] ICD-10-CM: I21.4  ICD-9-CM: 410.70  10/4/2021 - Present         Hypercoagulable state associated with [address]) ICD-10-CM: U07.1, D68.69  ICD-9-CM: 289.82, 079.89  10/4/2021 - Present Yes        Elevated troponin ICD-10-CM: R77.8  ICD-9-CM: 790.6  10/4/2021 - Present Unknown        Class 1 obesity with alveolar hypoventilation, serious comorbidity, and body mass index (BMI) of 30.0 to 30.9 in adult [facility]) ICD-10-CM: E66.2, Z68.30  ICD-9-CM: 278.03, V85.30  2021 - Present Yes        * (Principal) Acute hypoxemic respiratory failure due to [address]) ICD-10-CM: U07.1, J96.01  ICD-9-CM: 518.81, 079.89, 799.02  2021 - Present Unknown        Systolic CHF, chronic (HCC) ICD-10-CM: I50.22  ICD-9-CM: 428.22, 428.0  2021 - Present Yes        Status post coronary artery bypass graft ICD-10-CM: Z95.1  ICD-9-CM: V45.81  10/3/2018 - Present Yes    Overview Signed 10/3/2018 12:20 PM by Siddharth Mcdonnell NP     10/3/18 Dr. Ernesto Mckeon  CABG X 1, LIMA to the LAD WITH AVR 23 mm Intuity valve             S/P AVR (aortic valve replacement) ICD-10-CM: Z95.2  ICD-9-CM: V43.3  10/3/2018 - Present Yes    Overview Signed 10/3/2018 12:21 PM by Siddharth Mcdonnell, ANAHI     10/3/18  Dr. Ernesto Mckeon  CABG X 1, LIMA to the LAD WITH AVR 23 mm Intuity valve             Pneumonia due to COVID-19 virus ICD-10-CM: U07.1, J12.82  ICD-9-CM: 480.8, 079.89  10/3/2018 - Present Yes        CAD (coronary artery disease) ICD-10-CM: I25.10  ICD-9-CM: 414.00  9/11/2018 - Present Yes        Hypertension (Chronic) ICD-10-CM: I10  ICD-9-CM: 401.9  9/11/2018 - Present Yes            Admission HPI from 10/3/2021:    \" Pina Steward is a 70 y.o. male with medical history of Hx of CAD s/p CABG, HTN, s/p aortic valve replacement, sCHF EF 30%, pulmonary HTN who presented with worsening SOB and new left sided chest pain associated with chest tightness and malaise. He was hypoxic with minimal exertion requiring 2L o2. His troponin was over 1000 and NT pr BNP 4000s. EKG showed no ischemic changes. CXR vascular congestion or atypical PNA. ED d/w cardiology who recommended heparin bolus and gtt for possible thrombus.      He was recently hospitalized for COVID PNA and was weaned off of supplemental o2 and discharged home on 9/30 with decadron. He is unvaccinated and declined baricitinib at that time. \"    Hospital Course:  Allyssa Landis a 70 y. o. male with medical history of Hx of CAD s/p CABG, HTN, s/p aortic valve replacement, sCHF EF 30%, pulmonary HTN who presented with worsening SOB and new left sided chest pain associated with chest tightness and malaise. He was recently hospitalized for COVID PNA and was weaned off of supplemental o2 and discharged home on 9/30 with decadron. He is unvaccinated and declined baricitinib at that time. He was hypoxic with minimal exertion requiring 2L o2. His troponin was over 1000 and NT pr BNP 4000s. EKG showed no ischemic changes. CXR vascular congestion or atypical PNA.   ED d/w cardiology who recommended heparin bolus and gtt for possible thrombus. CT scan negative for PE. Cardiology has seen - interprets as demand ischemia. Troponin trending down. Cardiology recommend heparin GTT for 24 to 48 hours.   Ultrasound Doppler lower extremity with nonocclusive thrombus in the mid right peroneal vein.  Patient transitioned to [facility] on 10/6. Cardiology okay to discharge patient from cardiology standpoint. Oxygen qualifier as below. Home oxygen arranged for patient. Family asking for short-term rehab for patient but patient has refused. Also PT/OT recommend home health. Case management consulted to arrange home health for patient. Patient is stable for discharge home today with close follow-up with PCP and cardiology outpatient. Oxygen Qualifier          Room air: SpO2 with O2 and liter flow   Resting SpO2  94%     Ambulating SpO2  84% 94% on 4L          Disposition: Home Health Care Svc  Activity: Activity as tolerated  Diet: ADULT DIET Regular; Low Fat/Low Chol/High Fiber/SHAI  Code Status: Full Code    Follow Up Orders: Follow-up Appointments   Procedures    FOLLOW UP VISIT Appointment in: 3 - 5 Days PCP Cardiology in 1-2 weeks     PCP  Cardiology in 1-2 weeks     Standing Status:   Standing     Number of Occurrences:   1     Order Specific Question:   Appointment in     Answer:   3 - 5 Days       Follow-up Information     Follow up With Specialties Details Why Surya Saleh MD    Patient can only remember the practice name and not the physician      [id number]  Will contact you within 48 hours to schedule home visit. [address]   You should receive a call from [phone] within 2 days of discharge to schedule an appointment with a primary care doctor. If you do not hear from them within 48 hours of discharge, please call [phone]. Breana Shah MD Cardiology On 10/18/2021 at 1:45 [address]  [phone]            Discharge meds at bottom of this note. Plan was discussed with patient and son. All questions answered. Patient was stable at time of discharge.   Given instructions to call a physician or return if any concerns. Discharge summary and encounter summary was sent to PCP electronically via \"Comm Mgt\" link in [facility], if possible. Diagnostic Imaging/Tests:   XR CHEST PA LAT    Result Date: 9/28/2021  EXAM: 2 view chest radiograph. INDICATION: Chest pain and shortness of breath. Covid 19 positive. COMPARISON: Chest radiograph dated September 26, 2021. FINDINGS: Postsurgical change of aortic valve replacement. No pneumothorax. Mild left basilar reticular airspace opacities. Improved aeration of the right lung base. Heart appears normal in size. Pulmonary venous hypertension without overt pulmonary edema. Sternal wires are intact. 1. Minimal left basilar reticular airspace opacities and improved aeration of the right lung base. 2. Pulmonary venous hypertension status post aortic valve replacement. XR CHEST PORT    Result Date: 10/3/2021  Clinical history: Syncope TECHNIQUE: AP portable chest COMPARISON: September 28, 2021. FINDINGS: Heart is mildly enlarged. Mediastinal contour is within normal limits. Stable midline sternotomy wires. Mild increased interstitial prominence. No pleural effusion or pneumothorax. Surrounding bones are stable. 1. Mild increased interstitial prominence, likely vascular congestion or atypical infection. 2. No significant change compared to prior exam.    XR CHEST PORT    Result Date: 9/26/2021  CHEST X-RAY, single portable view  9/26/2021 History: Cough. Technique: Single frontal view of the chest. Comparison: Chest x-ray 10/8/2018 Findings: Stable post surgical changes overlie the mediastinal silhouette. The cardiac silhouette is mildly enlarged although stable. The lungs are expanded without evidence for pneumothorax. Mild peripheral opacities are seen in the right mid, and bilateral lower lung fields. The appearance raises concern for an early peripheral infiltrate. No significant associated pleural effusion is seen. 1.  Mild peripheral opacities in the right mid, and bilateral lower lung fields. The appearance raises concern for an early peripheral infiltrate as can occur with an atypical pneumonia such as Covid 19. Recommend clinical correlation. This report was made using voice transcription. Despite my best efforts to avoid any, transcription errors may persist. If there is any question about the accuracy of the report or need for clarification, then please call [phone], or text me through perfectserv for clarification or correction. DUPLEX LOWER EXT VENOUS BILAT    Result Date: 10/5/2021  Exam: DUPLEX LOWER EXT VENOUS BILAT on 10/5/2021 8:33 PM Clinical History: The Male patient is 70years old presenting for eval for DVT, elevated ddimer. Comparisons:  None Findings: The bilateral common femoral, superficial femoral, popliteal, and posterior tibial, as well as left peroneal veins are patent demonstrating normal compressibility, normal color flow, and normal response to distal augmentation. There is nonocclusive thrombus in the right mid peroneal vein. 1. Nonocclusive thrombus in the mid right peroneal vein. CPT code(s) U7730984     CT CHEST PULMONARY EMBOLISM    Result Date: 10/4/2021  Clinical history: Covid positive. Shortness of breath and generalized aches. TECHNIQUE: Axial and coronal CT of the chest with 100 cc of IV contrast. CT dose reduction was achieved through use of a standardized protocol tailored for this examination and automatic exposure control for dose modulation. COMPARISON: Chest x-ray yesterday. FINDINGS: There is no pulmonary embolism. There are bilateral peripheral groundglass consolidations. There is no pneumothorax or pleural effusion. There is no endobronchial lesion. The heart is enlarged. There is no pericardial effusion. Prosthetic aortic valve is noted. Sternotomy wires are present. The thoracic aorta is normal in course and caliber. There is no lymphadenopathy.  Included upper abdomen demonstrates cholelithiasis. Nonobstructing right renal stones are noted. Right renal cysts are suggested. There are degenerative changes of the spine. 1. Negative for pulmonary embolism. 2. Bilateral peripheral groundglass consolidations, suggesting pneumonia (including viral etiology). 3. Cholelithiasis. Echocardiogram results:  No results found for this visit on 10/03/21.     Procedures done this admission:  * No surgery found *    All Micro Results     Procedure Component Value Units Date/Time    ALBERT Dunham [[id number]] Collected: 10/06/21 0515    Order Status: Completed Specimen: Swab Updated: 10/06/21 0544     Strep, Molecular Not detected        Comment: Negative for Strep A nucleic acid  Methodology: Isothermal Nucleic Acid Amplification         CULTURE, STREP THROAT [[id number]] Collected: 10/05/21 1600    Order Status: Canceled Specimen: Throat swab           [unfilled]    Labs: Results:       BMP, Mg, Phos Recent Labs     10/06/21  0540 10/05/21  0317 10/03/21  2159   * 134* 136   K 4.2 4.0 4.1    100 102   CO2 25 23 24   AGAP 7 11 10   BUN 28* 36* 24*   CREA 1.13 1.18 1.21   CA 9.0 8.7 8.8   * 139* 149*   MG  --   --  2.3      CBC Recent Labs     10/06/21  0540 10/05/21  0317 10/03/21  2159   WBC 7.7 10.9 9.2   RBC 4.63 4.58 5.00   HGB 12.6* 12.4* 14.0   HCT 39.1* 38.3* 42.8    192 174   GRANS 79* 81* 76   LYMPH 13 12* 15   EOS 0* 1 1   MONOS 7 6 7   BASOS 0 0 0   IG 1 1 1   ANEU 6.1 8.8* 7.0   ABL 1.0 1.3 1.4   DREW 0.0 0.1 0.1   ABM 0.6 0.7 0.6   ABB 0.0 0.0 0.0   AIG 0.1 0.1 0.1      LFT Recent Labs     10/05/21  0317 10/03/21  2159   ALT 52 69*   AP 40* 48*   TP 7.4 8.1   ALB 2.5* 3.0*   GLOB 4.9* 5.1*   AGRAT 0.5* 0.6*      Cardiac Testing No results found for: BNPP, BNP, CPK, RCK1, RCK2, RCK3, RCK4, CKMB, CKNDX, CKND1, TROPT, TROIQ   Coagulation Tests Lab Results   Component Value Date/Time    Prothrombin time 15.0 (H) 10/05/2021 03:17 AM Prothrombin time 18.9 (H) 10/04/2021 12:15 AM    Prothrombin time 13.3 07/06/2021 10:18 AM    INR 1.1 10/05/2021 03:17 AM    INR 1.6 10/04/2021 12:15 AM    INR 1.0 07/06/2021 10:18 AM    aPTT 163.3 (HH) 10/05/2021 03:17 AM    aPTT 32.5 10/04/2021 12:15 AM    aPTT 28.2 07/06/2021 10:18 AM      A1c Lab Results   Component Value Date/Time    Hemoglobin A1c 6.9 (H) 10/06/2021 05:40 AM    Hemoglobin A1c 6.3 07/06/2021 10:18 AM    Hemoglobin A1c 6.3 (H) 10/01/2018 07:45 AM      Lipid Panel Lab Results   Component Value Date/Time    Cholesterol, total 153 10/06/2021 05:40 AM    HDL Cholesterol 49 10/06/2021 05:40 AM    LDL, calculated 74 10/06/2021 05:40 AM    VLDL, calculated 30 (H) 10/06/2021 05:40 AM    Triglyceride 150 10/06/2021 05:40 AM    CHOL/HDL Ratio 3.1 10/06/2021 05:40 AM      Thyroid Panel Lab Results   Component Value Date/Time    TSH 0.325 (L) 10/06/2021 05:40 AM    TSH 2.210 09/05/2018 12:22 PM    T4, Free 1.5 (H) 10/06/2021 05:40 AM        Most Recent UA Lab Results   Component Value Date/Time    Color YELLOW 10/01/2018 08:40 AM    Appearance CLEAR 10/01/2018 08:40 AM    Specific gravity 1.019 10/01/2018 08:40 AM    pH (UA) 5.0 10/01/2018 08:40 AM    Protein NEGATIVE  10/01/2018 08:40 AM    Glucose NEGATIVE  10/01/2018 08:40 AM    Ketone NEGATIVE  10/01/2018 08:40 AM    Bilirubin NEGATIVE  10/01/2018 08:40 AM    Blood NEGATIVE  10/01/2018 08:40 AM    Urobilinogen 0.2 10/01/2018 08:40 AM    Nitrites NEGATIVE  10/01/2018 08:40 AM    Leukocyte Esterase NEGATIVE  10/01/2018 08:40 AM    WBC  09/30/2019 11:22 AM    RBC 0-3 09/30/2019 11:22 AM    Epithelial cells 0 09/30/2019 11:22 AM    Bacteria 0 09/30/2019 11:22 AM    Casts 10-20 09/30/2019 11:22 AM        Allergies   Allergen Reactions    Lortab [Hydrocodone-Acetaminophen] Other (comments)     hallucinations     Immunization History   Administered Date(s) Administered    TB Skin Test (PPD) Intradermal 10/03/2018, 09/29/2021       All Labs from Last 24 Hrs:  Recent Results (from the past 24 hour(s))   HEPARIN XA UFH    Collection Time: 10/05/21  5:40 PM   Result Value Ref Range    Heparin Xa UFH 0.52 0.3 - 0.7 IU/mL   STREP, GROUP A, ALBERT    Collection Time: 10/06/21  5:15 AM    Specimen: Swab   Result Value Ref Range    Strep, Molecular Not detected NOTD     HEPARIN XA UFH    Collection Time: 10/06/21  5:40 AM   Result Value Ref Range    Heparin Xa UFH 0.41 0.3 - 0.7 IU/mL   CBC WITH AUTOMATED DIFF    Collection Time: 10/06/21  5:40 AM   Result Value Ref Range    WBC 7.7 4.3 - 11.1 K/uL    RBC 4.63 4.23 - 5.6 M/uL    HGB 12.6 (L) 13.6 - 17.2 g/dL    HCT 39.1 (L) 41.1 - 50.3 %    MCV 84.4 79.6 - 97.8 FL    MCH 27.2 26.1 - 32.9 PG    MCHC 32.2 31.4 - 35.0 g/dL    RDW 12.9 11.9 - 14.6 %    PLATELET 585 209 - 541 K/uL    MPV 9.7 9.4 - 12.3 FL    ABSOLUTE NRBC 0.00 0.0 - 0.2 K/uL    DF AUTOMATED      NEUTROPHILS 79 (H) 43 - 78 %    LYMPHOCYTES 13 13 - 44 %    MONOCYTES 7 4.0 - 12.0 %    EOSINOPHILS 0 (L) 0.5 - 7.8 %    BASOPHILS 0 0.0 - 2.0 %    IMMATURE GRANULOCYTES 1 0.0 - 5.0 %    ABS. NEUTROPHILS 6.1 1.7 - 8.2 K/UL    ABS. LYMPHOCYTES 1.0 0.5 - 4.6 K/UL    ABS. MONOCYTES 0.6 0.1 - 1.3 K/UL    ABS. EOSINOPHILS 0.0 0.0 - 0.8 K/UL    ABS. BASOPHILS 0.0 0.0 - 0.2 K/UL    ABS. IMM.  GRANS. 0.1 0.0 - 0.5 K/UL   METABOLIC PANEL, BASIC    Collection Time: 10/06/21  5:40 AM   Result Value Ref Range    Sodium 132 (L) 138 - 145 mmol/L    Potassium 4.2 3.5 - 5.1 mmol/L    Chloride 100 98 - 107 mmol/L    CO2 25 21 - 32 mmol/L    Anion gap 7 7 - 16 mmol/L    Glucose 107 (H) 65 - 100 mg/dL    BUN 28 (H) 8 - 23 MG/DL    Creatinine 1.13 0.8 - 1.5 MG/DL    GFR est AA >60 >60 ml/min/1.73m2    GFR est non-AA >60 >60 ml/min/1.73m2    Calcium 9.0 8.3 - 10.4 MG/DL   C REACTIVE PROTEIN, QT    Collection Time: 10/06/21  5:40 AM   Result Value Ref Range    C-Reactive protein 3.5 (H) 0.0 - 0.9 mg/dL   T4, FREE    Collection Time: 10/06/21  5:40 AM   Result Value Ref Range    T4, Free 1.5 (H) 0.78 - 1.46 NG/DL   TSH 3RD GENERATION    Collection Time: 10/06/21  5:40 AM   Result Value Ref Range    TSH 0.325 (L) 0.358 - 3.740 uIU/mL   LIPID PANEL    Collection Time: 10/06/21  5:40 AM   Result Value Ref Range    Cholesterol, total 153 <200 MG/DL    Triglyceride 150 35 - 150 MG/DL    HDL Cholesterol 49 40 - 60 MG/DL    LDL, calculated 74 <100 MG/DL    VLDL, calculated 30 (H) 6.0 - 23.0 MG/DL    CHOL/HDL Ratio 3.1     VITAMIN D, 25 HYDROXY    Collection Time: 10/06/21  5:40 AM   Result Value Ref Range    Vitamin D 25-Hydroxy 33.8 30.0 - 100.0 ng/mL   HEMOGLOBIN A1C WITH EAG    Collection Time: 10/06/21  5:40 AM   Result Value Ref Range    Hemoglobin A1c 6.9 (H) 4.2 - 6.3 %    Est. average glucose 151 mg/dL       Discharge Exam:  Patient Vitals for the past 24 hrs:   Temp Pulse Resp BP SpO2   10/06/21 1043 -- 68 20 120/86 95 %   10/06/21 0724 -- 63 -- -- --   10/06/21 0654 98.5 °F (36.9 °C) (!) 57 20 117/79 95 %   10/06/21 0500 98.5 °F (36.9 °C) (!) 53 19 126/66 93 %   10/06/21 0030 97.6 °F (36.4 °C) 66 18 106/73 94 %   10/05/21 1912 97.9 °F (36.6 °C) 72 18 124/86 94 %   10/05/21 1637 97.8 °F (36.6 °C) 63 20 (!) 144/77 94 %   10/05/21 1600 -- 75 -- -- --     Oxygen Therapy  O2 Sat (%): 95 % (10/06/21 1043)  Pulse via Oximetry: 90 beats per minute (10/05/21 1221)  O2 Device: Nasal cannula (10/05/21 1221)  Skin Assessment: Clean, dry, & intact (10/05/21 1221)  Skin Protection for O2 Device: N/A (10/04/21 1900)  O2 Flow Rate (L/min): 2 l/min (10/05/21 1221)    Estimated body mass index is 30.09 kg/m² as calculated from the following:    Height as of this encounter: 5' 10.5\" (1.791 m). Weight as of this encounter: 96.5 kg (212 lb 11.9 oz).       Intake/Output Summary (Last 24 hours) at 10/6/2021 1253  Last data filed at 10/6/2021 8038  Gross per 24 hour   Intake 600 ml   Output --   Net 600 ml       *Note that automatically entered I/Os may not be accurate; dependent on patient compliance with collection and accurate  by assistants. General:    Alert. Eyes:   Normal sclerae. Extraocular movements intact. ENT:  Normocephalic, atraumatic. Moist mucous membranes  CV:   Regular rate and rhythm. No murmur, rub, or gallop. Lungs:  Clear to auscultation bilaterally. No wheezing, rhonchi, or rales. Abdomen: Soft, nontender, nondistended. Extremities: Warm and dry. No cyanosis or edema. Neurologic: CN II-XII grossly intact. No gross focal deficits   Skin:     No rashes or jaundice. Psych:  Normal mood and affect.     Current Med List in Hospital:   Current Facility-Administered Medications   Medication Dose Route Frequency    apixaban (ELIQUIS) tablet 10 mg  10 mg Oral Q12H    [START ON 10/13/2021] apixaban (ELIQUIS) tablet 5 mg  5 mg Oral Q12H    phenol throat spray (CHLORASEPTIC) 1 Spray  1 Spray Oral PRN    mirtazapine (REMERON) tablet 7.5 mg  7.5 mg Oral QHS PRN    sodium chloride (NS) flush 5-40 mL  5-40 mL IntraVENous Q8H    sodium chloride (NS) flush 5-40 mL  5-40 mL IntraVENous PRN    acetaminophen (TYLENOL) tablet 650 mg  650 mg Oral Q6H PRN    Or    acetaminophen (TYLENOL) suppository 650 mg  650 mg Rectal Q6H PRN    polyethylene glycol (MIRALAX) packet 17 g  17 g Oral DAILY PRN    ondansetron (ZOFRAN ODT) tablet 4 mg  4 mg Oral Q8H PRN    Or    ondansetron (ZOFRAN) injection 4 mg  4 mg IntraVENous Q6H PRN    famotidine (PEPCID) tablet 20 mg  20 mg Oral BID    guaiFENesin-dextromethorphan (ROBITUSSIN DM) 100-10 mg/5 mL syrup 5 mL  5 mL Oral Q4H PRN    albuterol (PROVENTIL HFA, VENTOLIN HFA, PROAIR HFA) inhaler 2 Puff  2 Puff Inhalation Q4H PRN    aspirin delayed-release tablet 81 mg  81 mg Oral BID    atorvastatin (LIPITOR) tablet 40 mg  40 mg Oral DAILY    hyoscyamine SL (LEVSIN/SL) tablet 0.125 mg  0.125 mg SubLINGual Q4H PRN    metoprolol succinate (TOPROL-XL) XL tablet 50 mg  50 mg Oral DAILY    spironolactone (ALDACTONE) tablet 25 mg  25 mg Oral DAILY    valsartan (DIOVAN) tablet 40 mg  40 mg Oral DAILY    cyanocobalamin tablet 500 mcg  500 mcg Oral DAILY    nitroglycerin (NITROSTAT) tablet 0.4 mg  0.4 mg SubLINGual PRN    morphine injection 4 mg  4 mg IntraVENous Q3H PRN    dexAMETHasone (DECADRON) tablet 6 mg  6 mg Oral DAILY    sodium chloride (NS) flush 5-10 mL  5-10 mL IntraVENous Q8H    sodium chloride (NS) flush 5-10 mL  5-10 mL IntraVENous PRN       Discharge Info:   Current Discharge Medication List      START taking these medications    Details   dexAMETHasone (DECADRON) 6 mg tablet Take 1 Tablet by mouth Daily (before breakfast) for 8 days. Qty: 8 Tablet, Refills: 0  Start date: 10/6/2021, End date: 10/14/2021      !! apixaban (ELIQUIS) 5 mg tablet Take 2 Tablets by mouth every twelve (12) hours for 13 doses. Qty: 26 Tablet, Refills: 0  Start date: 10/6/2021, End date: 10/13/2021      !! apixaban (ELIQUIS) 5 mg tablet Take 1 Tablet by mouth every twelve (12) hours for 30 days. Qty: 60 Tablet, Refills: 0  Start date: 10/13/2021, End date: 11/12/2021      guaiFENesin-dextromethorphan (ROBITUSSIN DM) 100-10 mg/5 mL syrup Take 5 mL by mouth every four (4) hours as needed for Cough for up to 10 days. Qty: 1 Each, Refills: 0  Start date: 10/6/2021, End date: 10/16/2021       !! - Potential duplicate medications found. Please discuss with provider. CONTINUE these medications which have NOT CHANGED    Details   hyoscyamine SL (LEVSIN/SL) 0.125 mg SL tablet 1 Tablet by SubLINGual route every four (4) hours as needed for Cramping. Qty: 20 Tablet, Refills: 0      albuterol (Ventolin HFA) 90 mcg/actuation inhaler Take 2 Puffs by inhalation every four (4) hours as needed for Wheezing, Shortness of Breath or Cough. Qty: 6.7 g, Refills: 0      cyanocobalamin 1,000 mcg tablet Take 500 mcg by mouth daily. aspirin delayed-release 81 mg tablet Take 1 Tablet by mouth two (2) times a day.   Qty: 60 Tablet, Refills: 0      acetaminophen (TYLENOL) 500 mg tablet Take 2 Tablets by mouth every six (6) hours as needed for Pain. Qty: 60 Tablet, Refills: 0      atorvastatin (Lipitor) 40 mg tablet Take 40 mg by mouth daily. metoprolol succinate (TOPROL-XL) 50 mg XL tablet Take 1 Tab by mouth daily. Qty: 90 Tab, Refills: 3    Associated Diagnoses: Systolic CHF, chronic (HCC)      valsartan (DIOVAN) 40 mg tablet Take 1 Tab by mouth daily. Qty: 90 Tab, Refills: 3    Associated Diagnoses: Systolic CHF, chronic (HCC)      spironolactone (ALDACTONE) 25 mg tablet Take 1 Tab by mouth daily. Qty: 90 Tab, Refills: 3    Associated Diagnoses: Systolic CHF, chronic (HCC)      multivitamin (ONE A DAY) tablet Take 1 Tab by mouth daily. Time spent in patient discharge planning and coordination 36 minutes.     Signed:  Emily Eaton MD

## 2021-10-06 NOTE — PROGRESS NOTES
Patient's son, Navneet Jurado called to speak to primary RN. Spoke concern with patient going home today. Stated he has not been eating or performing daily activities at home with out assistance. Per son- patient non complaint with medication regimen and requires attention from son. Patient son stated he lives next door to patient and helps patient with daily activities. Stated he has not been eating and many other concerns stated in regards to patient returning to home. Navneet Jurado was made aware  is not recommending rehab at this time however he is not comfortable with patient d/c home. PT Notified MD and GLENROY.

## 2021-10-06 NOTE — PROGRESS NOTES
Patient to be discharged home today. He will require home oxygen. Referral sent to [facility] Portable tank delivered to room; patient verbalized understanding that Sunil will contact him to arrange delivery of concentrator to home today. Patient is agreeable to home health referral. Referral placed to [facility] for RN/PT/OT, per request. Patient does not have a PCP, and is agreeable to [facility] referral for assistance finding PCP. CM placed referral and notified [facility] that patient will need appt w/i 5 days of discharge for home health purposes. Dr. Deedee Gresham has agreed to follow [facility] orders for 1 week. CM spoke with patient's son, Humberto Smiley [phone], and updated him on all of the above. He is agreeable to discharge plan and states his mother will provide transportation home when patient is ready. CM will remain available should new needs arise. Update 3134: CM spoke with son, Humberto Smiley, by phone; notified him patient is ready for discharge. Humberto Smiley verbalized understanding and states they will call patient's cell phone when they arrive. Care Management Interventions  PCP Verified by CM: No ([facility] referral placed)  Mode of Transport at Discharge: Self  Transition of Care Consult (CM Consult): [address]: Yes  Discharge Durable Medical Equipment: Yes (home oxygen)  Physical Therapy Consult: Yes  Occupational Therapy Consult: Yes  Support Systems: Child(jasiel)  Confirm Follow Up Transport: Family  The Plan for Transition of Care is Related to the Following Treatment Goals : Return to previous level of function.   The Patient and/or Patient Representative was Provided with a Choice of Provider and Agrees with the Discharge Plan?: Yes  Freedom of Choice List was Provided with Basic Dialogue that Supports the Patient's Individualized Plan of Care/Goals, Treatment Preferences and Shares the Quality Data Associated with the Providers?: Yes  [place] Resource Information Provided?:  [facility])  Discharge Location  Discharge Placement: Home with home health

## 2021-10-07 ENCOUNTER — HOME CARE VISIT (OUTPATIENT)
Dept: SCHEDULING | Facility: HOME HEALTH | Age: 71
End: 2021-10-07
Payer: MEDICARE

## 2021-10-07 VITALS
TEMPERATURE: 96.7 F | SYSTOLIC BLOOD PRESSURE: 110 MMHG | DIASTOLIC BLOOD PRESSURE: 70 MMHG | HEART RATE: 81 BPM | RESPIRATION RATE: 18 BRPM | OXYGEN SATURATION: 93 %

## 2021-10-07 VITALS
OXYGEN SATURATION: 95 % | BODY MASS INDEX: 29.78 KG/M2 | WEIGHT: 212.74 LBS | TEMPERATURE: 98.5 F | RESPIRATION RATE: 20 BRPM | HEIGHT: 71 IN | HEART RATE: 68 BPM | DIASTOLIC BLOOD PRESSURE: 86 MMHG | SYSTOLIC BLOOD PRESSURE: 120 MMHG

## 2021-10-07 VITALS
SYSTOLIC BLOOD PRESSURE: 118 MMHG | DIASTOLIC BLOOD PRESSURE: 75 MMHG | RESPIRATION RATE: 18 BRPM | TEMPERATURE: 98.5 F | HEART RATE: 80 BPM | OXYGEN SATURATION: 97 %

## 2021-10-07 PROCEDURE — 3331090001 HH PPS REVENUE CREDIT

## 2021-10-07 PROCEDURE — 400018 HH-NO PAY CLAIM PROCEDURE

## 2021-10-07 PROCEDURE — G0151 HHCP-SERV OF PT,EA 15 MIN: HCPCS

## 2021-10-07 PROCEDURE — 400013 HH SOC

## 2021-10-07 PROCEDURE — 3331090002 HH PPS REVENUE DEBIT

## 2021-10-07 PROCEDURE — G0299 HHS/HOSPICE OF RN EA 15 MIN: HCPCS

## 2021-10-08 PROCEDURE — 3331090002 HH PPS REVENUE DEBIT

## 2021-10-08 PROCEDURE — 3331090001 HH PPS REVENUE CREDIT

## 2021-10-09 PROCEDURE — 3331090002 HH PPS REVENUE DEBIT

## 2021-10-09 PROCEDURE — 3331090001 HH PPS REVENUE CREDIT

## 2021-10-10 PROCEDURE — 3331090001 HH PPS REVENUE CREDIT

## 2021-10-10 PROCEDURE — 3331090002 HH PPS REVENUE DEBIT

## 2021-10-11 PROCEDURE — 3331090001 HH PPS REVENUE CREDIT

## 2021-10-11 PROCEDURE — 3331090002 HH PPS REVENUE DEBIT

## 2021-10-12 ENCOUNTER — HOME CARE VISIT (OUTPATIENT)
Dept: SCHEDULING | Facility: HOME HEALTH | Age: 71
End: 2021-10-12
Payer: MEDICARE

## 2021-10-12 VITALS
OXYGEN SATURATION: 97 % | HEART RATE: 84 BPM | DIASTOLIC BLOOD PRESSURE: 66 MMHG | TEMPERATURE: 97.7 F | RESPIRATION RATE: 18 BRPM | SYSTOLIC BLOOD PRESSURE: 106 MMHG

## 2021-10-12 VITALS
TEMPERATURE: 97.4 F | DIASTOLIC BLOOD PRESSURE: 60 MMHG | RESPIRATION RATE: 12 BRPM | SYSTOLIC BLOOD PRESSURE: 112 MMHG | OXYGEN SATURATION: 96 % | HEART RATE: 100 BPM

## 2021-10-12 PROCEDURE — G0299 HHS/HOSPICE OF RN EA 15 MIN: HCPCS

## 2021-10-12 PROCEDURE — G0157 HHC PT ASSISTANT EA 15: HCPCS

## 2021-10-12 PROCEDURE — 3331090001 HH PPS REVENUE CREDIT

## 2021-10-12 PROCEDURE — G0152 HHCP-SERV OF OT,EA 15 MIN: HCPCS

## 2021-10-12 PROCEDURE — 3331090002 HH PPS REVENUE DEBIT

## 2021-10-13 PROCEDURE — 3331090001 HH PPS REVENUE CREDIT

## 2021-10-13 PROCEDURE — 3331090002 HH PPS REVENUE DEBIT

## 2021-10-14 ENCOUNTER — HOME CARE VISIT (OUTPATIENT)
Dept: SCHEDULING | Facility: HOME HEALTH | Age: 71
End: 2021-10-14
Payer: MEDICARE

## 2021-10-14 VITALS
OXYGEN SATURATION: 96 % | HEART RATE: 84 BPM | DIASTOLIC BLOOD PRESSURE: 64 MMHG | RESPIRATION RATE: 18 BRPM | SYSTOLIC BLOOD PRESSURE: 109 MMHG | TEMPERATURE: 96.6 F

## 2021-10-14 VITALS
TEMPERATURE: 97.4 F | OXYGEN SATURATION: 95 % | RESPIRATION RATE: 12 BRPM | HEART RATE: 95 BPM | DIASTOLIC BLOOD PRESSURE: 76 MMHG | SYSTOLIC BLOOD PRESSURE: 120 MMHG

## 2021-10-14 VITALS
HEART RATE: 88 BPM | TEMPERATURE: 97.9 F | SYSTOLIC BLOOD PRESSURE: 122 MMHG | DIASTOLIC BLOOD PRESSURE: 84 MMHG | RESPIRATION RATE: 16 BRPM

## 2021-10-14 PROCEDURE — 3331090002 HH PPS REVENUE DEBIT

## 2021-10-14 PROCEDURE — G0157 HHC PT ASSISTANT EA 15: HCPCS

## 2021-10-14 PROCEDURE — 3331090001 HH PPS REVENUE CREDIT

## 2021-10-14 PROCEDURE — G0299 HHS/HOSPICE OF RN EA 15 MIN: HCPCS

## 2021-10-15 PROCEDURE — 3331090001 HH PPS REVENUE CREDIT

## 2021-10-15 PROCEDURE — 3331090002 HH PPS REVENUE DEBIT

## 2021-10-16 PROCEDURE — 3331090001 HH PPS REVENUE CREDIT

## 2021-10-16 PROCEDURE — 3331090002 HH PPS REVENUE DEBIT

## 2021-10-17 PROCEDURE — 3331090001 HH PPS REVENUE CREDIT

## 2021-10-17 PROCEDURE — 3331090002 HH PPS REVENUE DEBIT

## 2021-10-18 PROCEDURE — 3331090002 HH PPS REVENUE DEBIT

## 2021-10-18 PROCEDURE — 3331090001 HH PPS REVENUE CREDIT

## 2021-10-19 ENCOUNTER — HOME CARE VISIT (OUTPATIENT)
Dept: HOME HEALTH SERVICES | Facility: HOME HEALTH | Age: 71
End: 2021-10-19
Payer: MEDICARE

## 2021-10-19 PROCEDURE — 3331090002 HH PPS REVENUE DEBIT

## 2021-10-19 PROCEDURE — 3331090001 HH PPS REVENUE CREDIT

## 2021-10-20 ENCOUNTER — HOME CARE VISIT (OUTPATIENT)
Dept: HOME HEALTH SERVICES | Facility: HOME HEALTH | Age: 71
End: 2021-10-20
Payer: MEDICARE

## 2021-10-20 ENCOUNTER — HOME CARE VISIT (OUTPATIENT)
Dept: SCHEDULING | Facility: HOME HEALTH | Age: 71
End: 2021-10-20
Payer: MEDICARE

## 2021-10-20 PROCEDURE — 3331090002 HH PPS REVENUE DEBIT

## 2021-10-20 PROCEDURE — 3331090001 HH PPS REVENUE CREDIT

## 2021-10-21 PROCEDURE — 3331090001 HH PPS REVENUE CREDIT

## 2021-10-21 PROCEDURE — 3331090002 HH PPS REVENUE DEBIT

## 2021-10-22 PROCEDURE — 3331090002 HH PPS REVENUE DEBIT

## 2021-10-22 PROCEDURE — 3331090001 HH PPS REVENUE CREDIT

## 2021-10-23 PROCEDURE — 3331090001 HH PPS REVENUE CREDIT

## 2021-10-23 PROCEDURE — 3331090002 HH PPS REVENUE DEBIT

## 2021-10-24 PROCEDURE — 3331090001 HH PPS REVENUE CREDIT

## 2021-10-24 PROCEDURE — 3331090002 HH PPS REVENUE DEBIT

## 2021-10-25 ENCOUNTER — HOME CARE VISIT (OUTPATIENT)
Dept: HOME HEALTH SERVICES | Facility: HOME HEALTH | Age: 71
End: 2021-10-25
Payer: MEDICARE

## 2021-10-25 PROCEDURE — 3331090002 HH PPS REVENUE DEBIT

## 2021-10-25 PROCEDURE — 3331090001 HH PPS REVENUE CREDIT

## 2022-01-16 PROBLEM — U07.1 HYPERCOAGULABLE STATE ASSOCIATED WITH COVID-19 (HCC): Status: RESOLVED | Noted: 2021-10-04 | Resolved: 2022-01-16

## 2022-01-16 PROBLEM — D68.69 HYPERCOAGULABLE STATE ASSOCIATED WITH COVID-19 (HCC): Status: RESOLVED | Noted: 2021-10-04 | Resolved: 2022-01-16

## 2022-01-24 PROBLEM — E11.9 TYPE 2 DIABETES MELLITUS (HCC): Status: ACTIVE | Noted: 2022-01-24

## 2022-01-24 PROBLEM — M12.812 ROTATOR CUFF ARTHROPATHY OF BOTH SHOULDERS: Status: ACTIVE | Noted: 2022-01-24

## 2022-01-24 PROBLEM — M25.511 BILATERAL SHOULDER PAIN: Status: ACTIVE | Noted: 2022-01-24

## 2022-01-24 PROBLEM — M12.811 ROTATOR CUFF ARTHROPATHY OF BOTH SHOULDERS: Status: ACTIVE | Noted: 2022-01-24

## 2022-01-24 PROBLEM — M25.512 BILATERAL SHOULDER PAIN: Status: ACTIVE | Noted: 2022-01-24

## 2022-03-18 PROBLEM — E11.9 TYPE 2 DIABETES MELLITUS (HCC): Status: ACTIVE | Noted: 2022-01-24

## 2022-03-18 PROBLEM — Z95.1 STATUS POST CORONARY ARTERY BYPASS GRAFT: Status: ACTIVE | Noted: 2018-10-03

## 2022-03-18 PROBLEM — J93.9 PNEUMOTHORAX: Status: ACTIVE | Noted: 2018-10-05

## 2022-03-18 PROBLEM — I21.4 NSTEMI (NON-ST ELEVATED MYOCARDIAL INFARCTION) (HCC): Status: ACTIVE | Noted: 2021-10-04

## 2022-03-19 PROBLEM — U07.1 PNEUMONIA DUE TO COVID-19 VIRUS: Status: ACTIVE | Noted: 2018-10-03

## 2022-03-19 PROBLEM — E66.2 CLASS 1 OBESITY WITH ALVEOLAR HYPOVENTILATION, SERIOUS COMORBIDITY, AND BODY MASS INDEX (BMI) OF 30.0 TO 30.9 IN ADULT (HCC): Status: ACTIVE | Noted: 2021-09-29

## 2022-03-19 PROBLEM — R77.8 ELEVATED TROPONIN: Status: ACTIVE | Noted: 2021-10-04

## 2022-03-19 PROBLEM — M25.511 BILATERAL SHOULDER PAIN: Status: ACTIVE | Noted: 2022-01-24

## 2022-03-19 PROBLEM — U07.1 ACUTE HYPOXEMIC RESPIRATORY FAILURE DUE TO COVID-19 (HCC): Status: ACTIVE | Noted: 2021-09-28

## 2022-03-19 PROBLEM — E66.811 CLASS 1 OBESITY WITH ALVEOLAR HYPOVENTILATION, SERIOUS COMORBIDITY, AND BODY MASS INDEX (BMI) OF 30.0 TO 30.9 IN ADULT: Status: ACTIVE | Noted: 2021-09-29

## 2022-03-19 PROBLEM — J12.82 PNEUMONIA DUE TO COVID-19 VIRUS: Status: ACTIVE | Noted: 2018-10-03

## 2022-03-19 PROBLEM — M12.812 ROTATOR CUFF ARTHROPATHY OF BOTH SHOULDERS: Status: ACTIVE | Noted: 2022-01-24

## 2022-03-19 PROBLEM — R79.89 ELEVATED TROPONIN: Status: ACTIVE | Noted: 2021-10-04

## 2022-03-19 PROBLEM — I10 HYPERTENSION: Status: ACTIVE | Noted: 2018-09-11

## 2022-03-19 PROBLEM — R06.83 SNORING: Status: ACTIVE | Noted: 2021-07-06

## 2022-03-19 PROBLEM — M25.512 BILATERAL SHOULDER PAIN: Status: ACTIVE | Noted: 2022-01-24

## 2022-03-19 PROBLEM — I50.22 SYSTOLIC CHF, CHRONIC (HCC): Status: ACTIVE | Noted: 2021-09-28

## 2022-03-19 PROBLEM — M12.811 ROTATOR CUFF ARTHROPATHY OF BOTH SHOULDERS: Status: ACTIVE | Noted: 2022-01-24

## 2022-03-19 PROBLEM — M16.11 OSTEOARTHRITIS OF RIGHT HIP: Status: ACTIVE | Noted: 2021-07-26

## 2022-03-19 PROBLEM — I35.0 AORTIC STENOSIS: Status: ACTIVE | Noted: 2018-09-11

## 2022-03-19 PROBLEM — J96.01 ACUTE HYPOXEMIC RESPIRATORY FAILURE DUE TO COVID-19 (HCC): Status: ACTIVE | Noted: 2021-09-28

## 2022-03-20 PROBLEM — I25.10 CAD (CORONARY ARTERY DISEASE): Status: ACTIVE | Noted: 2018-09-11

## 2022-03-20 PROBLEM — Z95.2 S/P AVR (AORTIC VALVE REPLACEMENT): Status: ACTIVE | Noted: 2018-10-03

## 2022-05-25 ENCOUNTER — TELEPHONE (OUTPATIENT)
Dept: ORTHOPEDIC SURGERY | Age: 72
End: 2022-05-25

## 2022-07-06 ENCOUNTER — TELEPHONE (OUTPATIENT)
Dept: CARDIOLOGY CLINIC | Age: 72
End: 2022-07-06

## 2022-07-06 NOTE — TELEPHONE ENCOUNTER
Please call pt ,he is confused with his medication because he has been using 2 different pharmacys. He wants nurse to please call him to help see which meds he is suppose to be taking

## 2022-07-06 NOTE — TELEPHONE ENCOUNTER
Called pt back to go over his medications. Pt is currently at work and will not be home until 6pm. He does not have a list or his bottles with him, he also does not know the names of medications he takes. Asked pt if he is able to get on his MyChart and review his meds there. Pt stated that he cannot access his Mychart. Informed pt that I will mail him is last office visit with Dr. Kortney Luna. He is to review his meds and if there is any discrepancy to please call me back. Understanding voiced.

## 2022-08-01 NOTE — PROGRESS NOTES
Name: George Bullock  YOB: 1950  Gender: male  MRN: [id number]    CC:   Chief Complaint   Patient presents with    Shoulder Pain     Recheck bilateral shoulders        HPI: Patient presents for follow-up of bilateral shoulder pain. Patient has had bilateral shoulder injection on 4/26/22. Patient presents today requesting repeat. He did have a fall about 2 months ago onto his right shoulder and is having more pain.     Allergies   Allergen Reactions    Hydrocodone-Acetaminophen Other (See Comments)     hallucinations     Past Medical History:   Diagnosis Date    Arthritis     generalized OA    CAD (coronary artery disease) 09/11/2018    NO MI/ NO STENTS-- CABG AND AVR-10/2018- FOLLOWED BY DR Jose Srivastava    CHF (congestive heart failure) ([facility]     last echo-3/2021-- EF30-35%- followed by dr Simba Ferrell    DVT (deep venous thrombosis) ([facility] [address] 8/2021    RLE s/p covid    History of kidney stones 2016    naturally passed    History of melanoma 2016    removed from nose    Hx of CABG     Hypercholesterolemia     Hypertension 9/11/2018    CONTROLLED WITH MED    PUD (peptic ulcer disease)     1970's-    S/P AVR (aortic valve replacement)      Past Surgical History:   Procedure Laterality Date    AORTIC VALVE REPLACEMENT  10/03/2018    CORONARY ARTERY BYPASS GRAFT X 1, LIMA to the LAD WITH AVR 23 mm Intuity valve    APPENDECTOMY       15 yo    CORONARY ARTERY BYPASS GRAFT  10/03/2018    CABG x1, Dr. Issac Ocampo  2014    right lower ext - broken put in rods and screws    OTHER SURGICAL HISTORY      melanoma surgery to nose    TOTAL HIP ARTHROPLASTY Right 07/2021     Family History   Problem Relation Age of Onset    No Known Problems Brother     No Known Problems Father     Heart Disease Brother     Cancer Brother         Leukemia    Alzheimer's Disease Mother     Cancer Mother         Leukemia     Social History     Socioeconomic History    Marital status:      Spouse name: Not on file    Number of children: Not on file    Years of education: Not on file    Highest education level: Not on file   Occupational History    Not on file   Tobacco Use    Smoking status: Never    Smokeless tobacco: Never   Vaping Use    Vaping Use: Never used   Substance and Sexual Activity    Alcohol use: Yes     Alcohol/week: 2.0 standard drinks     Types: 2 Shots of liquor per week    Drug use: No    Sexual activity: Not on file   Other Topics Concern    Not on file   Social History Narrative    Not on file     Social Determinants of Health     Financial Resource Strain: Not on file   Food Insecurity: Not on file   Transportation Needs: Not on file   Physical Activity: Not on file   Stress: Not on file   Social Connections: Not on file   Intimate Partner Violence: Not on file   Housing Stability: Not on file        No flowsheet data found. Review of Systems  Non-contributory    PE:    No major changes in comparison to previous visit. Still actively has good function with both left and right. Has a little tenderness over the right sternoclavicular joint. Nothing over the [facility] joint. No obvious significant subluxation palpable or instability noted    Xray: 2 views of the right clavicle as well as 3 views of the right shoulder obtained today and reviewed. No clavicle fracture is noted. AC arthritic changes seen. Proximal humeral migration and arthritic change of the shoulder is noted. Small osteophyte on the inferior humerus. Rotator cuff tear arthropathy    A/Plan:     ICD-10-CM    1. Bilateral shoulder pain, unspecified chronicity  M25.511 XR SHOULDER RIGHT (MIN 2 VIEWS)    M25.512 methylPREDNISolone acetate (DEPO-MEDROL) injection 160 mg     DRAIN/INJECT LARGE JOINT/BURSA      2. Rotator cuff arthropathy of both shoulders  M12.811 methylPREDNISolone acetate (DEPO-MEDROL) injection 160 mg    M12.812 DRAIN/INJECT LARGE JOINT/BURSA      3. Pain of right clavicle  M89.8X1 XR CLAVICLE RIGHT      4.  Arthritis of right sternoclavicular joint  M19.011       No obvious acute traumatic bony change. Patient has rotator cuff tear arthropathy. He would like to continue with conservative care for his rotator cuff tear arthropathy  Discussed ice and topical NSAID for his right SC joint. Procedure note: After discussion of risks and benefits including, but not limited to pain, infection, steroid flare, increased blood sugar, fat necrosis, skin discoloration, and injury to blood vessels or nerves, the patient verbally consented to proceed with a glenohumeral joint injection. They understand that we are using this is an alternative method of treatment and the decision to not proceed with elective major surgery. We have discussed this decision in detail. The affected right shoulder was sterilely prepped in standard fashion and injected with 2 cc of Depo-Medrol (40mg/ml), 2 cc of 1% Lidocaine, and 2 cc of 0.5% Marcaine into the joint space. The patient tolerated the injection well. Procedure note: After discussion of risks and benefits including, but not limited to pain, infection, steroid flare, increased blood sugar, fat necrosis, skin discoloration, and injury to blood vessels or nerves, the patient verbally consented to proceed with a glenohumeral joint injection. They understand that we are using this is an alternative method of treatment and the decision to not proceed with elective major surgery. We have discussed this decision in detail. The affected left shoulder was sterilely prepped in standard fashion and injected with 2 cc of Depo-Medrol (40mg/ml), 2 cc of 1% Lidocaine, and 2 cc of 0.5% Marcaine into the joint space. The patient tolerated the injection well. Return in about 3 months (around 11/2/2022) for For Injection.         Colin Baldwin MD  08/02/22

## 2022-08-02 ENCOUNTER — OFFICE VISIT (OUTPATIENT)
Dept: ORTHOPEDIC SURGERY | Age: 72
End: 2022-08-02
Payer: MEDICARE

## 2022-08-02 DIAGNOSIS — M25.511 BILATERAL SHOULDER PAIN, UNSPECIFIED CHRONICITY: Primary | ICD-10-CM

## 2022-08-02 DIAGNOSIS — M12.812 ROTATOR CUFF ARTHROPATHY OF BOTH SHOULDERS: ICD-10-CM

## 2022-08-02 DIAGNOSIS — M19.011 ARTHRITIS OF RIGHT STERNOCLAVICULAR JOINT: ICD-10-CM

## 2022-08-02 DIAGNOSIS — M12.811 ROTATOR CUFF ARTHROPATHY OF BOTH SHOULDERS: ICD-10-CM

## 2022-08-02 DIAGNOSIS — M25.512 BILATERAL SHOULDER PAIN, UNSPECIFIED CHRONICITY: Primary | ICD-10-CM

## 2022-08-02 DIAGNOSIS — M89.8X1 PAIN OF RIGHT CLAVICLE: ICD-10-CM

## 2022-08-02 PROCEDURE — G8428 CUR MEDS NOT DOCUMENT: HCPCS | Performed by: ORTHOPAEDIC SURGERY

## 2022-08-02 PROCEDURE — 3017F COLORECTAL CA SCREEN DOC REV: CPT | Performed by: ORTHOPAEDIC SURGERY

## 2022-08-02 PROCEDURE — 1123F ACP DISCUSS/DSCN MKR DOCD: CPT | Performed by: ORTHOPAEDIC SURGERY

## 2022-08-02 PROCEDURE — 20610 DRAIN/INJ JOINT/BURSA W/O US: CPT | Performed by: ORTHOPAEDIC SURGERY

## 2022-08-02 PROCEDURE — G8417 CALC BMI ABV UP PARAM F/U: HCPCS | Performed by: ORTHOPAEDIC SURGERY

## 2022-08-02 RX ORDER — METHYLPREDNISOLONE ACETATE 40 MG/ML
160 INJECTION, SUSPENSION INTRA-ARTICULAR; INTRALESIONAL; INTRAMUSCULAR; SOFT TISSUE ONCE
Status: COMPLETED | OUTPATIENT
Start: 2022-08-02 | End: 2022-08-02

## 2022-08-02 RX ADMIN — METHYLPREDNISOLONE ACETATE 160 MG: 40 INJECTION, SUSPENSION INTRA-ARTICULAR; INTRALESIONAL; INTRAMUSCULAR; SOFT TISSUE at 09:08

## 2022-11-02 ENCOUNTER — HOSPITAL ENCOUNTER (INPATIENT)
Age: 72
LOS: 4 days | Discharge: HOME | DRG: 872 | End: 2022-11-07
Attending: EMERGENCY MEDICINE | Admitting: FAMILY MEDICINE
Payer: MEDICARE

## 2022-11-02 DIAGNOSIS — N30.00 ACUTE CYSTITIS WITHOUT HEMATURIA: ICD-10-CM

## 2022-11-02 DIAGNOSIS — E87.20 LACTIC ACID ACIDOSIS: ICD-10-CM

## 2022-11-02 DIAGNOSIS — I50.22 SYSTOLIC CHF, CHRONIC (HCC): ICD-10-CM

## 2022-11-02 DIAGNOSIS — R06.01 ORTHOPNEA: ICD-10-CM

## 2022-11-02 DIAGNOSIS — R06.89 TROUBLE BREATHING: ICD-10-CM

## 2022-11-02 DIAGNOSIS — N20.0 KIDNEY STONE: ICD-10-CM

## 2022-11-02 DIAGNOSIS — R06.83 SNORING: ICD-10-CM

## 2022-11-02 DIAGNOSIS — N17.9 AKI (ACUTE KIDNEY INJURY) (HCC): Primary | ICD-10-CM

## 2022-11-02 LAB
ALBUMIN SERPL-MCNC: 3.5 G/DL (ref 3.2–4.6)
ALBUMIN/GLOB SERPL: 0.8 {RATIO} (ref 0.4–1.6)
ALP SERPL-CCNC: 56 U/L (ref 50–136)
ALT SERPL-CCNC: 19 U/L (ref 12–65)
ANION GAP SERPL CALC-SCNC: 10 MMOL/L (ref 2–11)
AST SERPL-CCNC: 17 U/L (ref 15–37)
BASOPHILS # BLD: 0 K/UL (ref 0–0.2)
BASOPHILS NFR BLD: 0 % (ref 0–2)
BILIRUB SERPL-MCNC: 1 MG/DL (ref 0.2–1.1)
BILIRUB UR QL: NEGATIVE
BUN SERPL-MCNC: 23 MG/DL (ref 8–23)
CALCIUM SERPL-MCNC: 9.1 MG/DL (ref 8.3–10.4)
CHLORIDE SERPL-SCNC: 109 MMOL/L (ref 101–110)
CO2 SERPL-SCNC: 19 MMOL/L (ref 21–32)
CREAT SERPL-MCNC: 1.8 MG/DL (ref 0.8–1.5)
DIFFERENTIAL METHOD BLD: ABNORMAL
EOSINOPHIL # BLD: 0 K/UL (ref 0–0.8)
EOSINOPHIL NFR BLD: 0 % (ref 0.5–7.8)
ERYTHROCYTE [DISTWIDTH] IN BLOOD BY AUTOMATED COUNT: 13.2 % (ref 11.9–14.6)
GLOBULIN SER CALC-MCNC: 4.2 G/DL (ref 2.8–4.5)
GLUCOSE SERPL-MCNC: 142 MG/DL (ref 65–100)
GLUCOSE UR QL STRIP.AUTO: >1000 MG/DL
HCT VFR BLD AUTO: 42.5 % (ref 41.1–50.3)
HGB BLD-MCNC: 13.5 G/DL (ref 13.6–17.2)
IMM GRANULOCYTES # BLD AUTO: 0 K/UL (ref 0–0.5)
IMM GRANULOCYTES NFR BLD AUTO: 0 % (ref 0–5)
KETONES UR-MCNC: NEGATIVE MG/DL
LEUKOCYTE ESTERASE UR QL STRIP: ABNORMAL
LIPASE SERPL-CCNC: 134 U/L (ref 73–393)
LYMPHOCYTES # BLD: 0.2 K/UL (ref 0.5–4.6)
LYMPHOCYTES NFR BLD: 5 % (ref 13–44)
MCH RBC QN AUTO: 28.7 PG (ref 26.1–32.9)
MCHC RBC AUTO-ENTMCNC: 31.8 G/DL (ref 31.4–35)
MCV RBC AUTO: 90.4 FL (ref 82–102)
MONOCYTES # BLD: 0 K/UL (ref 0.1–1.3)
MONOCYTES NFR BLD: 1 % (ref 4–12)
NEUTS SEG # BLD: 3.7 K/UL (ref 1.7–8.2)
NEUTS SEG NFR BLD: 94 % (ref 43–78)
NITRITE UR QL: POSITIVE
NRBC # BLD: 0 K/UL (ref 0–0.2)
PH UR: 5.5 [PH] (ref 5–9)
PLATELET # BLD AUTO: 150 K/UL (ref 150–450)
PLATELET COMMENT: ADEQUATE
PMV BLD AUTO: 10 FL (ref 9.4–12.3)
POTASSIUM SERPL-SCNC: 3.6 MMOL/L (ref 3.5–5.1)
PROT SERPL-MCNC: 7.7 G/DL (ref 6.3–8.2)
PROT UR QL: ABNORMAL MG/DL
RBC # BLD AUTO: 4.7 M/UL (ref 4.23–5.6)
RBC # UR STRIP: ABNORMAL /UL
RBC MORPH BLD: ABNORMAL
SERVICE CMNT-IMP: ABNORMAL
SODIUM SERPL-SCNC: 138 MMOL/L (ref 133–143)
SP GR UR: 1.02 (ref 1–1.02)
UROBILINOGEN UR QL: 0.2 EU/DL (ref 0.2–1)
WBC # BLD AUTO: 3.9 K/UL (ref 4.3–11.1)
WBC MORPH BLD: ABNORMAL

## 2022-11-02 PROCEDURE — 96365 THER/PROPH/DIAG IV INF INIT: CPT

## 2022-11-02 PROCEDURE — 83690 ASSAY OF LIPASE: CPT

## 2022-11-02 PROCEDURE — 81003 URINALYSIS AUTO W/O SCOPE: CPT

## 2022-11-02 PROCEDURE — 85025 COMPLETE CBC W/AUTO DIFF WBC: CPT

## 2022-11-02 PROCEDURE — 81001 URINALYSIS AUTO W/SCOPE: CPT

## 2022-11-02 PROCEDURE — 96361 HYDRATE IV INFUSION ADD-ON: CPT

## 2022-11-02 PROCEDURE — 96375 TX/PRO/DX INJ NEW DRUG ADDON: CPT

## 2022-11-02 PROCEDURE — 80053 COMPREHEN METABOLIC PANEL: CPT

## 2022-11-02 PROCEDURE — 99285 EMERGENCY DEPT VISIT HI MDM: CPT

## 2022-11-02 ASSESSMENT — PAIN SCALES - GENERAL: PAINLEVEL_OUTOF10: 6

## 2022-11-02 ASSESSMENT — PAIN - FUNCTIONAL ASSESSMENT: PAIN_FUNCTIONAL_ASSESSMENT: 0-10

## 2022-11-03 ENCOUNTER — APPOINTMENT (OUTPATIENT)
Dept: CT IMAGING | Age: 72
DRG: 872 | End: 2022-11-03
Payer: MEDICARE

## 2022-11-03 PROBLEM — Z95.2 S/P AVR (AORTIC VALVE REPLACEMENT): Status: ACTIVE | Noted: 2018-10-03

## 2022-11-03 PROBLEM — N39.0 UTI (URINARY TRACT INFECTION): Status: ACTIVE | Noted: 2022-11-03

## 2022-11-03 PROBLEM — I10 HYPERTENSION: Status: ACTIVE | Noted: 2018-09-11

## 2022-11-03 PROBLEM — E11.9 TYPE 2 DIABETES MELLITUS (HCC): Status: ACTIVE | Noted: 2022-01-24

## 2022-11-03 PROBLEM — N17.9 AKI (ACUTE KIDNEY INJURY) (HCC): Status: ACTIVE | Noted: 2022-11-03

## 2022-11-03 PROBLEM — I25.10 CAD (CORONARY ARTERY DISEASE): Status: ACTIVE | Noted: 2018-09-11

## 2022-11-03 PROBLEM — I50.22 SYSTOLIC CHF, CHRONIC (HCC): Status: ACTIVE | Noted: 2021-09-28

## 2022-11-03 LAB
ACCESSION NUMBER, LLC1M: ABNORMAL
ACINETOBACTER CALCOAC BAUMANNII COMPLEX BY PCR: NOT DETECTED
ANION GAP SERPL CALC-SCNC: 6 MMOL/L (ref 2–11)
APPEARANCE UR: ABNORMAL
BACTEROIDES FRAGILIS BY PCR: NOT DETECTED
BILIRUB UR QL: NEGATIVE
BIOFIRE TEST COMMENT: ABNORMAL
BLACTX-M ISLT/SPM QL: NOT DETECTED
BLAIMP ISLT/SPM QL: NOT DETECTED
BLAKPC BLD POS QL NAA+NON-PROBE: NOT DETECTED
BLAOXA-48-LIKE ISLT/SPM QL: NOT DETECTED
BLAVIM ISLT/SPM QL: NOT DETECTED
BUN SERPL-MCNC: 27 MG/DL (ref 8–23)
C ALBICANS DNA BLD POS QL NAA+NON-PROBE: NOT DETECTED
C GLABRATA DNA BLD POS QL NAA+NON-PROBE: NOT DETECTED
C KRUSEI DNA BLD POS QL NAA+NON-PROBE: NOT DETECTED
C PARAP DNA BLD POS QL NAA+NON-PROBE: NOT DETECTED
C TROPICLS DNA BLD POS QL NAA+NON-PROBE: NOT DETECTED
CALCIUM SERPL-MCNC: 8 MG/DL (ref 8.3–10.4)
CANDIDA AURIS BY PCR: NOT DETECTED
CHLORIDE SERPL-SCNC: 107 MMOL/L (ref 101–110)
CO2 SERPL-SCNC: 24 MMOL/L (ref 21–32)
COLISTIN RES MCR-1 ISLT/SPM QL: NOT DETECTED
COLOR UR: ABNORMAL
CREAT SERPL-MCNC: 1.8 MG/DL (ref 0.8–1.5)
CRYPTOCOCCUS NEOFORMANS/GATTII BY PCR: NOT DETECTED
E CLOAC COMP DNA BLD POS NAA+NON-PROBE: NOT DETECTED
E COLI DNA BLD POS QL NAA+NON-PROBE: DETECTED
ENTEROBACTERALES BY PCR: DETECTED
ENTEROCOCCUS FAECALIS BY PCR: NOT DETECTED
ENTEROCOCCUS FAECIUM BY PCR: NOT DETECTED
GLUCOSE SERPL-MCNC: 140 MG/DL (ref 65–100)
GLUCOSE UR STRIP.AUTO-MCNC: >1000 MG/DL
GP B STREP DNA BLD POS QL NAA+NON-PROBE: NOT DETECTED
HAEM INFLU DNA BLD POS QL NAA+NON-PROBE: NOT DETECTED
HGB UR QL STRIP: ABNORMAL
K OXYTOCA DNA BLD POS QL NAA+NON-PROBE: NOT DETECTED
KETONES UR QL STRIP.AUTO: ABNORMAL MG/DL
KLEBSIELLA AEROGENES BY PCR: NOT DETECTED
KLEBSIELLA PNEUMONIAE GROUP BY PCR: NOT DETECTED
L MONOCYTOG DNA BLD POS QL NAA+NON-PROBE: NOT DETECTED
LACTATE SERPL-SCNC: 2.6 MMOL/L (ref 0.4–2)
LEUKOCYTE ESTERASE UR QL STRIP.AUTO: ABNORMAL
N MEN DNA BLD POS QL NAA+NON-PROBE: NOT DETECTED
NITRITE UR QL STRIP.AUTO: POSITIVE
P AERUGINOSA DNA BLD POS NAA+NON-PROBE: NOT DETECTED
PH UR STRIP: 5 [PH] (ref 5–9)
POTASSIUM SERPL-SCNC: 4.3 MMOL/L (ref 3.5–5.1)
PROT UR STRIP-MCNC: ABNORMAL MG/DL
PROTEUS SP DNA BLD POS QL NAA+NON-PROBE: NOT DETECTED
RESISTANT GENE NDM BY PCR: NOT DETECTED
RESISTANT GENE TARGETS: ABNORMAL
S AUREUS DNA BLD POS QL NAA+NON-PROBE: NOT DETECTED
S AUREUS+CONS DNA BLD POS NAA+NON-PROBE: NOT DETECTED
S MARCESCENS DNA BLD POS NAA+NON-PROBE: NOT DETECTED
S PNEUM DNA BLD POS QL NAA+NON-PROBE: NOT DETECTED
S PYO DNA BLD POS QL NAA+NON-PROBE: NOT DETECTED
SALMONELLA SPECIES BY PCR: NOT DETECTED
SODIUM SERPL-SCNC: 137 MMOL/L (ref 133–143)
SP GR UR REFRACTOMETRY: 1.02 (ref 1–1.02)
STAPHYLOCOCCUS EPIDERMIDIS BY PCR: NOT DETECTED
STAPHYLOCOCCUS LUGDUNENSIS BY PCR: NOT DETECTED
STENOTROPHOMONAS MALTOPHILIA BY PCR: NOT DETECTED
STREPTOCOCCUS DNA BLD POS NAA+NON-PROBE: NOT DETECTED
UROBILINOGEN UR QL STRIP.AUTO: 0.2 EU/DL (ref 0.2–1)

## 2022-11-03 PROCEDURE — 36415 COLL VENOUS BLD VENIPUNCTURE: CPT

## 2022-11-03 PROCEDURE — 80048 BASIC METABOLIC PNL TOTAL CA: CPT

## 2022-11-03 PROCEDURE — 2580000003 HC RX 258: Performed by: NURSE PRACTITIONER

## 2022-11-03 PROCEDURE — 87150 DNA/RNA AMPLIFIED PROBE: CPT

## 2022-11-03 PROCEDURE — 6360000002 HC RX W HCPCS: Performed by: FAMILY MEDICINE

## 2022-11-03 PROCEDURE — 87088 URINE BACTERIA CULTURE: CPT

## 2022-11-03 PROCEDURE — 6360000002 HC RX W HCPCS

## 2022-11-03 PROCEDURE — 6370000000 HC RX 637 (ALT 250 FOR IP): Performed by: FAMILY MEDICINE

## 2022-11-03 PROCEDURE — 87077 CULTURE AEROBIC IDENTIFY: CPT

## 2022-11-03 PROCEDURE — 2580000003 HC RX 258: Performed by: FAMILY MEDICINE

## 2022-11-03 PROCEDURE — 87040 BLOOD CULTURE FOR BACTERIA: CPT

## 2022-11-03 PROCEDURE — 74176 CT ABD & PELVIS W/O CONTRAST: CPT

## 2022-11-03 PROCEDURE — 87205 SMEAR GRAM STAIN: CPT

## 2022-11-03 PROCEDURE — 87086 URINE CULTURE/COLONY COUNT: CPT

## 2022-11-03 PROCEDURE — 6370000000 HC RX 637 (ALT 250 FOR IP): Performed by: NURSE PRACTITIONER

## 2022-11-03 PROCEDURE — 1100000000 HC RM PRIVATE

## 2022-11-03 PROCEDURE — 87186 SC STD MICRODIL/AGAR DIL: CPT

## 2022-11-03 PROCEDURE — 83605 ASSAY OF LACTIC ACID: CPT

## 2022-11-03 PROCEDURE — 2580000003 HC RX 258

## 2022-11-03 PROCEDURE — 6360000002 HC RX W HCPCS: Performed by: STUDENT IN AN ORGANIZED HEALTH CARE EDUCATION/TRAINING PROGRAM

## 2022-11-03 RX ORDER — LANOLIN ALCOHOL/MO/W.PET/CERES
9 CREAM (GRAM) TOPICAL NIGHTLY
Status: DISCONTINUED | OUTPATIENT
Start: 2022-11-03 | End: 2022-11-07 | Stop reason: HOSPADM

## 2022-11-03 RX ORDER — MORPHINE SULFATE 4 MG/ML
4 INJECTION INTRAVENOUS
Status: DISCONTINUED | OUTPATIENT
Start: 2022-11-03 | End: 2022-11-07 | Stop reason: HOSPADM

## 2022-11-03 RX ORDER — ACETAMINOPHEN 325 MG/1
650 TABLET ORAL EVERY 6 HOURS PRN
Status: DISCONTINUED | OUTPATIENT
Start: 2022-11-03 | End: 2022-11-07 | Stop reason: HOSPADM

## 2022-11-03 RX ORDER — LANOLIN ALCOHOL/MO/W.PET/CERES
500 CREAM (GRAM) TOPICAL DAILY
Status: DISCONTINUED | OUTPATIENT
Start: 2022-11-03 | End: 2022-11-07 | Stop reason: HOSPADM

## 2022-11-03 RX ORDER — HEPARIN SODIUM 5000 [USP'U]/ML
5000 INJECTION, SOLUTION INTRAVENOUS; SUBCUTANEOUS EVERY 8 HOURS
Status: DISCONTINUED | OUTPATIENT
Start: 2022-11-03 | End: 2022-11-03

## 2022-11-03 RX ORDER — ONDANSETRON 2 MG/ML
4 INJECTION INTRAMUSCULAR; INTRAVENOUS EVERY 6 HOURS PRN
Status: DISCONTINUED | OUTPATIENT
Start: 2022-11-03 | End: 2022-11-07 | Stop reason: HOSPADM

## 2022-11-03 RX ORDER — ONDANSETRON 2 MG/ML
4 INJECTION INTRAMUSCULAR; INTRAVENOUS
Status: COMPLETED | OUTPATIENT
Start: 2022-11-03 | End: 2022-11-03

## 2022-11-03 RX ORDER — METOPROLOL SUCCINATE 25 MG/1
50 TABLET, EXTENDED RELEASE ORAL DAILY
Status: DISCONTINUED | OUTPATIENT
Start: 2022-11-03 | End: 2022-11-07 | Stop reason: HOSPADM

## 2022-11-03 RX ORDER — MELATONIN 10 MG
10 CAPSULE ORAL NIGHTLY
Status: DISCONTINUED | OUTPATIENT
Start: 2022-11-03 | End: 2022-11-03 | Stop reason: CLARIF

## 2022-11-03 RX ORDER — SPIRONOLACTONE 25 MG/1
25 TABLET ORAL DAILY
Status: DISCONTINUED | OUTPATIENT
Start: 2022-11-03 | End: 2022-11-05

## 2022-11-03 RX ORDER — ONDANSETRON 4 MG/1
4 TABLET, ORALLY DISINTEGRATING ORAL EVERY 8 HOURS PRN
Status: DISCONTINUED | OUTPATIENT
Start: 2022-11-03 | End: 2022-11-07 | Stop reason: HOSPADM

## 2022-11-03 RX ORDER — ENOXAPARIN SODIUM 100 MG/ML
40 INJECTION SUBCUTANEOUS EVERY 24 HOURS
Status: DISCONTINUED | OUTPATIENT
Start: 2022-11-03 | End: 2022-11-07 | Stop reason: HOSPADM

## 2022-11-03 RX ORDER — SODIUM CHLORIDE, SODIUM LACTATE, POTASSIUM CHLORIDE, CALCIUM CHLORIDE 600; 310; 30; 20 MG/100ML; MG/100ML; MG/100ML; MG/100ML
INJECTION, SOLUTION INTRAVENOUS CONTINUOUS
Status: DISCONTINUED | OUTPATIENT
Start: 2022-11-03 | End: 2022-11-04

## 2022-11-03 RX ORDER — SODIUM CHLORIDE, SODIUM LACTATE, POTASSIUM CHLORIDE, CALCIUM CHLORIDE 600; 310; 30; 20 MG/100ML; MG/100ML; MG/100ML; MG/100ML
INJECTION, SOLUTION INTRAVENOUS CONTINUOUS
Status: DISCONTINUED | OUTPATIENT
Start: 2022-11-03 | End: 2022-11-03

## 2022-11-03 RX ORDER — VALSARTAN 40 MG/1
40 TABLET ORAL DAILY
Status: DISCONTINUED | OUTPATIENT
Start: 2022-11-03 | End: 2022-11-07 | Stop reason: HOSPADM

## 2022-11-03 RX ORDER — ATORVASTATIN CALCIUM 40 MG/1
40 TABLET, FILM COATED ORAL NIGHTLY
Status: DISCONTINUED | OUTPATIENT
Start: 2022-11-03 | End: 2022-11-07 | Stop reason: HOSPADM

## 2022-11-03 RX ORDER — SODIUM CHLORIDE 0.9 % (FLUSH) 0.9 %
5-40 SYRINGE (ML) INJECTION EVERY 12 HOURS SCHEDULED
Status: DISCONTINUED | OUTPATIENT
Start: 2022-11-03 | End: 2022-11-07 | Stop reason: HOSPADM

## 2022-11-03 RX ORDER — 0.9 % SODIUM CHLORIDE 0.9 %
1000 INTRAVENOUS SOLUTION INTRAVENOUS ONCE
Status: COMPLETED | OUTPATIENT
Start: 2022-11-03 | End: 2022-11-03

## 2022-11-03 RX ORDER — ASPIRIN 81 MG/1
81 TABLET ORAL DAILY
Status: DISCONTINUED | OUTPATIENT
Start: 2022-11-04 | End: 2022-11-07 | Stop reason: HOSPADM

## 2022-11-03 RX ORDER — POLYETHYLENE GLYCOL 3350 17 G/17G
17 POWDER, FOR SOLUTION ORAL DAILY PRN
Status: DISCONTINUED | OUTPATIENT
Start: 2022-11-03 | End: 2022-11-07 | Stop reason: HOSPADM

## 2022-11-03 RX ORDER — ENOXAPARIN SODIUM 100 MG/ML
40 INJECTION SUBCUTANEOUS DAILY
Status: DISCONTINUED | OUTPATIENT
Start: 2022-11-03 | End: 2022-11-03

## 2022-11-03 RX ORDER — SODIUM CHLORIDE 0.9 % (FLUSH) 0.9 %
5-40 SYRINGE (ML) INJECTION PRN
Status: DISCONTINUED | OUTPATIENT
Start: 2022-11-03 | End: 2022-11-07 | Stop reason: HOSPADM

## 2022-11-03 RX ORDER — SODIUM CHLORIDE 9 MG/ML
INJECTION, SOLUTION INTRAVENOUS PRN
Status: DISCONTINUED | OUTPATIENT
Start: 2022-11-03 | End: 2022-11-07 | Stop reason: HOSPADM

## 2022-11-03 RX ORDER — ACETAMINOPHEN 650 MG/1
650 SUPPOSITORY RECTAL EVERY 6 HOURS PRN
Status: DISCONTINUED | OUTPATIENT
Start: 2022-11-03 | End: 2022-11-07 | Stop reason: HOSPADM

## 2022-11-03 RX ADMIN — CEFTRIAXONE 1000 MG: 1 INJECTION, POWDER, FOR SOLUTION INTRAMUSCULAR; INTRAVENOUS at 01:04

## 2022-11-03 RX ADMIN — HEPARIN SODIUM 5000 UNITS: 5000 INJECTION INTRAVENOUS; SUBCUTANEOUS at 10:49

## 2022-11-03 RX ADMIN — METOPROLOL SUCCINATE 50 MG: 25 TABLET, FILM COATED, EXTENDED RELEASE ORAL at 10:49

## 2022-11-03 RX ADMIN — Medication 9 MG: at 20:36

## 2022-11-03 RX ADMIN — SODIUM CHLORIDE, PRESERVATIVE FREE 5 ML: 5 INJECTION INTRAVENOUS at 20:37

## 2022-11-03 RX ADMIN — ATORVASTATIN CALCIUM 40 MG: 40 TABLET, FILM COATED ORAL at 20:37

## 2022-11-03 RX ADMIN — MORPHINE SULFATE 4 MG: 4 INJECTION INTRAVENOUS at 00:19

## 2022-11-03 RX ADMIN — SODIUM CHLORIDE 1000 ML: 9 INJECTION, SOLUTION INTRAVENOUS at 00:18

## 2022-11-03 RX ADMIN — SODIUM CHLORIDE, PRESERVATIVE FREE 5 ML: 5 INJECTION INTRAVENOUS at 10:54

## 2022-11-03 RX ADMIN — CYANOCOBALAMIN TAB 1000 MCG 500 MCG: 1000 TAB at 10:49

## 2022-11-03 RX ADMIN — SODIUM CHLORIDE, POTASSIUM CHLORIDE, SODIUM LACTATE AND CALCIUM CHLORIDE: 600; 310; 30; 20 INJECTION, SOLUTION INTRAVENOUS at 09:05

## 2022-11-03 RX ADMIN — SODIUM CHLORIDE, POTASSIUM CHLORIDE, SODIUM LACTATE AND CALCIUM CHLORIDE: 600; 310; 30; 20 INJECTION, SOLUTION INTRAVENOUS at 20:37

## 2022-11-03 RX ADMIN — ENOXAPARIN SODIUM 40 MG: 100 INJECTION SUBCUTANEOUS at 20:36

## 2022-11-03 RX ADMIN — ONDANSETRON 4 MG: 2 INJECTION INTRAMUSCULAR; INTRAVENOUS at 00:19

## 2022-11-03 ASSESSMENT — ENCOUNTER SYMPTOMS
SINUS PAIN: 0
SHORTNESS OF BREATH: 0
HEMATEMESIS: 0
DIARRHEA: 0
CONSTIPATION: 0
FLATUS: 0
COLOR CHANGE: 0
SORE THROAT: 0
SINUS PRESSURE: 0
ABDOMINAL PAIN: 1
BELCHING: 0
VOMITING: 0
COUGH: 0
NAUSEA: 1
HEMATOCHEZIA: 0

## 2022-11-03 ASSESSMENT — PAIN SCALES - GENERAL: PAINLEVEL_OUTOF10: 3

## 2022-11-03 ASSESSMENT — PAIN - FUNCTIONAL ASSESSMENT: PAIN_FUNCTIONAL_ASSESSMENT: NONE - DENIES PAIN

## 2022-11-03 NOTE — PROGRESS NOTES
Physician Progress Note      Bert Chen  CSN #:                  [id number]  :                       1950  ADMIT DATE:       2022 11:25 PM  [address] [place] DATE:  RESPONDING  PROVIDER #:        Adelso Murphy MD          QUERY TEXT:    Pt admitted with flank pain--UTI, UDAY-  Pt noted to have SIRS criteria with an   infection. If possible, please document in the progress notes and discharge   summary if you are evaluating and /or treating any of the following: The medical record reflects the following:  Risk Factors: UTI, UDAY, lactic acidosis, renal stones, chronic s chf  Clinical Indicators: ua--large LE, pos nitrites, cr 1.8, scans showing renal   stones, wbc 3.9, pulse 128. Treatment: iv rocephin, fluids, labs, scans, cultures, essential home meds. Options provided:  -- Sepsis, present on admission  -- Sepsis, present on admission, now resolved  -- Sepsis, developed following admission  -- UTI without Sepsis  -- Other - I will add my own diagnosis  -- Disagree - Not applicable / Not valid  -- Disagree - Clinically unable to determine / Unknown  -- Refer to Clinical Documentation Reviewer    PROVIDER RESPONSE TEXT:    This patient has sepsis which was present on admission.     Query created by: Ju Harkins on 11/3/2022 7:57 AM      Electronically signed by:  Adelso Murphy MD 11/3/2022 2:50 PM

## 2022-11-03 NOTE — PROGRESS NOTES
Hospitalist Progress Note   Admit Date:  2022 11:25 PM   Name:  Navid Montanez   Age:  67 y.o. Sex:  male  :  1950   MRN:  [id number]   Room:  [place] [address]    Presenting Complaint: Flank Pain (Right sided )     Reason(s) for Admission: Kidney stone [N20.0]  Lactic acid acidosis [E87.20]  UDAY (acute kidney injury) [facility]) [N17.9]     Hospital Course:   Navid Montanez is a 67 y.o. male with medical history of HTN, DM2, CHF, CAD, kidney stones, who presented to ED with R flank pain that radiates to groin. He originally thought he may have twisted his back as he was working on a car yesterday, but started to consider a stone when back pain did not improved and was similar to the pain he would have with his stones. ER workup: UA indicative of infection. Found to have UDAY with Cr up to 1.8. Lactic acid is 2.6. CT A/P was obtained which shows:  1. No acute process. 2. Colonic diverticulosis. 3. Gallstones. 4. 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 5. Improved groundglass infiltrates in the lung bases, possibly the sequela of   prior viral infection. 6. Prior appendectomy. Also noted with b/l kidney stones with largest measuring 10 mm on right and chronic UPJ obstruction. Normal bladder    Subjective & 24hr Events (22): Denies back pain at this time. Denies dysuria, N/V, chills, fever. Reports long history of kidney stones and usually passes them without issue. Reports he thinks he passed one this a.m. Assessment & Plan:     Principal Problem:    UDAY (acute kidney injury) ([facility] [address]  Plan:   -Cr 1.80; Likely 2/2 UTI. Cr 1.16 22  -continue IVF and monitor x 24 hours. Monitor with HF. If worsens will re consult urology  -Avoid nephrotoxics as able     Active Problems:    UTI (urinary tract infection)  Plan:   -Continue Rocephin with Bcx/Ucx pending    Low back pain  -B/L kidney stones  -Improved per patient.  Likely a MSK from twisting injury while working on car vs UTI or stone  -Prn analgesia  -CT abd with no acute spinal process  -Urology consulted with no plans for surgical intervention. Reports findings on CT are chronic  -PT/OT with recs pending        Type 2 diabetes mellitus ([facility]  Plan:   -Accu checks w/ SSI  -ranges acceptable       Hypertension  Plan:   -BP soft  -will continue BB only for now      Systolic CHF, chronic (HCC)  Plan:   -Stable  -Continue BB      CAD (coronary artery disease)     S/P AVR (aortic valve replacement)  Plan:   -Denies CP  -Continue ASA/Statin           Anticipated discharge needs:      1-2 days     Diet:  ADULT DIET; Regular; 4 carb choices (60 gm/meal)  DVT PPx: heparin   Code status: Full Code    Hospital Problems:  Principal Problem:    UDAY (acute kidney injury) ([facility]  Active Problems:    UTI (urinary tract infection)    Type 2 diabetes mellitus ([facility]    Hypertension    Systolic CHF, chronic (HCC)    CAD (coronary artery disease)    S/P AVR (aortic valve replacement)  Resolved Problems:    * No resolved hospital problems. *      Objective:   Patient Vitals for the past 24 hrs:   Temp Pulse Resp BP SpO2   11/03/22 1107 97.6 °F (36.4 °C) 78 20 (!) 86/59 95 %   11/03/22 0908 97.8 °F (36.6 °C) 80 19 91/60 96 %   11/03/22 0414 97.7 °F (36.5 °C) 83 18 97/66 97 %   11/03/22 0352 -- 85 18 90/69 99 %   11/03/22 0245 -- -- -- 114/66 --   11/03/22 0100 -- -- -- 110/68 --   11/03/22 0030 -- -- -- (!) 90/58 --   11/03/22 0021 -- -- -- -- 97 %   11/03/22 0020 -- -- -- 105/67 --   11/02/22 2339 -- -- -- -- 98 %   11/02/22 2331 -- 98 -- 101/67 --   11/02/22 2137 98 °F (36.7 °C) (!) 128 18 104/76 94 %       Oxygen Therapy  SpO2: 95 %  Pulse Oximeter Device Mode: Intermittent  O2 Device: None (Room air)    Estimated body mass index is 30.55 kg/m² as calculated from the following:    Height as of this encounter: 5' 10.5\" (1.791 m). Weight as of this encounter: 216 lb (98 kg).   No intake or output data in the 24 hours ending 11/03/22 1237      Physical Exam:     Blood pressure (!) 86/59, pulse 78, temperature 97.6 °F (36.4 °C), temperature source Oral, resp. rate 20, height 5' 10.5\" (1.791 m), weight 216 lb (98 kg), SpO2 95 %. General:    Well nourished. Head:  Normocephalic, atraumatic  Eyes:  Sclerae appear normal.  Pupils equally round. ENT:  Nares appear normal, no drainage. Moist oral mucosa  Neck:  No restricted ROM. Trachea midline   CV:   RRR. No m/r/g. No jugular venous distension. Lungs:   CTAB. No wheezing, rhonchi, or rales. Symmetric expansion. Abdomen: Bowel sounds present. Soft, nontender, nondistended. Extremities: No cyanosis or clubbing. No edema  Skin:     No rashes and normal coloration. Warm and dry. Neuro:  CN II-XII grossly intact. Sensation intact. A&Ox3  Psych:  Normal mood and affect.       I have personally reviewed labs and tests showing:  Recent Labs:  Recent Results (from the past 48 hour(s))   CBC with Auto Differential    Collection Time: 11/02/22  9:43 PM   Result Value Ref Range    WBC 3.9 (L) 4.3 - 11.1 K/uL    RBC 4.70 4.23 - 5.6 M/uL    Hemoglobin 13.5 (L) 13.6 - 17.2 g/dL    Hematocrit 42.5 41.1 - 50.3 %    MCV 90.4 82 - 102 FL    MCH 28.7 26.1 - 32.9 PG    MCHC 31.8 31.4 - 35.0 g/dL    RDW 13.2 11.9 - 14.6 %    Platelets 918 728 - 293 K/uL    MPV 10.0 9.4 - 12.3 FL    nRBC 0.00 0.0 - 0.2 K/uL    Seg Neutrophils 94 (H) 43 - 78 %    Lymphocytes 5 (L) 13 - 44 %    Monocytes 1 (L) 4.0 - 12.0 %    Eosinophils % 0 (L) 0.5 - 7.8 %    Basophils 0 0.0 - 2.0 %    Immature Granulocytes 0 0.0 - 5.0 %    Segs Absolute 3.7 1.7 - 8.2 K/UL    Absolute Lymph # 0.2 (L) 0.5 - 4.6 K/UL    Absolute Mono # 0.0 (L) 0.1 - 1.3 K/UL    Absolute Eos # 0.0 0.0 - 0.8 K/UL    Basophils Absolute 0.0 0.0 - 0.2 K/UL    Absolute Immature Granulocyte 0.0 0.0 - 0.5 K/UL    RBC Comment NORMOCYTIC/NORMOCHROMIC      WBC Comment Result Confirmed By Smear      Platelet Comment ADEQUATE      Differential Type AUTOMATED     CMP    Collection Time: 11/02/22  9:43 PM   Result Value Ref Range    Sodium 138 133 - 143 mmol/L    Potassium 3.6 3.5 - 5.1 mmol/L    Chloride 109 101 - 110 mmol/L    CO2 19 (L) 21 - 32 mmol/L    Anion Gap 10 2 - 11 mmol/L    Glucose 142 (H) 65 - 100 mg/dL    BUN 23 8 - 23 MG/DL    Creatinine 1.80 (H) 0.8 - 1.5 MG/DL    Est, Glom Filt Rate 39 (L) >60 ml/min/1.73m2    Calcium 9.1 8.3 - 10.4 MG/DL    Total Bilirubin 1.0 0.2 - 1.1 MG/DL    ALT 19 12 - 65 U/L    AST 17 15 - 37 U/L    Alk Phosphatase 56 50 - 136 U/L    Total Protein 7.7 6.3 - 8.2 g/dL    Albumin 3.5 3.2 - 4.6 g/dL    Globulin 4.2 2.8 - 4.5 g/dL    Albumin/Globulin Ratio 0.8 0.4 - 1.6     Lipase    Collection Time: 11/02/22  9:43 PM   Result Value Ref Range    Lipase 134 73 - 393 U/L   POCT Urinalysis no Micro    Collection Time: 11/02/22 11:30 PM   Result Value Ref Range    Specific Gravity, Urine, POC 1.025 (H) 1.001 - 1.023      pH, Urine, POC 5.5 5.0 - 9.0      Protein, Urine, POC TRACE (A) NEG mg/dL    Glucose, UA POC >1,000 (A) NEG mg/dL    Ketones, Urine, POC Negative NEG mg/dL    Bilirubin, Urine, POC Negative NEG      Blood, UA POC LARGE (A) NEG      URINE UROBILINOGEN POC 0.2 0.2 - 1.0 EU/dL    Nitrate, Urine, POC Positive (A) NEG      Leukocyte Est, UA POC SMALL (A) NEG      Performed by: Daphne Ann    Urinalysis w rflx microscopic    Collection Time: 11/02/22 11:53 PM   Result Value Ref Range    Color, UA YELLOW/STRAW      Appearance TURBID      Specific Gravity, UA 1.023 1.001 - 1.023      pH, Urine 5.0 5.0 - 9.0      Protein, UA TRACE (A) NEG mg/dL    Glucose, UA >1000 mg/dL    Ketones, Urine TRACE (A) NEG mg/dL    Bilirubin Urine Negative NEG      Blood, Urine LARGE (A) NEG      Urobilinogen, Urine 0.2 0.2 - 1.0 EU/dL    Nitrite, Urine Positive (A) NEG      Leukocyte Esterase, Urine LARGE (A) NEG     Culture, Urine    Collection Time: 11/03/22 12:45 AM    Specimen: Urine, clean catch   Result Value Ref Range    Special Requests NO SPECIAL REQUESTS      Culture        No growth after short period of incubation. Further results to follow after overnight incubation. Lactic Acid    Collection Time: 11/03/22 12:45 AM   Result Value Ref Range    Lactic Acid, Plasma 2.6 (H) 0.4 - 2.0 MMOL/L   Basic Metabolic Panel    Collection Time: 11/03/22  9:46 AM   Result Value Ref Range    Sodium 137 133 - 143 mmol/L    Potassium 4.3 3.5 - 5.1 mmol/L    Chloride 107 101 - 110 mmol/L    CO2 24 21 - 32 mmol/L    Anion Gap 6 2 - 11 mmol/L    Glucose 140 (H) 65 - 100 mg/dL    BUN 27 (H) 8 - 23 MG/DL    Creatinine 1.80 (H) 0.8 - 1.5 MG/DL    Est, Glom Filt Rate 39 (L) >60 ml/min/1.73m2    Calcium 8.0 (L) 8.3 - 10.4 MG/DL       I have personally reviewed imaging studies showing: Other Studies:  CT ABDOMEN PELVIS RENAL STONE   Final Result   1. No acute process. 2. Colonic diverticulosis. 3. Gallstones. 4. 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 5. Improved groundglass infiltrates in the lung bases, possibly the sequela of   prior viral infection. 6. Prior appendectomy.              Current Meds:  Current Facility-Administered Medications   Medication Dose Route Frequency    morphine injection 4 mg  4 mg IntraVENous Q1H PRN    [Held by provider] spironolactone (ALDACTONE) tablet 25 mg  25 mg Oral Daily    [Held by provider] valsartan (DIOVAN) tablet 40 mg  40 mg Oral Daily    [START ON 11/4/2022] aspirin EC tablet 81 mg  81 mg Oral Daily    atorvastatin (LIPITOR) tablet 40 mg  40 mg Oral Nightly    vitamin B-12 (CYANOCOBALAMIN) tablet 500 mcg  500 mcg Oral Daily    metoprolol succinate (TOPROL XL) extended release tablet 50 mg  50 mg Oral Daily    sodium chloride flush 0.9 % injection 5-40 mL  5-40 mL IntraVENous 2 times per day    sodium chloride flush 0.9 % injection 5-40 mL  5-40 mL IntraVENous PRN    0.9 % sodium chloride infusion   IntraVENous PRN    ondansetron (ZOFRAN-ODT) disintegrating tablet 4 mg  4 mg Oral Q8H PRN    Or    ondansetron (ZOFRAN) injection 4 mg  4 mg IntraVENous Q6H PRN    polyethylene glycol (GLYCOLAX) packet 17 g  17 g Oral Daily PRN    acetaminophen (TYLENOL) tablet 650 mg  650 mg Oral Q6H PRN    Or    acetaminophen (TYLENOL) suppository 650 mg  650 mg Rectal Q6H PRN    lactated ringers infusion   IntraVENous Continuous    [START ON 11/4/2022] cefTRIAXone (ROCEPHIN) 1,000 mg in sodium chloride 0.9 % 50 mL IVPB mini-bag  1,000 mg IntraVENous Q24H    melatonin tablet 9 mg  9 mg Oral Nightly    heparin (porcine) injection 5,000 Units  5,000 Units SubCUTAneous q8h       Signed:  JACOB Kang - CNP    Part of this note may have been written by using a voice dictation software. The note has been proof read but may still contain some grammatical/other typographical errors.

## 2022-11-03 NOTE — H&P
Hospitalist History and Physical   Admit Date:  2022 11:25 PM   Name:  Alban North   Age:  67 y.o. Sex:  male  :  1950   MRN:  [id number]     Presenting Complaint: R flank pain  Reason(s) for Admission: Kidney stone [N20.0]  Lactic acid acidosis [E87.20]  UDAY (acute kidney injury) ([facility] [address] [N17.9]     History of Present Illness:   Alban North is a 67 y.o. male with medical history of HTN, DM2, CHF, CAD who presented to ED with R flank pain. Pain radiates to groin. Patient does have h/o kidney stones in the past.  He originally thought he may have twisted his back as he was working on a car yesterday. Upon ER workup, UA indicative of infection. Found to have UDAY with Cr up to 1.8. Lactic acid is 2.6. CT A/P was obtained which shows:  1. No acute process. 2. Colonic diverticulosis. 3. Gallstones. 4. 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 5. Improved groundglass infiltrates in the lung bases, possibly the sequela of   prior viral infection. 6. Prior appendectomy. In radiology findings, it is noted that there is a 10mm stone int he R kidney. Urology was consulted. Urology reports there are only chronic findings, and they do not think they will need to be consulted. Hospitalist to admit. Review of Systems:  10 systems reviewed and negative except as noted in HPI.   Assessment & Plan:   * UDAY (acute kidney injury) ([facility] [address]  Assessment & Plan  - Cr up to 1.8  - IVFs  - Recheck in AM  - Likely due to renal stones  - Holding any potentially nephrotoxic agents    UTI (urinary tract infection)  Assessment & Plan  - UA indicative of UTI  - IV rocephin  - Blood and urine cultures obtained    S/P AVR (aortic valve replacement)  Assessment & Plan  - Of note    CAD (coronary artery disease)  Assessment & Plan  - Of note  - Home meds    Systolic CHF, chronic ([address]  Assessment & Plan  - Unable to see most recent echo results, but in [address], appears that echo from 3/21 showed EF of 30-35%  - Will start gentle IVFs due to UDAY, but will need to monitor for any s/sx of volume overload    Hypertension  Assessment & Plan  - Holding home ARB due to UDAY  - BP stable  - PRN hydralazine    Type 2 diabetes mellitus ([facility] [address]  Assessment & Plan  - Holding home jardiance  - Diabetic diet      Disposition: inpatient    Past medical history reviewed. Past Medical History:   Diagnosis Date    Arthritis     generalized OA    CAD (coronary artery disease) 09/11/2018    NO MI/ NO STENTS-- CABG AND AVR-10/2018- FOLLOWED BY DR Fred Driscoll    CHF (congestive heart failure) ([facility] [address]     last echo-3/2021-- EF30-35%- followed by dr Aravind Darling    DVT (deep venous thrombosis) ([facility] 8/2021    RLE s/p covid    History of kidney stones 2016    naturally passed    History of melanoma 2016    removed from nose    Hx of CABG     Hypercholesterolemia     Hypertension 9/11/2018    CONTROLLED WITH MED    PUD (peptic ulcer disease)     1970's-    S/P AVR (aortic valve replacement)      Past surgical history reviewed. Past Surgical History:   Procedure Laterality Date    AORTIC VALVE REPLACEMENT  10/03/2018    CORONARY ARTERY BYPASS GRAFT X 1, LIMA to the LAD WITH AVR 23 mm Intuity valve    APPENDECTOMY       15 yo    CORONARY ARTERY BYPASS GRAFT  10/03/2018    CABG x1, Dr. Fletcher Katz  2014    right lower ext - broken put in rods and screws    OTHER SURGICAL HISTORY      melanoma surgery to nose    TOTAL HIP ARTHROPLASTY Right 07/2021      Allergies   Allergen Reactions    Hydrocodone-Acetaminophen Other (See Comments)     hallucinations      Social History     Tobacco Use    Smoking status: Never    Smokeless tobacco: Never   Substance Use Topics    Alcohol use:  Yes     Alcohol/week: 2.0 standard drinks     Types: 2 Shots of liquor per week      Family History   Problem Relation Age of Onset    No Known Problems Brother     No Known Problems Father     Heart Disease Brother     Cancer Brother         Leukemia    Alzheimer's Disease Mother     Cancer Mother         Leukemia      Family history reviewed and noncontributory to patient's acute condition; no relevant family history unless otherwise noted above. Immunization History   Administered Date(s) Administered    PPD Test 10/03/2018, 09/29/2021     PTA Medications:  Current Outpatient Medications   Medication Instructions    acetaminophen (TYLENOL) 1,000 mg, Oral, EVERY 6 HOURS PRN    aspirin 81 mg, Oral, DAILY    atorvastatin (LIPITOR) 40 mg, Oral, DAILY    cyanocobalamin 500 mcg, Oral, DAILY    empagliflozin (JARDIANCE) 10 mg, Oral, DAILY    melatonin 10 mg, Oral, NIGHTLY    metoprolol succinate (TOPROL XL) 50 MG extended release tablet TAKE ONE TABLET BY MOUTH ONE TIME DAILY    spironolactone (ALDACTONE) 25 mg, Oral, NIGHTLY PRN    valsartan (DIOVAN) 40 MG tablet TAKE ONE TABLET BY MOUTH ONE TIME DAILY       Objective:   Patient Vitals for the past 24 hrs:   Temp Pulse Resp BP SpO2   11/03/22 0414 97.7 °F (36.5 °C) 83 18 97/66 97 %   11/03/22 0352 -- 85 18 90/69 99 %   11/03/22 0245 -- -- -- 114/66 --   11/03/22 0100 -- -- -- 110/68 --   11/03/22 0030 -- -- -- (!) 90/58 --   11/03/22 0021 -- -- -- -- 97 %   11/03/22 0020 -- -- -- 105/67 --   11/02/22 2339 -- -- -- -- 98 %   11/02/22 2331 -- 98 -- 101/67 --   11/02/22 2137 98 °F (36.7 °C) (!) 128 18 104/76 94 %          Estimated body mass index is 30.55 kg/m² as calculated from the following:    Height as of this encounter: 5' 10.5\" (1.791 m). Weight as of this encounter: 216 lb (98 kg). No intake or output data in the 24 hours ending 11/03/22 7673      Physical Exam:  General:    Well nourished. No overt distress  Head:  Normocephalic, atraumatic  Eyes:  Sclerae appear normal.  Pupils equally round. HENT:  Nares appear normal, no drainage. Moist mucous membranes  Neck:  No restricted ROM. Trachea midline  CV:   RRR. S1/S2 auscultated  Lungs:   CTAB.   No wheezing, rhonchi, or rales. Appears even, unlabored  Abdomen: Bowel sounds present. Soft, nontender, nondistended. Extremities: Warm and dry. No cyanosis or clubbing. No edema. Skin:     No rashes. Normal turgor. Normal coloration  Neuro:  Cranial nerves II-XII grossly intact. Sensation intact  Psych:  Normal mood and affect.   Alert and oriented x3    Data Ordered and Personally Reviewed:    Last 24hr Labs:  Recent Results (from the past 24 hour(s))   CBC with Auto Differential    Collection Time: 11/02/22  9:43 PM   Result Value Ref Range    WBC 3.9 (L) 4.3 - 11.1 K/uL    RBC 4.70 4.23 - 5.6 M/uL    Hemoglobin 13.5 (L) 13.6 - 17.2 g/dL    Hematocrit 42.5 41.1 - 50.3 %    MCV 90.4 82 - 102 FL    MCH 28.7 26.1 - 32.9 PG    MCHC 31.8 31.4 - 35.0 g/dL    RDW 13.2 11.9 - 14.6 %    Platelets 130 062 - 776 K/uL    MPV 10.0 9.4 - 12.3 FL    nRBC 0.00 0.0 - 0.2 K/uL    Seg Neutrophils 94 (H) 43 - 78 %    Lymphocytes 5 (L) 13 - 44 %    Monocytes 1 (L) 4.0 - 12.0 %    Eosinophils % 0 (L) 0.5 - 7.8 %    Basophils 0 0.0 - 2.0 %    Immature Granulocytes 0 0.0 - 5.0 %    Segs Absolute 3.7 1.7 - 8.2 K/UL    Absolute Lymph # 0.2 (L) 0.5 - 4.6 K/UL    Absolute Mono # 0.0 (L) 0.1 - 1.3 K/UL    Absolute Eos # 0.0 0.0 - 0.8 K/UL    Basophils Absolute 0.0 0.0 - 0.2 K/UL    Absolute Immature Granulocyte 0.0 0.0 - 0.5 K/UL    RBC Comment NORMOCYTIC/NORMOCHROMIC      WBC Comment Result Confirmed By Smear      Platelet Comment ADEQUATE      Differential Type AUTOMATED     CMP    Collection Time: 11/02/22  9:43 PM   Result Value Ref Range    Sodium 138 133 - 143 mmol/L    Potassium 3.6 3.5 - 5.1 mmol/L    Chloride 109 101 - 110 mmol/L    CO2 19 (L) 21 - 32 mmol/L    Anion Gap 10 2 - 11 mmol/L    Glucose 142 (H) 65 - 100 mg/dL    BUN 23 8 - 23 MG/DL    Creatinine 1.80 (H) 0.8 - 1.5 MG/DL    Est, Glom Filt Rate 39 (L) >60 ml/min/1.73m2    Calcium 9.1 8.3 - 10.4 MG/DL    Total Bilirubin 1.0 0.2 - 1.1 MG/DL    ALT 19 12 - 65 U/L    AST 17 15 - 37 U/L Alk Phosphatase 56 50 - 136 U/L    Total Protein 7.7 6.3 - 8.2 g/dL    Albumin 3.5 3.2 - 4.6 g/dL    Globulin 4.2 2.8 - 4.5 g/dL    Albumin/Globulin Ratio 0.8 0.4 - 1.6     Lipase    Collection Time: 11/02/22  9:43 PM   Result Value Ref Range    Lipase 134 73 - 393 U/L   POCT Urinalysis no Micro    Collection Time: 11/02/22 11:30 PM   Result Value Ref Range    Specific Gravity, Urine, POC 1.025 (H) 1.001 - 1.023      pH, Urine, POC 5.5 5.0 - 9.0      Protein, Urine, POC TRACE (A) NEG mg/dL    Glucose, UA POC >1,000 (A) NEG mg/dL    Ketones, Urine, POC Negative NEG mg/dL    Bilirubin, Urine, POC Negative NEG      Blood, UA POC LARGE (A) NEG      URINE UROBILINOGEN POC 0.2 0.2 - 1.0 EU/dL    Nitrate, Urine, POC Positive (A) NEG      Leukocyte Est, UA POC SMALL (A) NEG      Performed by: Angelo Vega    Urinalysis w rflx microscopic    Collection Time: 11/02/22 11:53 PM   Result Value Ref Range    Color, UA YELLOW/STRAW      Appearance TURBID      Specific Gravity, UA 1.023 1.001 - 1.023      pH, Urine 5.0 5.0 - 9.0      Protein, UA TRACE (A) NEG mg/dL    Glucose, UA >1000 mg/dL    Ketones, Urine TRACE (A) NEG mg/dL    Bilirubin Urine Negative NEG      Blood, Urine LARGE (A) NEG      Urobilinogen, Urine 0.2 0.2 - 1.0 EU/dL    Nitrite, Urine Positive (A) NEG      Leukocyte Esterase, Urine LARGE (A) NEG     Lactic Acid    Collection Time: 11/03/22 12:45 AM   Result Value Ref Range    Lactic Acid, Plasma 2.6 (H) 0.4 - 2.0 MMOL/L       Signed:  Hina Julio MD

## 2022-11-03 NOTE — ASSESSMENT & PLAN NOTE
- Unable to see most recent echo results, but in [address], appears that echo from 3/21 showed EF of 30-35%  - Will start gentle IVFs due to UDAY, but will need to monitor for any s/sx of volume overload

## 2022-11-03 NOTE — ED PROVIDER NOTES
Emergency Department Provider Note                   PCP:                None Provider               Age: 67 y.o. Sex: male       ICD-10-CM    1. Kidney stone  N20.0       2. Lactic acid acidosis  E87.20           DISPOSITION Admitted 11/03/2022 02:58:40 AM        MDM  Number of Diagnoses or Management Options  Kidney stone: new, needed workup  Lactic acid acidosis: new, needed workup     Amount and/or Complexity of Data Reviewed  Clinical lab tests: ordered and reviewed  Tests in the radiology section of CPT®: ordered and reviewed    Risk of Complications, Morbidity, and/or Mortality  Presenting problems: moderate  Diagnostic procedures: moderate  Management options: moderate    Patient Progress  Patient progress: stable       ED Course as of 11/03/22 0345   Thu Nov 03, 2022   0033 Patient tachycardic, borderline hypotensive. Will evaluate for sepsis urinalysis shows small WBCs and nitrite positive we will give dose of Rocephin. CT renal stone protocol ordered given flank pain. [CJ]   0118 CT scan shows bilateral renal stones with the largest measuring 10 mm. Will reach out to urology. [CJ]   0135 Hematocrit: 42.5 [CJ]   0227 Dr. August Rodriguez urology stated that they would like hospitalist to admit the patient, did not feel the need to continue following patient. Hospitalist reached out for admission.  [CJ]      ED Course User Index  [CJ] Joanna Leyva, APRN - CNP        Orders Placed This Encounter   Procedures    Culture, Urine    Blood Culture 1    Blood Culture 2    CT ABDOMEN PELVIS RENAL STONE    CBC with Auto Differential    CMP    Lipase    Urinalysis w rflx microscopic    Lactic Acid    Diet NPO    POCT Urine Dipstick    POCT Urinalysis no Micro    Saline lock IV    ADMIT TO INPATIENT        Medications   morphine injection 4 mg (4 mg IntraVENous Given 11/3/22 0019)   0.9 % sodium chloride bolus (0 mLs IntraVENous Stopped 11/3/22 0253)   ondansetron (ZOFRAN) injection 4 mg (4 mg IntraVENous Given 11/3/22 0019)   cefTRIAXone (ROCEPHIN) 1,000 mg in sodium chloride 0.9 % 50 mL IVPB mini-bag (0 mg IntraVENous Stopped 11/3/22 0134)       New Prescriptions    No medications on file        Pradip Ulloa is a 67 y.o. male who presents to the Emergency Department with chief complaint of    Chief Complaint   Patient presents with    Flank Pain     Right sided       28-year-old male with past medical history of CAD, CHF, hypertension, renal stone, CABG, presents with right flank pain. He was working on a car earlier this afternoon and he thought he twisted his back, however the pain has remained in his right flank now radiating to his groin. The pain is coming in waves, feels chills but the pain but denies fever. History multiple renal stones. Denies dysuria, frequency, hesitancy.       Abdominal Pain  Pain location:  R flank  Pain quality: cramping and shooting    Pain radiates to:  Groin  Pain severity:  Moderate  Onset quality:  Sudden  Duration:  5 hours  Timing:  Intermittent  Progression:  Waxing and waning  Chronicity:  New  Context: not alcohol use, not awakening from sleep, not diet changes, not eating, not laxative use, not medication withdrawal, not previous surgeries, not recent illness, not recent sexual activity, not recent travel, not retching, not sick contacts, not suspicious food intake and not trauma    Relieved by:  Nothing  Worsened by:  Nothing  Ineffective treatments:  None tried  Associated symptoms: nausea    Associated symptoms: no anorexia, no belching, no chest pain, no chills, no constipation, no cough, no diarrhea, no dysuria, no fatigue, no fever, no flatus, no hematemesis, no hematochezia, no hematuria, no melena, no shortness of breath, no sore throat and no vomiting    Risk factors: no alcohol abuse, no aspirin use, not elderly, has not had multiple surgeries, no NSAID use and not obese        Review of Systems   Constitutional:  Negative for activity change, chills, fatigue and fever. HENT:  Negative for congestion, dental problem, drooling, mouth sores, nosebleeds, sinus pressure, sinus pain and sore throat. Respiratory:  Negative for cough and shortness of breath. Cardiovascular:  Negative for chest pain. Gastrointestinal:  Positive for abdominal pain and nausea. Negative for anorexia, constipation, diarrhea, flatus, hematemesis, hematochezia, melena and vomiting. Genitourinary:  Positive for flank pain. Negative for decreased urine volume, dysuria, hematuria and urgency. Musculoskeletal:  Negative for arthralgias, neck pain and neck stiffness. Skin:  Negative for color change, pallor, rash and wound. Neurological:  Negative for dizziness, weakness, light-headedness, numbness and headaches. Psychiatric/Behavioral:  Negative for agitation, behavioral problems and confusion. All other systems reviewed and are negative.     Past Medical History:   Diagnosis Date    Arthritis     generalized OA    CAD (coronary artery disease) 09/11/2018    NO MI/ NO STENTS-- CABG AND AVR-10/2018- FOLLOWED BY DR Tracie Mcbride    CHF (congestive heart failure) ([facility] [address]     last echo-3/2021-- EF30-35%- followed by dr Benny Atkinson DVT (deep venous thrombosis) ([facility] [address] 8/2021    RLE s/p covid    History of kidney stones 2016    naturally passed    History of melanoma 2016    removed from nose    Hx of CABG     Hypercholesterolemia     Hypertension 9/11/2018    CONTROLLED WITH MED    PUD (peptic ulcer disease)     1970's-    S/P AVR (aortic valve replacement)         Past Surgical History:   Procedure Laterality Date    AORTIC VALVE REPLACEMENT  10/03/2018    CORONARY ARTERY BYPASS GRAFT X 1, LIMA to the LAD WITH AVR 23 mm Intuity valve    APPENDECTOMY       15 yo    CORONARY ARTERY BYPASS GRAFT  10/03/2018    CABG x1, Dr. Miriam Schmitt  2014    right lower ext - broken put in rods and screws    OTHER SURGICAL HISTORY      melanoma surgery to nose  TOTAL HIP ARTHROPLASTY Right 07/2021        Family History   Problem Relation Age of Onset    No Known Problems Brother     No Known Problems Father     Heart Disease Brother     Cancer Brother         Leukemia    Alzheimer's Disease Mother     Cancer Mother         Leukemia        Social History     Socioeconomic History    Marital status:      Spouse name: None    Number of children: None    Years of education: None    Highest education level: None   Tobacco Use    Smoking status: Never    Smokeless tobacco: Never   Vaping Use    Vaping Use: Never used   Substance and Sexual Activity    Alcohol use: Yes     Alcohol/week: 2.0 standard drinks     Types: 2 Shots of liquor per week    Drug use: No         Hydrocodone-acetaminophen     Previous Medications    ACETAMINOPHEN (TYLENOL) 500 MG TABLET    Take 1,000 mg by mouth every 6 hours as needed    ASPIRIN 81 MG EC TABLET    Take 81 mg by mouth daily     ATORVASTATIN (LIPITOR) 40 MG TABLET    Take 40 mg by mouth daily    CYANOCOBALAMIN 1000 MCG TABLET    Take 500 mcg by mouth daily    EMPAGLIFLOZIN (JARDIANCE) 10 MG TABLET    Take 10 mg by mouth daily    MELATONIN 10 MG CAPS CAPSULE    Take 10 mg by mouth nightly     METOPROLOL SUCCINATE (TOPROL XL) 50 MG EXTENDED RELEASE TABLET    TAKE ONE TABLET BY MOUTH ONE TIME DAILY    SPIRONOLACTONE (ALDACTONE) 25 MG TABLET    Take 25 mg by mouth nightly as needed     VALSARTAN (DIOVAN) 40 MG TABLET    TAKE ONE TABLET BY MOUTH ONE TIME DAILY        Vitals signs and nursing note reviewed. Patient Vitals for the past 4 hrs:   BP SpO2   11/03/22 0245 114/66 --   11/03/22 0100 110/68 --   11/03/22 0030 (!) 90/58 --   11/03/22 0021 -- 97 %   11/03/22 0020 105/67 --          Physical Exam  Vitals and nursing note reviewed. Constitutional:       General: He is not in acute distress. Appearance: Normal appearance. He is normal weight. He is not ill-appearing, toxic-appearing or diaphoretic.    HENT: Head: Normocephalic and atraumatic. Right Ear: External ear normal.      Left Ear: External ear normal.      Nose: Nose normal. No congestion or rhinorrhea. Mouth/Throat:      Mouth: Mucous membranes are moist.      Pharynx: No oropharyngeal exudate or posterior oropharyngeal erythema. Eyes:      Extraocular Movements: Extraocular movements intact. Pupils: Pupils are equal, round, and reactive to light. Neck:      Vascular: No carotid bruit. Cardiovascular:      Rate and Rhythm: Normal rate and regular rhythm. Pulses: Normal pulses. Heart sounds: No murmur heard. No friction rub. No gallop. Pulmonary:      Effort: Pulmonary effort is normal. No respiratory distress. Breath sounds: Normal breath sounds. No stridor. No wheezing, rhonchi or rales. Abdominal:      General: Abdomen is flat. There is no distension. Palpations: There is no mass. Tenderness: There is no abdominal tenderness. There is right CVA tenderness. There is no left CVA tenderness, guarding or rebound. Hernia: No hernia is present. Musculoskeletal:         General: No swelling, tenderness, deformity or signs of injury. Normal range of motion. Cervical back: Normal range of motion and neck supple. No rigidity or tenderness. Right lower leg: No edema. Left lower leg: No edema. Lymphadenopathy:      Cervical: No cervical adenopathy. Skin:     General: Skin is warm. Capillary Refill: Capillary refill takes less than 2 seconds. Coloration: Skin is not jaundiced or pale. Findings: No bruising, erythema, lesion or rash. Neurological:      General: No focal deficit present. Mental Status: He is alert and oriented to person, place, and time. Cranial Nerves: No cranial nerve deficit. Sensory: No sensory deficit. Motor: No weakness.       Coordination: Coordination normal.      Gait: Gait normal.      Deep Tendon Reflexes: Reflexes normal. Psychiatric:         Mood and Affect: Mood normal.         Behavior: Behavior normal.         Thought Content: Thought content normal.         Judgment: Judgment normal.        Procedures    Results for orders placed or performed during the hospital encounter of 11/02/22   CT ABDOMEN PELVIS RENAL STONE    Narrative    EXAM: Noncontrast CT abdomen and pelvis. INDICATION: Abdominal pain. COMPARISON: Prior CT chest on October 4, 2021. TECHNIQUE: Axial CT images of the abdomen and pelvis were obtained without IV  contrast. Radiation dose reduction techniques were used for this study. Our CT  scanners use one or all of the following:  Automated exposure control,  adjustment of the mA or kV according to patient size, iterative reconstruction. FINDINGS:    - Liver: Within normal limits. - Gallbladder and bile ducts: There are a few small calcified gallstones in the  gallbladder, with no biliary dilatation or evidence of acute cholecystitis. - Spleen: Within normal limits. - Urinary tract: There are bilateral kidney stones, the largest on the right  measuring 10 mm in diameter. There is mild right kidney atrophy and collecting  system dilatation, to the level of the ureteropelvic junction, which was  partially visualized on the prior CT chest and may relate to a chronic UPJ  obstruction. No ureter stone is visualized. The bladder is within normal limits. - Adrenals: Within normal limits. - Pancreas: Within normal limits. - Gastrointestinal tract: There is colonic diverticulosis, with no bowel  obstruction or acute diverticulitis. The small bowel loops are within normal  limits. The appendix is absent.  - Retroperitoneum: Moderate abdominal aortic atherosclerosis, with no aneurysmal  dilatation.  - Peritoneal cavity and abdominal wall: No ascites or free intraperitoneal air.  - Pelvis: There is a fat-containing left internal hernia. - Spine/bones: No acute process.  There has been a right hip arthroplasty. - Other comments: There are groundglass opacities in the lung bases,  significantly improved from the prior CT chest, possibly the sequela of viral  pneumonia. Impression    1. No acute process. 2. Colonic diverticulosis. 3. Gallstones. 4. 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 5. Improved groundglass infiltrates in the lung bases, possibly the sequela of  prior viral infection. 6. Prior appendectomy.      CBC with Auto Differential   Result Value Ref Range    WBC 3.9 (L) 4.3 - 11.1 K/uL    RBC 4.70 4.23 - 5.6 M/uL    Hemoglobin 13.5 (L) 13.6 - 17.2 g/dL    Hematocrit 42.5 41.1 - 50.3 %    MCV 90.4 82 - 102 FL    MCH 28.7 26.1 - 32.9 PG    MCHC 31.8 31.4 - 35.0 g/dL    RDW 13.2 11.9 - 14.6 %    Platelets 671 812 - 535 K/uL    MPV 10.0 9.4 - 12.3 FL    nRBC 0.00 0.0 - 0.2 K/uL    Seg Neutrophils 94 (H) 43 - 78 %    Lymphocytes 5 (L) 13 - 44 %    Monocytes 1 (L) 4.0 - 12.0 %    Eosinophils % 0 (L) 0.5 - 7.8 %    Basophils 0 0.0 - 2.0 %    Immature Granulocytes 0 0.0 - 5.0 %    Segs Absolute 3.7 1.7 - 8.2 K/UL    Absolute Lymph # 0.2 (L) 0.5 - 4.6 K/UL    Absolute Mono # 0.0 (L) 0.1 - 1.3 K/UL    Absolute Eos # 0.0 0.0 - 0.8 K/UL    Basophils Absolute 0.0 0.0 - 0.2 K/UL    Absolute Immature Granulocyte 0.0 0.0 - 0.5 K/UL    RBC Comment NORMOCYTIC/NORMOCHROMIC      WBC Comment Result Confirmed By Smear      Platelet Comment ADEQUATE      Differential Type AUTOMATED     CMP   Result Value Ref Range    Sodium 138 133 - 143 mmol/L    Potassium 3.6 3.5 - 5.1 mmol/L    Chloride 109 101 - 110 mmol/L    CO2 19 (L) 21 - 32 mmol/L    Anion Gap 10 2 - 11 mmol/L    Glucose 142 (H) 65 - 100 mg/dL    BUN 23 8 - 23 MG/DL    Creatinine 1.80 (H) 0.8 - 1.5 MG/DL    Est, Glom Filt Rate 39 (L) >60 ml/min/1.73m2    Calcium 9.1 8.3 - 10.4 MG/DL    Total Bilirubin 1.0 0.2 - 1.1 MG/DL    ALT 19 12 - 65 U/L    AST 17 15 - 37 U/L    Alk Phosphatase 56 50 - 136 U/L    Total Protein 7.7 6.3 - 8.2 g/dL    Albumin 3.5 3.2 - 4.6 g/dL    Globulin 4.2 2.8 - 4.5 g/dL    Albumin/Globulin Ratio 0.8 0.4 - 1.6     Lipase   Result Value Ref Range    Lipase 134 73 - 393 U/L   Urinalysis w rflx microscopic   Result Value Ref Range    Color, UA YELLOW/STRAW      Appearance TURBID      Specific Gravity, UA 1.023 1.001 - 1.023      pH, Urine 5.0 5.0 - 9.0      Protein, UA TRACE (A) NEG mg/dL    Glucose, UA >1000 mg/dL    Ketones, Urine TRACE (A) NEG mg/dL    Bilirubin Urine Negative NEG      Blood, Urine LARGE (A) NEG      Urobilinogen, Urine 0.2 0.2 - 1.0 EU/dL    Nitrite, Urine Positive (A) NEG      Leukocyte Esterase, Urine LARGE (A) NEG     Lactic Acid   Result Value Ref Range    Lactic Acid, Plasma 2.6 (H) 0.4 - 2.0 MMOL/L   POCT Urinalysis no Micro   Result Value Ref Range    Specific Gravity, Urine, POC 1.025 (H) 1.001 - 1.023      pH, Urine, POC 5.5 5.0 - 9.0      Protein, Urine, POC TRACE (A) NEG mg/dL    Glucose, UA POC >1,000 (A) NEG mg/dL    Ketones, Urine, POC Negative NEG mg/dL    Bilirubin, Urine, POC Negative NEG      Blood, UA POC LARGE (A) NEG      URINE UROBILINOGEN POC 0.2 0.2 - 1.0 EU/dL    Nitrate, Urine, POC Positive (A) NEG      Leukocyte Est, UA POC SMALL (A) NEG      Performed by: [facility]         CT ABDOMEN PELVIS RENAL STONE   Final Result   1. No acute process. 2. Colonic diverticulosis. 3. Gallstones. 4. 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 5. Improved groundglass infiltrates in the lung bases, possibly the sequela of   prior viral infection. 6. Prior appendectomy. Voice dictation software was used during the making of this note. This software is not perfect and grammatical and other typographical errors may be present. This note has not been completely proofread for errors.      Suresh Madden, APRN - CNP  11/03/22 0345

## 2022-11-03 NOTE — ED TRIAGE NOTES
Patient to triage with c/o lower back pain after climbing up under the elise of a truck and thought he twisted his back. Patient states he may have a kidney stone on the right side. States at first he thought he twisted his back but the pain has now moved to the front.

## 2022-11-03 NOTE — ED NOTES
Pt received room assignment. Report called to accepting RN, Jazmin Dodge. RN to transport pt to admitted room.       Love Flannery RN  11/03/22 5915

## 2022-11-03 NOTE — ASSESSMENT & PLAN NOTE
- Cr up to 1.8  - IVFs  - Recheck in AM  - Likely due to renal stones  - Holding any potentially nephrotoxic agents

## 2022-11-04 LAB
ANION GAP SERPL CALC-SCNC: 10 MMOL/L (ref 2–11)
BASOPHILS # BLD: 0 K/UL (ref 0–0.2)
BASOPHILS NFR BLD: 0 % (ref 0–2)
BUN SERPL-MCNC: 24 MG/DL (ref 8–23)
CALCIUM SERPL-MCNC: 8.4 MG/DL (ref 8.3–10.4)
CHLORIDE SERPL-SCNC: 105 MMOL/L (ref 101–110)
CO2 SERPL-SCNC: 22 MMOL/L (ref 21–32)
CREAT SERPL-MCNC: 1.4 MG/DL (ref 0.8–1.5)
DIFFERENTIAL METHOD BLD: ABNORMAL
EOSINOPHIL # BLD: 0.1 K/UL (ref 0–0.8)
EOSINOPHIL NFR BLD: 1 % (ref 0.5–7.8)
ERYTHROCYTE [DISTWIDTH] IN BLOOD BY AUTOMATED COUNT: 13.6 % (ref 11.9–14.6)
GLUCOSE BLD STRIP.AUTO-MCNC: 111 MG/DL (ref 65–100)
GLUCOSE BLD STRIP.AUTO-MCNC: 112 MG/DL (ref 65–100)
GLUCOSE BLD STRIP.AUTO-MCNC: 141 MG/DL (ref 65–100)
GLUCOSE BLD STRIP.AUTO-MCNC: 145 MG/DL (ref 65–100)
GLUCOSE SERPL-MCNC: 109 MG/DL (ref 65–100)
HCT VFR BLD AUTO: 35.2 % (ref 41.1–50.3)
HGB BLD-MCNC: 11.2 G/DL (ref 13.6–17.2)
IMM GRANULOCYTES # BLD AUTO: 0.1 K/UL (ref 0–0.5)
IMM GRANULOCYTES NFR BLD AUTO: 1 % (ref 0–5)
LYMPHOCYTES # BLD: 1.2 K/UL (ref 0.5–4.6)
LYMPHOCYTES NFR BLD: 9 % (ref 13–44)
MCH RBC QN AUTO: 29.5 PG (ref 26.1–32.9)
MCHC RBC AUTO-ENTMCNC: 31.8 G/DL (ref 31.4–35)
MCV RBC AUTO: 92.6 FL (ref 82–102)
MONOCYTES # BLD: 0.5 K/UL (ref 0.1–1.3)
MONOCYTES NFR BLD: 4 % (ref 4–12)
NEUTS SEG # BLD: 11.2 K/UL (ref 1.7–8.2)
NEUTS SEG NFR BLD: 85 % (ref 43–78)
NRBC # BLD: 0 K/UL (ref 0–0.2)
PLATELET # BLD AUTO: 110 K/UL (ref 150–450)
PMV BLD AUTO: 11.2 FL (ref 9.4–12.3)
POTASSIUM SERPL-SCNC: 3.9 MMOL/L (ref 3.5–5.1)
RBC # BLD AUTO: 3.8 M/UL (ref 4.23–5.6)
SERVICE CMNT-IMP: ABNORMAL
SODIUM SERPL-SCNC: 137 MMOL/L (ref 133–143)
WBC # BLD AUTO: 13.1 K/UL (ref 4.3–11.1)

## 2022-11-04 PROCEDURE — 6360000002 HC RX W HCPCS

## 2022-11-04 PROCEDURE — 97165 OT EVAL LOW COMPLEX 30 MIN: CPT

## 2022-11-04 PROCEDURE — 2580000003 HC RX 258: Performed by: FAMILY MEDICINE

## 2022-11-04 PROCEDURE — 6360000002 HC RX W HCPCS: Performed by: NURSE PRACTITIONER

## 2022-11-04 PROCEDURE — 97535 SELF CARE MNGMENT TRAINING: CPT

## 2022-11-04 PROCEDURE — 97161 PT EVAL LOW COMPLEX 20 MIN: CPT

## 2022-11-04 PROCEDURE — 6370000000 HC RX 637 (ALT 250 FOR IP): Performed by: FAMILY MEDICINE

## 2022-11-04 PROCEDURE — 80048 BASIC METABOLIC PNL TOTAL CA: CPT

## 2022-11-04 PROCEDURE — 6360000002 HC RX W HCPCS: Performed by: STUDENT IN AN ORGANIZED HEALTH CARE EDUCATION/TRAINING PROGRAM

## 2022-11-04 PROCEDURE — 36415 COLL VENOUS BLD VENIPUNCTURE: CPT

## 2022-11-04 PROCEDURE — 1100000000 HC RM PRIVATE

## 2022-11-04 PROCEDURE — 97116 GAIT TRAINING THERAPY: CPT

## 2022-11-04 PROCEDURE — 6370000000 HC RX 637 (ALT 250 FOR IP): Performed by: NURSE PRACTITIONER

## 2022-11-04 PROCEDURE — 82962 GLUCOSE BLOOD TEST: CPT

## 2022-11-04 PROCEDURE — 85025 COMPLETE CBC W/AUTO DIFF WBC: CPT

## 2022-11-04 PROCEDURE — 6360000002 HC RX W HCPCS: Performed by: FAMILY MEDICINE

## 2022-11-04 PROCEDURE — 2580000003 HC RX 258: Performed by: NURSE PRACTITIONER

## 2022-11-04 PROCEDURE — 87040 BLOOD CULTURE FOR BACTERIA: CPT

## 2022-11-04 RX ORDER — INSULIN LISPRO 100 [IU]/ML
0-4 INJECTION, SOLUTION INTRAVENOUS; SUBCUTANEOUS NIGHTLY
Status: DISCONTINUED | OUTPATIENT
Start: 2022-11-04 | End: 2022-11-05

## 2022-11-04 RX ORDER — INSULIN LISPRO 100 [IU]/ML
0-8 INJECTION, SOLUTION INTRAVENOUS; SUBCUTANEOUS
Status: DISCONTINUED | OUTPATIENT
Start: 2022-11-04 | End: 2022-11-05

## 2022-11-04 RX ADMIN — CEFEPIME 2000 MG: 2 INJECTION, POWDER, FOR SOLUTION INTRAVENOUS at 14:39

## 2022-11-04 RX ADMIN — CYANOCOBALAMIN TAB 1000 MCG 500 MCG: 1000 TAB at 08:47

## 2022-11-04 RX ADMIN — SODIUM CHLORIDE, PRESERVATIVE FREE 10 ML: 5 INJECTION INTRAVENOUS at 08:47

## 2022-11-04 RX ADMIN — SODIUM CHLORIDE, POTASSIUM CHLORIDE, SODIUM LACTATE AND CALCIUM CHLORIDE: 600; 310; 30; 20 INJECTION, SOLUTION INTRAVENOUS at 11:24

## 2022-11-04 RX ADMIN — MORPHINE SULFATE 4 MG: 4 INJECTION INTRAVENOUS at 15:19

## 2022-11-04 RX ADMIN — ATORVASTATIN CALCIUM 40 MG: 40 TABLET, FILM COATED ORAL at 21:08

## 2022-11-04 RX ADMIN — Medication 9 MG: at 21:08

## 2022-11-04 RX ADMIN — CEFEPIME 2000 MG: 2 INJECTION, POWDER, FOR SOLUTION INTRAVENOUS at 21:08

## 2022-11-04 RX ADMIN — ENOXAPARIN SODIUM 40 MG: 100 INJECTION SUBCUTANEOUS at 21:08

## 2022-11-04 RX ADMIN — SODIUM CHLORIDE, PRESERVATIVE FREE 5 ML: 5 INJECTION INTRAVENOUS at 21:09

## 2022-11-04 RX ADMIN — ASPIRIN 81 MG: 81 TABLET ORAL at 08:47

## 2022-11-04 RX ADMIN — CEFTRIAXONE 1000 MG: 1 INJECTION, POWDER, FOR SOLUTION INTRAMUSCULAR; INTRAVENOUS at 01:18

## 2022-11-04 ASSESSMENT — PAIN DESCRIPTION - DESCRIPTORS: DESCRIPTORS: ACHING

## 2022-11-04 ASSESSMENT — PAIN SCALES - GENERAL
PAINLEVEL_OUTOF10: 0
PAINLEVEL_OUTOF10: 0

## 2022-11-04 ASSESSMENT — PAIN DESCRIPTION - ORIENTATION: ORIENTATION: OTHER (COMMENT)

## 2022-11-04 ASSESSMENT — PAIN DESCRIPTION - LOCATION: LOCATION: OTHER (COMMENT)

## 2022-11-04 NOTE — PROGRESS NOTES
ACUTE OCCUPATIONAL THERAPY GOALS:   (Developed with and agreed upon by patient and/or caregiver.)  1. Patient will complete lower body bathing and dressing with MOD I and adaptive equipment as needed. 2.Patient will complete upper body bathing and dressing with MOD I and adaptive equipment as needed. 3. Patient will complete toileting with MOD I.   4. Patient will tolerate 30 minutes of OT treatment with 1-2 rest breaks to increase activity tolerance for ADLs. 5. Patient will complete functional transfers with MOD I and adaptive equipment as needed. 6. Patient will complete functional activity with MOD I and adaptive equipment as needed. Timeframe: 7 visits      ALL GOALS MET 11/4/22  OCCUPATIONAL THERAPY Initial Assessment and Daily Note       OT Visit Days: 1  Acknowledge Orders  Time  OT Charge Capture  Rehab Caseload Tracker      Cassie Villalta is a 67 y.o. male   PRIMARY DIAGNOSIS: UDAY (acute kidney injury) ([facility] [address]  Kidney stone [N20.0]  Lactic acid acidosis [E87.20]  UDAY (acute kidney injury) ([facility] [address] [N17.9]       Reason for Referral: Generalized Muscle Weakness (M62.81)  Other lack of cordination (R27.8)  Difficulty in walking, Not elsewhere classified (R26.2)  Inpatient: Payor: Arpita Mei / Plan: [employer] / Product Type: *No Product type* /     ASSESSMENT:     REHAB RECOMMENDATIONS:   Recommendation to date pending progress:  Setting:  No further skilled therapy after discharge from hospital    Equipment:    None     ASSESSMENT:  Mr. Rd Reveles presented to the hospital with R sided flank pain. At baseline, pt lives alone and is independent for all ADLs/IADLs. 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 Today, pt was received supine in bed. Completed bed mobility, LB dressing, functional transfers, ambulation without AD, toileting, and grooming tasks standing at the sink independently. Pt reported being up ad lesley in the room with no mobility or self-care concerns. Following treatment session, Ray Gonzalez was determined to be at their functional baseline with no need for acute OT services. Will d/c OT orders--please re-consult if there are any changes in their functional mobility status. [address] [id number] AM-PAC 6 Clicks Daily Activity Inpatient Short Form:    AM-PAC Daily Activity Inpatient   How much help for putting on and taking off regular lower body clothing?: None  How much help for Bathing?: None  How much help for Toileting?: None  How much help for putting on and taking off regular upper body clothing?: None  How much help for taking care of personal grooming?: None  How much help for eating meals?: None  AM-PAC Inpatient Daily Activity Raw Score: 24  AM-PAC Inpatient ADL T-Scale Score : 57.54  ADL Inpatient CMS 0-100% Score: 0  ADL Inpatient CMS G-Code Modifier : CH      SUBJECTIVE:     Mr. Shannan De Los Santos states, \"I used to chicken fish. 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, owns a cane/RW/shower chair from previous hip replacement but was not using PTA    OBJECTIVE:     Syeda Arita / Chapis Villegas / Gabby Ackermangh: NA    RESTRICTIONS/PRECAUTIONS:       PAIN: VITALS / O2:   Pre Treatment:   Pain Assessment: None - Denies Pain      Post Treatment: no change        Vitals          Oxygen            GROSS EVALUATION: INTACT IMPAIRED   (See Comments)   UE AROM [x] []   UE PROM [x] []   Strength [x]       Posture / Balance [x]     Sensation [x]     Coordination [x]       Tone [x]       Edema [x]    Activity Tolerance [x]       Hand Dominance R [] L []      COGNITION/  PERCEPTION: INTACT IMPAIRED   (See Comments)   Orientation [x]     Vision [x]     Hearing [x]     Cognition  [x]     Perception [x]       MOBILITY: I Mod I S SBA CGA Min Mod Max Total  NT x2 Comments:   Bed Mobility    Rolling [] [] [] [] [] [] [] [] [] [x] []    Supine to Sit [x] [] [] [] [] [] [] [] [] [] []    Scooting [x] [] [] [] [] [] [] [] [] [] []    Sit to Supine [] [] [] [] [] [] [] [] [] [x] [] Left sitting in the chair    Transfers    Sit to Stand [x] [] [] [] [] [] [] [] [] [] []    Bed to Chair [x] [] [] [] [] [] [] [] [] [] [] No AD   Stand to Sit [x] [] [] [] [] [] [] [] [] [] []    Tub/Shower [] [] [] [] [] [] [] [] [] [x] []     Toilet [x] [] [] [] [] [] [] [] [] [] []      [] [] [] [] [] [] [] [] [] [] []    I=Independent, Mod I=Modified Independent, S=Supervision/Setup, SBA=Standby Assistance, CGA=Contact Guard Assistance, Min=Minimal Assistance, Mod=Moderate Assistance, Max=Maximal Assistance, Total=Total Assistance, NT=Not Tested    ACTIVITIES OF DAILY LIVING: I Mod I S SBA CGA Min Mod Max Total NT Comments   BASIC ADLs:              Upper Body Bathing  [] [] [] [] [] [] [] [] [] [x]    Lower Body Bathing [] [] [] [] [] [] [] [] [] [x]    Toileting [x] [] [] [] [] [] [] [] [] []    Upper Body Dressing [x] [] [] [] [] [] [] [] [] [] gown   Lower Body Dressing [x] [] [] [] [] [] [] [] [] [] Socks    Feeding [] [] [] [] [] [] [] [] [] [x]    Grooming [x] [] [] [] [] [] [] [] [] [] Washed hands/face and brushed teeth standing at the sink   Personal Device Care [] [] [] [] [] [] [] [] [] [x]    Functional Mobility [x] [] [] [] [] [] [] [] [] [] No AD   I=Independent, Mod I=Modified Independent, S=Supervision/Setup, SBA=Standby Assistance, CGA=Contact Guard Assistance, Min=Minimal Assistance, Mod=Moderate Assistance, Max=Maximal Assistance, Total=Total Assistance, NT=Not Tested    PLAN:   FREQUENCY/DURATION   OT Plan of Care:  (eval, tx, d/c) for duration of hospital stay or until stated goals are met, whichever comes first.    PROBLEM LIST:   (Skilled intervention is medically necessary to address:)  No skilled intervention medically necessary INTERVENTIONS PLANNED:  (Benefits and precautions of occupational therapy have been discussed with the patient.)  Self Care Training  No further interventions planned following today's treatment session         TREATMENT:     EVALUATION: LOW COMPLEXITY: (Untimed Charge)    TREATMENT:   Co-Treatment PT/OT necessary due to patient's decreased overall endurance/tolerance levels, as well as need for high level skilled assistance to complete functional transfers/mobility and functional tasks  Self Care (17 minutes): Patient participated in toileting, upper body dressing, lower body dressing, and grooming ADLs in unsupported sitting and standing with no verbal cueing to increase independence, decrease assistance required, increase activity tolerance, and increase safety awareness. Patient also participated in functional mobility and functional transfer training to increase independence, decrease assistance required, increase activity tolerance, and increase safety awareness. TREATMENT GRID:  N/A    AFTER TREATMENT PRECAUTIONS: Call light within reach, Chair, Needs within reach, and RN notified    INTERDISCIPLINARY COLLABORATION:  RN/ PCT, PT/ PTA, and OT/ AGUIRRE    EDUCATION:  Education Given To: Patient  Education Provided: Role of Therapy;Plan of Care; Fall Prevention Strategies  Education Outcome: Verbalized understanding;Demonstrated understanding    TOTAL TREATMENT DURATION AND TIME:  Time In: 0912  Time Out: 9139  Minutes: Feng Luo 1634, OT

## 2022-11-04 NOTE — CARE COORDINATION
This CM met with pt this day to complete assessment. Pt verified his insurance, emergency contact, and home address. He reports no difficulty obtaining his medications in the community. He lives at home alone with 2 steps to enter. His home DME includes a cane and a walker. He confirms that at baseline prior to admission he is independent with his ADLs including bathing, dressing, cooking, and driving. We discussed discharge planning this day. Pt plans on returning home with support from family when medically stable. Pt does not have a PCP at this time. His previous PCP is too far for him. He is interested in [facility] and was agreeable for this CM to make a hospital follow up appointment for him. Called office and arranged follow up appointment - details placed in appointment follow up section of AVS.     No additional CM needs at this time. Will continue to monitor and update as needed.         11/04/22 3197   Service Assessment   Patient Orientation Alert and Oriented   Cognition Alert   History Provided By Patient   Primary [address]   PCP Verified by CM Yes  (Pt does not presently have a PCP)   Last Visit to PCP Within last year   Prior Functional Level Independent in ADLs/IADLs   Current Functional Level Independent in ADLs/IADLs   Can patient return to prior living arrangement Yes   Ability to make needs known: Good   Family able to assist with home care needs: Yes   Social/Functional History   Lives With Alone   Type of [address] One level  (2 steps to enter)   Bathroom Shower/Tub Tub/Shower unit   ADL [address] [facility]   Discharge Planning   Type of Residence House   Living Arrangements Alone   Condition of Participation: Discharge Planning   The Plan for Transition of Care is related to the following treatment goals: Home

## 2022-11-04 NOTE — PROGRESS NOTES
Hospitalist Progress Note   Admit Date:  2022 11:25 PM   Name:  Navid Montanez   Age:  67 y.o. Sex:  male  :  1950   MRN:  [id number]   Room:  [place] [address]    Presenting Complaint: Flank Pain (Right sided )     Reason(s) for Admission: Kidney stone [N20.0]  Lactic acid acidosis [E87.20]  UDAY (acute kidney injury) [facility]) [N17.9]     Hospital Course:   Navid Montanez is a 67 y.o. male with medical history of HTN, DM2, CHF, CAD, kidney stones, who presented to ED with R flank pain that radiates to groin. He originally thought he may have twisted his back as he was working on a car yesterday, but started to consider a stone when back pain did not improved and was similar to the pain he would have with his stones. ER workup: UA indicative of infection. Found to have UDAY with Cr up to 1.8. Lactic acid is 2.6. CT A/P was obtained which shows:  1. No acute process. 2. Colonic diverticulosis. 3. Gallstones. 4. 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 5. Improved groundglass infiltrates in the lung bases, possibly the sequela of   prior viral infection. 6. Prior appendectomy. Also noted with b/l kidney stones with largest measuring 10 mm on right and chronic UPJ obstruction. Normal bladder    Subjective & 24hr Events (22):   11/3/22 Denies back pain at this time. Denies dysuria, N/V, chills, fever. Reports long history of kidney stones and usually passes them without issue. 22 No significant issues overnight. Reports some continued right sided low back pain with movement       Assessment & Plan:     Principal Problem:    Ecoli/Enterobacteriaceae Bacteremia   22  -Likely from UTI.  Ucx positive for GNR  -Changed abx to Cefepime  -Repeat Bcx pending       UADY (acute kidney injury) (HCC)(resolved)  Plan:   22  -Cr 1.40  -Avoid nephrotoxics as able     Active Problems:    UTI (urinary tract infection)  Plan:   22  -Abx changed to Cefepime    Low back pain  -B/L kidney stones  -Improved per patient. Likely a MSK from twisting injury while working on car vs UTI or stone  -Prn analgesia  -CT abd with no acute spinal process  -Urology consulted with no plans for surgical intervention. Reports findings on CT are chronic  11/4/22  -improving per patient   -OT with no further recs      Type 2 diabetes mellitus ([facility] [address]  Plan:   -Accu checks w/ SSI  -ranges acceptable       Hypertension  Plan:   11/4/22  -BP soft  -holding BB today      Systolic CHF, chronic ([facility] [address]  Plan:   11/4/22  -Stable  -Continue BB tomorrow  -Fluids discontinued      CAD (coronary artery disease)     S/P AVR (aortic valve replacement)  Plan:   -Denies CP  -Continue ASA/Statin           Anticipated discharge needs:      2-3 days     Diet:  ADULT DIET; Regular; 4 carb choices (60 gm/meal)  DVT PPx: heparin   Code status: Full Code    Hospital Problems:  Principal Problem:    [facility]  Active Problems:    UTI (urinary tract infection)    Type 2 diabetes mellitus ([facility] [address]    Hypertension    Systolic CHF, chronic (HCC)    CAD (coronary artery disease)    S/P AVR (aortic valve replacement)  Resolved Problems:    * No resolved hospital problems. *      Objective:   Patient Vitals for the past 24 hrs:   Temp Pulse Resp BP SpO2   11/04/22 1115 98.1 °F (36.7 °C) 82 18 104/73 95 %   11/04/22 0744 98.2 °F (36.8 °C) 79 20 102/69 95 %   11/04/22 0431 98.2 °F (36.8 °C) 70 21 91/61 94 %   11/04/22 0015 98.2 °F (36.8 °C) 83 17 101/68 96 %   11/03/22 1935 98.2 °F (36.8 °C) 78 18 88/62 92 %   11/03/22 1533 98.7 °F (37.1 °C) 80 16 103/66 95 %       Oxygen Therapy  SpO2: 95 %  Pulse Oximeter Device Mode: Intermittent  O2 Device: None (Room air)    Estimated body mass index is 30.55 kg/m² as calculated from the following:    Height as of this encounter: 5' 10.5\" (1.791 m). Weight as of this encounter: 216 lb (98 kg).     Intake/Output Summary (Last 24 hours) at 11/4/2022 1318  Last data filed at 11/4/2022 0917  Gross per 24 hour   Intake 1898 ml   Output 1175 ml   Net 723 ml         Physical Exam:     Blood pressure 104/73, pulse 82, temperature 98.1 °F (36.7 °C), temperature source Oral, resp. rate 18, height 5' 10.5\" (1.791 m), weight 216 lb (98 kg), SpO2 95 %. General:    Well nourished. Head:  Normocephalic, atraumatic  Eyes:  Sclerae appear normal.  Pupils equally round. ENT:  Nares appear normal, no drainage. Moist oral mucosa  Neck:  No restricted ROM. Trachea midline   CV:   RRR. No m/r/g. No jugular venous distension. Lungs:   CTAB. No wheezing, rhonchi, or rales. Symmetric expansion. Abdomen: Bowel sounds present. Soft, nontender, nondistended. Extremities: No cyanosis or clubbing. No edema  Skin:     No rashes and normal coloration. Warm and dry. Neuro:  CN II-XII grossly intact. Sensation intact. A&Ox3  Psych:  Normal mood and affect.       I have personally reviewed labs and tests showing:  Recent Labs:  Recent Results (from the past 48 hour(s))   CBC with Auto Differential    Collection Time: 11/02/22  9:43 PM   Result Value Ref Range    WBC 3.9 (L) 4.3 - 11.1 K/uL    RBC 4.70 4.23 - 5.6 M/uL    Hemoglobin 13.5 (L) 13.6 - 17.2 g/dL    Hematocrit 42.5 41.1 - 50.3 %    MCV 90.4 82 - 102 FL    MCH 28.7 26.1 - 32.9 PG    MCHC 31.8 31.4 - 35.0 g/dL    RDW 13.2 11.9 - 14.6 %    Platelets 795 718 - 341 K/uL    MPV 10.0 9.4 - 12.3 FL    nRBC 0.00 0.0 - 0.2 K/uL    Seg Neutrophils 94 (H) 43 - 78 %    Lymphocytes 5 (L) 13 - 44 %    Monocytes 1 (L) 4.0 - 12.0 %    Eosinophils % 0 (L) 0.5 - 7.8 %    Basophils 0 0.0 - 2.0 %    Immature Granulocytes 0 0.0 - 5.0 %    Segs Absolute 3.7 1.7 - 8.2 K/UL    Absolute Lymph # 0.2 (L) 0.5 - 4.6 K/UL    Absolute Mono # 0.0 (L) 0.1 - 1.3 K/UL    Absolute Eos # 0.0 0.0 - 0.8 K/UL    Basophils Absolute 0.0 0.0 - 0.2 K/UL    Absolute Immature Granulocyte 0.0 0.0 - 0.5 K/UL    RBC Comment NORMOCYTIC/NORMOCHROMIC      WBC Comment Result Confirmed By Smear      Platelet Comment ADEQUATE      Differential Type AUTOMATED     CMP    Collection Time: 11/02/22  9:43 PM   Result Value Ref Range    Sodium 138 133 - 143 mmol/L    Potassium 3.6 3.5 - 5.1 mmol/L    Chloride 109 101 - 110 mmol/L    CO2 19 (L) 21 - 32 mmol/L    Anion Gap 10 2 - 11 mmol/L    Glucose 142 (H) 65 - 100 mg/dL    BUN 23 8 - 23 MG/DL    Creatinine 1.80 (H) 0.8 - 1.5 MG/DL    Est, Glom Filt Rate 39 (L) >60 ml/min/1.73m2    Calcium 9.1 8.3 - 10.4 MG/DL    Total Bilirubin 1.0 0.2 - 1.1 MG/DL    ALT 19 12 - 65 U/L    AST 17 15 - 37 U/L    Alk Phosphatase 56 50 - 136 U/L    Total Protein 7.7 6.3 - 8.2 g/dL    Albumin 3.5 3.2 - 4.6 g/dL    Globulin 4.2 2.8 - 4.5 g/dL    Albumin/Globulin Ratio 0.8 0.4 - 1.6     Lipase    Collection Time: 11/02/22  9:43 PM   Result Value Ref Range    Lipase 134 73 - 393 U/L   POCT Urinalysis no Micro    Collection Time: 11/02/22 11:30 PM   Result Value Ref Range    Specific Gravity, Urine, POC 1.025 (H) 1.001 - 1.023      pH, Urine, POC 5.5 5.0 - 9.0      Protein, Urine, POC TRACE (A) NEG mg/dL    Glucose, UA POC >1,000 (A) NEG mg/dL    Ketones, Urine, POC Negative NEG mg/dL    Bilirubin, Urine, POC Negative NEG      Blood, UA POC LARGE (A) NEG      URINE UROBILINOGEN POC 0.2 0.2 - 1.0 EU/dL    Nitrate, Urine, POC Positive (A) NEG      Leukocyte Est, UA POC SMALL (A) NEG      Performed by: Dustin Lees    Urinalysis w rflx microscopic    Collection Time: 11/02/22 11:53 PM   Result Value Ref Range    Color, UA YELLOW/STRAW      Appearance TURBID      Specific Gravity, UA 1.023 1.001 - 1.023      pH, Urine 5.0 5.0 - 9.0      Protein, UA TRACE (A) NEG mg/dL    Glucose, UA >1000 mg/dL    Ketones, Urine TRACE (A) NEG mg/dL    Bilirubin Urine Negative NEG      Blood, Urine LARGE (A) NEG      Urobilinogen, Urine 0.2 0.2 - 1.0 EU/dL    Nitrite, Urine Positive (A) NEG      Leukocyte Esterase, Urine LARGE (A) NEG     Culture, Urine    Collection (Group B) NOT DETECTED NOTDET      Strep pneumoniae NOT DETECTED NOTDET      Strep pyogenes,(Grp. A) NOT DETECTED NOTDET      Acinetobacter calcoac baumannii complex by PCR NOT DETECTED NOTDET      Bacteroides fragilis by PCR NOT DETECTED NOTDET      Enterobacteriaceae by PCR Detected (A) NOTDET      Enterobacter cloacae complex by PCR NOT DETECTED NOTDET      Escherichia Coli Detected (A) NOTDET      Klebsiella aerogenes by PCR NOT DETECTED NOTDET      Klebsiella oxytoca by PCR NOT DETECTED NOTDET      Klebsiella pneumoniae group by PCR NOT DETECTED NOTDET      Proteus by PCR NOT DETECTED NOTDET      Salmonella species by PCR NOT DETECTED NOTDET      Serratia marcescens by PCR NOT DETECTED NOTDET      Haemophilus Influenzae by PCR NOT DETECTED NOTDET      Neisseria meningitidis by PCR NOT DETECTED NOTDET      Pseudomonas aeruginosa NOT DETECTED NOTDET      Stenotrophomonas maltophilia by PCR NOT DETECTED NOTDET      Candida albicans by PCR NOT DETECTED NOTDET      Candida auris by PCR NOT DETECTED NOTDET      Candida glabrata NOT DETECTED NOTDET      Candida krusei by PCR NOT DETECTED NOTDET      Candida parapsilosis by PCR NOT DETECTED NOTDET      Candida tropicalis by PCR NOT DETECTED NOTDET      Cryptococcus neoformans/gattii by PCR NOT DETECTED NOTDET      Resistant gene targets          Resistant gene ctx-m by PCR NOT DETECTED NOTDET      Resistant gene imp by PCR NOT DETECTED NOTDET      KPC (CARBAPENEM RESISTANCE GENE) NOT DETECTED NOTDET      Colistin Resistance mcr-1 gene by PCR NOT DETECTED NOTDET      Resistant gene ndm by PCR NOT DETECTED NOTDET      Resistant gene oxa-48-like by pcr NOT DETECTED NOTDET      Resistant gene vim by PCR NOT DETECTED NOTDET      [employer] test comment        False positive results may rarely occur.  Correlate with clinical,epidemiologic, and other laboratory findings   Basic Metabolic Panel    Collection Time: 11/03/22  9:46 AM   Result Value Ref Range    Sodium 137 133 - 143 mmol/L    Potassium 4.3 3.5 - 5.1 mmol/L    Chloride 107 101 - 110 mmol/L    CO2 24 21 - 32 mmol/L    Anion Gap 6 2 - 11 mmol/L    Glucose 140 (H) 65 - 100 mg/dL    BUN 27 (H) 8 - 23 MG/DL    Creatinine 1.80 (H) 0.8 - 1.5 MG/DL    Est, Glom Filt Rate 39 (L) >60 ml/min/1.73m2    Calcium 8.0 (L) 8.3 - 10.4 MG/DL   Basic Metabolic Panel w/ Reflex to MG    Collection Time: 11/04/22  8:10 AM   Result Value Ref Range    Sodium 137 133 - 143 mmol/L    Potassium 3.9 3.5 - 5.1 mmol/L    Chloride 105 101 - 110 mmol/L    CO2 22 21 - 32 mmol/L    Anion Gap 10 2 - 11 mmol/L    Glucose 109 (H) 65 - 100 mg/dL    BUN 24 (H) 8 - 23 MG/DL    Creatinine 1.40 0.8 - 1.5 MG/DL    Est, Glom Filt Rate 53 (L) >60 ml/min/1.73m2    Calcium 8.4 8.3 - 10.4 MG/DL   CBC with Auto Differential    Collection Time: 11/04/22  8:10 AM   Result Value Ref Range    WBC 13.1 (H) 4.3 - 11.1 K/uL    RBC 3.80 (L) 4.23 - 5.6 M/uL    Hemoglobin 11.2 (L) 13.6 - 17.2 g/dL    Hematocrit 35.2 (L) 41.1 - 50.3 %    MCV 92.6 82 - 102 FL    MCH 29.5 26.1 - 32.9 PG    MCHC 31.8 31.4 - 35.0 g/dL    RDW 13.6 11.9 - 14.6 %    Platelets 591 (L) 632 - 450 K/uL    MPV 11.2 9.4 - 12.3 FL    nRBC 0.00 0.0 - 0.2 K/uL    Seg Neutrophils 85 (H) 43 - 78 %    Lymphocytes 9 (L) 13 - 44 %    Monocytes 4 4.0 - 12.0 %    Eosinophils % 1 0.5 - 7.8 %    Basophils 0 0.0 - 2.0 %    Immature Granulocytes 1 0.0 - 5.0 %    Segs Absolute 11.2 (H) 1.7 - 8.2 K/UL    Absolute Lymph # 1.2 0.5 - 4.6 K/UL    Absolute Mono # 0.5 0.1 - 1.3 K/UL    Absolute Eos # 0.1 0.0 - 0.8 K/UL    Basophils Absolute 0.0 0.0 - 0.2 K/UL    Absolute Immature Granulocyte 0.1 0.0 - 0.5 K/UL    Differential Type AUTOMATED     Culture, Blood 1    Collection Time: 11/04/22  8:10 AM    Specimen: Blood   Result Value Ref Range    Special Requests RIGHT  Antecubital        Culture PENDING    POCT Glucose    Collection Time: 11/04/22  8:46 AM   Result Value Ref Range    POC Glucose 112 (H) 65 - 100 mg/dL    Performed by: Sigrid    POCT Glucose    Collection Time: 11/04/22 11:17 AM   Result Value Ref Range    POC Glucose 141 (H) 65 - 100 mg/dL    Performed by: Sigrid        I have personally reviewed imaging studies showing: Other Studies:  CT ABDOMEN PELVIS RENAL STONE   Final Result   1. No acute process. 2. Colonic diverticulosis. 3. Gallstones. 4. 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 5. Improved groundglass infiltrates in the lung bases, possibly the sequela of   prior viral infection. 6. Prior appendectomy.              Current Meds:  Current Facility-Administered Medications   Medication Dose Route Frequency    insulin lispro (HUMALOG) injection vial 0-8 Units  0-8 Units SubCUTAneous 4x Daily AC & HS    insulin lispro (HUMALOG) injection vial 0-4 Units  0-4 Units SubCUTAneous Nightly    cefepime (MAXIPIME) 2,000 mg in sodium chloride 0.9 % 50 mL IVPB mini-bag  2,000 mg IntraVENous Q8H    morphine injection 4 mg  4 mg IntraVENous Q1H PRN    [Held by provider] spironolactone (ALDACTONE) tablet 25 mg  25 mg Oral Daily    [Held by provider] valsartan (DIOVAN) tablet 40 mg  40 mg Oral Daily    aspirin EC tablet 81 mg  81 mg Oral Daily    atorvastatin (LIPITOR) tablet 40 mg  40 mg Oral Nightly    vitamin B-12 (CYANOCOBALAMIN) tablet 500 mcg  500 mcg Oral Daily    metoprolol succinate (TOPROL XL) extended release tablet 50 mg  50 mg Oral Daily    sodium chloride flush 0.9 % injection 5-40 mL  5-40 mL IntraVENous 2 times per day    sodium chloride flush 0.9 % injection 5-40 mL  5-40 mL IntraVENous PRN    0.9 % sodium chloride infusion   IntraVENous PRN    ondansetron (ZOFRAN-ODT) disintegrating tablet 4 mg  4 mg Oral Q8H PRN    Or    ondansetron (ZOFRAN) injection 4 mg  4 mg IntraVENous Q6H PRN    polyethylene glycol (GLYCOLAX) packet 17 g  17 g Oral Daily PRN    acetaminophen (TYLENOL) tablet 650 mg  650 mg Oral Q6H PRN    Or    acetaminophen (TYLENOL) suppository 650 mg  650 mg

## 2022-11-04 NOTE — PROGRESS NOTES
ACUTE PHYSICAL THERAPY GOALS:   (Developed with and agreed upon by patient and/or caregiver.)  ALL GOALS MET ON EVALUATION AND TREATMENT 11/4/22  1. Patient will perform bed mobility with INDEPENDENCE within 7 days. 2. Patient will transfer bed to chair with INDEPENDENCE within 7 days. 3. Patient will demonstrate FAIR+ DYNAMIC STANDING balance within 7 day(s). 4. Patient will ambulate 300ft+ with INDEPENDENCE within 7 days. 5. Patient will tolerate 25+ minutes of therapeutic activity/exercise and/or neuromuscular re-education while maintaining stable vitals to improve functional strength and activity tolerance within 7 days. PHYSICAL THERAPY Initial Assessment, Daily Note, Discharge, and AM  (Link to Caseload Tracking: PT Visit Days : 1  Acknowledge Orders  Time n/Out  PT Charge Capture  Rehab Caseload Tracker    Mian Phillip is a 67 y.o. male   PRIMARY DIAGNOSIS: UDAY (acute kidney injury) ([facility] [address]  Kidney stone [N20.0]  Lactic acid acidosis [E87.20]  UDAY (acute kidney injury) ([facility] [address] [N17.9]       Reason for Referral: Generalized Muscle Weakness (M62.81)  Difficulty in walking, Not elsewhere classified (R26.2)  Inpatient: Payor: Jian Hopper / Plan: [employer] HMO / Product Type: *No Product type* /     ASSESSMENT:     REHAB RECOMMENDATIONS:   Recommendation to date pending progress:  Setting:  No further skilled therapy after discharge from hospital    Equipment:    None     ASSESSMENT:  Mr. Maureen Blancas is a pleasant 67year old male admitted with UDAY and kidney stone. Patient is seen this AM for initial PT evaluation. At baseline, patient is an independent, community-level ambulator, works full-time at his  shop, and drives. Today, patient presents with intact functional strength and balance/gait status. Addressed gait training, activity pacing, and dynamic standing balance activity. See detailed assessment below.  No continued skilled PT services indicated at this time after evaluation and treatment. Thank you for this consult. [address] [facility] [address] [address] AM[facility]ated within [facility] 6 Clicks Basic Mobility Inpatient Short Form  AM-PAC Mobility Inpatient   How much difficulty turning over in bed?: None  How much difficulty sitting down on / standing up from a chair with arms?: None  How much difficulty moving from lying on back to sitting on side of bed?: None  How much help from another person moving to and from a bed to a chair?: None  How much help from another person needed to walk in hospital room?: None  How much help from another person for climbing 3-5 steps with a railing?: None  AM-PAC Inpatient Mobility Raw Score : 24  AM-PAC Inpatient T-Scale Score : 61.14  Mobility Inpatient CMS 0-100% Score: 0  Mobility Inpatient CMS G-Code Modifier : CH    SUBJECTIVE:   Mr. Maureen Blancas states, Jennifer Leach you! 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, owns a cane/RW/shower chair from previous hip replacement but was not using PTA    OBJECTIVE:     PAIN: VITALS / O2: PRECAUTION / Lesleigh Cox / DRAINS:   Pre Treatment:   Pain Assessment: None - Denies Pain      Post Treatment: 0/10 Vitals        Oxygen      IV    RESTRICTIONS/PRECAUTIONS:                    GROSS EVALUATION: Intact Impaired (Comments):   AROM [x]  WFL B LE   PROM []    Strength [x]  WFL B LE   Balance [x]  Good dynamically in sitting and standing   Posture [x] WFL   Sensation []     Coordination []      Tone []     Edema []    Activity Tolerance []  Mildly decreased from baseline, patient reports feeling \"stiff. \"    []      COGNITION/  PERCEPTION: Intact Impaired (Comments):   Orientation [x]     Vision []     Hearing []     Cognition  []       MOBILITY: I Mod I S SBA CGA Min Mod Max Total  NT x2 Comments:   Bed Mobility    Rolling [] [] [] [] [] [] [] [] [] [x] []    Supine to Sit [x] [] [] [] [] [] [] [] [] [] []    Scooting [x] [] [] [] [] [] [] [] [] [] []    Sit to Supine [x] [] [] [] [] [] [] [] [] [] []    Transfers    Sit to Stand [x] [] [] [x] [] [] [] [] [] [] [] Initially SBA and progressed to I with activity pacing cues   Bed to Chair [x] [] [] [x] [] [] [] [] [] [] []    Stand to Sit [x] [] [] [x] [] [] [] [] [] [] []     [] [] [] [] [] [] [] [] [] [] []    I=Independent, Mod I=Modified Independent, S=Supervision, SBA=Standby Assistance, CGA=Contact Guard Assistance,   Min=Minimal Assistance, Mod=Moderate Assistance, Max=Maximal Assistance, Total=Total Assistance, NT=Not Tested    GAIT: I Mod I S SBA CGA Min Mod Max Total  NT x2 Comments:   Level of Assistance [x] [] [] [x] [] [] [] [] [] [] [] Initially SBA and progressed to I with activity pacing cues   Distance 150 feet    DME None    Gait Quality Decreased johan     Weightbearing Status      Stairs      I=Independent, Mod I=Modified Independent, S=Supervision, SBA=Standby Assistance, CGA=Contact Guard Assistance,   Min=Minimal Assistance, Mod=Moderate Assistance, Max=Maximal Assistance, Total=Total Assistance, NT=Not Tested    PLAN:   FREQUENCY AND DURATION:  (Eval, Tx, D/C) for duration of hospital stay or until stated goals are met, whichever comes first.    THERAPY PROGNOSIS: Good    PROBLEM LIST:   (Skilled intervention is medically necessary to address:)  Decreased Gait Ability INTERVENTIONS PLANNED:   (Benefits and precautions of physical therapy have been discussed with the patient.)  Gait Training  Education       TREATMENT:   EVALUATION: LOW COMPLEXITY: (Untimed Charge)  At this time, patient is appropriate for Co-evaluation/treatment with occupational therapy due to patient's complex medical presentation. TREATMENT:   Gait Training (23 Minutes): Gait training for 150 feet utilizing None.  Patient required Tactile, Verbal, and Visual cueing to improve Activity Pacing and Dynamic Standing Balance. As well as pre-gait stance, activity pacing with seated rest break and dynamic standing balance activity.      TREATMENT GRID:  N/A    AFTER TREATMENT PRECAUTIONS: Bed/Chair Locked, Call light within reach, Chair, Needs within reach, and RN notified    INTERDISCIPLINARY COLLABORATION:  RN/ PCT, PT/ PTA, and OT/ AGUIRRE    EDUCATION: Education Given To: Patient  Education Provided: Role of Therapy;Plan of Care  Education Method: Demonstration;Verbal  Barriers to Learning: None  Education Outcome: Verbalized understanding;Demonstrated understanding    TIME IN/OUT:  Time In: 0912  Time Out: 5715  Minutes: 56666 Prince Anuj Dowd, PT

## 2022-11-05 ENCOUNTER — APPOINTMENT (OUTPATIENT)
Dept: GENERAL RADIOLOGY | Age: 72
DRG: 872 | End: 2022-11-05
Payer: MEDICARE

## 2022-11-05 ENCOUNTER — APPOINTMENT (OUTPATIENT)
Dept: CT IMAGING | Age: 72
DRG: 872 | End: 2022-11-05
Payer: MEDICARE

## 2022-11-05 LAB
ANION GAP SERPL CALC-SCNC: 6 MMOL/L (ref 2–11)
BACTERIA SPEC CULT: ABNORMAL
BASOPHILS # BLD: 0 K/UL (ref 0–0.2)
BASOPHILS NFR BLD: 0 % (ref 0–2)
BUN SERPL-MCNC: 21 MG/DL (ref 8–23)
CALCIUM SERPL-MCNC: 8.6 MG/DL (ref 8.3–10.4)
CHLORIDE SERPL-SCNC: 105 MMOL/L (ref 101–110)
CO2 SERPL-SCNC: 23 MMOL/L (ref 21–32)
CREAT SERPL-MCNC: 1.2 MG/DL (ref 0.8–1.5)
D DIMER PPP FEU-MCNC: 2.38 UG/ML(FEU)
DIFFERENTIAL METHOD BLD: ABNORMAL
EOSINOPHIL # BLD: 0.3 K/UL (ref 0–0.8)
EOSINOPHIL NFR BLD: 3 % (ref 0.5–7.8)
ERYTHROCYTE [DISTWIDTH] IN BLOOD BY AUTOMATED COUNT: 13.1 % (ref 11.9–14.6)
EST. AVERAGE GLUCOSE BLD GHB EST-MCNC: 160 MG/DL
GLUCOSE BLD STRIP.AUTO-MCNC: 112 MG/DL (ref 65–100)
GLUCOSE BLD STRIP.AUTO-MCNC: 130 MG/DL (ref 65–100)
GLUCOSE BLD STRIP.AUTO-MCNC: 133 MG/DL (ref 65–100)
GLUCOSE SERPL-MCNC: 106 MG/DL (ref 65–100)
GRAM STN SPEC: ABNORMAL
HBA1C MFR BLD: 7.2 % (ref 4.8–5.6)
HCT VFR BLD AUTO: 37.9 % (ref 41.1–50.3)
HGB BLD-MCNC: 11.7 G/DL (ref 13.6–17.2)
IMM GRANULOCYTES # BLD AUTO: 0.1 K/UL (ref 0–0.5)
IMM GRANULOCYTES NFR BLD AUTO: 1 % (ref 0–5)
LYMPHOCYTES # BLD: 1.2 K/UL (ref 0.5–4.6)
LYMPHOCYTES NFR BLD: 14 % (ref 13–44)
MCH RBC QN AUTO: 28.3 PG (ref 26.1–32.9)
MCHC RBC AUTO-ENTMCNC: 30.9 G/DL (ref 31.4–35)
MCV RBC AUTO: 91.5 FL (ref 82–102)
MONOCYTES # BLD: 0.5 K/UL (ref 0.1–1.3)
MONOCYTES NFR BLD: 5 % (ref 4–12)
NEUTS SEG # BLD: 6.8 K/UL (ref 1.7–8.2)
NEUTS SEG NFR BLD: 77 % (ref 43–78)
NRBC # BLD: 0 K/UL (ref 0–0.2)
PLATELET # BLD AUTO: 118 K/UL (ref 150–450)
PMV BLD AUTO: 11.3 FL (ref 9.4–12.3)
POTASSIUM SERPL-SCNC: 3.9 MMOL/L (ref 3.5–5.1)
RBC # BLD AUTO: 4.14 M/UL (ref 4.23–5.6)
SERVICE CMNT-IMP: ABNORMAL
SODIUM SERPL-SCNC: 134 MMOL/L (ref 133–143)
WBC # BLD AUTO: 8.8 K/UL (ref 4.3–11.1)

## 2022-11-05 PROCEDURE — 6360000004 HC RX CONTRAST MEDICATION: Performed by: HOSPITALIST

## 2022-11-05 PROCEDURE — 85379 FIBRIN DEGRADATION QUANT: CPT

## 2022-11-05 PROCEDURE — 71260 CT THORAX DX C+: CPT | Performed by: NURSE PRACTITIONER

## 2022-11-05 PROCEDURE — 6370000000 HC RX 637 (ALT 250 FOR IP): Performed by: FAMILY MEDICINE

## 2022-11-05 PROCEDURE — 80048 BASIC METABOLIC PNL TOTAL CA: CPT

## 2022-11-05 PROCEDURE — 36415 COLL VENOUS BLD VENIPUNCTURE: CPT

## 2022-11-05 PROCEDURE — 82962 GLUCOSE BLOOD TEST: CPT

## 2022-11-05 PROCEDURE — 6370000000 HC RX 637 (ALT 250 FOR IP): Performed by: NURSE PRACTITIONER

## 2022-11-05 PROCEDURE — 83036 HEMOGLOBIN GLYCOSYLATED A1C: CPT

## 2022-11-05 PROCEDURE — 85025 COMPLETE CBC W/AUTO DIFF WBC: CPT

## 2022-11-05 PROCEDURE — 2580000003 HC RX 258: Performed by: FAMILY MEDICINE

## 2022-11-05 PROCEDURE — 2580000003 HC RX 258: Performed by: NURSE PRACTITIONER

## 2022-11-05 PROCEDURE — 1100000000 HC RM PRIVATE

## 2022-11-05 PROCEDURE — 71045 X-RAY EXAM CHEST 1 VIEW: CPT

## 2022-11-05 PROCEDURE — 6360000002 HC RX W HCPCS: Performed by: NURSE PRACTITIONER

## 2022-11-05 PROCEDURE — 6360000002 HC RX W HCPCS: Performed by: STUDENT IN AN ORGANIZED HEALTH CARE EDUCATION/TRAINING PROGRAM

## 2022-11-05 RX ORDER — SPIRONOLACTONE 25 MG/1
25 TABLET ORAL DAILY
Status: DISCONTINUED | OUTPATIENT
Start: 2022-11-06 | End: 2022-11-07 | Stop reason: HOSPADM

## 2022-11-05 RX ADMIN — CEFEPIME 2000 MG: 2 INJECTION, POWDER, FOR SOLUTION INTRAVENOUS at 14:09

## 2022-11-05 RX ADMIN — CYANOCOBALAMIN TAB 1000 MCG 500 MCG: 1000 TAB at 09:13

## 2022-11-05 RX ADMIN — ATORVASTATIN CALCIUM 40 MG: 40 TABLET, FILM COATED ORAL at 21:14

## 2022-11-05 RX ADMIN — METOPROLOL SUCCINATE 50 MG: 25 TABLET, FILM COATED, EXTENDED RELEASE ORAL at 09:13

## 2022-11-05 RX ADMIN — CEFEPIME 2000 MG: 2 INJECTION, POWDER, FOR SOLUTION INTRAVENOUS at 21:15

## 2022-11-05 RX ADMIN — SODIUM CHLORIDE, PRESERVATIVE FREE 5 ML: 5 INJECTION INTRAVENOUS at 21:15

## 2022-11-05 RX ADMIN — CEFEPIME 2000 MG: 2 INJECTION, POWDER, FOR SOLUTION INTRAVENOUS at 05:10

## 2022-11-05 RX ADMIN — SODIUM CHLORIDE, PRESERVATIVE FREE 5 ML: 5 INJECTION INTRAVENOUS at 09:14

## 2022-11-05 RX ADMIN — Medication 9 MG: at 21:14

## 2022-11-05 RX ADMIN — IOPAMIDOL 100 ML: 755 INJECTION, SOLUTION INTRAVENOUS at 14:33

## 2022-11-05 RX ADMIN — ENOXAPARIN SODIUM 40 MG: 100 INJECTION SUBCUTANEOUS at 21:14

## 2022-11-05 RX ADMIN — ASPIRIN 81 MG: 81 TABLET ORAL at 09:14

## 2022-11-05 NOTE — PROGRESS NOTES
Hospitalist Progress Note   Admit Date:  2022 11:25 PM   Name:  River Gómez   Age:  67 y.o. Sex:  male  :  1950   MRN:  [id number]   Room:  [place] [address]    Presenting Complaint: Flank Pain (Right sided )     Reason(s) for Admission: Kidney stone [N20.0]  Lactic acid acidosis [E87.20]  UDAY (acute kidney injury) [facility]) [N17.9]     Hospital Course:   River Gómez is a 67 y.o. male with medical history of HTN, DM2, CHF, CAD, kidney stones, who presented to ED with R flank pain that radiates to groin. He originally thought he may have twisted his back as he was working on a car yesterday, but started to consider a stone when back pain did not improved and was similar to the pain he would have with his stones. ER workup: UA indicative of infection. Found to have UDAY with Cr up to 1.8. Lactic acid is 2.6. CT A/P was obtained which shows:  1. No acute process. 2. Colonic diverticulosis. 3. Gallstones. 4. 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 5. Improved groundglass infiltrates in the lung bases, possibly the sequela of   prior viral infection. 6. Prior appendectomy. Also noted with b/l kidney stones with largest measuring 10 mm on right and chronic UPJ obstruction. Normal bladder    Subjective & 24hr Events (22): Patient has new complaint of SOB. States he feels he can't take a deep breath comfortably. Assessment & Plan:     Principal Problem:    Ecoli/Enterobacteriaceae Bacteremia   22  -Likely from UTI. Ucx positive for GNR  -Changed abx to Cefepime  -Repeat Bcx pending   22  -Repeat Bcx NGT  -Continue Cefepime    SOB  22  -New complaint today.  Will get CXR.  -Ddimer pending  -IVF stopped on yesterday  -IS  -OOB often      UDAY (acute kidney injury) (HCC)(resolved)  Plan:   22  -Cr 1.40  -Avoid nephrotoxics as able       UTI (urinary tract infection)  Plan:   22  -Abx changed to Cefepime    Low back pain  -B/L kidney stones  -Improved per patient. Likely a MSK from twisting injury while working on car vs UTI or stone  -Prn analgesia  -CT abd with no acute spinal process  -Urology consulted with no plans for surgical intervention. Reports findings on CT are chronic  11/4/22  -improving per patient   -OT with no further recs      Type 2 diabetes mellitus ([facility]  Plan:   -Accu checks w/ SSI  -ranges acceptable       Hypertension  Plan:   11/4/22  -BP soft  -holding BB today      Systolic CHF, chronic ([facility]  Plan:   11/4/22  -Stable  -Continue BB tomorrow  -Fluids discontinued      CAD (coronary artery disease)     S/P AVR (aortic valve replacement)  Plan:   -Denies CP  -Continue ASA/Statin           Anticipated discharge needs:      2-3 days     Diet:  ADULT DIET; Regular; 4 carb choices (60 gm/meal)  DVT PPx: heparin   Code status: Full Code    Hospital Problems:  Principal Problem:    UDAY (acute kidney injury) ([facility]  Active Problems:    UTI (urinary tract infection)    Type 2 diabetes mellitus ([facility]    Hypertension    Systolic CHF, chronic (HCC)    CAD (coronary artery disease)    S/P AVR (aortic valve replacement)  Resolved Problems:    * No resolved hospital problems. *      Objective:   Patient Vitals for the past 24 hrs:   Temp Pulse Resp BP SpO2   11/05/22 0729 97.9 °F (36.6 °C) 81 18 116/74 --   11/05/22 0407 98.2 °F (36.8 °C) 76 16 114/72 --   11/04/22 2314 98 °F (36.7 °C) 88 18 113/75 95 %   11/04/22 1934 97.4 °F (36.3 °C) (!) 103 20 132/75 98 %   11/04/22 1549 -- -- 16 -- --   11/04/22 1519 -- -- 16 -- --   11/04/22 1454 98.8 °F (37.1 °C) 89 18 105/76 96 %   11/04/22 1115 98.1 °F (36.7 °C) 82 18 104/73 95 %       Oxygen Therapy  SpO2: 95 %  Pulse Oximeter Device Mode: Intermittent  O2 Device: None (Room air)    Estimated body mass index is 30.55 kg/m² as calculated from the following:    Height as of this encounter: 5' 10.5\" (1.791 m). Weight as of this encounter: 216 lb (98 kg).     Intake/Output Summary (Last 24 hours) at 11/5/2022 1102  Last data filed at 11/5/2022 0729  Gross per 24 hour   Intake 295 ml   Output 1100 ml   Net -805 ml         Physical Exam:     Blood pressure 116/74, pulse 81, temperature 97.9 °F (36.6 °C), temperature source Oral, resp. rate 18, height 5' 10.5\" (1.791 m), weight 216 lb (98 kg), SpO2 95 %. General:    Well nourished. Head:  Normocephalic, atraumatic  Eyes:  Sclerae appear normal.  Pupils equally round. ENT:  Nares appear normal, no drainage. Moist oral mucosa  Neck:  No restricted ROM. Trachea midline   CV:   RRR. No m/r/g. No jugular venous distension. Lungs:   CTAB. No wheezing, rhonchi, or rales. Symmetric expansion. Abdomen: Bowel sounds present. Soft, nontender, nondistended. Extremities: No cyanosis or clubbing. No edema  Skin:     No rashes and normal coloration. Warm and dry. Neuro:  CN II-XII grossly intact. Sensation intact. A&Ox3  Psych:  Normal mood and affect.       I have personally reviewed labs and tests showing:  Recent Labs:  Recent Results (from the past 48 hour(s))   Basic Metabolic Panel w/ Reflex to MG    Collection Time: 11/04/22  8:10 AM   Result Value Ref Range    Sodium 137 133 - 143 mmol/L    Potassium 3.9 3.5 - 5.1 mmol/L    Chloride 105 101 - 110 mmol/L    CO2 22 21 - 32 mmol/L    Anion Gap 10 2 - 11 mmol/L    Glucose 109 (H) 65 - 100 mg/dL    BUN 24 (H) 8 - 23 MG/DL    Creatinine 1.40 0.8 - 1.5 MG/DL    Est, Glom Filt Rate 53 (L) >60 ml/min/1.73m2    Calcium 8.4 8.3 - 10.4 MG/DL   CBC with Auto Differential    Collection Time: 11/04/22  8:10 AM   Result Value Ref Range    WBC 13.1 (H) 4.3 - 11.1 K/uL    RBC 3.80 (L) 4.23 - 5.6 M/uL    Hemoglobin 11.2 (L) 13.6 - 17.2 g/dL    Hematocrit 35.2 (L) 41.1 - 50.3 %    MCV 92.6 82 - 102 FL    MCH 29.5 26.1 - 32.9 PG    MCHC 31.8 31.4 - 35.0 g/dL    RDW 13.6 11.9 - 14.6 %    Platelets 796 (L) 184 - 450 K/uL    MPV 11.2 9.4 - 12.3 FL    nRBC 0.00 0.0 - 0.2 K/uL    Seg Neutrophils 85 (H) 43 - 78 %    Lymphocytes 9 (L) 13 - 44 %    Monocytes 4 4.0 - 12.0 %    Eosinophils % 1 0.5 - 7.8 %    Basophils 0 0.0 - 2.0 %    Immature Granulocytes 1 0.0 - 5.0 %    Segs Absolute 11.2 (H) 1.7 - 8.2 K/UL    Absolute Lymph # 1.2 0.5 - 4.6 K/UL    Absolute Mono # 0.5 0.1 - 1.3 K/UL    Absolute Eos # 0.1 0.0 - 0.8 K/UL    Basophils Absolute 0.0 0.0 - 0.2 K/UL    Absolute Immature Granulocyte 0.1 0.0 - 0.5 K/UL    Differential Type AUTOMATED     Culture, Blood 1    Collection Time: 11/04/22  8:10 AM    Specimen: Blood   Result Value Ref Range    Special Requests RIGHT  Antecubital        Culture NO GROWTH AFTER 22 HOURS     POCT Glucose    Collection Time: 11/04/22  8:46 AM   Result Value Ref Range    POC Glucose 112 (H) 65 - 100 mg/dL    Performed by: Sigrid    Culture, Blood 1    Collection Time: 11/04/22 10:38 AM    Specimen: Blood   Result Value Ref Range    Special Requests RIGHT  FOREARM        Culture NO GROWTH AFTER 19 HOURS     POCT Glucose    Collection Time: 11/04/22 11:17 AM   Result Value Ref Range    POC Glucose 141 (H) 65 - 100 mg/dL    Performed by: EnmaLiuSIMRAN    POCT Glucose    Collection Time: 11/04/22  4:31 PM   Result Value Ref Range    POC Glucose 111 (H) 65 - 100 mg/dL    Performed by: Sigrid    POCT Glucose    Collection Time: 11/04/22  8:52 PM   Result Value Ref Range    POC Glucose 145 (H) 65 - 100 mg/dL    Performed by: Michael    POCT Glucose    Collection Time: 11/05/22  7:05 AM   Result Value Ref Range    POC Glucose 112 (H) 65 - 100 mg/dL    Performed by:  Michael    Basic Metabolic Panel w/ Reflex to MG    Collection Time: 11/05/22  7:34 AM   Result Value Ref Range    Sodium 134 133 - 143 mmol/L    Potassium 3.9 3.5 - 5.1 mmol/L    Chloride 105 101 - 110 mmol/L    CO2 23 21 - 32 mmol/L    Anion Gap 6 2 - 11 mmol/L    Glucose 106 (H) 65 - 100 mg/dL    BUN 21 8 - 23 MG/DL    Creatinine 1.20 0.8 - 1.5 MG/DL    Est, Glom Filt Rate >60 >60 ml/min/1.73m2    Calcium 8.6 8.3 - 10.4 MG/DL   CBC with Auto Differential    Collection Time: 11/05/22  7:34 AM   Result Value Ref Range    WBC 8.8 4.3 - 11.1 K/uL    RBC 4.14 (L) 4.23 - 5.6 M/uL    Hemoglobin 11.7 (L) 13.6 - 17.2 g/dL    Hematocrit 37.9 (L) 41.1 - 50.3 %    MCV 91.5 82 - 102 FL    MCH 28.3 26.1 - 32.9 PG    MCHC 30.9 (L) 31.4 - 35.0 g/dL    RDW 13.1 11.9 - 14.6 %    Platelets 774 (L) 557 - 450 K/uL    MPV 11.3 9.4 - 12.3 FL    nRBC 0.00 0.0 - 0.2 K/uL    Differential Type AUTOMATED      Seg Neutrophils 77 43 - 78 %    Lymphocytes 14 13 - 44 %    Monocytes 5 4.0 - 12.0 %    Eosinophils % 3 0.5 - 7.8 %    Basophils 0 0.0 - 2.0 %    Immature Granulocytes 1 0.0 - 5.0 %    Segs Absolute 6.8 1.7 - 8.2 K/UL    Absolute Lymph # 1.2 0.5 - 4.6 K/UL    Absolute Mono # 0.5 0.1 - 1.3 K/UL    Absolute Eos # 0.3 0.0 - 0.8 K/UL    Basophils Absolute 0.0 0.0 - 0.2 K/UL    Absolute Immature Granulocyte 0.1 0.0 - 0.5 K/UL       I have personally reviewed imaging studies showing: Other Studies:  CT ABDOMEN PELVIS RENAL STONE   Final Result   1. No acute process. 2. Colonic diverticulosis. 3. Gallstones. 4. 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 5. Improved groundglass infiltrates in the lung bases, possibly the sequela of   prior viral infection. 6. Prior appendectomy.          XR CHEST PORTABLE    (Results Pending)       Current Meds:  Current Facility-Administered Medications   Medication Dose Route Frequency    insulin lispro (HUMALOG) injection vial 0-8 Units  0-8 Units SubCUTAneous 4x Daily AC & HS    insulin lispro (HUMALOG) injection vial 0-4 Units  0-4 Units SubCUTAneous Nightly    cefepime (MAXIPIME) 2,000 mg in sodium chloride 0.9 % 50 mL IVPB mini-bag  2,000 mg IntraVENous Q8H    morphine injection 4 mg  4 mg IntraVENous Q1H PRN    [Held by provider] spironolactone (ALDACTONE) tablet 25 mg  25 mg Oral Daily    [Held by provider] valsartan (DIOVAN) tablet 40 mg  40 mg Oral Daily    aspirin EC tablet 81 mg  81 mg Oral Daily    atorvastatin (LIPITOR) tablet 40 mg  40 mg Oral Nightly    vitamin B-12 (CYANOCOBALAMIN) tablet 500 mcg  500 mcg Oral Daily    metoprolol succinate (TOPROL XL) extended release tablet 50 mg  50 mg Oral Daily    sodium chloride flush 0.9 % injection 5-40 mL  5-40 mL IntraVENous 2 times per day    sodium chloride flush 0.9 % injection 5-40 mL  5-40 mL IntraVENous PRN    0.9 % sodium chloride infusion   IntraVENous PRN    ondansetron (ZOFRAN-ODT) disintegrating tablet 4 mg  4 mg Oral Q8H PRN    Or    ondansetron (ZOFRAN) injection 4 mg  4 mg IntraVENous Q6H PRN    polyethylene glycol (GLYCOLAX) packet 17 g  17 g Oral Daily PRN    acetaminophen (TYLENOL) tablet 650 mg  650 mg Oral Q6H PRN    Or    acetaminophen (TYLENOL) suppository 650 mg  650 mg Rectal Q6H PRN    melatonin tablet 9 mg  9 mg Oral Nightly    enoxaparin (LOVENOX) injection 40 mg  40 mg SubCUTAneous Q24H       Signed:  JACOB Anand - CNP    Part of this note may have been written by using a voice dictation software. The note has been proof read but may still contain some grammatical/other typographical errors.

## 2022-11-06 ENCOUNTER — APPOINTMENT (OUTPATIENT)
Dept: NON INVASIVE DIAGNOSTICS | Age: 72
DRG: 872 | End: 2022-11-06
Payer: MEDICARE

## 2022-11-06 PROBLEM — R06.01 ORTHOPNEA: Status: ACTIVE | Noted: 2022-11-06

## 2022-11-06 LAB
ANION GAP SERPL CALC-SCNC: 9 MMOL/L (ref 2–11)
BUN SERPL-MCNC: 18 MG/DL (ref 8–23)
CALCIUM SERPL-MCNC: 8.6 MG/DL (ref 8.3–10.4)
CHLORIDE SERPL-SCNC: 107 MMOL/L (ref 101–110)
CO2 SERPL-SCNC: 21 MMOL/L (ref 21–32)
CREAT SERPL-MCNC: 1.2 MG/DL (ref 0.8–1.5)
ECHO AO ASC DIAM: 3.5 CM
ECHO AO ASCENDING AORTA INDEX: 1.61 CM/M2
ECHO AO ROOT DIAM: 3.3 CM
ECHO AO ROOT INDEX: 1.52 CM/M2
ECHO AV ACCELERATION TIME: 88 MS
ECHO AV AREA PEAK VELOCITY: 0.8 CM2
ECHO AV AREA VTI: 0.8 CM2
ECHO AV AREA/BSA PEAK VELOCITY: 0.4 CM2/M2
ECHO AV AREA/BSA VTI: 0.4 CM2/M2
ECHO AV MEAN GRADIENT: 10 MMHG
ECHO AV MEAN VELOCITY: 1.5 M/S
ECHO AV PEAK GRADIENT: 19 MMHG
ECHO AV PEAK VELOCITY: 2.2 M/S
ECHO AV VELOCITY RATIO: 0.27
ECHO AV VTI: 41 CM
ECHO BSA: 2.21 M2
ECHO EST RA PRESSURE: 3 MMHG
ECHO IVC PROX: 1.3 CM
ECHO LA AREA 2C: 29.9 CM2
ECHO LA AREA 4C: 29.4 CM2
ECHO LA MAJOR AXIS: 6.5 CM
ECHO LA MINOR AXIS: 6.4 CM
ECHO LA VOL BP: 110 ML (ref 18–58)
ECHO LA VOL/BSA BIPLANE: 51 ML/M2 (ref 16–34)
ECHO LV E' LATERAL VELOCITY: 6 CM/S
ECHO LV E' SEPTAL VELOCITY: 6 CM/S
ECHO LV EDV A2C: 254 ML
ECHO LV EDV A4C: 259 ML
ECHO LV EDV INDEX A4C: 119 ML/M2
ECHO LV EDV NDEX A2C: 117 ML/M2
ECHO LV EJECTION FRACTION A2C: 12 %
ECHO LV EJECTION FRACTION A4C: 10 %
ECHO LV EJECTION FRACTION BIPLANE: 13 % (ref 55–100)
ECHO LV ESV A2C: 222 ML
ECHO LV ESV A4C: 234 ML
ECHO LV ESV INDEX A2C: 102 ML/M2
ECHO LV ESV INDEX A4C: 108 ML/M2
ECHO LV FRACTIONAL SHORTENING: 6 % (ref 28–44)
ECHO LV INTERNAL DIMENSION DIASTOLE INDEX: 3.04 CM/M2
ECHO LV INTERNAL DIMENSION DIASTOLIC: 6.6 CM (ref 4.2–5.9)
ECHO LV INTERNAL DIMENSION SYSTOLIC INDEX: 2.86 CM/M2
ECHO LV INTERNAL DIMENSION SYSTOLIC: 6.2 CM
ECHO LV IVSD: 1.1 CM (ref 0.6–1)
ECHO LV MASS 2D: 309.3 G (ref 88–224)
ECHO LV MASS INDEX 2D: 142.5 G/M2 (ref 49–115)
ECHO LV POSTERIOR WALL DIASTOLIC: 1 CM (ref 0.6–1)
ECHO LV RELATIVE WALL THICKNESS RATIO: 0.3
ECHO LVOT AREA: 2.8 CM2
ECHO LVOT AV VTI INDEX: 0.29
ECHO LVOT DIAM: 1.9 CM
ECHO LVOT MEAN GRADIENT: 1 MMHG
ECHO LVOT PEAK GRADIENT: 2 MMHG
ECHO LVOT PEAK VELOCITY: 0.6 M/S
ECHO LVOT STROKE VOLUME INDEX: 15.3 ML/M2
ECHO LVOT SV: 33.2 ML
ECHO LVOT VTI: 11.7 CM
ECHO MV A VELOCITY: 1.03 M/S
ECHO MV AREA VTI: 1.1 CM2
ECHO MV E DECELERATION TIME (DT): 160 MS
ECHO MV E VELOCITY: 1.34 M/S
ECHO MV E/A RATIO: 1.3
ECHO MV E/E' LATERAL: 22.33
ECHO MV E/E' RATIO (AVERAGED): 22.33
ECHO MV E/E' SEPTAL: 22.33
ECHO MV EROA PISA: 6.7 CM2
ECHO MV LVOT VTI INDEX: 2.57
ECHO MV MAX VELOCITY: 1.3 M/S
ECHO MV MEAN GRADIENT: 2 MMHG
ECHO MV MEAN VELOCITY: 0.7 M/S
ECHO MV PEAK GRADIENT: 6 MMHG
ECHO MV REGURGITANT ALIASING (NYQUIST) VELOCITY: 30 CM/S
ECHO MV REGURGITANT PEAK GRADIENT: 81 MMHG
ECHO MV REGURGITANT PEAK VELOCITY: 4.5 M/S
ECHO MV REGURGITANT RADIUS PISA: 0.4 CM
ECHO MV REGURGITANT VOLUME PISA: 1098.58 ML
ECHO MV REGURGITANT VTIA: 164 CM
ECHO MV VTI: 30.1 CM
ECHO RIGHT VENTRICULAR SYSTOLIC PRESSURE (RVSP): 39 MMHG
ECHO RV BASAL DIMENSION: 4.4 CM
ECHO RV MID DIMENSION: 2.9 CM
ECHO RV TAPSE: 1.5 CM (ref 1.7–?)
ECHO TV REGURGITANT MAX VELOCITY: 3.02 M/S
ECHO TV REGURGITANT PEAK GRADIENT: 36 MMHG
GLUCOSE SERPL-MCNC: 118 MG/DL (ref 65–100)
LV EF: 13 %
LVEF MODALITY: ABNORMAL
NT PRO BNP: 6630 PG/ML (ref 5–125)
POTASSIUM SERPL-SCNC: 3.7 MMOL/L (ref 3.5–5.1)
SODIUM SERPL-SCNC: 137 MMOL/L (ref 133–143)

## 2022-11-06 PROCEDURE — 6370000000 HC RX 637 (ALT 250 FOR IP): Performed by: FAMILY MEDICINE

## 2022-11-06 PROCEDURE — 1100000000 HC RM PRIVATE

## 2022-11-06 PROCEDURE — 83880 ASSAY OF NATRIURETIC PEPTIDE: CPT

## 2022-11-06 PROCEDURE — 2580000003 HC RX 258: Performed by: NURSE PRACTITIONER

## 2022-11-06 PROCEDURE — 2580000003 HC RX 258: Performed by: FAMILY MEDICINE

## 2022-11-06 PROCEDURE — 36415 COLL VENOUS BLD VENIPUNCTURE: CPT

## 2022-11-06 PROCEDURE — 6370000000 HC RX 637 (ALT 250 FOR IP): Performed by: NURSE PRACTITIONER

## 2022-11-06 PROCEDURE — C8929 TTE W OR WO FOL WCON,DOPPLER: HCPCS

## 2022-11-06 PROCEDURE — 6360000002 HC RX W HCPCS: Performed by: PHYSICIAN ASSISTANT

## 2022-11-06 PROCEDURE — 93306 TTE W/DOPPLER COMPLETE: CPT | Performed by: INTERNAL MEDICINE

## 2022-11-06 PROCEDURE — 6360000002 HC RX W HCPCS: Performed by: STUDENT IN AN ORGANIZED HEALTH CARE EDUCATION/TRAINING PROGRAM

## 2022-11-06 PROCEDURE — 6360000002 HC RX W HCPCS: Performed by: NURSE PRACTITIONER

## 2022-11-06 PROCEDURE — 99223 1ST HOSP IP/OBS HIGH 75: CPT | Performed by: INTERNAL MEDICINE

## 2022-11-06 PROCEDURE — 80048 BASIC METABOLIC PNL TOTAL CA: CPT

## 2022-11-06 RX ORDER — LEVOFLOXACIN 500 MG/1
750 TABLET, FILM COATED ORAL DAILY
Status: DISCONTINUED | OUTPATIENT
Start: 2022-11-06 | End: 2022-11-07 | Stop reason: HOSPADM

## 2022-11-06 RX ORDER — FUROSEMIDE 10 MG/ML
40 INJECTION INTRAMUSCULAR; INTRAVENOUS ONCE
Status: COMPLETED | OUTPATIENT
Start: 2022-11-06 | End: 2022-11-06

## 2022-11-06 RX ORDER — FUROSEMIDE 10 MG/ML
20 INJECTION INTRAMUSCULAR; INTRAVENOUS ONCE
Status: COMPLETED | OUTPATIENT
Start: 2022-11-06 | End: 2022-11-06

## 2022-11-06 RX ADMIN — FUROSEMIDE 20 MG: 10 INJECTION, SOLUTION INTRAMUSCULAR; INTRAVENOUS at 11:34

## 2022-11-06 RX ADMIN — Medication 9 MG: at 21:44

## 2022-11-06 RX ADMIN — FUROSEMIDE 40 MG: 10 INJECTION, SOLUTION INTRAMUSCULAR; INTRAVENOUS at 14:50

## 2022-11-06 RX ADMIN — ATORVASTATIN CALCIUM 40 MG: 40 TABLET, FILM COATED ORAL at 21:44

## 2022-11-06 RX ADMIN — ENOXAPARIN SODIUM 40 MG: 100 INJECTION SUBCUTANEOUS at 21:44

## 2022-11-06 RX ADMIN — LEVOFLOXACIN 750 MG: 500 TABLET, FILM COATED ORAL at 14:50

## 2022-11-06 RX ADMIN — METOPROLOL SUCCINATE 50 MG: 25 TABLET, FILM COATED, EXTENDED RELEASE ORAL at 08:52

## 2022-11-06 RX ADMIN — SODIUM CHLORIDE, PRESERVATIVE FREE 10 ML: 5 INJECTION INTRAVENOUS at 21:45

## 2022-11-06 RX ADMIN — ASPIRIN 81 MG: 81 TABLET ORAL at 11:34

## 2022-11-06 RX ADMIN — CEFEPIME 2000 MG: 2 INJECTION, POWDER, FOR SOLUTION INTRAVENOUS at 05:59

## 2022-11-06 RX ADMIN — CYANOCOBALAMIN TAB 1000 MCG 500 MCG: 1000 TAB at 08:52

## 2022-11-06 RX ADMIN — SODIUM CHLORIDE, PRESERVATIVE FREE 5 ML: 5 INJECTION INTRAVENOUS at 08:53

## 2022-11-06 RX ADMIN — ACETAMINOPHEN 650 MG: 325 TABLET ORAL at 08:51

## 2022-11-06 RX ADMIN — SPIRONOLACTONE 25 MG: 25 TABLET ORAL at 08:52

## 2022-11-06 ASSESSMENT — PAIN - FUNCTIONAL ASSESSMENT: PAIN_FUNCTIONAL_ASSESSMENT: ACTIVITIES ARE NOT PREVENTED

## 2022-11-06 ASSESSMENT — PAIN SCALES - GENERAL
PAINLEVEL_OUTOF10: 3
PAINLEVEL_OUTOF10: 0

## 2022-11-06 ASSESSMENT — PAIN DESCRIPTION - DESCRIPTORS: DESCRIPTORS: ACHING

## 2022-11-06 ASSESSMENT — PAIN DESCRIPTION - ORIENTATION: ORIENTATION: OTHER (COMMENT)

## 2022-11-06 ASSESSMENT — PAIN DESCRIPTION - LOCATION: LOCATION: GROIN

## 2022-11-06 NOTE — PROGRESS NOTES
Hospitalist Progress Note   Admit Date:  2022 11:25 PM   Name:  Terra Strickland   Age:  67 y.o. Sex:  male  :  1950   MRN:  [id number]   Room:  [place] [address]    Presenting Complaint: Flank Pain (Right sided )     Reason(s) for Admission: Kidney stone [N20.0]  Lactic acid acidosis [E87.20]  UDAY (acute kidney injury) [facility]) [N17.9]     Hospital Course:   Terra Strickland is a 67 y.o. male with medical history of HTN, DM2, CHF, CAD, kidney stones, who presented to ED with R flank pain that radiates to groin. He originally thought he may have twisted his back as he was working on a car yesterday, but started to consider a stone when back pain did not improved and was similar to the pain he would have with his stones. ER workup: UA indicative of infection. Found to have UDAY with Cr up to 1.8. Lactic acid is 2.6. CT A/P was obtained which shows:  1. No acute process. 2. Colonic diverticulosis. 3. Gallstones. 4. 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 5. Improved groundglass infiltrates in the lung bases, possibly the sequela of   prior viral infection. 6. Prior appendectomy. Also noted with b/l kidney stones with largest measuring 10 mm on right and chronic UPJ obstruction. Normal bladder    Subjective & 24hr Events (22): Still with complaint of SOB. Denies CP      Assessment & Plan:     Principal Problem:    Ecoli/Enterobacteriaceae Bacteremia   22  -Likely from UTI. Ucx positive for GNR  -Changed abx to Cefepime  -Repeat Bcx pending   22  -Repeat Bcx NGT  -Cefepime changed to Levaquin        Systolic CHF, chronic ([facility]  Plan:   22  -Complaint of orthopnea today  -BNP > 6,000. No comparative testing  -ECHO pending   -Diuretic restarted  -Patient of Dr. Velvet Blue. Will consult  -CT PE negative for PE but showing small b/l pleural effusions       SOB  22  -New complaint today.  Will get CXR.  -Ddimer pending  -IVF stopped on yesterday  -IS  -OOB often  11/6/22  -CXR negative  -Ddimer elevated but CT negative for PE      UDAY (acute kidney injury) (HCC)(resolved)  Plan:   11/4/22  -Cr 1.40  -Avoid nephrotoxics as able      Ecoli  UTI (urinary tract infection)  Plan:   11/6/22  -Final Ucx w/ EColi  -Abx changed to Levaquin    Low back pain  -B/L kidney stones  -Improved per patient. Likely a MSK from twisting injury while working on car vs UTI or stone  -Prn analgesia  -CT abd with no acute spinal process  -Urology consulted with no plans for surgical intervention. Reports findings on CT are chronic  11/4/22  -improving per patient   -OT with no further recs      Type 2 diabetes mellitus ([facility]  Plan:   -Accu checks w/ SSI  -ranges acceptable       Hypertension  Plan:   11/4/22  -BP soft  -holding BB today    CAD (coronary artery disease)     S/P AVR (aortic valve replacement)  Plan:   -Denies CP  -Continue ASA/Statin           Anticipated discharge needs:      2-3 days     Diet:  ADULT DIET; Regular; 4 carb choices (60 gm/meal)  DVT PPx: heparin   Code status: Full Code    Hospital Problems:  Principal Problem:    UDAY (acute kidney injury) ([facility]  Active Problems:    UTI (urinary tract infection)    Type 2 diabetes mellitus ([facility]    Hypertension    Systolic CHF, chronic (HCC)    CAD (coronary artery disease)    S/P AVR (aortic valve replacement)  Resolved Problems:    * No resolved hospital problems.  *      Objective:   Patient Vitals for the past 24 hrs:   Temp Pulse Resp BP SpO2   11/06/22 0745 97.5 °F (36.4 °C) 76 19 135/87 96 %   11/06/22 0018 97.9 °F (36.6 °C) 84 16 116/81 98 %   11/05/22 1959 98.1 °F (36.7 °C) 82 18 108/80 95 %   11/05/22 1545 98.1 °F (36.7 °C) 76 16 106/74 --   11/05/22 1100 98.6 °F (37 °C) 82 18 117/82 98 %       Oxygen Therapy  SpO2: 96 %  Pulse Oximeter Device Mode: Intermittent  O2 Device: None (Room air)    Estimated body mass index is 30.55 kg/m² as calculated from the following:    Height as of this encounter: 5' 10.5\" (1.791 m). Weight as of this encounter: 216 lb (98 kg). No intake or output data in the 24 hours ending 11/06/22 1054        Physical Exam:     Blood pressure 135/87, pulse 76, temperature 97.5 °F (36.4 °C), temperature source Oral, resp. rate 19, height 5' 10.5\" (1.791 m), weight 216 lb (98 kg), SpO2 96 %. General:    Well nourished. Head:  Normocephalic, atraumatic  Eyes:  Sclerae appear normal.  Pupils equally round. ENT:  Nares appear normal, no drainage. Moist oral mucosa  Neck:  No restricted ROM. Trachea midline   CV:   RRR. No m/r/g. No jugular venous distension. Lungs:   CTAB. No wheezing, rhonchi, or rales. Symmetric expansion. Abdomen: Bowel sounds present. Soft, nontender, nondistended. Extremities: No cyanosis or clubbing. No edema  Skin:     No rashes and normal coloration. Warm and dry. Neuro:  CN II-XII grossly intact. Sensation intact. A&Ox3  Psych:  Normal mood and affect. I have personally reviewed labs and tests showing:  Recent Labs:  Recent Results (from the past 48 hour(s))   POCT Glucose    Collection Time: 11/04/22  4:31 PM   Result Value Ref Range    POC Glucose 111 (H) 65 - 100 mg/dL    Performed by: Sigrid    POCT Glucose    Collection Time: 11/04/22  8:52 PM   Result Value Ref Range    POC Glucose 145 (H) 65 - 100 mg/dL    Performed by: Michael    POCT Glucose    Collection Time: 11/05/22  7:05 AM   Result Value Ref Range    POC Glucose 112 (H) 65 - 100 mg/dL    Performed by:  Michael    Basic Metabolic Panel w/ Reflex to MG    Collection Time: 11/05/22  7:34 AM   Result Value Ref Range    Sodium 134 133 - 143 mmol/L    Potassium 3.9 3.5 - 5.1 mmol/L    Chloride 105 101 - 110 mmol/L    CO2 23 21 - 32 mmol/L    Anion Gap 6 2 - 11 mmol/L    Glucose 106 (H) 65 - 100 mg/dL    BUN 21 8 - 23 MG/DL    Creatinine 1.20 0.8 - 1.5 MG/DL    Est, Glom Filt Rate >60 >60 ml/min/1.73m2    Calcium 8.6 8.3 - 10.4 MG/DL   CBC with Auto Differential    Collection Time: 11/05/22  7:34 AM   Result Value Ref Range    WBC 8.8 4.3 - 11.1 K/uL    RBC 4.14 (L) 4.23 - 5.6 M/uL    Hemoglobin 11.7 (L) 13.6 - 17.2 g/dL    Hematocrit 37.9 (L) 41.1 - 50.3 %    MCV 91.5 82 - 102 FL    MCH 28.3 26.1 - 32.9 PG    MCHC 30.9 (L) 31.4 - 35.0 g/dL    RDW 13.1 11.9 - 14.6 %    Platelets 989 (L) 674 - 450 K/uL    MPV 11.3 9.4 - 12.3 FL    nRBC 0.00 0.0 - 0.2 K/uL    Differential Type AUTOMATED      Seg Neutrophils 77 43 - 78 %    Lymphocytes 14 13 - 44 %    Monocytes 5 4.0 - 12.0 %    Eosinophils % 3 0.5 - 7.8 %    Basophils 0 0.0 - 2.0 %    Immature Granulocytes 1 0.0 - 5.0 %    Segs Absolute 6.8 1.7 - 8.2 K/UL    Absolute Lymph # 1.2 0.5 - 4.6 K/UL    Absolute Mono # 0.5 0.1 - 1.3 K/UL    Absolute Eos # 0.3 0.0 - 0.8 K/UL    Basophils Absolute 0.0 0.0 - 0.2 K/UL    Absolute Immature Granulocyte 0.1 0.0 - 0.5 K/UL   Hemoglobin A1C    Collection Time: 11/05/22  7:34 AM   Result Value Ref Range    Hemoglobin A1C 7.2 (H) 4.8 - 5.6 %    eAG 160 mg/dL   POCT Glucose    Collection Time: 11/05/22 11:16 AM   Result Value Ref Range    POC Glucose 130 (H) 65 - 100 mg/dL    Performed by: Noni Mcclain    D-Dimer, Quantitative    Collection Time: 11/05/22 11:23 AM   Result Value Ref Range    D-Dimer, Quant 2.38 (H) <0.56 ug/ml(FEU)   POCT Glucose    Collection Time: 11/05/22  4:01 PM   Result Value Ref Range    POC Glucose 133 (H) 65 - 100 mg/dL    Performed by: Noni Mcclain    Basic Metabolic Panel    Collection Time: 11/06/22  7:15 AM   Result Value Ref Range    Sodium 137 133 - 143 mmol/L    Potassium 3.7 3.5 - 5.1 mmol/L    Chloride 107 101 - 110 mmol/L    CO2 21 21 - 32 mmol/L    Anion Gap 9 2 - 11 mmol/L    Glucose 118 (H) 65 - 100 mg/dL    BUN 18 8 - 23 MG/DL    Creatinine 1.20 0.8 - 1.5 MG/DL    Est, Glom Filt Rate >60 >60 ml/min/1.73m2    Calcium 8.6 8.3 - 10.4 MG/DL   Brain Natriuretic Peptide    Collection Time: 11/06/22  7:15 AM   Result Value Ref Range NT Pro-BNP 6,630 (H) 5 - 125 PG/ML       I have personally reviewed imaging studies showing: Other Studies:  CT CHEST PULMONARY EMBOLISM W CONTRAST   Final Result   1. No evidence of pulmonary embolus. 2.  Small bilateral pleural effusions. ** If there are any questions about this report, I can be reached on   [employer] or at [phone] **      XR CHEST PORTABLE   Final Result   Stable chronic lung disease. No acute findings in the chest         CT ABDOMEN PELVIS RENAL STONE   Final Result   1. No acute process. 2. Colonic diverticulosis. 3. Gallstones. 4. 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 5. Improved groundglass infiltrates in the lung bases, possibly the sequela of   prior viral infection. 6. Prior appendectomy.              Current Meds:  Current Facility-Administered Medications   Medication Dose Route Frequency    spironolactone (ALDACTONE) tablet 25 mg  25 mg Oral Daily    cefepime (MAXIPIME) 2,000 mg in sodium chloride 0.9 % 50 mL IVPB mini-bag  2,000 mg IntraVENous Q8H    morphine injection 4 mg  4 mg IntraVENous Q1H PRN    [Held by provider] valsartan (DIOVAN) tablet 40 mg  40 mg Oral Daily    aspirin EC tablet 81 mg  81 mg Oral Daily    atorvastatin (LIPITOR) tablet 40 mg  40 mg Oral Nightly    vitamin B-12 (CYANOCOBALAMIN) tablet 500 mcg  500 mcg Oral Daily    metoprolol succinate (TOPROL XL) extended release tablet 50 mg  50 mg Oral Daily    sodium chloride flush 0.9 % injection 5-40 mL  5-40 mL IntraVENous 2 times per day    sodium chloride flush 0.9 % injection 5-40 mL  5-40 mL IntraVENous PRN    0.9 % sodium chloride infusion   IntraVENous PRN    ondansetron (ZOFRAN-ODT) disintegrating tablet 4 mg  4 mg Oral Q8H PRN    Or    ondansetron (ZOFRAN) injection 4 mg  4 mg IntraVENous Q6H PRN    polyethylene glycol (GLYCOLAX) packet 17 g  17 g Oral Daily PRN    acetaminophen (TYLENOL) tablet 650 mg  650 mg Oral Q6H PRN    Or    acetaminophen (TYLENOL) suppository 650 mg  650 mg Rectal Q6H PRN    melatonin tablet 9 mg  9 mg Oral Nightly    enoxaparin (LOVENOX) injection 40 mg  40 mg SubCUTAneous Q24H       Signed:  JACOB Andrew CNP    Part of this note may have been written by using a voice dictation software. The note has been proof read but may still contain some grammatical/other typographical errors.

## 2022-11-06 NOTE — H&P
[facility] Cardiology Initial Cardiac Evaluation      Date of  Admission: 11/2/2022 11:25 PM     Primary Care Physician: None Provider  Primary Cardiologist: Dr Brodie Harris  Referring Physician: Sabi Mujica, NP  Supervising Physician: Dr Elton Diaz     CC/Reason for evaluation: CHF    HPI:  Jennifer Mendez is a 67 y.o. male with prior history of CAD s/p CABG, NICM, HFrEF ECHO 3/2021- EF 30-35%, s/p AVR, HTN, DVT and DM2 who presented to [facility] ED on 11/3 with complaint of right flank pain. In ED, UA positive for UTI. CT a/b showed 10 mm stone in the right kidney. Cr 1.8. Admitted to medicine service for UTI and UDAY. Given I L fluid bolus and started on maintenance fluids. During admission patient developed worsening SOB. CXR was negative for acute cardiopulmonary process. D-dimer was elevated therefore CT chest performed. Showed bilateral small pleural effusions and negative for PE. pBNP [id number]. IVF stopped. Patient was given IV lasix 20 mg. Cardiology consulted for further recommendations.       Past Medical History:   Diagnosis Date    Arthritis     generalized OA    CAD (coronary artery disease) 09/11/2018    NO MI/ NO STENTS-- CABG AND AVR-10/2018- FOLLOWED BY DR Pablo Rincon    CHF (congestive heart failure) ([facility] [address]     last echo-3/2021-- EF30-35%- followed by dr Nael Burdick    DVT (deep venous thrombosis) ([facility] [address] 8/2021    RLE s/p covid    History of kidney stones 2016    naturally passed    History of melanoma 2016    removed from nose    Hx of CABG     Hypercholesterolemia     Hypertension 9/11/2018    CONTROLLED WITH MED    PUD (peptic ulcer disease)     1970's-    S/P AVR (aortic valve replacement)       Past Surgical History:   Procedure Laterality Date    AORTIC VALVE REPLACEMENT  10/03/2018    CORONARY ARTERY BYPASS GRAFT X 1, LIMA to the LAD WITH AVR 23 mm Intuity valve    APPENDECTOMY       15 yo    CORONARY ARTERY BYPASS GRAFT  10/03/2018    CABG x1, Dr. Avelina Patel  2014    right lower ext - broken put in rods and screws    OTHER SURGICAL HISTORY      melanoma surgery to nose    TOTAL HIP ARTHROPLASTY Right 07/2021       Allergies   Allergen Reactions    Hydrocodone-Acetaminophen Other (See Comments)     hallucinations      Social History     Socioeconomic History    Marital status:      Spouse name: Not on file    Number of children: Not on file    Years of education: Not on file    Highest education level: Not on file   Occupational History    Not on file   Tobacco Use    Smoking status: Never    Smokeless tobacco: Never   Vaping Use    Vaping Use: Never used   Substance and Sexual Activity    Alcohol use:  Yes     Alcohol/week: 2.0 standard drinks     Types: 2 Shots of liquor per week    Drug use: No    Sexual activity: Not on file   Other Topics Concern    Not on file   Social History Narrative    Not on file     Social Determinants of Health     Financial Resource Strain: Not on file   Food Insecurity: Not on file   Transportation Needs: Not on file   Physical Activity: Not on file   Stress: Not on file   Social Connections: Not on file   Intimate Partner Violence: Not on file   Housing Stability: Not on file     Social History       Tobacco History       Smoking Status  Never      Smokeless Tobacco Use  Never              Alcohol History       Alcohol Use Status  Yes Drinks/Week  2 Shots of liquor per week Amount  2.0 standard drinks of alcohol/wk              Drug Use       Drug Use Status  No              Sexual Activity       Sexually Active  Not Asked                    Family History   Problem Relation Age of Onset    No Known Problems Brother     No Known Problems Father     Heart Disease Brother     Cancer Brother         Leukemia    Alzheimer's Disease Mother     Cancer Mother         Leukemia        Current Facility-Administered Medications   Medication Dose Route Frequency    furosemide (LASIX) injection 20 mg  20 mg IntraVENous Once    spironolactone (ALDACTONE) tablet 25 mg  25 mg Oral Daily    cefepime (MAXIPIME) 2,000 mg in sodium chloride 0.9 % 50 mL IVPB mini-bag  2,000 mg IntraVENous Q8H    morphine injection 4 mg  4 mg IntraVENous Q1H PRN    [Held by provider] valsartan (DIOVAN) tablet 40 mg  40 mg Oral Daily    aspirin EC tablet 81 mg  81 mg Oral Daily    atorvastatin (LIPITOR) tablet 40 mg  40 mg Oral Nightly    vitamin B-12 (CYANOCOBALAMIN) tablet 500 mcg  500 mcg Oral Daily    metoprolol succinate (TOPROL XL) extended release tablet 50 mg  50 mg Oral Daily    sodium chloride flush 0.9 % injection 5-40 mL  5-40 mL IntraVENous 2 times per day    sodium chloride flush 0.9 % injection 5-40 mL  5-40 mL IntraVENous PRN    0.9 % sodium chloride infusion   IntraVENous PRN    ondansetron (ZOFRAN-ODT) disintegrating tablet 4 mg  4 mg Oral Q8H PRN    Or    ondansetron (ZOFRAN) injection 4 mg  4 mg IntraVENous Q6H PRN    polyethylene glycol (GLYCOLAX) packet 17 g  17 g Oral Daily PRN    acetaminophen (TYLENOL) tablet 650 mg  650 mg Oral Q6H PRN    Or    acetaminophen (TYLENOL) suppository 650 mg  650 mg Rectal Q6H PRN    melatonin tablet 9 mg  9 mg Oral Nightly    enoxaparin (LOVENOX) injection 40 mg  40 mg SubCUTAneous Q24H       Review of Symptoms:    General: No weight changes,  weakness, fever or chills  Skin: no rashes, lumps, or other skin changes  HEENT: no headache, dizziness, lightheadedness, vision changes, hearing changes, tinnitus, vertigo, sinus pressure/pain, bleeding gums, sore throat, or hoarseness  Neck: no swollen glands, goiter, pain or stiffness  Respiratory: Positive for dyspnea.  No cough, sputum, hemoptysis  Cardiovascular: + as per HPI  Gastrointestinal: no GERD, constipation, diarrhea, liver problems, or h/o GI bleed  Urinary: no frequency, urgency , hematuria, burning/pain with urination, recent flank pain, polyuria, nocturia, or difficulty urinating  Peripheral Vascular: no claudication, leg cramps, prior DVTs, swelling of calves, legs, or feet, color change, or swelling with redness or tenderness  Musculoskeletal: no muscle or joint pain/stiffness, joint swelling, erythema of joints, or back pain  Psychiatric: no depression or excessive stress  Neurological: no sensory or motor loss, seizures, syncope, tremors, numbness, no dementia  Hematologic: no anemia, easy bruising or bleeding  Endocrine: No thyroid problems, heat or cold intolerance, excessive sweating, polyuria, polydipsia, diabetes.        Subjective:     Physical Exam:    Vitals:    11/05/22 1545 11/05/22 1959 11/06/22 0018 11/06/22 0745   BP: 106/74 108/80 116/81 135/87   Pulse: 76 82 84 76   Resp: 16 18 16 19   Temp: 98.1 °F (36.7 °C) 98.1 °F (36.7 °C) 97.9 °F (36.6 °C) 97.5 °F (36.4 °C)   TempSrc: Oral Oral Oral Oral   SpO2:  95% 98% 96%   Weight:       Height:         General: Well Developed, Well Nourished, No Acute Distress  HEENT: pupils equal and round, no abnormalities noted  Neck: supple, no JVD, no carotid bruits  Heart: S1S2 with RRR without murmurs or gallops  Lungs: Crackles in bases   Abd: soft, nontender, nondistended, with good bowel sounds  Ext: warm, no edema, calves supple/nontender, pulses 2+ bilaterally  Skin: warm and dry  Psychiatric: Normal mood and affect  Neurologic: Alert and oriented X 3    Labs:     Recent Labs     11/05/22  0734 11/04/22  0810 11/02/22  2143   WBC 8.8 13.1* 3.9*   HGB 11.7* 11.2* 13.5*   HCT 37.9* 35.2* 42.5   MCV 91.5 92.6 90.4   * 110* 150     Lab Results   Component Value Date    WBC 8.8 11/05/2022    HGB 11.7 (L) 11/05/2022    HCT 37.9 (L) 11/05/2022     (L) 11/05/2022    CHOL 291 (H) 01/12/2022    TRIG 326 (H) 01/12/2022    HDL 44 01/12/2022    ALT 19 11/02/2022    AST 17 11/02/2022     11/06/2022    K 3.7 11/06/2022     11/06/2022    CREATININE 1.20 11/06/2022    BUN 18 11/06/2022    CO2 21 11/06/2022    TSH 1.900 01/12/2022    INR 1.1 10/05/2021    LABA1C 7.2 (H) 11/05/2022      Recent Labs     11/05/22  1123   DDIMER 2.38*     Pt has been seen and examined by Dr. Silvana Strickland. He agrees with the following assessment and plan. Assessment/Plan:       Principal Problem:    Bacteremia   - per primary team       Chronic systolic heart failure/NICM  - CXR no acute process  - CT chest- small bilateral pleural effusions  - pBNP 6630  - given IV lasix 20 x 1 per primary   - start IV lasix 40 mg daily  - continue toprol, spironolactone, valsartan   - strict I&O's  - daily weights   - monitor renal function with diuresis       UTI (urinary tract infection)  - E coli  - per primary team       Type 2 diabetes mellitus (HCC)  - Hgb A1c 7.2  - SSI  - per primary team       Hypertension  - stable   - monitor and titrate medications as needed       CAD (coronary artery disease)  - on ASA, statin, BB  - remains chest pain free          UDAY (acute kidney injury) ([facility] [address]  - per primary team     Thank you for requesting cardiac evaluation and allowing us to participate in the care of this patient. We will continue to follow along with you. Tayla Culp PA-C  Supervising Physician: Dr Silvana Strickland     Attending Addendum    Patient independently seen and examined by me. Agree with above note by physician extender with the following additions and exceptions: 67 y.o. male with prior history of CAD s/p CABG, NICM, HFrEF ECHO 3/2021- EF 30-35%, s/p AVR, HTN, DVT and DM2 who presented to [facility] ED on 11/3 with complaint of right flank pain and now with UTI     Key findings are:  No CP or HERRING  CV- RRR without murmur  Lungs- Clear bilaterally  Ext- no edema    Plan: IV lasix given in ER, good response        --strict I and Os, daily weights       --cont home metoprolol, aldactone       --restart additional OMT pending clinical course       --further plan pending clinical course     Laura CHENG  [address] [facility] Cardiology

## 2022-11-06 NOTE — PROGRESS NOTES
TRANSFER - OUT REPORT:    Verbal report given to [facility] on Rk Fall  being transferred to [facility] for routine progression of patient care       Report consisted of patient's Situation, Background, Assessment and   Recommendations(SBAR). Information from the following report(s) Nurse Handoff Report, ED SBAR, Intake/Output, MAR, and Recent Results was reviewed with the receiving nurse. Lines:   Peripheral IV 11/02/22 Right Antecubital (Active)   Site Assessment Clean, dry & intact 11/06/22 0745   Line Status Flushed;Capped 11/06/22 0745   Line Care Connections checked and tightened;Cap changed 11/06/22 0745   Phlebitis Assessment No symptoms 11/06/22 0745   Infiltration Assessment 0 11/06/22 0745   Alcohol Cap Used Yes 11/06/22 0745   Dressing Status Clean, dry & intact 11/06/22 0745   Dressing Type Transparent 11/06/22 0745       Peripheral IV  Left Hand (Active)   Site Assessment Clean, dry & intact 11/06/22 0745   Line Status Infusing 11/06/22 0745   Line Care Connections checked and tightened;Cap changed 11/06/22 0745   Phlebitis Assessment No symptoms 11/06/22 0745   Infiltration Assessment 0 11/06/22 0745   Alcohol Cap Used Yes 11/06/22 0745   Dressing Status Clean, dry & intact 11/06/22 0745   Dressing Type Transparent 11/06/22 0745        Opportunity for questions and clarification was provided.       Patient transported with:  [employer]

## 2022-11-07 VITALS
OXYGEN SATURATION: 96 % | WEIGHT: 214.2 LBS | SYSTOLIC BLOOD PRESSURE: 120 MMHG | HEART RATE: 79 BPM | TEMPERATURE: 97.8 F | HEIGHT: 71 IN | DIASTOLIC BLOOD PRESSURE: 83 MMHG | RESPIRATION RATE: 18 BRPM | BODY MASS INDEX: 29.99 KG/M2

## 2022-11-07 PROCEDURE — 2580000003 HC RX 258: Performed by: FAMILY MEDICINE

## 2022-11-07 PROCEDURE — 6370000000 HC RX 637 (ALT 250 FOR IP): Performed by: NURSE PRACTITIONER

## 2022-11-07 PROCEDURE — 99232 SBSQ HOSP IP/OBS MODERATE 35: CPT | Performed by: INTERNAL MEDICINE

## 2022-11-07 PROCEDURE — 6370000000 HC RX 637 (ALT 250 FOR IP): Performed by: FAMILY MEDICINE

## 2022-11-07 RX ORDER — LEVOFLOXACIN 750 MG/1
750 TABLET ORAL DAILY
Qty: 2 TABLET | Refills: 0 | Status: SHIPPED | OUTPATIENT
Start: 2022-11-08 | End: 2022-11-10

## 2022-11-07 RX ADMIN — SPIRONOLACTONE 25 MG: 25 TABLET ORAL at 09:02

## 2022-11-07 RX ADMIN — LEVOFLOXACIN 750 MG: 500 TABLET, FILM COATED ORAL at 09:01

## 2022-11-07 RX ADMIN — SODIUM CHLORIDE, PRESERVATIVE FREE 10 ML: 5 INJECTION INTRAVENOUS at 09:02

## 2022-11-07 RX ADMIN — METOPROLOL SUCCINATE 50 MG: 25 TABLET, FILM COATED, EXTENDED RELEASE ORAL at 09:02

## 2022-11-07 RX ADMIN — ASPIRIN 81 MG: 81 TABLET ORAL at 09:02

## 2022-11-07 RX ADMIN — CYANOCOBALAMIN TAB 1000 MCG 500 MCG: 1000 TAB at 09:02

## 2022-11-07 ASSESSMENT — PAIN SCALES - GENERAL
PAINLEVEL_OUTOF10: 0
PAINLEVEL_OUTOF10: 0

## 2022-11-07 NOTE — PROGRESS NOTES
Patient is being adamant about going home today. He stated that he would leave AMA if necessary. Notified Jeanna Schirmer NP of this. She stated that she would come up to the floor.

## 2022-11-07 NOTE — PLAN OF CARE
Problem: Discharge Planning  Goal: Discharge to home or other facility with appropriate resources  Outcome: Adequate for Discharge     Problem: Safety - Adult  Goal: Free from fall injury  Outcome: Adequate for Discharge     Problem: Pain  Goal: Verbalizes/displays adequate comfort level or baseline comfort level  Outcome: Adequate for Discharge     Problem: Chronic Conditions and Co-morbidities  Goal: Patient's chronic conditions and co-morbidity symptoms are monitored and maintained or improved  Outcome: Adequate for Discharge

## 2022-11-07 NOTE — CARE COORDINATION
Discharge order is in. Pt is discharging home in stable condition. Pt transferred up from the 2nd floor over the weekend, previous CM arranged f/u with new PCP. Appt time and date updated on AVS. No other discharge needs were identified. Tx goals met. 11/07/22 [address] Discharge   Transition of Care Consult (CM Consult) Discharge [id number] Discharge None   [place] Resource Information Provided?  No   Mode of Transport at Discharge Other (see comment)  (Family)   Confirm Follow Up Transport Family

## 2022-11-07 NOTE — PROGRESS NOTES
am  11/7/2022 7:30 AM    Admit Date: 11/2/2022    Admit Diagnosis: Kidney stone [N20.0]  Lactic acid acidosis [E87.20]  UDAY (acute kidney injury) ([facility] [address] [N17.9]      Subjective:    Patient : Hx of CHF, AVR, HTN. Still having chest tightness and orthopnea, developing over last 3 days after IVF given. Originally admitted 11/2 for kidney stones/UTI/bacteremia. Today denies abd pain, dysuria. Objective:    /80   Pulse 74   Temp 97.6 °F (36.4 °C)   Resp 18   Ht 5' 10.5\" (1.791 m)   Wt 214 lb 3.2 oz (97.2 kg)   SpO2 96%   BMI 30.30 kg/m²     ROS:  General ROS: negative for - chills  Hematological and Lymphatic ROS: negative for - blood clots or jaundice  Respiratory ROS: no cough, shortness of breath, or wheezing  Cardiovascular ROS: positive for - chest pain/tightness and orthopnea  Gastrointestinal ROS: no abdominal pain, change in bowel habits, or black or bloody stools   ROS: no dysuria  Neurological ROS: no TIA or stroke symptoms    Physical Exam:      Physical Examination: General appearance - Appearance: alert, well appearing, and in no distress.    Neck/lymph - supple, no significant adenopathy  Chest/CV - rales noted randa  Heart - normal rate, regular rhythm, normal S1, S2, no murmurs, rubs, clicks or gallops  Abdomen/GI - soft, nontender, nondistended, no masses or organomegaly   Musculoskeletal - no joint tenderness, deformity or swelling  Extremities - peripheral pulses normal, no pedal edema, no clubbing or cyanosis  Skin - normal coloration and turgor, no rashes, no suspicious skin lesions noted    Current Facility-Administered Medications   Medication Dose Route Frequency    levoFLOXacin (LEVAQUIN) tablet 750 mg  750 mg Oral Daily    spironolactone (ALDACTONE) tablet 25 mg  25 mg Oral Daily    morphine injection 4 mg  4 mg IntraVENous Q1H PRN    [Held by provider] valsartan (DIOVAN) tablet 40 mg  40 mg Oral Daily    aspirin EC tablet 81 mg  81 mg Oral Daily    atorvastatin (LIPITOR) tablet 40 mg  40 mg Oral Nightly    vitamin B-12 (CYANOCOBALAMIN) tablet 500 mcg  500 mcg Oral Daily    metoprolol succinate (TOPROL XL) extended release tablet 50 mg  50 mg Oral Daily    sodium chloride flush 0.9 % injection 5-40 mL  5-40 mL IntraVENous 2 times per day    sodium chloride flush 0.9 % injection 5-40 mL  5-40 mL IntraVENous PRN    0.9 % sodium chloride infusion   IntraVENous PRN    ondansetron (ZOFRAN-ODT) disintegrating tablet 4 mg  4 mg Oral Q8H PRN    Or    ondansetron (ZOFRAN) injection 4 mg  4 mg IntraVENous Q6H PRN    polyethylene glycol (GLYCOLAX) packet 17 g  17 g Oral Daily PRN    acetaminophen (TYLENOL) tablet 650 mg  650 mg Oral Q6H PRN    Or    acetaminophen (TYLENOL) suppository 650 mg  650 mg Rectal Q6H PRN    melatonin tablet 9 mg  9 mg Oral Nightly    enoxaparin (LOVENOX) injection 40 mg  40 mg SubCUTAneous Q24H       Data Review: data included in this note has been independently reviewed by the author     TELEMETRY: NSR    Assessment/Plan:     Patient Active Problem List   Diagnosis    Pneumothorax    Type 2 diabetes mellitus ([facility] [address]    Status post coronary artery bypass graft    NSTEMI (non-ST elevated myocardial infarction) ([facility] [address]    Osteoarthritis of right hip    Aortic stenosis    Hypertension    Pneumonia due to COVID-19 virus    Snoring    Class 1 obesity with alveolar hypoventilation, serious comorbidity, and body mass index (BMI) of 30.0 to 30.9 in adult ([facility])    Systolic CHF, chronic ([facility])    Elevated troponin    Rotator cuff arthropathy of both shoulders    Bilateral shoulder pain    Acute hypoxemic respiratory failure due to COVID-19 ([facility])    CAD (coronary artery disease)    S/P AVR (aortic valve replacement)    UDAY (acute kidney injury) ([facility] [address]    UTI (urinary tract infection)    Orthopnea     PLAN    UTI with Bacteremia, UDAY  Enterobacter, E.  Coli  Day 5 abx changed from ceftriaxone to levaquin 11/6    CHF  TTE 11/2/22 EF 10-15, LV dilation, RA dilation, LA dilation, diastolic dysfunction, bioprosthetic AV  Echo 3/2021 showed EF of 30-35%  Lasix drip finished  On Aldactone, Toprol  On Jardiance,Valsartan at home, held d/t UTI/UDAY  -GFR now improved to >60 and Cr 1.2  -restart Valsartan    CAD  On lipitor, ASA    HTN  Controlled, 112/80        Kortney Cat  I have personally seen and evaluated the patient and reviewed the students note and agree with the following assessment and plan and findings. I was present for and observed the key components of this note. Any appropriate additions or editing of the information have been done by me.     Anupama Dwyer MD, [facility]  Cardiology

## 2022-11-07 NOTE — PROGRESS NOTES
TRANSFER - IN REPORT:    Verbal report received from Jennifer Vargas on Myriam Foley  being received from [facility] for routine progression of patient care      Report consisted of patient's Situation, Background, Assessment and   Recommendations(SBAR). Information from the following report(s) Nurse Handoff Report, MAR, and Recent Results was reviewed with the receiving nurse. Opportunity for questions and clarification was provided. Assessment completed upon patient's arrival to unit and care assumed.

## 2022-11-07 NOTE — DISCHARGE SUMMARY
Hospitalist Discharge Summary   Admit Date:  2022 11:25 PM   DC Note date: 2022  Name:  Myriam Foley   Age:  67 y.o. Sex:  male  :  1950   MRN:  [id number]   Room:  [facility]  PCP:  None Provider    Presenting Complaint: Flank Pain (Right sided )     Initial Admission Diagnosis: Kidney stone [N20.0]  Lactic acid acidosis [E87.20]  UDAY (acute kidney injury) ([address] [N17.9]     Problem List for this Hospitalization (present on admission):    Principal Problem:    UDAY (acute kidney injury) ([address]  Active Problems:    UTI (urinary tract infection)    Orthopnea    Type 2 diabetes mellitus ([address]    Hypertension    Systolic CHF, chronic ([address]    CAD (coronary artery disease)    S/P AVR (aortic valve replacement)  Resolved Problems:    * No resolved hospital problems. *      Hospital Course:  Myriam Foley is a 67 y.o. male with medical history of HTN, DM2, CHF, CAD, kidney stones, who presented to ED with R flank pain that radiates to groin. He originally thought he may have twisted his back as he was working on a car yesterday, but started to consider a stone when back pain did not improved and was similar to the pain he would have with his stones. ER workup: UA indicative of infection. Found to have UDAY with Cr up to 1.8. Lactic acid is 2.6. CT A/P was obtained which shows:  1. No acute process. 2. Colonic diverticulosis. 3. Gallstones. 4. 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 5. Improved groundglass infiltrates in the lung bases, possibly the sequela of   prior viral infection. 6. Prior appendectomy. Also noted with b/l kidney stones with largest measuring 10 mm on right and chronic UPJ obstruction. Normal bladder. Bcx/Ucx eventually positive for Ecoli and patient transitioned to Cefepime. Repeat Ucx negative x 3 days and cefepime changed to oral Levaquin for completion of therapy. Complaint of orthopnea w/ BNP > 6000.  Cardiology consulted with medication adjustments and IV lasix. Patient of Dr. Velvet Blue. ECHO w/ EF 10-15% Grade II DD. Previously 30-35% 3/2021. Spoke with Cardiology who are ok with discharge. Patient has appointment in 2 weeks with Dr. Velvet Blue. Did not require O2 during hospital stay and denied HERRING, CP. He does continue with orthopnea and states he feels he needs to see a pulmonologist due to inability to take a deep breath since his COVID dx in 2021. Pt stable for discharge and discharged home with orders for PCP follow-up and referral to pulmonologist of choice. He will f/u with CARD[facility] as previously scheduled. Disposition: Home  Diet: ADULT DIET; Regular; 4 carb choices (60 gm/meal)  Code Status: Full Code    Follow Ups:   Follow-up Information     JACOB Arias - NP Follow up on 11/10/2022. Specialty: Nurse Practitioner  Why: 9:30am: New Patient Appointment    Please arrive 30 minutes early  Contact information:  [address] [place] [address]  [phone]             None Provider Follow up in 1 week(s). Why: Case management to assist with finding PCP           Miguel Chaudhry DO . Specialty: Internal Medicine  Contact information:  [address]  [address] [facility] [address] [place] [address]  [phone]                       Time spent in patient discharge and coordination 41 minutes. Follow up labs/diagnostics (ultimately defer to outpatient provider):  Likely will need sleep study    Plan was discussed with patient. All questions answered. Patient was stable at time of discharge. Instructions given to call a physician or return if any concerns.     Current Discharge Medication List        START taking these medications    Details   levoFLOXacin (LEVAQUIN) 750 MG tablet Take 1 tablet by mouth daily for 2 days  Qty: 2 tablet, Refills: 0           CONTINUE these medications which have NOT CHANGED    Details   aspirin 81 MG EC tablet Take 81 mg by mouth daily       atorvastatin (LIPITOR) 40 MG tablet Take 40 mg by mouth daily      cyanocobalamin 1000 MCG tablet Take 500 mcg by mouth daily      empagliflozin (JARDIANCE) 10 MG tablet Take 10 mg by mouth daily      melatonin 10 MG CAPS capsule Take 10 mg by mouth nightly       metoprolol succinate (TOPROL XL) 50 MG extended release tablet TAKE ONE TABLET BY MOUTH ONE TIME DAILY      spironolactone (ALDACTONE) 25 MG tablet Take 25 mg by mouth nightly as needed       valsartan (DIOVAN) 40 MG tablet TAKE ONE TABLET BY MOUTH ONE TIME DAILY           STOP taking these medications       acetaminophen (TYLENOL) 500 MG tablet Comments:   Reason for Stopping:               Procedures done this admission:  * No surgery found *    Consults this admission:  IP CONSULT TO CARDIOLOGY    Echocardiogram results:  11/02/22    TRANSTHORACIC ECHOCARDIOGRAM (TTE) COMPLETE (CONTRAST/BUBBLE/3D PRN) 11/06/2022  4:35 PM (Final)    Interpretation Summary    Left Ventricle: Severely reduced left ventricular systolic function with a visually estimated EF of 10 -15%. Left ventricle is moderately dilated. Normal wall thickness. Severe global hypokinesis present. Grade II diastolic dysfunction with increased LAP. Right Ventricle: Reduced systolic function. TAPSE is abnormal.    Aortic Valve: Not well visualized. Bioprosthetic valve. Mitral Valve: Mild regurgitation. Left Atrium: Left atrium is severely dilated. Right Atrium: Right atrium is dilated. Technical qualifiers: Color flow Doppler was performed and pulse wave and/or continuous wave Doppler was performed. Contrast used: Definity. Signed by: Dianelys Ascencio MD on 11/6/2022  4:35 PM      Diagnostic Imaging/Tests:   XR CHEST PORTABLE    Result Date: 11/5/2022  Stable chronic lung disease. No acute findings in the chest     CT CHEST PULMONARY EMBOLISM W CONTRAST    Result Date: 11/5/2022  1. No evidence of pulmonary embolus. 2.  Small bilateral pleural effusions.  ** If there are any questions about this report, I can be reached on [employer] or at [phone] **    CT ABDOMEN PELVIS RENAL STONE    Result Date: 11/3/2022  1. No acute process. 2. Colonic diverticulosis. 3. Gallstones. 4. 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 5. Improved groundglass infiltrates in the lung bases, possibly the sequela of prior viral infection. 6. Prior appendectomy. Labs: Results:       BMP, Mg, Phos Recent Labs     11/05/22  0734 11/06/22  0715    137   K 3.9 3.7    107   CO2 23 21   ANIONGAP 6 9   BUN 21 18   CREATININE 1.20 1.20   LABGLOM >60 >60   CALCIUM 8.6 8.6   GLUCOSE 106* 118*      CBC Recent Labs     11/05/22  0734   WBC 8.8   RBC 4.14*   HGB 11.7*   HCT 37.9*   MCV 91.5   MCH 28.3   MCHC 30.9*   RDW 13.1   *   MPV 11.3   NRBC 0.00   SEGS 77   LYMPHOPCT 14   EOSRELPCT 3   MONOPCT 5   BASOPCT 0   IMMGRAN 1   SEGSABS 6.8   LYMPHSABS 1.2   EOSABS 0.3   MONOSABS 0.5   BASOSABS 0.0   ABSIMMGRAN 0.1      LFT No results for input(s): BILITOT, BILIDIR, ALKPHOS, AST, ALT, PROT, LABALBU, GLOB in the last 72 hours.    Cardiac  Lab Results   Component Value Date/Time    NTPROBNP 6,630 11/06/2022 07:15 AM    TROPHS 641.4 10/05/2021 03:17 AM    TROPHS 943.1 10/04/2021 12:32 AM    TROPHS 1,057.8 10/03/2021 09:58 PM      Coags Lab Results   Component Value Date/Time    PROTIME 15.0 10/05/2021 03:17 AM    PROTIME 18.9 10/04/2021 12:15 AM    PROTIME 13.3 07/06/2021 10:18 AM    INR 1.1 10/05/2021 03:17 AM    INR 1.6 10/04/2021 12:15 AM    INR 1.0 07/06/2021 10:18 AM    APTT 163.3 10/05/2021 03:17 AM    APTT 32.5 10/04/2021 12:15 AM    APTT 28.2 07/06/2021 10:18 AM      A1c Lab Results   Component Value Date/Time    LABA1C 7.2 11/05/2022 07:34 AM    LABA1C 6.7 01/12/2022 09:54 AM    LABA1C 6.9 10/06/2021 05:40 AM     11/05/2022 07:34 AM     01/12/2022 09:54 AM     10/06/2021 05:40 AM      Lipids Lab Results   Component Value Date/Time    CHOL 291 01/12/2022 09:54 AM [address] 01/12/2022 09:54 AM    LABVLDL 30 10/06/2021 05:40 AM    HDL 44 01/12/2022 09:54 AM    CHOLHDLRATIO 3.1 10/06/2021 05:40 AM    TRIG 326 01/12/2022 09:54 AM      Thyroid  No results found for: Merril Monday     Most Recent UA Lab Results   Component Value Date/Time    COLORU YELLOW/STRAW 11/02/2022 11:53 PM    APPEARANCE TURBID 11/02/2022 11:53 PM    SPECGRAV 1.023 11/02/2022 11:53 PM    LABPH 5.0 11/02/2022 11:53 PM    PROTEINU TRACE 11/02/2022 11:53 PM    GLUCOSEU >1000 11/02/2022 11:53 PM    GLUCOSEU >1,000 11/02/2022 11:30 PM    KETUA TRACE 11/02/2022 11:53 PM    BILIRUBINUR Negative 11/02/2022 11:53 PM    BLOODU LARGE 11/02/2022 11:53 PM    BLOODU LARGE 11/02/2022 11:30 PM    UROBILINOGEN 0.2 11/02/2022 11:53 PM    NITRU Positive 11/02/2022 11:53 PM    LEUKOCYTESUR LARGE 11/02/2022 11:53 PM    WBCUA  09/30/2019 11:22 AM    RBCUA 0-3 09/30/2019 11:22 AM    BACTERIA 0 09/30/2019 11:22 AM        Recent Labs     11/04/22  1038 11/04/22  0810 11/03/22  0045   CULTURE NO GROWTH 3 DAYS NO GROWTH 3 DAYS >100,000 COLONIES/mL ESCHERICHIA COLI*  GRAM NEGATIVE RODS For identification and susceptibility refer to culture [id number]*  ESCHERICHIA COLI*  Refer to Blood Culture ID Panel Accession [id number]       All Labs from Last 24 Hrs:  Recent Results (from the past 24 hour(s))   Transthoracic echocardiogram (TTE) complete with contrast, bubble, strain, and 3D PRN    Collection Time: 11/06/22  3:30 PM   Result Value Ref Range    LV EDV A2C 254 mL    LV EDV A4C 259 mL    LV ESV A2C 222 mL    LV ESV A4C 234 mL    IVSd 1.1 (A) 0.6 - 1.0 cm    LVIDd 6.6 (A) 4.2 - 5.9 cm    LVIDs 6.2 cm    LVOT Diameter 1.9 cm    LVOT Mean Gradient 1 mmHg    LVOT VTI 11.7 cm    LVOT Peak Velocity 0.6 m/s    LVOT Peak Gradient 2 mmHg    LVPWd 1.0 0.6 - 1.0 cm    LV E' Lateral Velocity 6 cm/s    LV E' Septal Velocity 6 cm/s    LV Ejection Fraction A2C 12 %    LV Ejection Fraction A4C 10 %    EF BP 13 (A) 55 - 100 %    LVOT Area 2.8 cm2    LVOT SV 33.2 ml    LA Minor Axis 6.4 cm    LA Major Axis 6.5 cm    LA Area 2C 29.9 cm2    LA Area 4C 29.4 cm2    LA Volume  (A) 18 - 58 mL    AV AT 88.00 ms    AV Mean Velocity 1.5 m/s    AV Mean Gradient 10 mmHg    AV VTI 41.0 cm    AV Peak Velocity 2.2 m/s    AV Peak Gradient 19 mmHg    AV Area by VTI 0.8 cm2    AV Area by Peak Velocity 0.8 cm2    Aortic Root 3.3 cm    Ascending Aorta 3.5 cm    IVC Proxmal 1.3 cm    MV Nyquist Velocity 30 cm/s    MR Radius PISA 0.40 cm    MR .0 cm    MV Mean Gradient 2 mmHg    MV VTI 30.1 cm    MV Mean Velocity 0.7 m/s    MR Peak Velocity 4.5 m/s    MR Peak Gradient 81 mmHg    MV Max Velocity 1.3 m/s    MV Peak Gradient 6 mmHg    MV E Wave Deceleration Time 160.0 ms    MV A Velocity 1.03 m/s    MV E Velocity 1.34 m/s    MV EROA PISA 6.7 cm2    MV Area by VTI 1.1 cm2    RV Basal Dimension 4.4 cm    RV Mid Dimension 2.9 cm    TAPSE 1.5 (A) 1.7 cm    TR Max Velocity 3.02 m/s    TR Peak Gradient 36 mmHg    Body Surface Area 2.21 m2    Fractional Shortening 2D 6 28 - 44 %    LV ESV Index A4C 108 mL/m2    LV EDV Index A4C 119 mL/m2    LV ESV Index A2C 102 mL/m2    LV EDV Index A2C 117 mL/m2    LVIDd Index 3.04 cm/m2    LVIDs Index 2.86 cm/m2    LV RWT Ratio 0.30     LV Mass 2D 309.3 (A) 88 - 224 g    LV Mass 2D Index 142.5 (A) 49 - 115 g/m2    MV E/A 1.30     E/E' Ratio (Averaged) 22.33     E/E' Lateral 22.33     E/E' Septal 22.33     LA Volume Index BP 51 (A) 16 - 34 ml/m2    LVOT Stroke Volume Index 15.3 mL/m2    Ao Root Index 1.52 cm/m2    Ascending Aorta Index 1.61 cm/m2    LVOT:AV VTI Index 0.29     VANESSA/BSA VTI 0.4 cm2/m2    VANESSA/BSA Peak Velocity 0.4 cm2/m2    MR Regurg Volume PISA 1,098. 58 mL    MV:LVOT VTI Index 2.57     Est. RA Pressure 3 mmHg    RVSP 39 mmHg    AV Velocity Ratio 0.27        Allergies   Allergen Reactions    Hydrocodone-Acetaminophen Other (See Comments)     hallucinations     Immunization History   Administered Date(s) Administered PPD Test 10/03/2018, 09/29/2021       Recent Vital Data:  Patient Vitals for the past 24 hrs:   Temp Pulse Resp BP SpO2   11/07/22 0902 -- -- -- 128/80 --   11/07/22 0712 97.7 °F (36.5 °C) 74 18 112/80 96 %   11/07/22 0429 97.6 °F (36.4 °C) 78 18 111/78 95 %   11/07/22 0042 98.1 °F (36.7 °C) 79 18 97/63 98 %   11/06/22 2120 97.9 °F (36.6 °C) 81 18 120/85 94 %   11/06/22 1652 97.9 °F (36.6 °C) 83 18 116/71 97 %   11/06/22 1230 97.5 °F (36.4 °C) 76 18 109/81 98 %       Oxygen Therapy  SpO2: 96 %  Pulse Oximeter Device Mode: Intermittent  O2 Device: None (Room air)    Estimated body mass index is 30.3 kg/m² as calculated from the following:    Height as of this encounter: 5' 10.5\" (1.791 m). Weight as of this encounter: 214 lb 3.2 oz (97.2 kg). Intake/Output Summary (Last 24 hours) at 11/7/2022 1052  Last data filed at 11/6/2022 2055  Gross per 24 hour   Intake 100 ml   Output 1800 ml   Net -1700 ml         Physical Exam:    General:    Well nourished. No overt distress  Head:  Normocephalic, atraumatic  Eyes:  Sclerae appear normal.  Pupils equally round. HENT:  Nares appear normal, no drainage. Moist mucous membranes  Neck:  No restricted ROM. Trachea midline  CV:   RRR. No m/r/g. No JVD  Lungs:   CTAB. No wheezing, rhonchi, or rales. Respirations even, unlabored  Abdomen:   Soft, nontender, nondistended. Extremities: Warm and dry. No cyanosis or clubbing. No edema. Skin:     No rashes. Normal coloration  Neuro:  CN II-XII grossly intact. Psych:  Normal mood and affect. Signed:  JACOB Luong CNP    Part of this note may have been written by using a voice dictation software. The note has been proof read but may still contain some grammatical/other typographical errors.

## 2022-11-07 NOTE — PROGRESS NOTES
Spiritual Care Visit, initial visit attempted    Patient had been discharged. Visit by Ashley Carbajal, Staff .  Ellie, Shara.TOMASZ., B.A.

## 2022-11-08 ENCOUNTER — TELEPHONE (OUTPATIENT)
Dept: INTERNAL MEDICINE CLINIC | Facility: CLINIC | Age: 72
End: 2022-11-08

## 2022-11-08 ENCOUNTER — CARE COORDINATION (OUTPATIENT)
Dept: OTHER | Facility: CLINIC | Age: 72
End: 2022-11-08

## 2022-11-08 NOTE — CARE COORDINATION
[facility] Care Transitions Initial Follow Up Call    Call within 2 business days of discharge: Yes    Care Transition Nurse contacted the patient by telephone to perform post hospital discharge assessment. Verified name and  with patient as identifiers. Provided introduction to self, and explanation of the Care Transition Nurse role. Patient: Karen Maki Patient : 1950   MRN: [id number]  Reason for Admission: UDAY (acute kidney injury)   Discharge Date: 22 RARS: Readmission Risk Score: 10.4      Last Discharge  Street       Date Complaint Diagnosis Description Type Department Provider    22 Flank Pain UDAY (acute kidney injury) ([facility] [address] [address] . .. ED to Hosp-Admission (Discharged) (ADMITTED) Ayla Bower MD; Elicia Winston. .. Was this an external facility discharge? No Discharge Facility: [facility]    Challenges to be reviewed by the provider   Additional needs identified to be addressed with provider: No  none               Method of communication with provider: none.    n    Care Transition Nurse reviewed discharge instructions, medical action plan, and red flags with patient who verbalized understanding. The patient was given an opportunity to ask questions and does not have any further questions or concerns at this time. Were discharge instructions available to patient? Yes. Reviewed appropriate site of care based on symptoms and resources available to patient including: PCP  Specialist  When to call 911  Condition related references. The patient agrees to contact the PCP office for questions related to their healthcare. Advance Care Planning:   Does patient have an Advance Directive: not on file; education provided. Medication reconciliation was performed with patient, who verbalizes understanding of administration of home medications.  Medications reviewed, 1111F entered: yes    Was patient discharged with a pulse oximeter? no    Non-face-to-face services provided:  Scheduled appointment with PCP-New Provider, Roxana Cervantes with [facility] on 11/10/22 at 9:30 am  Scheduled appointment with Specialist-Cardiology on 11/28/22 at 1:15 pm  Obtained and reviewed discharge summary and/or continuity of care documents  Education of patient/family/caregiver/guardian to support self-management-of medical conditions, red flags, and follow up appointments. CTN encouraged patient to monitor daily weights and report to MD weight gain of 2 lbs or greater overnight or 5 lbs pounds in a week. Patient agrees to discussed finger stick glucose monitoring with PCP at follow up. Assessment and support for treatment adherence and medication management-per discharge instruction. Patient confirmed he obtained Levaquin and is taking as directed. Patient encouraged to completed all antibiotics. Patient is aware of upcoming follow up appointments. Offered patient enrollment in the Remote Patient Monitoring (RPM) program for in-home monitoring: NA.    Care Transitions 24 Hour Call    Schedule Follow Up Appointment with PCP: Completed  Do you have a copy of your discharge instructions?: Yes  Do you have all of your prescriptions and are they filled?: Yes  Have you been contacted by a [id number] [employer] Pharmacist?: No  Have you scheduled your follow up appointment?: Yes  How are you going to get to your appointment?: Car - drive self  Do you have support at home?: Alone  Do you feel like you have everything you need to keep you well at home?: Yes  Are you an active caregiver in your home?: No  Care Transitions Interventions        Goals Addressed                   This Visit's Progress     Conditions and Symptoms        I will schedule office visits, as directed by my provider. I will keep my appointment or reschedule if I have to cancel. I will follow my Zone Management tool to seek urgent or emergent care.   I will notify my provider of any symptoms that indicate a worsening of my condition. Barriers: none  Plan for overcoming my barriers: N/A  Confidence: 7/10  Anticipated Goal Completion Date: 12/6/2022               Follow Up  Future Appointments   Date Time Provider Collette Plata   11/28/2022  1:15 PM Michael Benavides MD [facility] AM[facility]     PCP - Idris Head, new Provider at [facility] on 11/10/22 at 9:30 am.    Care Transition Nurse provided contact information. Plan for follow-up call in 5-7 days based on severity of symptoms and risk factors. Plan for next call: self management-of medical conditions, medications, and follow up.      Sherron Bobby RN

## 2022-11-08 NOTE — TELEPHONE ENCOUNTER
Called patient to schedule TCV appt following discharge from [facility]  11/07/2022. Dx  Kidney stone [N20.0]  Lactic acid acidosis [E87.20]  UDAY (acute kidney injury) ([facility] [N17.9]     Patient states his PCP was Dr Eugenio Dwyer and it is too far for him to drive.   Has appt with [facility] 11/10/2022 with Slime Laurent NP.

## 2022-11-09 LAB
BACTERIA SPEC CULT: NORMAL
BACTERIA SPEC CULT: NORMAL
SERVICE CMNT-IMP: NORMAL
SERVICE CMNT-IMP: NORMAL

## 2022-11-15 ENCOUNTER — CARE COORDINATION (OUTPATIENT)
Dept: OTHER | Facility: CLINIC | Age: 72
End: 2022-11-15

## 2022-11-15 NOTE — CARE COORDINATION
[facility] Care Transitions Follow Up Call    Care Transition Nurse contacted the patient by telephone to follow up after admission on 2022. Verified name and  with patient as identifiers. Patient: Alban North  Patient : 1950   MRN: [id number]  Reason for Admission: UDAY (acute kidney injury)  Discharge Date: 22 RARS: Readmission Risk Score: 10.4      Needs to be reviewed by the provider   Additional needs identified to be addressed with provider: No  none             Method of communication with provider: none. Addressed changes since last contact:   Patient reports he is doing well and denies any concerns at this time. Patient reports he completed follow up with new PCP and labs were done. No results available at this time. Patient denies any symptoms of concern. Patient endorses compliance with his medications. Patient reports he continues to monitor his daily weights and no changes. Discussed follow-up appointments. If no appointment was previously scheduled, appointment scheduling offered: Yes. Is follow up appointment scheduled within 7 days of discharge? Yes. Follow Up  Future Appointments   Date Time Provider Collette Plata   2022  1:15 PM Honey Hughes MD [facility] AM[facility]   2022 10:10 AM TOMASZ Aldana MD [facility] AM[facility]     Non-[facility] follow up appointment(s): no    Care Transition Nurse reviewed medical action plan and red flags with patient and discussed any barriers to care and/or understanding of plan of care after discharge. Discussed appropriate site of care based on symptoms and resources available to patient including: PCP  Specialist  When to call 911  Condition related references. The patient agrees to contact the PCP office for questions related to their healthcare. Patients top risk factors for readmission: medical condition-UDAY, lactic acidosis, UTI, DM, Orthopnea, HTN, DM, CAD, CHF, Hx.  Of AVR, kidney stones  Interventions to address risk factors: Obtained and reviewed discharge summary and/or continuity of care documents, Education of patient/family/caregiver/guardian to support self-management-of medical conditions, safety precautions, self monitoring and we discussed when to seek care, and follow up appointments, and Assessment and support for treatment adherence and medication management-per discharge instructions. Offered patient enrollment in the Remote Patient Monitoring (RPM) program for in-home monitoring: NA.     Care Transitions Subsequent and Final Call    Schedule Follow Up Appointment with PCP: Completed  Subsequent and Final Calls  Do you have any ongoing symptoms?: No  Have your medications changed?: No  Do you have any questions related to your medications?: No  Do you currently have any active services?: No  Do you have any needs or concerns that I can assist you with?: No  Identified Barriers: None  Care Transitions Interventions  Other Interventions:           Care Transition Nurse provided contact information for future needs. Plan for follow-up call in 10-14 days based on severity of symptoms and risk factors. Plan for next call: self management-of medical conditions and follow up.     Donaldo Almonte RN

## 2022-11-28 ENCOUNTER — OFFICE VISIT (OUTPATIENT)
Dept: CARDIOLOGY CLINIC | Age: 72
End: 2022-11-28
Payer: MEDICARE

## 2022-11-28 VITALS
HEART RATE: 79 BPM | WEIGHT: 224 LBS | SYSTOLIC BLOOD PRESSURE: 126 MMHG | DIASTOLIC BLOOD PRESSURE: 78 MMHG | BODY MASS INDEX: 32.07 KG/M2 | HEIGHT: 70 IN

## 2022-11-28 DIAGNOSIS — I25.118 ATHEROSCLEROTIC HEART DISEASE OF NATIVE CORONARY ARTERY WITH OTHER FORMS OF ANGINA PECTORIS (HCC): ICD-10-CM

## 2022-11-28 DIAGNOSIS — Z95.1 PRESENCE OF AORTOCORONARY BYPASS GRAFT: ICD-10-CM

## 2022-11-28 DIAGNOSIS — Z95.2 PRESENCE OF PROSTHETIC HEART VALVE: ICD-10-CM

## 2022-11-28 DIAGNOSIS — I50.22 CHRONIC SYSTOLIC (CONGESTIVE) HEART FAILURE (HCC): Primary | ICD-10-CM

## 2022-11-28 PROCEDURE — G8427 DOCREV CUR MEDS BY ELIG CLIN: HCPCS | Performed by: INTERNAL MEDICINE

## 2022-11-28 PROCEDURE — 3017F COLORECTAL CA SCREEN DOC REV: CPT | Performed by: INTERNAL MEDICINE

## 2022-11-28 PROCEDURE — 3074F SYST BP LT 130 MM HG: CPT | Performed by: INTERNAL MEDICINE

## 2022-11-28 PROCEDURE — 99214 OFFICE O/P EST MOD 30 MIN: CPT | Performed by: INTERNAL MEDICINE

## 2022-11-28 PROCEDURE — G8484 FLU IMMUNIZE NO ADMIN: HCPCS | Performed by: INTERNAL MEDICINE

## 2022-11-28 PROCEDURE — 93000 ELECTROCARDIOGRAM COMPLETE: CPT | Performed by: INTERNAL MEDICINE

## 2022-11-28 PROCEDURE — 1123F ACP DISCUSS/DSCN MKR DOCD: CPT | Performed by: INTERNAL MEDICINE

## 2022-11-28 PROCEDURE — G8417 CALC BMI ABV UP PARAM F/U: HCPCS | Performed by: INTERNAL MEDICINE

## 2022-11-28 PROCEDURE — 1111F DSCHRG MED/CURRENT MED MERGE: CPT | Performed by: INTERNAL MEDICINE

## 2022-11-28 PROCEDURE — 1036F TOBACCO NON-USER: CPT | Performed by: INTERNAL MEDICINE

## 2022-11-28 PROCEDURE — 3078F DIAST BP <80 MM HG: CPT | Performed by: INTERNAL MEDICINE

## 2022-11-28 RX ORDER — SPIRONOLACTONE 25 MG/1
25 TABLET ORAL NIGHTLY PRN
Qty: 90 TABLET | Refills: 3 | Status: SHIPPED | OUTPATIENT
Start: 2022-11-28

## 2022-11-28 RX ORDER — METOPROLOL SUCCINATE 50 MG/1
50 TABLET, EXTENDED RELEASE ORAL DAILY
Qty: 90 TABLET | Refills: 3 | Status: SHIPPED | OUTPATIENT
Start: 2022-11-28 | End: 2022-11-28 | Stop reason: SDUPTHER

## 2022-11-28 RX ORDER — METOPROLOL SUCCINATE 50 MG/1
50 TABLET, EXTENDED RELEASE ORAL 2 TIMES DAILY
Qty: 180 TABLET | Refills: 3 | Status: SHIPPED | OUTPATIENT
Start: 2022-11-28

## 2022-11-28 RX ORDER — VALSARTAN 40 MG/1
40 TABLET ORAL DAILY
Qty: 90 TABLET | Refills: 3 | Status: SHIPPED | OUTPATIENT
Start: 2022-11-28

## 2022-11-28 RX ORDER — ATORVASTATIN CALCIUM 40 MG/1
40 TABLET, FILM COATED ORAL DAILY
Qty: 90 TABLET | Refills: 3 | Status: SHIPPED | OUTPATIENT
Start: 2022-11-28

## 2022-11-28 NOTE — PROGRESS NOTES
[address] [place], [place]  [address], [address] [facility], [address] [place] [address]  PHONE: [phone]    SUBJECTIVE: /HPI  Karen Maki (1950) is a 70 y.o. male seen for a follow up visit regarding the following:        ICD-10-CM ICD-9-CM   1. Systolic CHF, chronic (HCC)  I50.22 428.22     428.0   2. Coronary artery disease of native artery of native heart with stable angina pectoris ([address]  I25.118 414.01     413.9   3. S/P AVR (aortic valve replacement)  Z95.2 V43.3   4. Status post coronary artery bypass graft  Z95.1 V45.81   5. Essential hypertension  I10 401.9     Pt doing well. No chest pain. No palpitations. Patient denies syncope. No dyspnea. States they are taking meds. Maintains a normal level of activity for them without symptoms. No dizziness or lightheadedness. All above conditions stable. In March 2021 patient underwent echocardiogram showing EF 30 to 35% and cardiac catheterization showing stableAnatomy anatomy patient had a continued patency of his LIMA to the LAD with minimal disease otherwise. This is consistent with a nonischemic cardiomyopathy    Review of lab work shows that the patient has an elevated blood sugar he is aware of this and is making adjustments in some of his dietary and tea consumption habits. The addition of Samella Come will also help with this. His lipid panel is markedly off. But he is not on a statin at this point. We will reinstitute his statin and usually he is within guideline directed medical therapy while on statin.   This can be reassessed in 3 months when he returns    11/28/22  Echocardiogram from November 6 shows ejection fraction 10 to 15%  He is on guideline directed medical therapy  We can continue to optimize his heart failure meds by doubling his Toprol-XL to 50 mg twice daily there is room on the pulse and blood pressure for this we will continue the Aldactone 25 mg daily the valsartan 40 mg daily and the Jardiance 10 mg daily        Past Medical History, Past Surgical History, Family history, Social History, and Medications were all reviewed with the patient today and updated as necessary. Outpatient Medications Marked as Taking for the 4/19/22 encounter (Office Visit) with Haris Villaseñor MD   Medication Sig Dispense Refill    spironolactone (ALDACTONE) 25 mg tablet TAKE ONE TABLET BY MOUTH ONE TIME DAILY 90 Tablet 3    valsartan (DIOVAN) 40 mg tablet TAKE ONE TABLET BY MOUTH ONE TIME DAILY 90 Tablet 3    metoprolol succinate (TOPROL-XL) 50 mg XL tablet TAKE ONE TABLET BY MOUTH ONE TIME DAILY 90 Tablet 3    melatonin 10 mg capsule Take 10 mg by mouth nightly. cyanocobalamin, vitamin B-12, (Vitamin B-12) 5,000 mcg subl Take 1 Tablet by mouth daily. albuterol (Ventolin HFA) 90 mcg/actuation inhaler Take 2 Puffs by inhalation every four (4) hours as needed for Wheezing, Shortness of Breath or Cough. 6.7 g 0    cyanocobalamin 1,000 mcg tablet Take 500 mcg by mouth daily. aspirin delayed-release 81 mg tablet Take 1 Tablet by mouth two (2) times a day. 60 Tablet 0    acetaminophen (TYLENOL) 500 mg tablet Take 2 Tablets by mouth every six (6) hours as needed for Pain. 60 Tablet 0    multivitamin (ONE A DAY) tablet Take 1 Tab by mouth daily. empagliflozin (Jardiance) 10 mg tablet Take 1 Tablet by mouth daily. 90 Tablet 3    atorvastatin (Lipitor) 40 mg tablet Take 1 Tablet by mouth daily. 90 Tablet 3     Allergies   Allergen Reactions    Lortab [Hydrocodone-Acetaminophen] Other (comments)     hallucinations     Past Medical History:   Diagnosis Date    Arthritis     generalized OA    CAD (coronary artery disease) 09/11/2018    Followed by [facility].     Calculus of kidney     CHF (congestive heart failure) ([facility] [address]     DVT (deep venous thrombosis) ([facility] [address] 2021    RLE     History of kidney stones 2016    naturally passed    History of melanoma 2016    removed from nose    Hx of CABG     Hypercholesterolemia     Hypertension 9/11/2018    PUD (peptic ulcer disease)     1970's- patient denies 10/01/18    S/P AVR (aortic valve replacement)      Past Surgical History:   Procedure Laterality Date    HX AORTIC VALVE REPLACEMENT  10/03/2018    CORONARY ARTERY BYPASS GRAFT X 1, LIMA to the LAD WITH AVR 23 mm Intuity valve    HX APPENDECTOMY       15 yo    HX CORONARY ARTERY BYPASS GRAFT  10/03/2018    CABG x1, Dr. Blanca Gonzales    HX HIP REPLACEMENT Right 07/2021    HX ORTHOPAEDIC  2014    right lower ext - broken put in rods and screws    HX OTHER SURGICAL      melanoma surgery to nose     Family History   Problem Relation Age of Onset    Cancer Mother         Leukemia    Alzheimer's Disease Mother     Cancer Brother         Leukemia    Heart Disease Brother     No Known Problems Father     No Known Problems Brother       Social History     Tobacco Use    Smoking status: Never Smoker    Smokeless tobacco: Never Used   Substance Use Topics    Alcohol use: Yes     Comment: 3 times per month      Pt does   have history of early onset cad or heart failure in immediate family members    Current Outpatient Medications:     spironolactone (ALDACTONE) 25 mg tablet, TAKE ONE TABLET BY MOUTH ONE TIME DAILY, Disp: 90 Tablet, Rfl: 3    valsartan (DIOVAN) 40 mg tablet, TAKE ONE TABLET BY MOUTH ONE TIME DAILY, Disp: 90 Tablet, Rfl: 3    metoprolol succinate (TOPROL-XL) 50 mg XL tablet, TAKE ONE TABLET BY MOUTH ONE TIME DAILY, Disp: 90 Tablet, Rfl: 3    melatonin 10 mg capsule, Take 10 mg by mouth nightly., Disp: , Rfl:     cyanocobalamin, vitamin B-12, (Vitamin B-12) 5,000 mcg subl, Take 1 Tablet by mouth daily. , Disp: , Rfl:     albuterol (Ventolin HFA) 90 mcg/actuation inhaler, Take 2 Puffs by inhalation every four (4) hours as needed for Wheezing, Shortness of Breath or Cough. , Disp: 6.7 g, Rfl: 0    cyanocobalamin 1,000 mcg tablet, Take 500 mcg by mouth daily. , Disp: , Rfl:     aspirin delayed-release 81 mg tablet, Take 1 Tablet by mouth two (2) times a day., Disp: 60 Tablet, Rfl: 0    acetaminophen (TYLENOL) 500 mg tablet, Take 2 Tablets by mouth every six (6) hours as needed for Pain., Disp: 60 Tablet, Rfl: 0    multivitamin (ONE A DAY) tablet, Take 1 Tab by mouth daily. , Disp: , Rfl:     empagliflozin (Jardiance) 10 mg tablet, Take 1 Tablet by mouth daily. , Disp: 90 Tablet, Rfl: 3    atorvastatin (Lipitor) 40 mg tablet, Take 1 Tablet by mouth daily. , Disp: 90 Tablet, Rfl: 3    OXYGEN-AIR DELIVERY SYSTEMS, 2 L by Nasal route continuous. pt reports only using at night  (Patient not taking: Reported on 11/12/2021), Disp: , Rfl:         ROS:  Review of Systems - General ROS: negative for - chills, fatigue or fever  Hematological and Lymphatic ROS: negative for - bleeding problems, bruising or jaundice  Respiratory ROS: no cough, shortness of breath, or wheezing  Cardiovascular ROS: no chest pain or dyspnea on exertion  Gastrointestinal ROS: no abdominal pain, change in bowel habits, or black or bloody stools  Neurological ROS: no TIA or stroke symptoms  All other systems negative.       Wt Readings from Last 3 Encounters:   04/19/22 229 lb (103.9 kg)   11/12/21 212 lb 9.6 oz (96.4 kg)   10/11/21 208 lb 6.4 oz (94.5 kg)     Temp Readings from Last 3 Encounters:   11/12/21 97.1 °F (36.2 °C) (Temporal)   10/14/21 97.4 °F (36.3 °C) (Temporal)   10/14/21 97.9 °F (36.6 °C) (Temporal)     BP Readings from Last 3 Encounters:   04/19/22 120/72   11/12/21 130/82   10/14/21 120/76     Pulse Readings from Last 3 Encounters:   04/19/22 76   11/12/21 (!) 113   10/14/21 95           PHYSICAL EXAM:  Visit Vitals  /72   Pulse 76   Ht 5' 9\" (1.753 m)   Wt 229 lb (103.9 kg)   BMI 33.82 kg/m²       Physical Examination: General appearance - alert, well appearing, and in no distress  Mental status - alert, oriented to person, place, and time  Eyes - pupils equal and reactive, extraocular eye movements intact  Neck/lymph - supple, no significant adenopathy  Chest/lungs - clear to auscultation, no wheezes, rales or rhonchi, symmetric air entry  Heart/CV - normal rate, regular rhythm, normal S1, S2, no murmurs, rubs, clicks or gallops  Abdomen/GI - soft, nontender, nondistended, no masses or organomegaly  Musculoskeletal - no joint tenderness, deformity or swelling  Extremities - peripheral pulses normal, no pedal edema, no clubbing or cyanosis  Skin - normal coloration and turgor, no rashes, no suspicious skin lesions noted    EKG: normal EKG, normal sinus rhythm, unchanged from previous tracings. Medications reviewed and questions answered    No results found for this or any previous visit (from the past 672 hour(s)). Lab Results   Component Value Date/Time    Cholesterol, total 291 (H) 01/12/2022 09:54 AM    HDL Cholesterol 44 01/12/2022 09:54 AM    LDL, calculated 183 (H) 01/12/2022 09:54 AM    LDL, calculated 74 10/06/2021 05:40 AM    VLDL, calculated 64 (H) 01/12/2022 09:54 AM    VLDL, calculated 30 (H) 10/06/2021 05:40 AM    Triglyceride 326 (H) 01/12/2022 09:54 AM    CHOL/HDL Ratio 3.1 10/06/2021 05:40 AM         ASSESSMENT and PLAN        1. Systolic CHF, chronic (HCC)  The current medical regimen is effective;  continue present plan and medications at doses listed below. Key CAD CHF Meds               spironolactone (ALDACTONE) 25 mg tablet (Taking) TAKE ONE TABLET BY MOUTH ONE TIME DAILY    valsartan (DIOVAN) 40 mg tablet (Taking) TAKE ONE TABLET BY MOUTH ONE TIME DAILY    metoprolol succinate (TOPROL-XL) 50 mg XL tablet (Taking) TAKE ONE TABLET BY MOUTH ONE TIME DAILY    aspirin delayed-release 81 mg tablet (Taking) Take 1 Tablet by mouth two (2) times a day. atorvastatin (Lipitor) 40 mg tablet (Taking) Take 1 Tablet by mouth daily. 2. Coronary artery disease of native artery of native heart with stable angina pectoris [facility])  The current medical regimen is effective;  continue present plan and medications at doses listed below.   Key CAD CHF Meds spironolactone (ALDACTONE) 25 mg tablet (Taking) TAKE ONE TABLET BY MOUTH ONE TIME DAILY    valsartan (DIOVAN) 40 mg tablet (Taking) TAKE ONE TABLET BY MOUTH ONE TIME DAILY    metoprolol succinate (TOPROL-XL) 50 mg XL tablet (Taking) TAKE ONE TABLET BY MOUTH ONE TIME DAILY    aspirin delayed-release 81 mg tablet (Taking) Take 1 Tablet by mouth two (2) times a day. atorvastatin (Lipitor) 40 mg tablet (Taking) Take 1 Tablet by mouth daily. 3. S/P AVR (aortic valve replacement)  The current medical regimen is effective;  continue present plan and medications. 4. Status post coronary artery bypass graft  The current medical regimen is effective;  continue present plan and medications at doses listed below. Key CAD CHF Meds               spironolactone (ALDACTONE) 25 mg tablet (Taking) TAKE ONE TABLET BY MOUTH ONE TIME DAILY    valsartan (DIOVAN) 40 mg tablet (Taking) TAKE ONE TABLET BY MOUTH ONE TIME DAILY    metoprolol succinate (TOPROL-XL) 50 mg XL tablet (Taking) TAKE ONE TABLET BY MOUTH ONE TIME DAILY    aspirin delayed-release 81 mg tablet (Taking) Take 1 Tablet by mouth two (2) times a day. atorvastatin (Lipitor) 40 mg tablet (Taking) Take 1 Tablet by mouth daily. The current medical regimen is effective;  continue present plan and medications at doses listed below. Key CAD CHF Meds               spironolactone (ALDACTONE) 25 mg tablet (Taking) TAKE ONE TABLET BY MOUTH ONE TIME DAILY    valsartan (DIOVAN) 40 mg tablet (Taking) TAKE ONE TABLET BY MOUTH ONE TIME DAILY    metoprolol succinate (TOPROL-XL) 50 mg XL tablet (Taking) TAKE ONE TABLET BY MOUTH ONE TIME DAILY    aspirin delayed-release 81 mg tablet (Taking) Take 1 Tablet by mouth two (2) times a day. atorvastatin (Lipitor) 40 mg tablet (Taking) Take 1 Tablet by mouth daily.                  5. Essential hypertension  The current medical regimen is effective;  continue present plan and medications at doses listed below.  Key CAD CHF Meds               spironolactone (ALDACTONE) 25 mg tablet (Taking) TAKE ONE TABLET BY MOUTH ONE TIME DAILY    valsartan (DIOVAN) 40 mg tablet (Taking) TAKE ONE TABLET BY MOUTH ONE TIME DAILY    metoprolol succinate (TOPROL-XL) 50 mg XL tablet (Taking) TAKE ONE TABLET BY MOUTH ONE TIME DAILY    aspirin delayed-release 81 mg tablet (Taking) Take 1 Tablet by mouth two (2) times a day. atorvastatin (Lipitor) 40 mg tablet (Taking) Take 1 Tablet by mouth daily. Will resubmit patient's statin and add Farxiga 10 mg p.o. daily per updated HFrEF guideline. [employer] does not cover [place] but they do cover Jardiance 10 mg p.o. daily. This is an acceptable substitute as it is an SGLT2 inhibitor. We have also sent in his statin. We will follow-up in 3 months to assess efficacy        Orders Placed This Encounter    empagliflozin (Jardiance) 10 mg tablet     Sig: Take 1 Tablet by mouth daily. Dispense:  90 Tablet     Refill:  3    atorvastatin (Lipitor) 40 mg tablet     Sig: Take 1 Tablet by mouth daily. Dispense:  90 Tablet     Refill:  3       Pt is instructed to follow all appropriate cardiac risk factor recommendations and to be compliant with meds and testing. Instructed to follow up appropriately and seek urgent medical care if acute or unstable issues arise.  Results of some tests may be viewed thru [address] but this does not substitute for follow up with MD. If follow up is not scheduled pt is instructed to call for follow up       is for   Specialty Problems          Cardiology Problems    Aortic stenosis        CAD (coronary artery disease)        Hypertension        Systolic CHF, chronic ([address]        NSTEMI (non-ST elevated myocardial infarction) ([facility] [address]                       Rosaline Zimmerman MD  4/19/2022  8:04 AM

## 2022-11-29 ENCOUNTER — CARE COORDINATION (OUTPATIENT)
Dept: OTHER | Facility: CLINIC | Age: 72
End: 2022-11-29

## 2022-11-29 NOTE — CARE COORDINATION
CTN attempted outreach to patient for LUIS follow up call. Unable to reach patient and unable to leave a message due to voicemail not set up. Patient completed follow up with Cardiology as scheduled per chart review. Will attempt outreach again.

## 2022-12-03 PROBLEM — N39.0 UTI (URINARY TRACT INFECTION): Status: RESOLVED | Noted: 2022-11-03 | Resolved: 2022-12-03

## 2022-12-06 ENCOUNTER — OFFICE VISIT (OUTPATIENT)
Dept: ORTHOPEDIC SURGERY | Age: 72
End: 2022-12-06
Payer: MEDICARE

## 2022-12-06 DIAGNOSIS — M12.812 ROTATOR CUFF ARTHROPATHY OF BOTH SHOULDERS: ICD-10-CM

## 2022-12-06 DIAGNOSIS — M25.512 BILATERAL SHOULDER PAIN, UNSPECIFIED CHRONICITY: Primary | ICD-10-CM

## 2022-12-06 DIAGNOSIS — M25.511 BILATERAL SHOULDER PAIN, UNSPECIFIED CHRONICITY: Primary | ICD-10-CM

## 2022-12-06 DIAGNOSIS — M12.811 ROTATOR CUFF ARTHROPATHY OF BOTH SHOULDERS: ICD-10-CM

## 2022-12-06 PROCEDURE — 20610 DRAIN/INJ JOINT/BURSA W/O US: CPT | Performed by: ORTHOPAEDIC SURGERY

## 2022-12-06 RX ORDER — TRIAMCINOLONE ACETONIDE 40 MG/ML
160 INJECTION, SUSPENSION INTRA-ARTICULAR; INTRAMUSCULAR ONCE
Status: COMPLETED | OUTPATIENT
Start: 2022-12-06 | End: 2022-12-06

## 2022-12-06 RX ADMIN — TRIAMCINOLONE ACETONIDE 160 MG: 40 INJECTION, SUSPENSION INTRA-ARTICULAR; INTRAMUSCULAR at 10:06

## 2022-12-06 NOTE — PROGRESS NOTES
Name: Jennifer Mendez  YOB: 1950  Gender: male  MRN: [id number]    CC:   Chief Complaint   Patient presents with    Shoulder Pain     Recheck bilateral shoulders        HPI: Patient presents for follow-up of bilateral shoulder pain. Patient has had bilateral shoulder injection on 8-2-22. Patient presents today requesting repeat. No new injuries comparison to previous visit.      Allergies   Allergen Reactions    Hydrocodone-Acetaminophen Other (See Comments)     hallucinations     Past Medical History:   Diagnosis Date    Arthritis     generalized OA    CAD (coronary artery disease) 09/11/2018    NO MI/ NO STENTS-- CABG AND AVR-10/2018- FOLLOWED BY DR Pablo Rincon    CHF (congestive heart failure) ([facility] [address]     last echo-3/2021-- EF30-35%- followed by dr Nael Burdick    DVT (deep venous thrombosis) ([facility] [address] 8/2021    RLE s/p covid    History of kidney stones 2016    naturally passed    History of melanoma 2016    removed from nose    Hx of CABG     Hypercholesterolemia     Hypertension 9/11/2018    CONTROLLED WITH MED    PUD (peptic ulcer disease)     1970's-    S/P AVR (aortic valve replacement)      Past Surgical History:   Procedure Laterality Date    AORTIC VALVE REPLACEMENT  10/03/2018    CORONARY ARTERY BYPASS GRAFT X 1, LIMA to the LAD WITH AVR 23 mm Intuity valve    APPENDECTOMY       15 yo    CORONARY ARTERY BYPASS GRAFT  10/03/2018    CABG x1, Dr. Avelina Patel  2014    right lower ext - broken put in rods and screws    OTHER SURGICAL HISTORY      melanoma surgery to nose    TOTAL HIP ARTHROPLASTY Right 07/2021     Family History   Problem Relation Age of Onset    No Known Problems Brother     No Known Problems Father     Heart Disease Brother     Cancer Brother         Leukemia    Alzheimer's Disease Mother     Cancer Mother         Leukemia     Social History     Socioeconomic History    Marital status:      Spouse name: Not on file    Number of children: Not on file Years of education: Not on file    Highest education level: Not on file   Occupational History    Not on file   Tobacco Use    Smoking status: Never    Smokeless tobacco: Never   Vaping Use    Vaping Use: Never used   Substance and Sexual Activity    Alcohol use: Yes     Alcohol/week: 2.0 standard drinks     Types: 2 Shots of liquor per week    Drug use: No    Sexual activity: Not on file   Other Topics Concern    Not on file   Social History Narrative    Not on file     Social Determinants of Health     Financial Resource Strain: Not on file   Food Insecurity: Not on file   Transportation Needs: Not on file   Physical Activity: Not on file   Stress: Not on file   Social Connections: Not on file   Intimate Partner Violence: Not on file   Housing Stability: Not on file        No flowsheet data found. Review of Systems  Non-contributory    PE:    No major changes in comparison to previous visit    Xray: Not indicated    A/Plan:     ICD-10-CM    1. Bilateral shoulder pain, unspecified chronicity  M25.511 triamcinolone acetonide (KENALOG-40) injection 160 mg    M25.512 DRAIN/INJECT LARGE JOINT/BURSA      2. Rotator cuff arthropathy of both shoulders  M12.811 triamcinolone acetonide (KENALOG-40) injection 160 mg    M12.812 DRAIN/INJECT LARGE JOINT/BURSA           Procedure note: After discussion of risks and benefits including, but not limited to pain, infection, steroid flare, increased blood sugar, fat necrosis, skin discoloration, and injury to blood vessels or nerves, the patient verbally consented to proceed with a glenohumeral joint injection. They understand that we are using this is an alternative method of treatment and the decision to not proceed with elective major surgery. We have discussed this decision in detail. The affected right shoulder was sterilely prepped in standard fashion and injected with 2 cc of Kenalog (40mg/ml), 2 cc of 1% Lidocaine, and 2 cc of 0.5% Marcaine into the joint space. The patient tolerated the injection well. Procedure note: After discussion of risks and benefits including, but not limited to pain, infection, steroid flare, increased blood sugar, fat necrosis, skin discoloration, and injury to blood vessels or nerves, the patient verbally consented to proceed with a glenohumeral joint injection. They understand that we are using this is an alternative method of treatment and the decision to not proceed with elective major surgery. We have discussed this decision in detail. The affected left shoulder was sterilely prepped in standard fashion and injected with 2 cc of Kenalog (40mg/ml), 2 cc of 1% Lidocaine, and 2 cc of 0.5% Marcaine into the joint space. The patient tolerated the injection well. Return for injection slot.         Emerson Zavaleta MD  12/06/22

## 2022-12-07 ENCOUNTER — CARE COORDINATION (OUTPATIENT)
Dept: OTHER | Facility: CLINIC | Age: 72
End: 2022-12-07

## 2022-12-07 NOTE — CARE COORDINATION
Multiple unsuccessful attempts to reach patient for follow up call. Unable to reach patient. Will resolve episode at this time.

## 2023-01-03 ENCOUNTER — OFFICE VISIT (OUTPATIENT)
Dept: CARDIOLOGY CLINIC | Age: 73
End: 2023-01-03
Payer: MEDICARE

## 2023-01-03 VITALS
HEIGHT: 70 IN | HEART RATE: 60 BPM | BODY MASS INDEX: 32.21 KG/M2 | DIASTOLIC BLOOD PRESSURE: 62 MMHG | WEIGHT: 225 LBS | SYSTOLIC BLOOD PRESSURE: 122 MMHG

## 2023-01-03 DIAGNOSIS — I25.118 ATHEROSCLEROTIC HEART DISEASE OF NATIVE CORONARY ARTERY WITH OTHER FORMS OF ANGINA PECTORIS (HCC): ICD-10-CM

## 2023-01-03 DIAGNOSIS — I50.22 CHRONIC SYSTOLIC (CONGESTIVE) HEART FAILURE (HCC): Primary | ICD-10-CM

## 2023-01-03 DIAGNOSIS — I82.451 ACUTE EMBOLISM AND THROMBOSIS OF RIGHT PERONEAL VEIN (HCC): ICD-10-CM

## 2023-01-03 PROCEDURE — 1036F TOBACCO NON-USER: CPT | Performed by: INTERNAL MEDICINE

## 2023-01-03 PROCEDURE — 1123F ACP DISCUSS/DSCN MKR DOCD: CPT | Performed by: INTERNAL MEDICINE

## 2023-01-03 PROCEDURE — G8417 CALC BMI ABV UP PARAM F/U: HCPCS | Performed by: INTERNAL MEDICINE

## 2023-01-03 PROCEDURE — 3017F COLORECTAL CA SCREEN DOC REV: CPT | Performed by: INTERNAL MEDICINE

## 2023-01-03 PROCEDURE — 99214 OFFICE O/P EST MOD 30 MIN: CPT | Performed by: INTERNAL MEDICINE

## 2023-01-03 PROCEDURE — G8484 FLU IMMUNIZE NO ADMIN: HCPCS | Performed by: INTERNAL MEDICINE

## 2023-01-03 PROCEDURE — G8427 DOCREV CUR MEDS BY ELIG CLIN: HCPCS | Performed by: INTERNAL MEDICINE

## 2023-01-03 PROCEDURE — 3074F SYST BP LT 130 MM HG: CPT | Performed by: INTERNAL MEDICINE

## 2023-01-03 PROCEDURE — 3078F DIAST BP <80 MM HG: CPT | Performed by: INTERNAL MEDICINE

## 2023-01-03 RX ORDER — SPIRONOLACTONE 25 MG/1
25 TABLET ORAL 2 TIMES DAILY
Qty: 180 TABLET | Refills: 3 | Status: SHIPPED | OUTPATIENT
Start: 2023-01-03

## 2023-01-03 NOTE — PROGRESS NOTES
[address] [place]  [address], [address], [address] [place] [address]  PHONE: [phone]    SUBJECTIVE: /HPI  Dio Phillip (1950) is a 70 y.o. male seen for a follow up visit regarding the following:        ICD-10-CM ICD-9-CM   1. Systolic CHF, chronic (HCC)  I50.22 428.22     428.0   2. Coronary artery disease of native artery of native heart with stable angina pectoris ([facility] [address]  I25.118 414.01     413.9   3. S/P AVR (aortic valve replacement)  Z95.2 V43.3   4. Status post coronary artery bypass graft  Z95.1 V45.81   5. Essential hypertension  I10 401.9     Pt doing well. No chest pain. No palpitations. Patient denies syncope. No dyspnea. States they are taking meds. Maintains a normal level of activity for them without symptoms. No dizziness or lightheadedness. All above conditions stable. In March 2021 patient underwent echocardiogram showing EF 30 to 35% and cardiac catheterization showing stableAnatomy anatomy patient had a continued patency of his LIMA to the LAD with minimal disease otherwise. This is consistent with a nonischemic cardiomyopathy    Review of lab work shows that the patient has an elevated blood sugar he is aware of this and is making adjustments in some of his dietary and tea consumption habits. The addition of Janice Seo will also help with this. His lipid panel is markedly off. But he is not on a statin at this point. We will reinstitute his statin and usually he is within guideline directed medical therapy while on statin.   This can be reassessed in 3 months when he returns    11/28/22  Echocardiogram from November 6 shows ejection fraction 10 to 15%  He is on guideline directed medical therapy  We can continue to optimize his heart failure meds by doubling his Toprol-XL to 50 mg twice daily there is room on the pulse and blood pressure for this we will continue the Aldactone 25 mg daily the valsartan 40 mg daily and the Jardiance 10 mg daily    1/3/23  Pt doing well. No chest pain. No palpitations. Patient denies syncope. No dyspnea. States they are taking meds. Maintains a normal level of activity for them without symptoms. No dizziness or lightheadedness. All above conditions stable. Patient is on all 4 pillars of guideline directed medical therapy for HFrEF is on Aldactone Diovan Toprol-XL and Jardiance        Past Medical History, Past Surgical History, Family history, Social History, and Medications were all reviewed with the patient today and updated as necessary. Outpatient Medications Marked as Taking for the 4/19/22 encounter (Office Visit) with Mel Small MD   Medication Sig Dispense Refill    spironolactone (ALDACTONE) 25 mg tablet TAKE ONE TABLET BY MOUTH ONE TIME DAILY 90 Tablet 3    valsartan (DIOVAN) 40 mg tablet TAKE ONE TABLET BY MOUTH ONE TIME DAILY 90 Tablet 3    metoprolol succinate (TOPROL-XL) 50 mg XL tablet TAKE ONE TABLET BY MOUTH ONE TIME DAILY 90 Tablet 3    melatonin 10 mg capsule Take 10 mg by mouth nightly. cyanocobalamin, vitamin B-12, (Vitamin B-12) 5,000 mcg subl Take 1 Tablet by mouth daily. albuterol (Ventolin HFA) 90 mcg/actuation inhaler Take 2 Puffs by inhalation every four (4) hours as needed for Wheezing, Shortness of Breath or Cough. 6.7 g 0    cyanocobalamin 1,000 mcg tablet Take 500 mcg by mouth daily. aspirin delayed-release 81 mg tablet Take 1 Tablet by mouth two (2) times a day. 60 Tablet 0    acetaminophen (TYLENOL) 500 mg tablet Take 2 Tablets by mouth every six (6) hours as needed for Pain. 60 Tablet 0    multivitamin (ONE A DAY) tablet Take 1 Tab by mouth daily. empagliflozin (Jardiance) 10 mg tablet Take 1 Tablet by mouth daily. 90 Tablet 3    atorvastatin (Lipitor) 40 mg tablet Take 1 Tablet by mouth daily.  90 Tablet 3     Allergies   Allergen Reactions    Lortab [Hydrocodone-Acetaminophen] Other (comments)     hallucinations     Past Medical History:   Diagnosis Date    Arthritis generalized OA    CAD (coronary artery disease) 09/11/2018    Followed by [facility].     Calculus of kidney     CHF (congestive heart failure) ([facility] [address]     DVT (deep venous thrombosis) ([facility] [address] 2021    RLE     History of kidney stones 2016    naturally passed    History of melanoma 2016    removed from nose    Hx of CABG     Hypercholesterolemia     Hypertension 9/11/2018    PUD (peptic ulcer disease)     1970's- patient denies 10/01/18    S/P AVR (aortic valve replacement)      Past Surgical History:   Procedure Laterality Date    HX AORTIC VALVE REPLACEMENT  10/03/2018    CORONARY ARTERY BYPASS GRAFT X 1, LIMA to the LAD WITH AVR 23 mm Intuity valve    HX APPENDECTOMY       15 yo    HX CORONARY ARTERY BYPASS GRAFT  10/03/2018    CABG x1, Dr. Lydia Shabazz Right 07/2021    HX ORTHOPAEDIC  2014    right lower ext - broken put in rods and screws    HX OTHER SURGICAL      melanoma surgery to nose     Family History   Problem Relation Age of Onset    Cancer Mother         Leukemia    Alzheimer's Disease Mother     Cancer Brother         Leukemia    Heart Disease Brother     No Known Problems Father     No Known Problems Brother       Social History     Tobacco Use    Smoking status: Never Smoker    Smokeless tobacco: Never Used   Substance Use Topics    Alcohol use: Yes     Comment: 3 times per month      Pt does   have history of early onset cad or heart failure in immediate family members    Current Outpatient Medications:     spironolactone (ALDACTONE) 25 mg tablet, TAKE ONE TABLET BY MOUTH ONE TIME DAILY, Disp: 90 Tablet, Rfl: 3    valsartan (DIOVAN) 40 mg tablet, TAKE ONE TABLET BY MOUTH ONE TIME DAILY, Disp: 90 Tablet, Rfl: 3    metoprolol succinate (TOPROL-XL) 50 mg XL tablet, TAKE ONE TABLET BY MOUTH ONE TIME DAILY, Disp: 90 Tablet, Rfl: 3    melatonin 10 mg capsule, Take 10 mg by mouth nightly., Disp: , Rfl:     cyanocobalamin, vitamin B-12, (Vitamin B-12) 5,000 mcg subl, Take 1 Tablet by mouth daily., Disp: , Rfl:     albuterol (Ventolin HFA) 90 mcg/actuation inhaler, Take 2 Puffs by inhalation every four (4) hours as needed for Wheezing, Shortness of Breath or Cough. , Disp: 6.7 g, Rfl: 0    cyanocobalamin 1,000 mcg tablet, Take 500 mcg by mouth daily. , Disp: , Rfl:     aspirin delayed-release 81 mg tablet, Take 1 Tablet by mouth two (2) times a day., Disp: 60 Tablet, Rfl: 0    acetaminophen (TYLENOL) 500 mg tablet, Take 2 Tablets by mouth every six (6) hours as needed for Pain., Disp: 60 Tablet, Rfl: 0    multivitamin (ONE A DAY) tablet, Take 1 Tab by mouth daily. , Disp: , Rfl:     empagliflozin (Jardiance) 10 mg tablet, Take 1 Tablet by mouth daily. , Disp: 90 Tablet, Rfl: 3    atorvastatin (Lipitor) 40 mg tablet, Take 1 Tablet by mouth daily. , Disp: 90 Tablet, Rfl: 3    OXYGEN-AIR DELIVERY SYSTEMS, 2 L by Nasal route continuous. pt reports only using at night  (Patient not taking: Reported on 11/12/2021), Disp: , Rfl:         ROS:  Review of Systems - General ROS: negative for - chills, fatigue or fever  Hematological and Lymphatic ROS: negative for - bleeding problems, bruising or jaundice  Respiratory ROS: no cough, shortness of breath, or wheezing  Cardiovascular ROS: no chest pain or dyspnea on exertion  Gastrointestinal ROS: no abdominal pain, change in bowel habits, or black or bloody stools  Neurological ROS: no TIA or stroke symptoms  All other systems negative.       Wt Readings from Last 3 Encounters:   04/19/22 229 lb (103.9 kg)   11/12/21 212 lb 9.6 oz (96.4 kg)   10/11/21 208 lb 6.4 oz (94.5 kg)     Temp Readings from Last 3 Encounters:   11/12/21 97.1 °F (36.2 °C) (Temporal)   10/14/21 97.4 °F (36.3 °C) (Temporal)   10/14/21 97.9 °F (36.6 °C) (Temporal)     BP Readings from Last 3 Encounters:   04/19/22 120/72   11/12/21 130/82   10/14/21 120/76     Pulse Readings from Last 3 Encounters:   04/19/22 76   11/12/21 (!) 113   10/14/21 95           PHYSICAL EXAM:  Visit Vitals  /72   Pulse 76    5' 9\" (1.753 m)   Wt 229 lb (103.9 kg)   BMI 33.82 kg/m²       Physical Examination: General appearance - alert, well appearing, and in no distress  Mental status - alert, oriented to person, place, and time  Eyes - pupils equal and reactive, extraocular eye movements intact  Neck/lymph - supple, no significant adenopathy  Chest/lungs - clear to auscultation, no wheezes, rales or rhonchi, symmetric air entry  Heart/CV - normal rate, regular rhythm, normal S1, S2, no murmurs, rubs, clicks or gallops  Abdomen/GI - soft, nontender, nondistended, no masses or organomegaly  Musculoskeletal - no joint tenderness, deformity or swelling  Extremities - peripheral pulses normal, no pedal edema, no clubbing or cyanosis  Skin - normal coloration and turgor, no rashes, no suspicious skin lesions noted    EKG: normal EKG, normal sinus rhythm, unchanged from previous tracings. Medications reviewed and questions answered    No results found for this or any previous visit (from the past 672 hour(s)). Lab Results   Component Value Date/Time    Cholesterol, total 291 (H) 01/12/2022 09:54 AM    HDL Cholesterol 44 01/12/2022 09:54 AM    LDL, calculated 183 (H) 01/12/2022 09:54 AM    LDL, calculated 74 10/06/2021 05:40 AM    VLDL, calculated 64 (H) 01/12/2022 09:54 AM    VLDL, calculated 30 (H) 10/06/2021 05:40 AM    Triglyceride 326 (H) 01/12/2022 09:54 AM    CHOL/HDL Ratio 3.1 10/06/2021 05:40 AM         ASSESSMENT and PLAN        1. Systolic CHF, chronic (HCC)  The current medical regimen is effective;  continue present plan and medications at doses listed below.   Key CAD CHF Meds               spironolactone (ALDACTONE) 25 mg tablet (Taking) TAKE ONE TABLET BY MOUTH ONE TIME DAILY    valsartan (DIOVAN) 40 mg tablet (Taking) TAKE ONE TABLET BY MOUTH ONE TIME DAILY    metoprolol succinate (TOPROL-XL) 50 mg XL tablet (Taking) TAKE ONE TABLET BY MOUTH ONE TIME DAILY    aspirin delayed-release 81 mg tablet (Taking) Take 1 Tablet by mouth two (2) times a day. atorvastatin (Lipitor) 40 mg tablet (Taking) Take 1 Tablet by mouth daily. 2. Coronary artery disease of native artery of native heart with stable angina pectoris [facility])  The current medical regimen is effective;  continue present plan and medications at doses listed below. Key CAD CHF Meds               spironolactone (ALDACTONE) 25 mg tablet (Taking) TAKE ONE TABLET BY MOUTH ONE TIME DAILY    valsartan (DIOVAN) 40 mg tablet (Taking) TAKE ONE TABLET BY MOUTH ONE TIME DAILY    metoprolol succinate (TOPROL-XL) 50 mg XL tablet (Taking) TAKE ONE TABLET BY MOUTH ONE TIME DAILY    aspirin delayed-release 81 mg tablet (Taking) Take 1 Tablet by mouth two (2) times a day. atorvastatin (Lipitor) 40 mg tablet (Taking) Take 1 Tablet by mouth daily. 3. S/P AVR (aortic valve replacement)  The current medical regimen is effective;  continue present plan and medications. 4. Status post coronary artery bypass graft  The current medical regimen is effective;  continue present plan and medications at doses listed below. Key CAD CHF Meds               spironolactone (ALDACTONE) 25 mg tablet (Taking) TAKE ONE TABLET BY MOUTH ONE TIME DAILY    valsartan (DIOVAN) 40 mg tablet (Taking) TAKE ONE TABLET BY MOUTH ONE TIME DAILY    metoprolol succinate (TOPROL-XL) 50 mg XL tablet (Taking) TAKE ONE TABLET BY MOUTH ONE TIME DAILY    aspirin delayed-release 81 mg tablet (Taking) Take 1 Tablet by mouth two (2) times a day. atorvastatin (Lipitor) 40 mg tablet (Taking) Take 1 Tablet by mouth daily. The current medical regimen is effective;  continue present plan and medications at doses listed below.   Key CAD CHF Meds               spironolactone (ALDACTONE) 25 mg tablet (Taking) TAKE ONE TABLET BY MOUTH ONE TIME DAILY    valsartan (DIOVAN) 40 mg tablet (Taking) TAKE ONE TABLET BY MOUTH ONE TIME DAILY    metoprolol succinate (TOPROL-XL) 50 mg XL tablet (Taking) TAKE ONE TABLET BY MOUTH ONE TIME DAILY    aspirin delayed-release 81 mg tablet (Taking) Take 1 Tablet by mouth two (2) times a day. atorvastatin (Lipitor) 40 mg tablet (Taking) Take 1 Tablet by mouth daily. 5. Essential hypertension  The current medical regimen is effective;  continue present plan and medications at doses listed below. Key CAD CHF Meds               spironolactone (ALDACTONE) 25 mg tablet (Taking) TAKE ONE TABLET BY MOUTH ONE TIME DAILY    valsartan (DIOVAN) 40 mg tablet (Taking) TAKE ONE TABLET BY MOUTH ONE TIME DAILY    metoprolol succinate (TOPROL-XL) 50 mg XL tablet (Taking) TAKE ONE TABLET BY MOUTH ONE TIME DAILY    aspirin delayed-release 81 mg tablet (Taking) Take 1 Tablet by mouth two (2) times a day. atorvastatin (Lipitor) 40 mg tablet (Taking) Take 1 Tablet by mouth daily. Will resubmit patient's statin and add Farxiga 10 mg p.o. daily per updated HFrEF guideline. [employer] does not cover Ross Brew but they do cover Jardiance 10 mg p.o. daily. This is an acceptable substitute as it is an SGLT2 inhibitor. We have also sent in his statin. We will follow-up in 3 months to assess efficacy        Orders Placed This Encounter    empagliflozin (Jardiance) 10 mg tablet     Sig: Take 1 Tablet by mouth daily. Dispense:  90 Tablet     Refill:  3    atorvastatin (Lipitor) 40 mg tablet     Sig: Take 1 Tablet by mouth daily. Dispense:  90 Tablet     Refill:  3       Pt is instructed to follow all appropriate cardiac risk factor recommendations and to be compliant with meds and testing. Instructed to follow up appropriately and seek urgent medical care if acute or unstable issues arise.  Results of some tests may be viewed thru [address] but this does not substitute for follow up with MD. If follow up is not scheduled pt is instructed to call for follow up       is for   Specialty Problems          Cardiology Problems    Aortic stenosis CAD (coronary artery disease)        Hypertension        Systolic CHF, chronic ([facility] [address]        NSTEMI (non-ST elevated myocardial infarction) ([facility]                       Juliet Westfall MD  4/19/2022  8:04 AM

## 2023-01-10 RX ORDER — METOPROLOL SUCCINATE 50 MG/1
50 TABLET, EXTENDED RELEASE ORAL 2 TIMES DAILY
Qty: 180 TABLET | Refills: 3 | Status: SHIPPED | OUTPATIENT
Start: 2023-01-10

## 2023-01-10 NOTE — TELEPHONE ENCOUNTER
Patient states at his last visit his fluid pill was increased to 2 a day. He says his prescription is still for one a day so he needs a new prescription with the new dosage sent to [facility] order. Patient does not know the name of the medicine.

## 2023-01-31 NOTE — TELEPHONE ENCOUNTER
MEDICATION REFILL REQUEST      Name of Medication:  metoprolol  Dose:  50 mg  Frequency:  2 pills a day  Quantity:  180  Days' supply:  90      Pharmacy Name/Location:  [facility] [address]  Pt said Dr Moustapha Palumbo changed dose and he needs a new RX

## 2023-02-01 RX ORDER — METOPROLOL SUCCINATE 50 MG/1
50 TABLET, EXTENDED RELEASE ORAL 2 TIMES DAILY
Qty: 180 TABLET | Refills: 3 | Status: SHIPPED | OUTPATIENT
Start: 2023-02-01

## 2023-02-01 NOTE — TELEPHONE ENCOUNTER
Requested Prescriptions     Pending Prescriptions Disp Refills    metoprolol succinate (TOPROL XL) 50 MG extended release tablet 180 tablet 3     Sig: Take 1 tablet by mouth in the morning and at bedtime

## 2023-03-07 ENCOUNTER — OFFICE VISIT (OUTPATIENT)
Dept: ORTHOPEDIC SURGERY | Age: 73
End: 2023-03-07

## 2023-03-07 DIAGNOSIS — M12.812 ROTATOR CUFF ARTHROPATHY OF BOTH SHOULDERS: ICD-10-CM

## 2023-03-07 DIAGNOSIS — M25.511 BILATERAL SHOULDER PAIN, UNSPECIFIED CHRONICITY: Primary | ICD-10-CM

## 2023-03-07 DIAGNOSIS — M12.811 ROTATOR CUFF ARTHROPATHY OF BOTH SHOULDERS: ICD-10-CM

## 2023-03-07 DIAGNOSIS — M25.512 BILATERAL SHOULDER PAIN, UNSPECIFIED CHRONICITY: Primary | ICD-10-CM

## 2023-03-07 RX ORDER — METHYLPREDNISOLONE ACETATE 40 MG/ML
160 INJECTION, SUSPENSION INTRA-ARTICULAR; INTRALESIONAL; INTRAMUSCULAR; SOFT TISSUE ONCE
Status: COMPLETED | OUTPATIENT
Start: 2023-03-07 | End: 2023-03-07

## 2023-03-07 RX ADMIN — METHYLPREDNISOLONE ACETATE 160 MG: 40 INJECTION, SUSPENSION INTRA-ARTICULAR; INTRALESIONAL; INTRAMUSCULAR; SOFT TISSUE at 08:33

## 2023-03-07 NOTE — PROGRESS NOTES
Name: Ashly Hawthorne  YOB: 1950  Gender: male  MRN: [id number]    CC:   Chief Complaint   Patient presents with    Shoulder Pain     Bilateral shoulder CSI        HPI: Patient presents for follow-up of bilateral shoulder pain. Patient has had bilateral shoulder injection on 12-6-22. Patient presents today requesting repeat. No new injuries comparison to previous visit.     Allergies   Allergen Reactions    Hydrocodone-Acetaminophen Other (See Comments)     hallucinations     Past Medical History:   Diagnosis Date    Arthritis     generalized OA    CAD (coronary artery disease) 09/11/2018    NO MI/ NO STENTS-- CABG AND AVR-10/2018- FOLLOWED BY DR Augie Thrasher    CHF (congestive heart failure) ([facility] [address]     last echo-3/2021-- EF30-35%- followed by dr Gustavo Watt    DVT (deep venous thrombosis) ([facility] [address] 8/2021    RLE s/p covid    History of kidney stones 2016    naturally passed    History of melanoma 2016    removed from nose    Hx of CABG     Hypercholesterolemia     Hypertension 9/11/2018    CONTROLLED WITH MED    PUD (peptic ulcer disease)     1970's-    S/P AVR (aortic valve replacement)      Past Surgical History:   Procedure Laterality Date    AORTIC VALVE REPLACEMENT  10/03/2018    CORONARY ARTERY BYPASS GRAFT X 1, LIMA to the LAD WITH AVR 23 mm Intuity valve    APPENDECTOMY       15 yo    CORONARY ARTERY BYPASS GRAFT  10/03/2018    CABG x1, Dr. Shannon Quevedo  2014    right lower ext - broken put in rods and screws    OTHER SURGICAL HISTORY      melanoma surgery to nose    TOTAL HIP ARTHROPLASTY Right 07/2021     Family History   Problem Relation Age of Onset    No Known Problems Brother     No Known Problems Father     Heart Disease Brother     Cancer Brother         Leukemia    Alzheimer's Disease Mother     Cancer Mother         Leukemia     Social History     Socioeconomic History    Marital status:      Spouse name: Not on file    Number of children: Not on file Years of education: Not on file    Highest education level: Not on file   Occupational History    Not on file   Tobacco Use    Smoking status: Never    Smokeless tobacco: Never   Vaping Use    Vaping Use: Never used   Substance and Sexual Activity    Alcohol use: Yes     Alcohol/week: 2.0 standard drinks     Types: 2 Shots of liquor per week    Drug use: No    Sexual activity: Not on file   Other Topics Concern    Not on file   Social History Narrative    Not on file     Social Determinants of Health     Financial Resource Strain: Not on file   Food Insecurity: Not on file   Transportation Needs: Not on file   Physical Activity: Not on file   Stress: Not on file   Social Connections: Not on file   Intimate Partner Violence: Not on file   Housing Stability: Not on file        No flowsheet data found. Review of Systems  Non-contributory    PE:    No major changes in comparison to previous visit    Xray: Not indicated    A/Plan:     ICD-10-CM    1. Bilateral shoulder pain, unspecified chronicity  M25.511 methylPREDNISolone acetate (DEPO-MEDROL) injection 160 mg    M25.512 DRAIN/INJECT LARGE JOINT/BURSA      2. Rotator cuff arthropathy of both shoulders  M12.811 methylPREDNISolone acetate (DEPO-MEDROL) injection 160 mg    M12.812 DRAIN/INJECT LARGE JOINT/BURSA           Procedure note: After discussion of risks and benefits including, but not limited to pain, infection, steroid flare, increased blood sugar, fat necrosis, skin discoloration, and injury to blood vessels or nerves, the patient verbally consented to proceed with a glenohumeral joint injection. They understand that we are using this is an alternative method of treatment and the decision to not proceed with elective major surgery. We have discussed this decision in detail.   The affected right shoulder was sterilely prepped in standard fashion and injected with 2 cc of Depo Medrol (40mg/ml), 2 cc of 1% Lidocaine, and 2 cc of 0.5% Marcaine into the joint space.  The patient tolerated the injection well. Procedure note: After discussion of risks and benefits including, but not limited to pain, infection, steroid flare, increased blood sugar, fat necrosis, skin discoloration, and injury to blood vessels or nerves, the patient verbally consented to proceed with a glenohumeral joint injection. They understand that we are using this is an alternative method of treatment and the decision to not proceed with elective major surgery. We have discussed this decision in detail. The affected left shoulder was sterilely prepped in standard fashion and injected with 2 cc of Depo-Medrol (40mg/ml), 2 cc of 1% Lidocaine, and 2 cc of 0.5% Marcaine into the joint space. The patient tolerated the injection well. Return in about 3 months (around 6/7/2023).         Emery Armando MD  [birth year]

## 2023-06-09 ENCOUNTER — OFFICE VISIT (OUTPATIENT)
Dept: ORTHOPEDIC SURGERY | Age: 73
End: 2023-06-09

## 2023-06-09 DIAGNOSIS — M12.811 ROTATOR CUFF ARTHROPATHY OF BOTH SHOULDERS: Primary | ICD-10-CM

## 2023-06-09 DIAGNOSIS — M25.511 BILATERAL SHOULDER PAIN, UNSPECIFIED CHRONICITY: ICD-10-CM

## 2023-06-09 DIAGNOSIS — M12.812 ROTATOR CUFF ARTHROPATHY OF BOTH SHOULDERS: Primary | ICD-10-CM

## 2023-06-09 DIAGNOSIS — M25.512 BILATERAL SHOULDER PAIN, UNSPECIFIED CHRONICITY: ICD-10-CM

## 2023-06-09 RX ORDER — METHYLPREDNISOLONE ACETATE 40 MG/ML
160 INJECTION, SUSPENSION INTRA-ARTICULAR; INTRALESIONAL; INTRAMUSCULAR; SOFT TISSUE ONCE
Status: COMPLETED | OUTPATIENT
Start: 2023-06-09 | End: 2023-06-09

## 2023-06-09 RX ADMIN — METHYLPREDNISOLONE ACETATE 160 MG: 40 INJECTION, SUSPENSION INTRA-ARTICULAR; INTRALESIONAL; INTRAMUSCULAR; SOFT TISSUE at 12:39

## 2023-07-11 ENCOUNTER — OFFICE VISIT (OUTPATIENT)
Age: 73
End: 2023-07-11
Payer: MEDICARE

## 2023-07-11 VITALS
WEIGHT: 236 LBS | BODY MASS INDEX: 33.79 KG/M2 | HEART RATE: 84 BPM | HEIGHT: 70 IN | DIASTOLIC BLOOD PRESSURE: 84 MMHG | SYSTOLIC BLOOD PRESSURE: 142 MMHG

## 2023-07-11 DIAGNOSIS — I50.22 CHRONIC SYSTOLIC (CONGESTIVE) HEART FAILURE (HCC): Primary | ICD-10-CM

## 2023-07-11 DIAGNOSIS — Z95.1 PRESENCE OF AORTOCORONARY BYPASS GRAFT: ICD-10-CM

## 2023-07-11 DIAGNOSIS — I25.118 ATHEROSCLEROTIC HEART DISEASE OF NATIVE CORONARY ARTERY WITH OTHER FORMS OF ANGINA PECTORIS (HCC): ICD-10-CM

## 2023-07-11 DIAGNOSIS — Z95.2 PRESENCE OF PROSTHETIC HEART VALVE: ICD-10-CM

## 2023-07-11 PROCEDURE — 3079F DIAST BP 80-89 MM HG: CPT | Performed by: INTERNAL MEDICINE

## 2023-07-11 PROCEDURE — 3077F SYST BP >= 140 MM HG: CPT | Performed by: INTERNAL MEDICINE

## 2023-07-11 PROCEDURE — 99214 OFFICE O/P EST MOD 30 MIN: CPT | Performed by: INTERNAL MEDICINE

## 2023-07-11 PROCEDURE — G8427 DOCREV CUR MEDS BY ELIG CLIN: HCPCS | Performed by: INTERNAL MEDICINE

## 2023-07-11 PROCEDURE — G8417 CALC BMI ABV UP PARAM F/U: HCPCS | Performed by: INTERNAL MEDICINE

## 2023-07-11 PROCEDURE — 1036F TOBACCO NON-USER: CPT | Performed by: INTERNAL MEDICINE

## 2023-07-11 PROCEDURE — 1123F ACP DISCUSS/DSCN MKR DOCD: CPT | Performed by: INTERNAL MEDICINE

## 2023-07-11 PROCEDURE — 3017F COLORECTAL CA SCREEN DOC REV: CPT | Performed by: INTERNAL MEDICINE

## 2023-07-11 RX ORDER — SPIRONOLACTONE 25 MG/1
25 TABLET ORAL 2 TIMES DAILY
Qty: 180 TABLET | Refills: 3 | Status: SHIPPED | OUTPATIENT
Start: 2023-07-11

## 2023-07-11 RX ORDER — METOPROLOL SUCCINATE 50 MG/1
50 TABLET, EXTENDED RELEASE ORAL 2 TIMES DAILY
Qty: 180 TABLET | Refills: 3 | Status: SHIPPED | OUTPATIENT
Start: 2023-07-11

## 2023-07-11 RX ORDER — ATORVASTATIN CALCIUM 40 MG/1
40 TABLET, FILM COATED ORAL DAILY
Qty: 90 TABLET | Refills: 3 | Status: SHIPPED | OUTPATIENT
Start: 2023-07-11

## 2023-07-11 RX ORDER — VALSARTAN 40 MG/1
40 TABLET ORAL DAILY
Qty: 90 TABLET | Refills: 3 | Status: SHIPPED | OUTPATIENT
Start: 2023-07-11

## 2023-07-11 NOTE — PROGRESS NOTES
[address] [address] [address] [place] [facility] ([place], [place]), [facility], [address]  PHONE: [phone]    SUBJECTIVE: /HPI  Breanne Garduno (1950) is a 68 y.o. male seen for a follow up visit regarding the following:   Specialty Problems          Cardiology Problems    Acute embolism and thrombosis of right peroneal vein ([address])        Aortic stenosis        Hypertension        Systolic CHF, chronic ([address])        Atherosclerotic heart disease of native coronary artery with other forms of angina pectoris [facility])        NSTEMI (non-ST elevated myocardial infarction) [facility])         Muaricio Love is a 51-year-old male who presents to the office for a 6 month f/u evaluation. At his last visit, he was restarted on a Lipitor. TTE on 11/06/22 showed EF of 10-15%. Today, he states Pt doing well. No chest pain. No palpitations. Patient denies syncope. No dyspnea. States they are taking meds. Maintains a normal level of activity for them without symptoms. No dizziness or lightheadedness. All above conditions stable. Patient is on optimize medical therapy we will repeat an echo and if EF is still below 35% electrophysiology consult for consideration ofICD therapy or depending on his QRS duration CRT therapy CRT-D therapy  . Past Medical History, Past Surgical History, Family history, Social History, and Medications were all reviewed with the patient today and updated as necessary.     Allergies   Allergen Reactions    Hydrocodone-Acetaminophen Other (See Comments)     hallucinations     Past Medical History:   Diagnosis Date    Arthritis     generalized OA    CAD (coronary artery disease) 09/11/2018    NO MI/ NO STENTS-- CABG AND AVR-10/2018- FOLLOWED BY DR Cholo Connors    CHF (congestive heart failure) ([address])     last echo-3/2021-- EF30-35%- followed by dr Cholo Connors    DVT (deep venous thrombosis) ([address]) 8/2021    RLE s/p covid    History of kidney stones 2016    naturally passed    History of melanoma 2016    removed from nose    Hx of

## 2023-07-18 DIAGNOSIS — I25.118 ATHEROSCLEROTIC HEART DISEASE OF NATIVE CORONARY ARTERY WITH OTHER FORMS OF ANGINA PECTORIS (HCC): ICD-10-CM

## 2023-07-18 DIAGNOSIS — Z95.2 PRESENCE OF PROSTHETIC HEART VALVE: ICD-10-CM

## 2023-07-18 DIAGNOSIS — Z95.1 PRESENCE OF AORTOCORONARY BYPASS GRAFT: ICD-10-CM

## 2023-07-18 LAB
25(OH)D3 SERPL-MCNC: 25.5 NG/ML (ref 30–100)
ALBUMIN SERPL-MCNC: 3.8 G/DL (ref 3.2–4.6)
ALBUMIN/GLOB SERPL: 1 (ref 0.4–1.6)
ALP SERPL-CCNC: 50 U/L (ref 50–136)
ALT SERPL-CCNC: 25 U/L (ref 12–65)
ANION GAP SERPL CALC-SCNC: 3 MMOL/L (ref 2–11)
AST SERPL-CCNC: 27 U/L (ref 15–37)
BASOPHILS # BLD: 0 K/UL (ref 0–0.2)
BASOPHILS NFR BLD: 0 % (ref 0–2)
BILIRUB SERPL-MCNC: 0.8 MG/DL (ref 0.2–1.1)
BUN SERPL-MCNC: 14 MG/DL (ref 8–23)
CALCIUM SERPL-MCNC: 9.2 MG/DL (ref 8.3–10.4)
CHLORIDE SERPL-SCNC: 110 MMOL/L (ref 101–110)
CHOLEST SERPL-MCNC: 183 MG/DL
CO2 SERPL-SCNC: 24 MMOL/L (ref 21–32)
CREAT SERPL-MCNC: 1.4 MG/DL (ref 0.8–1.5)
DIFFERENTIAL METHOD BLD: ABNORMAL
EOSINOPHIL # BLD: 0.2 K/UL (ref 0–0.8)
EOSINOPHIL NFR BLD: 3 % (ref 0.5–7.8)
ERYTHROCYTE [DISTWIDTH] IN BLOOD BY AUTOMATED COUNT: 13.2 % (ref 11.9–14.6)
GLOBULIN SER CALC-MCNC: 3.7 G/DL (ref 2.8–4.5)
GLUCOSE SERPL-MCNC: 119 MG/DL (ref 65–100)
HCT VFR BLD AUTO: 44.7 % (ref 41.1–50.3)
HDLC SERPL-MCNC: 48 MG/DL (ref 40–60)
HDLC SERPL: 3.8
HGB BLD-MCNC: 13.7 G/DL (ref 13.6–17.2)
IMM GRANULOCYTES # BLD AUTO: 0 K/UL (ref 0–0.5)
IMM GRANULOCYTES NFR BLD AUTO: 0 % (ref 0–5)
IRON SERPL-MCNC: 93 UG/DL (ref 35–150)
LDLC SERPL CALC-MCNC: 91.8 MG/DL
LYMPHOCYTES # BLD: 2 K/UL (ref 0.5–4.6)
LYMPHOCYTES NFR BLD: 28 % (ref 13–44)
MCH RBC QN AUTO: 29.1 PG (ref 26.1–32.9)
MCHC RBC AUTO-ENTMCNC: 30.6 G/DL (ref 31.4–35)
MCV RBC AUTO: 95.1 FL (ref 82–102)
MONOCYTES # BLD: 0.6 K/UL (ref 0.1–1.3)
MONOCYTES NFR BLD: 8 % (ref 4–12)
NEUTS SEG # BLD: 4.3 K/UL (ref 1.7–8.2)
NEUTS SEG NFR BLD: 60 % (ref 43–78)
NRBC # BLD: 0 K/UL (ref 0–0.2)
PLATELET # BLD AUTO: 142 K/UL (ref 150–450)
PMV BLD AUTO: 11.1 FL (ref 9.4–12.3)
POTASSIUM SERPL-SCNC: 5.1 MMOL/L (ref 3.5–5.1)
PROT SERPL-MCNC: 7.5 G/DL (ref 6.3–8.2)
RBC # BLD AUTO: 4.7 M/UL (ref 4.23–5.6)
SODIUM SERPL-SCNC: 137 MMOL/L (ref 133–143)
TRIGL SERPL-MCNC: 216 MG/DL (ref 35–150)
TSH, 3RD GENERATION: 2.33 UIU/ML (ref 0.36–3.74)
VLDLC SERPL CALC-MCNC: 43.2 MG/DL (ref 6–23)
WBC # BLD AUTO: 7.1 K/UL (ref 4.3–11.1)

## 2023-07-19 LAB — APO B SERPL-MCNC: 108 MG/DL

## 2023-08-21 ENCOUNTER — TELEPHONE (OUTPATIENT)
Age: 73
End: 2023-08-21

## 2023-08-21 NOTE — TELEPHONE ENCOUNTER
Friday night had some SOB and chest tightness walking to the [place] at [facility]. It is about a 1/4 mile one way. He used to walk more but has not been exercising as much lately. He says HR jumps up a bit when he walks . He is scheduled w/ 9/22. He is asking if he can have a disabled placard?

## 2023-08-21 NOTE — TELEPHONE ENCOUNTER
Pt reports episodes of increased SOB and chest tightness while [place] this past Friday night.

## 2023-08-21 NOTE — TELEPHONE ENCOUNTER
Johnny Sanchez MD  You 6 minutes ago (11:47 AM)     Yovana Fallon  Yes     I called pt.told him MD response. I will work on forms

## 2023-08-24 NOTE — TELEPHONE ENCOUNTER
Form filled out for disabled placard   Sent to [facility] for pt..

## 2023-09-22 ENCOUNTER — INITIAL CONSULT (OUTPATIENT)
Age: 73
End: 2023-09-22
Payer: MEDICARE

## 2023-09-22 VITALS
DIASTOLIC BLOOD PRESSURE: 72 MMHG | HEART RATE: 62 BPM | BODY MASS INDEX: 32.93 KG/M2 | HEIGHT: 70 IN | SYSTOLIC BLOOD PRESSURE: 138 MMHG | WEIGHT: 230 LBS

## 2023-09-22 DIAGNOSIS — Z95.2 S/P AVR (AORTIC VALVE REPLACEMENT): ICD-10-CM

## 2023-09-22 DIAGNOSIS — I42.8 NICM (NONISCHEMIC CARDIOMYOPATHY) (HCC): ICD-10-CM

## 2023-09-22 DIAGNOSIS — I10 PRIMARY HYPERTENSION: ICD-10-CM

## 2023-09-22 DIAGNOSIS — I50.22 SYSTOLIC CHF, CHRONIC (HCC): Primary | ICD-10-CM

## 2023-09-22 DIAGNOSIS — Z95.1 STATUS POST CORONARY ARTERY BYPASS GRAFT: ICD-10-CM

## 2023-09-22 PROCEDURE — 1123F ACP DISCUSS/DSCN MKR DOCD: CPT | Performed by: INTERNAL MEDICINE

## 2023-09-22 PROCEDURE — 3017F COLORECTAL CA SCREEN DOC REV: CPT | Performed by: INTERNAL MEDICINE

## 2023-09-22 PROCEDURE — 3075F SYST BP GE 130 - 139MM HG: CPT | Performed by: INTERNAL MEDICINE

## 2023-09-22 PROCEDURE — G8417 CALC BMI ABV UP PARAM F/U: HCPCS | Performed by: INTERNAL MEDICINE

## 2023-09-22 PROCEDURE — 93000 ELECTROCARDIOGRAM COMPLETE: CPT | Performed by: INTERNAL MEDICINE

## 2023-09-22 PROCEDURE — 3078F DIAST BP <80 MM HG: CPT | Performed by: INTERNAL MEDICINE

## 2023-09-22 PROCEDURE — G8427 DOCREV CUR MEDS BY ELIG CLIN: HCPCS | Performed by: INTERNAL MEDICINE

## 2023-09-22 PROCEDURE — 1036F TOBACCO NON-USER: CPT | Performed by: INTERNAL MEDICINE

## 2023-09-22 PROCEDURE — 99214 OFFICE O/P EST MOD 30 MIN: CPT | Performed by: INTERNAL MEDICINE

## 2023-09-22 NOTE — PROGRESS NOTES
discussed in detail the potential benefits of ICD therapy, including improved mortality and prevention of SCD. Additionally, I discussed with the patient the potential risks of ICD implantation, including the risk of bleeding, infection, large blood vessel injury, lung or cardiac injury, venous occlusion, DVT/PE, pneumothorax, cardiac tamponade, perforation, need for urgent open heart surgery or additional procedures, device/lead failure, lead dislodgement, inappropriate shock(s), heart attack, stroke, arrhythmia, oversedation, respiratory arrest, and even death. We also discussed at length the indications for the addition of an LV lead for cardiac resynchronization in the setting of underlying conduction system disease and/or for the clinical suspicion for a high degree of ventricular pacing. We discussed not every patient has clinical improvement with a biventricular device, and implantation of a biventricular device does slightly increase the risks of the procedure. In addition, on rare occasions a patient's anatomy or underlying ventricular scar does not allow for successful placement of an LV lead or acceptable pacing parameters. After our comprehensive discussion, the patient would like to proceed with scheduling the procedure. I will have our  meet to discuss today or call the patient as soon as possible to make the arrangements. Patient has my professional contact information and was encouraged to call with additional questions or concerns.        -BiV ICD, [employer]    Pt is being referred to for an Implantable Cardioverter- Defibrillator (ICD). Risks and benefits have been discussed with the patient. The patient has been allowed to ask questions regarding ICD implant. Pt was provided with a Shared Decision ICD booklet.       2. HTN: controlled cont meds    Patient has been instructed and agrees to call our office with any issues or other concerns related to their cardiac condition(s) and/or

## 2023-10-13 DIAGNOSIS — I50.22 SYSTOLIC CHF, CHRONIC (HCC): ICD-10-CM

## 2023-10-13 LAB
ANION GAP SERPL CALC-SCNC: 6 MMOL/L (ref 2–11)
BUN SERPL-MCNC: 18 MG/DL (ref 8–23)
CALCIUM SERPL-MCNC: 9.3 MG/DL (ref 8.3–10.4)
CHLORIDE SERPL-SCNC: 107 MMOL/L (ref 101–110)
CO2 SERPL-SCNC: 25 MMOL/L (ref 21–32)
CREAT SERPL-MCNC: 1.5 MG/DL (ref 0.8–1.5)
ERYTHROCYTE [DISTWIDTH] IN BLOOD BY AUTOMATED COUNT: 13.2 % (ref 11.9–14.6)
GLUCOSE SERPL-MCNC: 214 MG/DL (ref 65–100)
HCT VFR BLD AUTO: 43.3 % (ref 41.1–50.3)
HGB BLD-MCNC: 13.6 G/DL (ref 13.6–17.2)
MAGNESIUM SERPL-MCNC: 1.9 MG/DL (ref 1.8–2.4)
MCH RBC QN AUTO: 29.1 PG (ref 26.1–32.9)
MCHC RBC AUTO-ENTMCNC: 31.4 G/DL (ref 31.4–35)
MCV RBC AUTO: 92.5 FL (ref 82–102)
NRBC # BLD: 0 K/UL (ref 0–0.2)
PLATELET # BLD AUTO: 144 K/UL (ref 150–450)
PMV BLD AUTO: 11.2 FL (ref 9.4–12.3)
POTASSIUM SERPL-SCNC: 4.7 MMOL/L (ref 3.5–5.1)
RBC # BLD AUTO: 4.68 M/UL (ref 4.23–5.6)
SODIUM SERPL-SCNC: 138 MMOL/L (ref 133–143)
WBC # BLD AUTO: 8.2 K/UL (ref 4.3–11.1)

## 2023-10-16 ENCOUNTER — OFFICE VISIT (OUTPATIENT)
Age: 73
End: 2023-10-16
Payer: MEDICARE

## 2023-10-16 VITALS
WEIGHT: 237 LBS | HEIGHT: 70 IN | BODY MASS INDEX: 33.93 KG/M2 | SYSTOLIC BLOOD PRESSURE: 124 MMHG | HEART RATE: 58 BPM | DIASTOLIC BLOOD PRESSURE: 72 MMHG

## 2023-10-16 DIAGNOSIS — Z95.1 STATUS POST CORONARY ARTERY BYPASS GRAFT: ICD-10-CM

## 2023-10-16 DIAGNOSIS — I25.118 ATHEROSCLEROTIC HEART DISEASE OF NATIVE CORONARY ARTERY WITH OTHER FORMS OF ANGINA PECTORIS (HCC): Primary | ICD-10-CM

## 2023-10-16 DIAGNOSIS — I50.22 SYSTOLIC CHF, CHRONIC (HCC): ICD-10-CM

## 2023-10-16 DIAGNOSIS — Z95.2 S/P AVR (AORTIC VALVE REPLACEMENT): ICD-10-CM

## 2023-10-16 PROCEDURE — 3017F COLORECTAL CA SCREEN DOC REV: CPT | Performed by: INTERNAL MEDICINE

## 2023-10-16 PROCEDURE — 1123F ACP DISCUSS/DSCN MKR DOCD: CPT | Performed by: INTERNAL MEDICINE

## 2023-10-16 PROCEDURE — 99214 OFFICE O/P EST MOD 30 MIN: CPT | Performed by: INTERNAL MEDICINE

## 2023-10-16 PROCEDURE — 1036F TOBACCO NON-USER: CPT | Performed by: INTERNAL MEDICINE

## 2023-10-16 PROCEDURE — 3074F SYST BP LT 130 MM HG: CPT | Performed by: INTERNAL MEDICINE

## 2023-10-16 PROCEDURE — G8417 CALC BMI ABV UP PARAM F/U: HCPCS | Performed by: INTERNAL MEDICINE

## 2023-10-16 PROCEDURE — 93000 ELECTROCARDIOGRAM COMPLETE: CPT | Performed by: INTERNAL MEDICINE

## 2023-10-16 PROCEDURE — 3078F DIAST BP <80 MM HG: CPT | Performed by: INTERNAL MEDICINE

## 2023-10-16 PROCEDURE — G8427 DOCREV CUR MEDS BY ELIG CLIN: HCPCS | Performed by: INTERNAL MEDICINE

## 2023-10-16 PROCEDURE — G8484 FLU IMMUNIZE NO ADMIN: HCPCS | Performed by: INTERNAL MEDICINE

## 2023-10-16 RX ORDER — ATORVASTATIN CALCIUM 40 MG/1
40 TABLET, FILM COATED ORAL DAILY
Qty: 90 TABLET | Refills: 3 | Status: SHIPPED | OUTPATIENT
Start: 2023-10-16

## 2023-10-16 RX ORDER — VALSARTAN 40 MG/1
40 TABLET ORAL DAILY
Qty: 90 TABLET | Refills: 3 | Status: SHIPPED | OUTPATIENT
Start: 2023-10-16

## 2023-10-16 RX ORDER — SPIRONOLACTONE 25 MG/1
25 TABLET ORAL 2 TIMES DAILY
Qty: 180 TABLET | Refills: 3 | Status: SHIPPED | OUTPATIENT
Start: 2023-10-16

## 2023-10-16 RX ORDER — METOPROLOL SUCCINATE 50 MG/1
50 TABLET, EXTENDED RELEASE ORAL 2 TIMES DAILY
Qty: 180 TABLET | Refills: 3 | Status: SHIPPED | OUTPATIENT
Start: 2023-10-16

## 2023-10-18 ENCOUNTER — ANESTHESIA EVENT (OUTPATIENT)
Dept: CARDIAC CATH/INVASIVE PROCEDURES | Age: 73
End: 2023-10-18
Payer: MEDICARE

## 2023-10-18 NOTE — PROGRESS NOTES
Patient pre-assessment complete for BiV ICD with Dr. Ismael Al scheduled for 10/19/23 at 1:30pm, arrival time 11:00am. Patient verified using . Patient instructed to bring a list of home medications on the day of procedure. NPO status reinforced. Patient instructed to follow EP Information Sheet given at appointment. Patient verbalizes understanding of all instructions & denies any questions at this time.

## 2023-10-19 ENCOUNTER — HOSPITAL ENCOUNTER (OUTPATIENT)
Age: 73
Setting detail: OBSERVATION
Discharge: HOME | End: 2023-10-20
Attending: INTERNAL MEDICINE | Admitting: INTERNAL MEDICINE
Payer: MEDICARE

## 2023-10-19 ENCOUNTER — ANESTHESIA (OUTPATIENT)
Dept: CARDIAC CATH/INVASIVE PROCEDURES | Age: 73
End: 2023-10-19
Payer: MEDICARE

## 2023-10-19 ENCOUNTER — APPOINTMENT (OUTPATIENT)
Dept: GENERAL RADIOLOGY | Age: 73
End: 2023-10-19
Attending: INTERNAL MEDICINE
Payer: MEDICARE

## 2023-10-19 DIAGNOSIS — I42.8 NICM (NONISCHEMIC CARDIOMYOPATHY) (HCC): ICD-10-CM

## 2023-10-19 DIAGNOSIS — Z95.810 S/P ICD (INTERNAL CARDIAC DEFIBRILLATOR) PROCEDURE: Primary | ICD-10-CM

## 2023-10-19 LAB
ANION GAP SERPL CALC-SCNC: 6 MMOL/L (ref 2–11)
BUN SERPL-MCNC: 15 MG/DL (ref 8–23)
CALCIUM SERPL-MCNC: 8.9 MG/DL (ref 8.3–10.4)
CHLORIDE SERPL-SCNC: 111 MMOL/L (ref 101–110)
CO2 SERPL-SCNC: 24 MMOL/L (ref 21–32)
CREAT SERPL-MCNC: 1.4 MG/DL (ref 0.8–1.5)
ECHO BSA: 2.24 M2
EKG ATRIAL RATE: 76 BPM
EKG DIAGNOSIS: NORMAL
EKG P AXIS: 40 DEGREES
EKG P-R INTERVAL: 236 MS
EKG Q-T INTERVAL: 490 MS
EKG QRS DURATION: 178 MS
EKG QTC CALCULATION (BAZETT): 551 MS
EKG R AXIS: 106 DEGREES
EKG T AXIS: -58 DEGREES
EKG VENTRICULAR RATE: 76 BPM
ERYTHROCYTE [DISTWIDTH] IN BLOOD BY AUTOMATED COUNT: 13.2 % (ref 11.9–14.6)
GLUCOSE BLD STRIP.AUTO-MCNC: 216 MG/DL (ref 65–100)
GLUCOSE SERPL-MCNC: 148 MG/DL (ref 65–100)
HCT VFR BLD AUTO: 42.4 % (ref 41.1–50.3)
HGB BLD-MCNC: 13.4 G/DL (ref 13.6–17.2)
MAGNESIUM SERPL-MCNC: 2.2 MG/DL (ref 1.8–2.4)
MCH RBC QN AUTO: 28.9 PG (ref 26.1–32.9)
MCHC RBC AUTO-ENTMCNC: 31.6 G/DL (ref 31.4–35)
MCV RBC AUTO: 91.4 FL (ref 82–102)
NRBC # BLD: 0 K/UL (ref 0–0.2)
PLATELET # BLD AUTO: 165 K/UL (ref 150–450)
PMV BLD AUTO: 9.6 FL (ref 9.4–12.3)
POTASSIUM SERPL-SCNC: 4.4 MMOL/L (ref 3.5–5.1)
RBC # BLD AUTO: 4.64 M/UL (ref 4.23–5.6)
SERVICE CMNT-IMP: ABNORMAL
SODIUM SERPL-SCNC: 141 MMOL/L (ref 133–143)
WBC # BLD AUTO: 7.6 K/UL (ref 4.3–11.1)

## 2023-10-19 PROCEDURE — C1894 INTRO/SHEATH, NON-LASER: HCPCS | Performed by: INTERNAL MEDICINE

## 2023-10-19 PROCEDURE — 2500000003 HC RX 250 WO HCPCS: Performed by: INTERNAL MEDICINE

## 2023-10-19 PROCEDURE — 6360000004 HC RX CONTRAST MEDICATION: Performed by: INTERNAL MEDICINE

## 2023-10-19 PROCEDURE — 3700000001 HC ADD 15 MINUTES (ANESTHESIA): Performed by: INTERNAL MEDICINE

## 2023-10-19 PROCEDURE — C1773 RET DEV, INSERTABLE: HCPCS | Performed by: INTERNAL MEDICINE

## 2023-10-19 PROCEDURE — 83735 ASSAY OF MAGNESIUM: CPT

## 2023-10-19 PROCEDURE — 71045 X-RAY EXAM CHEST 1 VIEW: CPT

## 2023-10-19 PROCEDURE — 2500000003 HC RX 250 WO HCPCS: Performed by: NURSE ANESTHETIST, CERTIFIED REGISTERED

## 2023-10-19 PROCEDURE — 33225 L VENTRIC PACING LEAD ADD-ON: CPT | Performed by: INTERNAL MEDICINE

## 2023-10-19 PROCEDURE — 6360000002 HC RX W HCPCS: Performed by: INTERNAL MEDICINE

## 2023-10-19 PROCEDURE — 33249 INSJ/RPLCMT DEFIB W/LEAD(S): CPT | Performed by: INTERNAL MEDICINE

## 2023-10-19 PROCEDURE — 85027 COMPLETE CBC AUTOMATED: CPT

## 2023-10-19 PROCEDURE — C1769 GUIDE WIRE: HCPCS | Performed by: INTERNAL MEDICINE

## 2023-10-19 PROCEDURE — 2580000003 HC RX 258: Performed by: INTERNAL MEDICINE

## 2023-10-19 PROCEDURE — C1895 LEAD, AICD, ENDO DUAL COIL: HCPCS | Performed by: INTERNAL MEDICINE

## 2023-10-19 PROCEDURE — 87086 URINE CULTURE/COLONY COUNT: CPT

## 2023-10-19 PROCEDURE — A4217 STERILE WATER/SALINE, 500 ML: HCPCS | Performed by: INTERNAL MEDICINE

## 2023-10-19 PROCEDURE — 80048 BASIC METABOLIC PNL TOTAL CA: CPT

## 2023-10-19 PROCEDURE — C1900 LEAD, CORONARY VENOUS: HCPCS | Performed by: INTERNAL MEDICINE

## 2023-10-19 PROCEDURE — C1725 CATH, TRANSLUMIN NON-LASER: HCPCS | Performed by: INTERNAL MEDICINE

## 2023-10-19 PROCEDURE — C1892 INTRO/SHEATH,FIXED,PEEL-AWAY: HCPCS | Performed by: INTERNAL MEDICINE

## 2023-10-19 PROCEDURE — G0378 HOSPITAL OBSERVATION PER HR: HCPCS

## 2023-10-19 PROCEDURE — 81001 URINALYSIS AUTO W/SCOPE: CPT

## 2023-10-19 PROCEDURE — C1882 AICD, OTHER THAN SING/DUAL: HCPCS | Performed by: INTERNAL MEDICINE

## 2023-10-19 PROCEDURE — 2580000003 HC RX 258: Performed by: ANESTHESIOLOGY

## 2023-10-19 PROCEDURE — 3700000000 HC ANESTHESIA ATTENDED CARE: Performed by: INTERNAL MEDICINE

## 2023-10-19 PROCEDURE — 6360000002 HC RX W HCPCS: Performed by: NURSE ANESTHETIST, CERTIFIED REGISTERED

## 2023-10-19 PROCEDURE — 2720000010 HC SURG SUPPLY STERILE: Performed by: INTERNAL MEDICINE

## 2023-10-19 PROCEDURE — 82962 GLUCOSE BLOOD TEST: CPT

## 2023-10-19 PROCEDURE — 2709999900 HC NON-CHARGEABLE SUPPLY: Performed by: INTERNAL MEDICINE

## 2023-10-19 PROCEDURE — 6370000000 HC RX 637 (ALT 250 FOR IP): Performed by: INTERNAL MEDICINE

## 2023-10-19 DEVICE — IMPLANTABLE DEVICE
Type: IMPLANTABLE DEVICE | Status: FUNCTIONAL
Brand: ACTICOR 7 HF-T QP DF4 IS4 PROMRI

## 2023-10-19 DEVICE — IMPLANTABLE DEVICE
Type: IMPLANTABLE DEVICE | Status: FUNCTIONAL
Brand: SENTUS PROMRI OTW QP L-85/49

## 2023-10-19 DEVICE — IMPLANTABLE DEVICE
Type: IMPLANTABLE DEVICE | Status: FUNCTIONAL
Brand: PLEXA PROMRI

## 2023-10-19 RX ORDER — METOPROLOL SUCCINATE 25 MG/1
50 TABLET, EXTENDED RELEASE ORAL 2 TIMES DAILY
Status: DISCONTINUED | OUTPATIENT
Start: 2023-10-19 | End: 2023-10-20 | Stop reason: HOSPADM

## 2023-10-19 RX ORDER — LANOLIN ALCOHOL/MO/W.PET/CERES
9 CREAM (GRAM) TOPICAL NIGHTLY
Status: DISCONTINUED | OUTPATIENT
Start: 2023-10-19 | End: 2023-10-20 | Stop reason: HOSPADM

## 2023-10-19 RX ORDER — ASPIRIN 81 MG/1
81 TABLET ORAL DAILY
Status: DISCONTINUED | OUTPATIENT
Start: 2023-10-20 | End: 2023-10-20 | Stop reason: HOSPADM

## 2023-10-19 RX ORDER — DIPHENHYDRAMINE HYDROCHLORIDE 50 MG/ML
12.5 INJECTION INTRAMUSCULAR; INTRAVENOUS
Status: DISCONTINUED | OUTPATIENT
Start: 2023-10-19 | End: 2023-10-19 | Stop reason: HOSPADM

## 2023-10-19 RX ORDER — SODIUM CHLORIDE 0.9 % (FLUSH) 0.9 %
5-40 SYRINGE (ML) INJECTION PRN
Status: DISCONTINUED | OUTPATIENT
Start: 2023-10-19 | End: 2023-10-19 | Stop reason: HOSPADM

## 2023-10-19 RX ORDER — DEXTROSE MONOHYDRATE 100 MG/ML
INJECTION, SOLUTION INTRAVENOUS CONTINUOUS PRN
Status: DISCONTINUED | OUTPATIENT
Start: 2023-10-19 | End: 2023-10-19 | Stop reason: HOSPADM

## 2023-10-19 RX ORDER — SODIUM CHLORIDE 0.9 % (FLUSH) 0.9 %
5-40 SYRINGE (ML) INJECTION EVERY 12 HOURS SCHEDULED
Status: DISCONTINUED | OUTPATIENT
Start: 2023-10-19 | End: 2023-10-19 | Stop reason: HOSPADM

## 2023-10-19 RX ORDER — SODIUM CHLORIDE, SODIUM LACTATE, POTASSIUM CHLORIDE, CALCIUM CHLORIDE 600; 310; 30; 20 MG/100ML; MG/100ML; MG/100ML; MG/100ML
INJECTION, SOLUTION INTRAVENOUS CONTINUOUS
Status: DISCONTINUED | OUTPATIENT
Start: 2023-10-19 | End: 2023-10-20 | Stop reason: HOSPADM

## 2023-10-19 RX ORDER — MIDAZOLAM HYDROCHLORIDE 1 MG/ML
INJECTION INTRAMUSCULAR; INTRAVENOUS PRN
Status: DISCONTINUED | OUTPATIENT
Start: 2023-10-19 | End: 2023-10-19 | Stop reason: SDUPTHER

## 2023-10-19 RX ORDER — KETAMINE HYDROCHLORIDE 50 MG/ML
INJECTION, SOLUTION, CONCENTRATE INTRAMUSCULAR; INTRAVENOUS PRN
Status: DISCONTINUED | OUTPATIENT
Start: 2023-10-19 | End: 2023-10-19 | Stop reason: SDUPTHER

## 2023-10-19 RX ORDER — SPIRONOLACTONE 25 MG/1
25 TABLET ORAL 2 TIMES DAILY
Status: DISCONTINUED | OUTPATIENT
Start: 2023-10-19 | End: 2023-10-20 | Stop reason: HOSPADM

## 2023-10-19 RX ORDER — OXYCODONE HYDROCHLORIDE 5 MG/1
5 TABLET ORAL
Status: DISCONTINUED | OUTPATIENT
Start: 2023-10-19 | End: 2023-10-19 | Stop reason: HOSPADM

## 2023-10-19 RX ORDER — OXYCODONE HYDROCHLORIDE 5 MG/1
5 TABLET ORAL EVERY 4 HOURS PRN
Status: DISCONTINUED | OUTPATIENT
Start: 2023-10-19 | End: 2023-10-20 | Stop reason: HOSPADM

## 2023-10-19 RX ORDER — LIDOCAINE HYDROCHLORIDE 10 MG/ML
1 INJECTION, SOLUTION INFILTRATION; PERINEURAL
Status: DISCONTINUED | OUTPATIENT
Start: 2023-10-19 | End: 2023-10-19 | Stop reason: HOSPADM

## 2023-10-19 RX ORDER — ATORVASTATIN CALCIUM 40 MG/1
40 TABLET, FILM COATED ORAL DAILY
Status: DISCONTINUED | OUTPATIENT
Start: 2023-10-20 | End: 2023-10-20 | Stop reason: HOSPADM

## 2023-10-19 RX ORDER — LIDOCAINE HYDROCHLORIDE AND EPINEPHRINE 10; 10 MG/ML; UG/ML
INJECTION, SOLUTION INFILTRATION; PERINEURAL PRN
Status: DISCONTINUED | OUTPATIENT
Start: 2023-10-19 | End: 2023-10-19 | Stop reason: HOSPADM

## 2023-10-19 RX ORDER — EPHEDRINE SULFATE/0.9% NACL/PF 50 MG/5 ML
SYRINGE (ML) INTRAVENOUS PRN
Status: DISCONTINUED | OUTPATIENT
Start: 2023-10-19 | End: 2023-10-19 | Stop reason: SDUPTHER

## 2023-10-19 RX ORDER — PROPOFOL 10 MG/ML
INJECTION, EMULSION INTRAVENOUS CONTINUOUS PRN
Status: DISCONTINUED | OUTPATIENT
Start: 2023-10-19 | End: 2023-10-19 | Stop reason: SDUPTHER

## 2023-10-19 RX ORDER — MIDAZOLAM HYDROCHLORIDE 2 MG/2ML
2 INJECTION, SOLUTION INTRAMUSCULAR; INTRAVENOUS
Status: DISCONTINUED | OUTPATIENT
Start: 2023-10-19 | End: 2023-10-19 | Stop reason: HOSPADM

## 2023-10-19 RX ORDER — DEXMEDETOMIDINE HYDROCHLORIDE 100 UG/ML
INJECTION, SOLUTION INTRAVENOUS PRN
Status: DISCONTINUED | OUTPATIENT
Start: 2023-10-19 | End: 2023-10-19 | Stop reason: SDUPTHER

## 2023-10-19 RX ORDER — SODIUM CHLORIDE 9 MG/ML
INJECTION, SOLUTION INTRAVENOUS PRN
Status: DISCONTINUED | OUTPATIENT
Start: 2023-10-19 | End: 2023-10-19 | Stop reason: HOSPADM

## 2023-10-19 RX ORDER — PROCHLORPERAZINE EDISYLATE 5 MG/ML
5 INJECTION INTRAMUSCULAR; INTRAVENOUS
Status: DISCONTINUED | OUTPATIENT
Start: 2023-10-19 | End: 2023-10-19 | Stop reason: HOSPADM

## 2023-10-19 RX ORDER — PROPOFOL 10 MG/ML
INJECTION, EMULSION INTRAVENOUS PRN
Status: DISCONTINUED | OUTPATIENT
Start: 2023-10-19 | End: 2023-10-19

## 2023-10-19 RX ORDER — LIDOCAINE HYDROCHLORIDE 20 MG/ML
INJECTION, SOLUTION EPIDURAL; INFILTRATION; INTRACAUDAL; PERINEURAL PRN
Status: DISCONTINUED | OUTPATIENT
Start: 2023-10-19 | End: 2023-10-19 | Stop reason: SDUPTHER

## 2023-10-19 RX ORDER — HYDROMORPHONE HYDROCHLORIDE 2 MG/ML
0.5 INJECTION, SOLUTION INTRAMUSCULAR; INTRAVENOUS; SUBCUTANEOUS EVERY 10 MIN PRN
Status: DISCONTINUED | OUTPATIENT
Start: 2023-10-19 | End: 2023-10-19 | Stop reason: HOSPADM

## 2023-10-19 RX ORDER — ETOMIDATE 2 MG/ML
INJECTION INTRAVENOUS PRN
Status: DISCONTINUED | OUTPATIENT
Start: 2023-10-19 | End: 2023-10-19 | Stop reason: SDUPTHER

## 2023-10-19 RX ORDER — VALSARTAN 40 MG/1
40 TABLET ORAL DAILY
Status: DISCONTINUED | OUTPATIENT
Start: 2023-10-20 | End: 2023-10-20 | Stop reason: HOSPADM

## 2023-10-19 RX ADMIN — KETAMINE HYDROCHLORIDE 10 MG: 50 INJECTION, SOLUTION INTRAMUSCULAR; INTRAVENOUS at 14:29

## 2023-10-19 RX ADMIN — Medication 10 MG: at 15:40

## 2023-10-19 RX ADMIN — DEXMEDETOMIDINE 8 MCG: 100 INJECTION, SOLUTION, CONCENTRATE INTRAVENOUS at 14:58

## 2023-10-19 RX ADMIN — MIDAZOLAM 1 MG: 1 INJECTION INTRAMUSCULAR; INTRAVENOUS at 14:37

## 2023-10-19 RX ADMIN — KETAMINE HYDROCHLORIDE 10 MG: 50 INJECTION, SOLUTION INTRAMUSCULAR; INTRAVENOUS at 14:58

## 2023-10-19 RX ADMIN — Medication 9 MG: at 21:22

## 2023-10-19 RX ADMIN — ETOMIDATE 4 MG: 2 INJECTION, SOLUTION INTRAVENOUS at 14:29

## 2023-10-19 RX ADMIN — SODIUM CHLORIDE, SODIUM LACTATE, POTASSIUM CHLORIDE, AND CALCIUM CHLORIDE: 600; 310; 30; 20 INJECTION, SOLUTION INTRAVENOUS at 14:22

## 2023-10-19 RX ADMIN — DEXMEDETOMIDINE 8 MCG: 100 INJECTION, SOLUTION, CONCENTRATE INTRAVENOUS at 14:29

## 2023-10-19 RX ADMIN — DEXMEDETOMIDINE 4 MCG: 100 INJECTION, SOLUTION, CONCENTRATE INTRAVENOUS at 14:38

## 2023-10-19 RX ADMIN — LIDOCAINE HYDROCHLORIDE 20 MG: 20 INJECTION, SOLUTION EPIDURAL; INFILTRATION; INTRACAUDAL; PERINEURAL at 14:29

## 2023-10-19 RX ADMIN — CEFAZOLIN 2000 ML: 10 INJECTION, POWDER, FOR SOLUTION INTRAVENOUS at 14:27

## 2023-10-19 RX ADMIN — KETAMINE HYDROCHLORIDE 10 MG: 50 INJECTION, SOLUTION INTRAMUSCULAR; INTRAVENOUS at 14:45

## 2023-10-19 RX ADMIN — MIDAZOLAM 1 MG: 1 INJECTION INTRAMUSCULAR; INTRAVENOUS at 14:29

## 2023-10-19 RX ADMIN — KETAMINE HYDROCHLORIDE 10 MG: 50 INJECTION, SOLUTION INTRAMUSCULAR; INTRAVENOUS at 14:37

## 2023-10-19 RX ADMIN — CEFAZOLIN SODIUM 2000 MG: 100 INJECTION, POWDER, LYOPHILIZED, FOR SOLUTION INTRAVENOUS at 21:21

## 2023-10-19 RX ADMIN — PROPOFOL 25 MCG/KG/MIN: 10 INJECTION, EMULSION INTRAVENOUS at 14:29

## 2023-10-19 NOTE — PROGRESS NOTES
Family at bedside. Provided PO fluids, meal tray. Call bell within reach. No needs expressed at this time.

## 2023-10-19 NOTE — PROGRESS NOTES
Pt arrived from EP lab and moved to bed. Pacer/ICD site CDI with gauze pressure dressing and tape. No bruising or swelling noted. Pt has no fluids running from cath lab.

## 2023-10-19 NOTE — PROGRESS NOTES
Report received from [facility], [address] [place] [address]. Procedural finding communicated. Intra procedural medication administration reviewed. Progression of care discussed. Patient received into CPRU room 2, Post ICD placement    Access site without bleeding or swelling. Yes    Patient instructed to limit movement of left upper extremity. Routine post procedural vital signs & site assessment initiated.

## 2023-10-19 NOTE — PROGRESS NOTES
TRANSFER - OUT REPORT:    Verbal report given to Christina Munoz RN on Nithya Bernal  being transferred to 3rd floor telemetry for routine progression of patient care       Report consisted of patient's Situation, Background, Assessment and   Recommendations(SBAR). Information from the following report(s) Nurse Handoff Report was reviewed with the receiving nurse. Lines:   Peripheral IV 10/19/23 Right Antecubital (Active)       Peripheral IV 10/19/23 Left Antecubital (Active)        Opportunity for questions and clarification was provided.

## 2023-10-19 NOTE — PROGRESS NOTES
Pt arrived, ambulated to room with no visible problems, planned ICD for Dr Stephon Palacios. Consent signed, Procedure discussed with pt all questions answered voiced understanding. Medications and history discussed with pt.     Pt prepped per orders The patient has a fraility score of 3-MANAGING WELL, based on no need for assistance

## 2023-10-19 NOTE — ANESTHESIA POSTPROCEDURE EVALUATION
Department of Anesthesiology  Postprocedure Note    Patient: Denisse Cruz  MRN: [id number]  YOB: 1950  Date of evaluation: 10/19/2023      Procedure Summary     Date: 10/19/23 Room / Location: [facility] 2 ALL EVENTS / D CARDIAC CATH LAB    Anesthesia Start: 3644 Anesthesia Stop: 1620    Procedures:       Insert ICD multi      Lv lead placement Diagnosis:       NICM (nonischemic cardiomyopathy) ([address])      (NICM (nonischemic cardiomyopathy) ([address]) [I42.8])    Providers: Shoshana Collado MD Responsible Provider: Kim Petersen MD    Anesthesia Type: TIVA ASA Status: 4          Anesthesia Type: TIVA    Avelino Phase I: Avelino Score: 10    Avelino Phase II:        Anesthesia Post Evaluation    Patient location during evaluation: PACU  Patient participation: complete - patient participated  Level of consciousness: awake  Airway patency: patent  Nausea & Vomiting: no nausea  Complications: no  Cardiovascular status: hemodynamically stable  Respiratory status: acceptable and nonlabored ventilation  Hydration status: stable  Multimodal analgesia pain management approach

## 2023-10-19 NOTE — PROGRESS NOTES
Dr. Archie Rosas, Anesthesia MD saw patient w family at bedside. Patient okay to be transported to 3rd floor telemetry.

## 2023-10-20 VITALS
HEART RATE: 97 BPM | RESPIRATION RATE: 18 BRPM | OXYGEN SATURATION: 98 % | WEIGHT: 228.7 LBS | SYSTOLIC BLOOD PRESSURE: 120 MMHG | HEIGHT: 70 IN | TEMPERATURE: 97.6 F | BODY MASS INDEX: 32.74 KG/M2 | DIASTOLIC BLOOD PRESSURE: 80 MMHG

## 2023-10-20 LAB
APPEARANCE UR: CLEAR
BACTERIA URNS QL MICRO: NEGATIVE /HPF
BILIRUB UR QL: NEGATIVE
CASTS URNS QL MICRO: ABNORMAL /LPF
COLOR UR: ABNORMAL
EPI CELLS #/AREA URNS HPF: ABNORMAL /HPF
GLUCOSE UR STRIP.AUTO-MCNC: >1000 MG/DL
HGB UR QL STRIP: ABNORMAL
KETONES UR QL STRIP.AUTO: NEGATIVE MG/DL
LEUKOCYTE ESTERASE UR QL STRIP.AUTO: ABNORMAL
MUCOUS THREADS URNS QL MICRO: 0 /LPF
NITRITE UR QL STRIP.AUTO: NEGATIVE
PH UR STRIP: 6.5 (ref 5–9)
PROT UR STRIP-MCNC: ABNORMAL MG/DL
RBC #/AREA URNS HPF: ABNORMAL /HPF
SP GR UR REFRACTOMETRY: 1.02 (ref 1–1.02)
URINE CULTURE IF INDICATED: ABNORMAL
UROBILINOGEN UR QL STRIP.AUTO: 0.2 EU/DL (ref 0.2–1)
WBC URNS QL MICRO: >100 /HPF

## 2023-10-20 PROCEDURE — G0378 HOSPITAL OBSERVATION PER HR: HCPCS

## 2023-10-20 PROCEDURE — 96365 THER/PROPH/DIAG IV INF INIT: CPT

## 2023-10-20 PROCEDURE — 2580000003 HC RX 258: Performed by: NURSE PRACTITIONER

## 2023-10-20 PROCEDURE — 6370000000 HC RX 637 (ALT 250 FOR IP): Performed by: INTERNAL MEDICINE

## 2023-10-20 PROCEDURE — 6360000002 HC RX W HCPCS: Performed by: NURSE PRACTITIONER

## 2023-10-20 RX ORDER — CEFPODOXIME PROXETIL 200 MG/1
200 TABLET, FILM COATED ORAL 2 TIMES DAILY
Qty: 14 TABLET | Refills: 0 | Status: SHIPPED | OUTPATIENT
Start: 2023-10-20 | End: 2023-10-27

## 2023-10-20 RX ORDER — OXYCODONE HYDROCHLORIDE 5 MG/1
5 TABLET ORAL EVERY 8 HOURS PRN
Qty: 9 TABLET | Refills: 0 | Status: SHIPPED | OUTPATIENT
Start: 2023-10-20 | End: 2023-10-23

## 2023-10-20 RX ORDER — ASPIRIN 81 MG/1
81 TABLET ORAL DAILY
Qty: 30 TABLET | Refills: 3 | Status: SHIPPED | OUTPATIENT
Start: 2023-10-20

## 2023-10-20 RX ADMIN — ASPIRIN 81 MG: 81 TABLET ORAL at 09:12

## 2023-10-20 RX ADMIN — OXYCODONE HYDROCHLORIDE 5 MG: 5 TABLET ORAL at 01:01

## 2023-10-20 RX ADMIN — SPIRONOLACTONE 25 MG: 25 TABLET ORAL at 09:12

## 2023-10-20 RX ADMIN — CEFTRIAXONE 1000 MG: 1 INJECTION, POWDER, FOR SOLUTION INTRAMUSCULAR; INTRAVENOUS at 01:03

## 2023-10-20 RX ADMIN — EMPAGLIFLOZIN 10 MG: 10 TABLET, FILM COATED ORAL at 09:12

## 2023-10-20 RX ADMIN — VALSARTAN 40 MG: 40 TABLET, FILM COATED ORAL at 09:12

## 2023-10-20 RX ADMIN — ATORVASTATIN CALCIUM 40 MG: 40 TABLET, FILM COATED ORAL at 09:12

## 2023-10-20 RX ADMIN — METOPROLOL SUCCINATE 50 MG: 25 TABLET, EXTENDED RELEASE ORAL at 09:12

## 2023-10-20 ASSESSMENT — PAIN SCALES - GENERAL: PAINLEVEL_OUTOF10: 6

## 2023-10-20 ASSESSMENT — PAIN DESCRIPTION - ORIENTATION: ORIENTATION: LEFT

## 2023-10-20 ASSESSMENT — PAIN DESCRIPTION - LOCATION: LOCATION: SHOULDER

## 2023-10-20 NOTE — CARE COORDINATION
Patient admitted in Obs status for scheduled ICD placement. Diagnosed with UTI. Discharge order placed this AM.   CM met with patient to assess for discharge needs. Patient verified PCP, insurance, and demographic. Patient has support of 2 sons. No DME needs. No history of home health or SNF. CM attempted to contact primary, Mary Castellanos. [facility] [phone]. Appointment not available until Nov 1  CM called [facility] and scheduled Oct 27th. CM discussed with patient and patient is agreeable to go for follow up to [facility] Oct 27th at [address] with [employer]. to obtain post discharge appointment to follow up UTI after treatment. Son to transport home. 10/20/23 1002   Service Assessment   Patient Orientation Alert and Oriented   Cognition Alert   History Provided By Patient   Primary Caregiver Self   Accompanied By/Relationship None   Support Systems Children  (2 Sons)   Patient's Healthcare Decision Maker is: Legal Next of Kin   PCP Verified by CM   ([facility] in [place])   Prior Functional Level Independent in ADLs/IADLs   Current Functional Level Independent in ADLs/IADLs   Can patient return to prior living arrangement Yes   Ability to make needs known: Good   Family able to assist with home care needs: Yes   Would you like for me to discuss the discharge plan with any other family members/significant others, and if so, who? No   Financial Resources Medicare;Medicaid   Community Resources None   Social/Functional History   Home Equipment None   Receives Help From Family   ADL Assistance Independent   Ambulation Assistance Independent   Transfer Assistance Independent   Active  Yes   Mode of Transportation Car   Occupation Retired   Discharge Planning   Type of [address] [employer] [place] Prior To Admission None   Potential Assistance Needed N/A   DME Ordered?  No   Potential Assistance Purchasing Medications No   Type of Home

## 2023-10-20 NOTE — MANAGEMENT PLAN
EP Note    68year old male with a history of NICM with wide LBBB s/p CRT-Dx. Finding last night with +UA with lower abdominal symptoms. Started IV abx.     -Stable wound, device interrogation and CXR. -UTI - s/p CTX dose, likely ok to d/c with Vantin with follow up for Ucx and resolution of symptoms. Ensure PCP follow up for this as well.   -Dispo - anticipate today vs tomorrow. Uma Zendejas.  Lianet Rios MD, [address] Cardiac Electrophysiology  [facility]

## 2023-10-20 NOTE — DISCHARGE SUMMARY
[facility] Discharge Summary     Patient ID:  Gallo Macias  [id number]  89 y.o.  1950    Admit date: 10/19/2023    Discharge date:  10/20/2023    Admitting Physician: Willian Antony MD     Discharge Physician: GENO Varela/ Dr. Kathy Landaverde    Admission Diagnoses: NICM (nonischemic cardiomyopathy) [facility]) [I42.8]    Discharge Diagnoses:   Patient Active Problem List    Diagnosis    NICM (nonischemic cardiomyopathy) [facility])       Cardiology Procedures this admission:   ICD implantation, CXR  Consults: none    Hospital Course: Patient was seen at the office of [facility] by Dr. Rciha La for management of NICM and was subsequently scheduled for an AM Admission ICD/PM implantation at [facility] on 10/19/23. Patient was taken to the EP lab and underwent successful implantation of [employer] biventricular ICD by Dr. Richa La. Patient tolerated the procedure well and was taken to the telemetry floor for recovery. Follow up chest xray showed no pneumothorax. The following morning patient was up feeling well without any complaints of chest pain, shortness of breath, or palpitations. Patient's left subclavian cath site was clean, dry and intact without hematoma. Patient's labs were WNL. After admission, patient complained of urinary symptoms. UA was noted to be abnormal and antibiotic therapy was started. Patient will complete ABX course as outpatient and follow-up with PCP. Patient was seen and examined by Dr. Kathy Landaverde and determined stable and ready for discharge. Patient was instructed on the importance of medication compliance and outpatient follow up. Patient has been scheduled for follow-up with [facility] -- device clinic in 10-14 days 11/02/23 at 2 PM.      DISPOSITION: Patient has been instructed to keep affected arm below shoulder level for the next 4 weeks or until cleared by doctor. The arm sling should be worn while sleeping. The dressing will be removed at follow-up.

## 2023-10-20 NOTE — MANAGEMENT PLAN
Notified by RN that UA was abnormal.     > 100 WBC and moderate leuks, nitrate negative. Currently receiving Ancef for prophylactic coverage post-device implant. Spoke with pharmacist tonight regarding antibiotic coverage    Plan:    -- stop IV ancef. Start IV rocephin. --transition to oral antibiotic treatment (Vantin) on discharge. Continue treatment for 5 days.      JACOB Sotomayor - STEVE  12:26 AM

## 2023-10-22 LAB
BACTERIA SPEC CULT: NORMAL
SERVICE CMNT-IMP: NORMAL

## 2023-10-24 ENCOUNTER — CARE COORDINATION (OUTPATIENT)
Dept: CARE COORDINATION | Facility: CLINIC | Age: 73
End: 2023-10-24

## 2023-10-24 NOTE — CARE COORDINATION
have an Advance Directive:  no ACP docs . Medication reconciliation was performed with patient, who verbalizes understanding of administration of home medications. Medications reviewed, 1111F entered: yes    Was patient discharged with a pulse oximeter? no    Non-face-to-face services provided:  Obtained and reviewed discharge summary and/or continuity of care documents    Offered patient enrollment in the Remote Patient Monitoring (RPM) program for in-home monitoring: NA.    Care Transitions 24 Hour Call    Do you have a copy of your discharge instructions?: Yes  Do you have all of your prescriptions and are they filled?: Yes  Have you scheduled your follow up appointment?: Yes  How are you going to get to your appointment?: Car - family or friend to transport  Do you have support at home?: Alone  Do you feel like you have everything you need to keep you well at home?: Yes  Are you an active caregiver in your home?: No  Care Transitions Interventions                                   Discussed follow-up appointments. If no appointment was previously scheduled, appointment scheduling offered: Yes. Is follow up appointment scheduled within 7-14 days of discharge? Yes. Follow Up  Future Appointments   Date Time Provider [address] [place] [address]   10/27/2023 10:10 AM WESLEY Farmer [facility] AMB   11/2/2023  2:00 PM [facility]       Care Transition Nurse provided contact information. Plan for follow-up call in 5-7 days based on severity of symptoms and risk factors. Plan for next call:  discuss post op site including: changes, concerns.     Fly Mari RN

## 2023-11-02 ENCOUNTER — NURSE ONLY (OUTPATIENT)
Age: 73
End: 2023-11-02

## 2023-11-02 DIAGNOSIS — I42.8 NICM (NONISCHEMIC CARDIOMYOPATHY) (HCC): ICD-10-CM

## 2023-11-02 DIAGNOSIS — I50.22 SYSTOLIC CHF, CHRONIC (HCC): Primary | ICD-10-CM

## 2023-11-03 ENCOUNTER — CARE COORDINATION (OUTPATIENT)
Dept: CARE COORDINATION | Facility: CLINIC | Age: 73
End: 2023-11-03

## 2023-11-03 NOTE — CARE COORDINATION
Care Transitions Outreach Attempt    Call within 2 business days of discharge: Yes   Attempted to reach patient for transitions of care follow up. Unable to reach patient. Patient: Troy Sandoval Patient : 1950 MRN: [id number]    Last Discharge Facility       Date Complaint Diagnosis Description Type Department Provider    10/19/23  S/P ICD (internal cardiac defibrillator) procedure . .. Admission (Discharged) Claiborne Muster, Lowery Lefort, MD              Was this an external facility discharge?  No Discharge Facility Name: [facility]    Noted following upcoming appointments from discharge chart review:   [facility] follow up appointment(s):   Future Appointments   Date Time Provider [address]   2024  1:15 PM MIGUELITO HENDRICKSON[facility] DEVICE [facility] JOSE G[facility] AMB   2024  1:45 PM MD JOSE Croft G[facility] AMB     Non-[facility]  follow up appointment(s): none noted

## 2023-11-10 ENCOUNTER — TELEPHONE (OUTPATIENT)
Age: 73
End: 2023-11-10

## 2023-11-10 NOTE — TELEPHONE ENCOUNTER
Patient with 8 min episode of true AF vs questionable atrial oversensing. Recent implant set VDD. No hx of paf, no hx of tia. Patient denies any sx. Report available in [place] for review.

## 2023-11-14 ENCOUNTER — CARE COORDINATION (OUTPATIENT)
Dept: CARE COORDINATION | Facility: CLINIC | Age: 73
End: 2023-11-14

## 2023-11-14 NOTE — CARE COORDINATION
Care Transitions Follow Up Call    Patient Current Location:  Home: [address] [place] [id number]    Care Transition Nurse contacted the patient by telephone to follow up after admission on 10/19/2023. Verified name and  with patient as identifiers. Patient: Leonarda Hook  Patient : 1950   MRN: [id number]  Reason for Admission:  ICD implantation, CXR  Discharge Date: 10/20/23 RARS: No data recorded    Needs to be reviewed by the provider   Additional needs identified to be addressed with provider: No  none             Method of communication with provider: none. Reinforced discharge instructions:  -Signs of infection, such as fever over 100 F, drainage from the incision, redness, significant swelling, or warmth at the incision site.  -Significant pain around the site that gets worse. Mild discomfort can be normal.   -Bleeding from the incision site  -Swelling in the arm on the side of the incision site  -Chest pain or shortness of breath          History of: NICM (nonischemic cardiomyopathy with recent procedure: ICD implantation, CXR. Patient did not attend hospital follow up with PCP, he could not find the building and went to [facility] for directions as well. He went to the device clinic as recommended. Reports compliance with medications. Addressed changes since last contact:   appointment with device clinic  Discussed follow-up appointments. If no appointment was previously scheduled, appointment scheduling offered: Yes, declines reschedule with PCP (new) for UTI follow as recommended on discharge instructions. Is follow up appointment scheduled within 14 days of discharge? N/A.     Follow Up  Future Appointments   Date Time Provider [address] [place] [address]   2024  1:15 PM [facility]UELITO HENDR[facility]SON[facility] DEVICE 55 JOSE G[facility] AMB   2024  1:45 PM MD JOSE Jones GVL AMB     External follow up appointment(s): none noted    Care Transition Nurse reviewed discharge

## 2024-02-06 ENCOUNTER — OFFICE VISIT (OUTPATIENT)
Dept: INTERNAL MEDICINE CLINIC | Facility: CLINIC | Age: 74
End: 2024-02-06
Payer: MEDICARE

## 2024-02-06 VITALS
TEMPERATURE: 98.2 F | WEIGHT: 230.8 LBS | BODY MASS INDEX: 33.04 KG/M2 | OXYGEN SATURATION: 98 % | HEIGHT: 70 IN | SYSTOLIC BLOOD PRESSURE: 142 MMHG | DIASTOLIC BLOOD PRESSURE: 72 MMHG | HEART RATE: 79 BPM

## 2024-02-06 DIAGNOSIS — E11.69 TYPE 2 DIABETES MELLITUS WITH OTHER SPECIFIED COMPLICATION, UNSPECIFIED WHETHER LONG TERM INSULIN USE (HCC): ICD-10-CM

## 2024-02-06 DIAGNOSIS — I82.451 ACUTE EMBOLISM AND THROMBOSIS OF RIGHT PERONEAL VEIN (HCC): ICD-10-CM

## 2024-02-06 DIAGNOSIS — E55.9 VITAMIN D INSUFFICIENCY: ICD-10-CM

## 2024-02-06 DIAGNOSIS — G89.29 CHRONIC PAIN OF BOTH FEET: ICD-10-CM

## 2024-02-06 DIAGNOSIS — I42.8 NICM (NONISCHEMIC CARDIOMYOPATHY) (HCC): ICD-10-CM

## 2024-02-06 DIAGNOSIS — I50.22 SYSTOLIC CHF, CHRONIC (HCC): ICD-10-CM

## 2024-02-06 DIAGNOSIS — E66.2 CLASS 1 OBESITY WITH ALVEOLAR HYPOVENTILATION, SERIOUS COMORBIDITY, AND BODY MASS INDEX (BMI) OF 30.0 TO 30.9 IN ADULT (HCC): ICD-10-CM

## 2024-02-06 DIAGNOSIS — Z87.39 HX OF GOUT: ICD-10-CM

## 2024-02-06 DIAGNOSIS — Z95.2 S/P AVR (AORTIC VALVE REPLACEMENT): ICD-10-CM

## 2024-02-06 DIAGNOSIS — N18.31 STAGE 3A CHRONIC KIDNEY DISEASE (HCC): ICD-10-CM

## 2024-02-06 DIAGNOSIS — I25.118 ATHEROSCLEROTIC HEART DISEASE OF NATIVE CORONARY ARTERY WITH OTHER FORMS OF ANGINA PECTORIS (HCC): ICD-10-CM

## 2024-02-06 DIAGNOSIS — M79.671 CHRONIC PAIN OF BOTH FEET: ICD-10-CM

## 2024-02-06 DIAGNOSIS — M79.672 CHRONIC PAIN OF BOTH FEET: ICD-10-CM

## 2024-02-06 DIAGNOSIS — Z95.1 STATUS POST CORONARY ARTERY BYPASS GRAFT: Primary | ICD-10-CM

## 2024-02-06 DIAGNOSIS — M25.50 POLYARTHRALGIA: ICD-10-CM

## 2024-02-06 DIAGNOSIS — Z87.442 HISTORY OF NEPHROLITHIASIS: ICD-10-CM

## 2024-02-06 DIAGNOSIS — I50.22 CHRONIC SYSTOLIC (CONGESTIVE) HEART FAILURE (HCC): ICD-10-CM

## 2024-02-06 PROBLEM — N18.30 CHRONIC RENAL DISEASE, STAGE III (HCC): Status: ACTIVE | Noted: 2024-02-06

## 2024-02-06 LAB
ALBUMIN SERPL-MCNC: 4 G/DL (ref 3.2–4.6)
ALBUMIN/GLOB SERPL: 0.9 (ref 0.4–1.6)
ALP SERPL-CCNC: 52 U/L (ref 50–136)
ALT SERPL-CCNC: 24 U/L (ref 12–65)
ANION GAP SERPL CALC-SCNC: 3 MMOL/L (ref 2–11)
AST SERPL-CCNC: 19 U/L (ref 15–37)
BILIRUB SERPL-MCNC: 0.6 MG/DL (ref 0.2–1.1)
BUN SERPL-MCNC: 28 MG/DL (ref 8–23)
CALCIUM SERPL-MCNC: 9.2 MG/DL (ref 8.3–10.4)
CHLORIDE SERPL-SCNC: 107 MMOL/L (ref 103–113)
CO2 SERPL-SCNC: 27 MMOL/L (ref 21–32)
CREAT SERPL-MCNC: 1.5 MG/DL (ref 0.8–1.5)
ERYTHROCYTE [DISTWIDTH] IN BLOOD BY AUTOMATED COUNT: 12.6 % (ref 11.9–14.6)
GLOBULIN SER CALC-MCNC: 4.5 G/DL (ref 2.8–4.5)
GLUCOSE SERPL-MCNC: 202 MG/DL (ref 65–100)
HCT VFR BLD AUTO: 43.9 % (ref 41.1–50.3)
HGB BLD-MCNC: 13.8 G/DL (ref 13.6–17.2)
MCH RBC QN AUTO: 28.3 PG (ref 26.1–32.9)
MCHC RBC AUTO-ENTMCNC: 31.4 G/DL (ref 31.4–35)
MCV RBC AUTO: 90.1 FL (ref 82–102)
NRBC # BLD: 0 K/UL (ref 0–0.2)
PLATELET # BLD AUTO: 164 K/UL (ref 150–450)
PMV BLD AUTO: 10.6 FL (ref 9.4–12.3)
POTASSIUM SERPL-SCNC: 4.5 MMOL/L (ref 3.5–5.1)
PROT SERPL-MCNC: 8.5 G/DL (ref 6.3–8.2)
RBC # BLD AUTO: 4.87 M/UL (ref 4.23–5.6)
SODIUM SERPL-SCNC: 137 MMOL/L (ref 136–146)
URATE SERPL-MCNC: 6 MG/DL (ref 2.6–6)
WBC # BLD AUTO: 8.6 K/UL (ref 4.3–11.1)

## 2024-02-06 PROCEDURE — 3078F DIAST BP <80 MM HG: CPT | Performed by: INTERNAL MEDICINE

## 2024-02-06 PROCEDURE — 3077F SYST BP >= 140 MM HG: CPT | Performed by: INTERNAL MEDICINE

## 2024-02-06 PROCEDURE — 99204 OFFICE O/P NEW MOD 45 MIN: CPT | Performed by: INTERNAL MEDICINE

## 2024-02-06 PROCEDURE — 1036F TOBACCO NON-USER: CPT | Performed by: INTERNAL MEDICINE

## 2024-02-06 PROCEDURE — G8427 DOCREV CUR MEDS BY ELIG CLIN: HCPCS | Performed by: INTERNAL MEDICINE

## 2024-02-06 PROCEDURE — 1123F ACP DISCUSS/DSCN MKR DOCD: CPT | Performed by: INTERNAL MEDICINE

## 2024-02-06 PROCEDURE — 3046F HEMOGLOBIN A1C LEVEL >9.0%: CPT | Performed by: INTERNAL MEDICINE

## 2024-02-06 PROCEDURE — 2022F DILAT RTA XM EVC RTNOPTHY: CPT | Performed by: INTERNAL MEDICINE

## 2024-02-06 PROCEDURE — G8417 CALC BMI ABV UP PARAM F/U: HCPCS | Performed by: INTERNAL MEDICINE

## 2024-02-06 PROCEDURE — G8484 FLU IMMUNIZE NO ADMIN: HCPCS | Performed by: INTERNAL MEDICINE

## 2024-02-06 PROCEDURE — 3017F COLORECTAL CA SCREEN DOC REV: CPT | Performed by: INTERNAL MEDICINE

## 2024-02-06 SDOH — ECONOMIC STABILITY: FOOD INSECURITY: WITHIN THE PAST 12 MONTHS, THE FOOD YOU BOUGHT JUST DIDN'T LAST AND YOU DIDN'T HAVE MONEY TO GET MORE.: NEVER TRUE

## 2024-02-06 SDOH — ECONOMIC STABILITY: FOOD INSECURITY: WITHIN THE PAST 12 MONTHS, YOU WORRIED THAT YOUR FOOD WOULD RUN OUT BEFORE YOU GOT MONEY TO BUY MORE.: NEVER TRUE

## 2024-02-06 SDOH — ECONOMIC STABILITY: INCOME INSECURITY: HOW HARD IS IT FOR YOU TO PAY FOR THE VERY BASICS LIKE FOOD, HOUSING, MEDICAL CARE, AND HEATING?: NOT HARD AT ALL

## 2024-02-06 ASSESSMENT — PATIENT HEALTH QUESTIONNAIRE - PHQ9
1. LITTLE INTEREST OR PLEASURE IN DOING THINGS: 0
SUM OF ALL RESPONSES TO PHQ QUESTIONS 1-9: 0
SUM OF ALL RESPONSES TO PHQ QUESTIONS 1-9: 0
SUM OF ALL RESPONSES TO PHQ9 QUESTIONS 1 & 2: 0
SUM OF ALL RESPONSES TO PHQ QUESTIONS 1-9: 0
2. FEELING DOWN, DEPRESSED OR HOPELESS: 0
SUM OF ALL RESPONSES TO PHQ QUESTIONS 1-9: 0

## 2024-02-06 NOTE — PROGRESS NOTES
Dell Parker (: 1950) is a 73 y.o. male, here for evaluation of the following chief complaint(s):  New Patient (Pt is here to estab PCP. Pt is transferring from Dr. Pastrana.) and Foot Pain (Pt states bilateral pain in feet. Pt states sx's have been present several years.)       ASSESSMENT/PLAN:  1. Status post coronary artery bypass graft  2. Chronic systolic (congestive) heart failure ([facility])  3. NICM (nonischemic cardiomyopathy) ([facility])  -     CBC; Future  4. Acute embolism and thrombosis of right peroneal vein ([facility])  5. Atherosclerotic heart disease of native coronary artery with other forms of angina pectoris ([facility])  6. Type 2 diabetes mellitus with other specified complication, unspecified whether long term insulin use ([facility])  -     Hemoglobin A1C; Future  -     Microalbumin / Creatinine Urine Ratio; Future  7. Stage 3a chronic kidney disease ([facility])  -     Comprehensive Metabolic Panel; Future  -     Microalbumin / Creatinine Urine Ratio; Future  8. S/P AVR (aortic valve replacement)  9. Class 1 obesity with alveolar hypoventilation, serious comorbidity, and body mass index (BMI) of 30.0 to 30.9 in adult ([facility])  10. Systolic CHF, chronic ([facility])  11. Chronic pain of both feet  12. Polyarthralgia  13. Vitamin D insufficiency  14. Hx of gout  -     Uric Acid; Future  15. History of nephrolithiasis             SUBJECTIVE/OBJECTIVE:  HPI: Patient is a pleasant 73-year-old man with a complex medicalHistory presenting to establish care with a new PCP.  Patient's blood pressure somewhat elevated at establishment at 142/72.  Patient's been following with [facility] for his history of CABG with stent placement and aortic valve replacement.  Patient with concerns at this time establishment of bilateral feet pain present chronically.    Patient with taught skin of the perioral region. Destructive arthrtitic changes present of the digits primarily at the DIP. Offering SHAWN and inflammatory markers with RF and

## 2024-02-06 NOTE — ACP (ADVANCE CARE PLANNING)
Advance Care Planning   The patient has the following advanced directives on file:  Advance Directives       Power of  Living Will ACP-Advance Directive ACP-Power of     Not on File Not on File Not on File Not on File            The patient has appointed the following active healthcare agents:    Primary Decision Maker: Ko Parker - [phone]    The Patient has the following current code status:    Code Status: Prior    Visit Documentation:  I discussed Advance Care Planning with Dell Parker today which included the importance of making their choices for care and treatment in the case of a health event that adversely affects their decision-making abilities. He has not completed the Advance Care Directives. He does not have an active health care agent at this time.  Dell Parker was encouraged to complete the declaration forms and provide a signed copy of his medical records.            Caterina Blum  2/6/2024

## 2024-02-06 NOTE — PROGRESS NOTES
Diabetic foot exam:   Left Foot:   Visual Exam: normal   Pulse DP: 2+ (normal)   Filament test: normal sensation   Vibratory Sensation: normal  Right Foot:   Visual Exam: normal   Pulse DP: 2+ (normal)   Filament test: normal sensation   Vibratory Sensation: normal    Pt states that he trims his own toenails. Pt states that he does have some pain in his toes and the ball of his feet. Pt denies any numbness/tingling. Ty

## 2024-02-07 LAB
EST. AVERAGE GLUCOSE BLD GHB EST-MCNC: 183 MG/DL
HBA1C MFR BLD: 8 % (ref 4.8–5.6)

## 2024-02-22 ENCOUNTER — NURSE ONLY (OUTPATIENT)
Age: 74
End: 2024-02-22

## 2024-02-22 ENCOUNTER — OFFICE VISIT (OUTPATIENT)
Age: 74
End: 2024-02-22
Payer: MEDICARE

## 2024-02-22 VITALS
HEIGHT: 70 IN | SYSTOLIC BLOOD PRESSURE: 110 MMHG | HEART RATE: 80 BPM | DIASTOLIC BLOOD PRESSURE: 78 MMHG | WEIGHT: 231 LBS | BODY MASS INDEX: 33.07 KG/M2

## 2024-02-22 DIAGNOSIS — I42.8 NICM (NONISCHEMIC CARDIOMYOPATHY) (HCC): Primary | ICD-10-CM

## 2024-02-22 DIAGNOSIS — I50.22 SYSTOLIC CHF, CHRONIC (HCC): ICD-10-CM

## 2024-02-22 PROCEDURE — 1123F ACP DISCUSS/DSCN MKR DOCD: CPT | Performed by: INTERNAL MEDICINE

## 2024-02-22 PROCEDURE — 3074F SYST BP LT 130 MM HG: CPT | Performed by: INTERNAL MEDICINE

## 2024-02-22 PROCEDURE — 3078F DIAST BP <80 MM HG: CPT | Performed by: INTERNAL MEDICINE

## 2024-02-22 PROCEDURE — G8417 CALC BMI ABV UP PARAM F/U: HCPCS | Performed by: INTERNAL MEDICINE

## 2024-02-22 PROCEDURE — G8427 DOCREV CUR MEDS BY ELIG CLIN: HCPCS | Performed by: INTERNAL MEDICINE

## 2024-02-22 PROCEDURE — 99214 OFFICE O/P EST MOD 30 MIN: CPT | Performed by: INTERNAL MEDICINE

## 2024-02-22 PROCEDURE — 1036F TOBACCO NON-USER: CPT | Performed by: INTERNAL MEDICINE

## 2024-02-22 PROCEDURE — 3017F COLORECTAL CA SCREEN DOC REV: CPT | Performed by: INTERNAL MEDICINE

## 2024-02-22 PROCEDURE — G8484 FLU IMMUNIZE NO ADMIN: HCPCS | Performed by: INTERNAL MEDICINE

## 2024-02-22 NOTE — PROGRESS NOTES
increases will recommend OAC, monitor closely via device clinic    Patient has been instructed and agrees to call our office with any issues or other concerns related to their cardiac condition(s) and/or complaint(s).    No follow-ups on file.         Carl Grimm MD, MS  Cardiology/Electrophysiology  2/22/2024  1:41 PM

## 2024-02-27 ENCOUNTER — NURSE ONLY (OUTPATIENT)
Age: 74
End: 2024-02-27
Payer: MEDICARE

## 2024-02-27 DIAGNOSIS — I48.0 PAF (PAROXYSMAL ATRIAL FIBRILLATION) (HCC): Primary | ICD-10-CM

## 2024-02-27 PROCEDURE — 93000 ELECTROCARDIOGRAM COMPLETE: CPT | Performed by: INTERNAL MEDICINE

## 2024-02-27 NOTE — PROGRESS NOTES
Pt walked in for nurse visit. Patient complained of feeling his heart beat in his abdomen. EKG complete. Pt noted to have palpating on left side of abdomen while lying down for EKG. Dr. Baez viewed EKG and stated to call the device clinic. Hina in device notified and patient was offered an appointment today or tomorrow in [place] or Friday in [place]. Pt opted to wait until Friday and appt was given at 9:15 in [place] office. Pt agreed and was instructed to call back with any new issues.

## 2024-03-01 ENCOUNTER — NURSE ONLY (OUTPATIENT)
Age: 74
End: 2024-03-01

## 2024-03-01 DIAGNOSIS — I42.8 NICM (NONISCHEMIC CARDIOMYOPATHY) (HCC): Primary | ICD-10-CM

## 2024-03-07 ENCOUNTER — OFFICE VISIT (OUTPATIENT)
Dept: INTERNAL MEDICINE CLINIC | Facility: CLINIC | Age: 74
End: 2024-03-07
Payer: MEDICARE

## 2024-03-07 VITALS
SYSTOLIC BLOOD PRESSURE: 122 MMHG | HEART RATE: 61 BPM | DIASTOLIC BLOOD PRESSURE: 65 MMHG | OXYGEN SATURATION: 97 % | TEMPERATURE: 97.5 F | BODY MASS INDEX: 33.07 KG/M2 | WEIGHT: 231 LBS | HEIGHT: 70 IN

## 2024-03-07 DIAGNOSIS — R29.818 ALTERATION IN HEARING STATUS: ICD-10-CM

## 2024-03-07 DIAGNOSIS — Z00.00 MEDICARE ANNUAL WELLNESS VISIT, SUBSEQUENT: Primary | ICD-10-CM

## 2024-03-07 PROBLEM — J96.01 ACUTE HYPOXEMIC RESPIRATORY FAILURE DUE TO COVID-19 (HCC): Status: RESOLVED | Noted: 2021-09-28 | Resolved: 2024-03-07

## 2024-03-07 PROBLEM — U07.1 ACUTE HYPOXEMIC RESPIRATORY FAILURE DUE TO COVID-19 (HCC): Status: RESOLVED | Noted: 2021-09-28 | Resolved: 2024-03-07

## 2024-03-07 PROCEDURE — G0439 PPPS, SUBSEQ VISIT: HCPCS | Performed by: INTERNAL MEDICINE

## 2024-03-07 PROCEDURE — 3074F SYST BP LT 130 MM HG: CPT | Performed by: INTERNAL MEDICINE

## 2024-03-07 PROCEDURE — 3017F COLORECTAL CA SCREEN DOC REV: CPT | Performed by: INTERNAL MEDICINE

## 2024-03-07 PROCEDURE — G8484 FLU IMMUNIZE NO ADMIN: HCPCS | Performed by: INTERNAL MEDICINE

## 2024-03-07 PROCEDURE — 1123F ACP DISCUSS/DSCN MKR DOCD: CPT | Performed by: INTERNAL MEDICINE

## 2024-03-07 PROCEDURE — 3078F DIAST BP <80 MM HG: CPT | Performed by: INTERNAL MEDICINE

## 2024-03-07 ASSESSMENT — PATIENT HEALTH QUESTIONNAIRE - PHQ9
1. LITTLE INTEREST OR PLEASURE IN DOING THINGS: 0
SUM OF ALL RESPONSES TO PHQ QUESTIONS 1-9: 0
SUM OF ALL RESPONSES TO PHQ QUESTIONS 1-9: 0
SUM OF ALL RESPONSES TO PHQ9 QUESTIONS 1 & 2: 0
2. FEELING DOWN, DEPRESSED OR HOPELESS: 0
SUM OF ALL RESPONSES TO PHQ QUESTIONS 1-9: 0
SUM OF ALL RESPONSES TO PHQ QUESTIONS 1-9: 0

## 2024-03-07 ASSESSMENT — LIFESTYLE VARIABLES
HOW OFTEN DO YOU HAVE A DRINK CONTAINING ALCOHOL: NEVER
HOW MANY STANDARD DRINKS CONTAINING ALCOHOL DO YOU HAVE ON A TYPICAL DAY: PATIENT DOES NOT DRINK

## 2024-03-07 NOTE — ACP (ADVANCE CARE PLANNING)
Advance Care Planning   The patient has the following advanced directives on file:  Advance Directives       Power of  Living Will ACP-Advance Directive ACP-Power of     Not on File Not on File Not on File Not on File            The patient has appointed the following active healthcare agents:    Primary Decision Maker: Ko Parker - [phone]    The Patient has the following current code status:    Code Status: Prior    Visit Documentation:  I discussed Advance Care Planning with Dell Parker today which included the importance of making their choices for care and treatment in the case of a health event that adversely affects their decision-making abilities. He has not completed the Advance Care Directives. He does not have an active health care agent at this time.  Dell Parker was encouraged to complete the declaration forms and provide a signed copy of his medical records. I advised patient we would continue this discussion at future visits.     Caterina Blum  3/7/2024

## 2024-03-07 NOTE — PATIENT INSTRUCTIONS
directive is a legal way to state your wishes at the end of your life. It tells your family and your doctor what to do if you can't say what you want.  There are two main types of advance directives. You can change them any time your wishes change.  Living will.  This form tells your family and your doctor your wishes about life support and other treatment. The form is also called a declaration.  Medical power of .  This form lets you name a person to make treatment decisions for you when you can't speak for yourself. This person is called a health care agent (health care proxy, health care surrogate). The form is also called a durable power of  for health care.  If you do not have an advance directive, decisions about your medical care may be made by a family member, or by a doctor or a  who doesn't know you.  It may help to think of an advance directive as a gift to the people who care for you. If you have one, they won't have to make tough decisions by themselves.  For more information, including forms for your state, see the [employer] website (www.caringinfo.org/planning/advance-directives/).  Follow-up care is a key part of your treatment and safety. Be sure to make and go to all appointments, and call your doctor if you are having problems. It's also a good idea to know your test results and keep a list of the medicines you take.  What should you include in an advance directive?  Many states have a unique advance directive form. (It may ask you to address specific issues.) Or you might use a universal form that's approved by many states.  If your form doesn't tell you what to address, it may be hard to know what to include in your advance directive. Use the questions below to help you get started.  Who do you want to make decisions about your medical care if you are not able to?  What life-support measures do you want if you have a serious illness that gets worse over time or can't be

## 2024-03-07 NOTE — PROGRESS NOTES
Medicare Annual Wellness Visit    Dell Parker is here for 1 Month Follow-Up (Pt is here to review recent lab results) and Medicare AWV (Subsequent)    Assessment & Plan   Medicare annual wellness visit, subsequent  Alteration in hearing status  Recommendations for Preventive Services Due: see orders and patient instructions/AVS.  Recommended screening schedule for the next 5-10 years is provided to the patient in written form: see Patient Instructions/AVS.     No follow-ups on file.     Subjective   The following acute and/or chronic problems were also addressed today:  NO chronic unstable conditions currently.     Patient's complete Health Risk Assessment and screening values have been reviewed and are found in Flowsheets. The following problems were reviewed today and where indicated follow up appointments were made and/or referrals ordered.    Positive Risk Factor Screenings with Interventions:                Activity, Diet, and Weight:  On average, how many days per week do you engage in moderate to strenuous exercise (like a brisk walk)?: 0 days  On average, how many minutes do you engage in exercise at this level?: 0 min    Do you eat balanced/healthy meals regularly?: Yes    Body mass index is 33.15 kg/m². (!) Abnormal      Inactivity Interventions:  Patient advised to follow up in the office for further evaluation and treatment  Obesity Interventions:  Patient advised to follow-up in this office for further evaluation and treatment         Hearing Screen:  Do you or your family notice any trouble with your hearing that hasn't been managed with hearing aids?: (!) Yes    Interventions:  Referred to Audiology       Advanced Directives:  Do you have a Living Will?: (!) No    Intervention:  has NO advanced directive - information provided                     Objective   Vitals:    03/07/24 0934   BP: 122/65   Site: Left Upper Arm   Position: Sitting   Cuff Size: Large Adult   Pulse: 61   Temp: 97.5 °F

## 2024-04-15 ENCOUNTER — TELEPHONE (OUTPATIENT)
Age: 74
End: 2024-04-15

## 2024-04-15 DIAGNOSIS — H53.8 BLURRED VISION: Primary | ICD-10-CM

## 2024-04-15 NOTE — TELEPHONE ENCOUNTER
Patient stated he went to the eye doctor today and they told him they think he has a blockage in his neck and its affecting his eyes and giving him headaches. Stated that he should contact .

## 2024-04-15 NOTE — TELEPHONE ENCOUNTER
The eye doctor thinks he has a blockage in artery in his neck.He has been having blurred vision,severe headaches and dizziness.Told to call  to see about getting carotid ultrasound.    Can carotid ultrasound be ordered?

## 2024-04-15 NOTE — TELEPHONE ENCOUNTER
Benson Baez MD  You3 minutes ago (3:33 PM)       Sure we will get a carotid artery duplex you can schedule one for him and a follow-up with me sometime after   I notified pt.of MD response and he v/u.Note sent to scheduling to schedule Carotid and fu after w/.

## 2024-04-30 ENCOUNTER — TELEPHONE (OUTPATIENT)
Dept: CARDIOLOGY CLINIC | Age: 74
End: 2024-04-30

## 2024-04-30 DIAGNOSIS — I65.22 LEFT CAROTID ARTERY STENOSIS: Primary | ICD-10-CM

## 2024-04-30 NOTE — TELEPHONE ENCOUNTER
Pt in for Carotid U/S. Please review results. Pt having increased amount of dizzy spells with blurred vision in left eye and some pain on left side of head. Pt stated prior to today, he would have one long spell but today it has been several shorter episodes. VSS (BP-138/74, P-72). No current symptoms at visit. Patient discharged home with instructions to go to ER if symptoms worsen.    Per Dr. Mills, he wanted us to reach out to you for advice on next step.. Would you like to see pt in office or possible proceed with a vascular consult?

## 2024-04-30 NOTE — TELEPHONE ENCOUNTER
Per Dr. Baez..    Have him see one of the vascular surgeons ASAP I do not need to follow-up with him in the office prior to him seeing vascular surgery      Urgent consult placed for [facility]. Pt called and instructed that he should be receiving a call from their office to set up an appt. He voiced understanding.

## 2024-05-02 ENCOUNTER — OFFICE VISIT (OUTPATIENT)
Dept: VASCULAR SURGERY | Age: 74
End: 2024-05-02

## 2024-05-02 VITALS
BODY MASS INDEX: 32.64 KG/M2 | WEIGHT: 228 LBS | SYSTOLIC BLOOD PRESSURE: 130 MMHG | HEIGHT: 70 IN | HEART RATE: 65 BPM | OXYGEN SATURATION: 98 % | DIASTOLIC BLOOD PRESSURE: 80 MMHG

## 2024-05-02 DIAGNOSIS — I65.23 BILATERAL CAROTID ARTERY STENOSIS: Primary | ICD-10-CM

## 2024-05-02 DIAGNOSIS — I65.22 CAROTID STENOSIS, ASYMPTOMATIC, LEFT: Primary | ICD-10-CM

## 2024-05-02 NOTE — PROGRESS NOTES
VASCULAR SURGERY   [address] [address]  [phone] FAX: [phone]        Dell Parker  : 1950    Reason for visit: Carotid stenosis    Chief Complaint: 73 y.o. male presents after imaging work up for left eye pain and vision changes  DUS with left ICA stenosis >80%. Denies slurred speech or unilateral weakness.     Plan:   Critical left ICA stenosis: CTA head and neck to assess carotid intervention.     Level 5 , Acute or chronic illness or injury that poses threat to life or bodily function., and Elective major surgery with risk factors below    Imaging interrupted:   Carotid DUS    Mild (<50%) stenosis in the right internal carotid artery.  Heterogeneous plaque in the right internal carotid artery.    Critical (80-99%) stenosis in the left internal carotid artery.  Severe and homogeneous plaque (proximal) in the left internal carotid artery.    Normal antegrade flow involving the right vertebral artery.    Normal antegrade flow involving the left vertebral artery.    Constitutional:   Negative for fevers and unexplained weight loss.  Eyes:   Negative for visual disturbance.  ENT:   Negative for significant hearing loss and tinnitus.  Respiratory:   Negative for hemoptysis.  Cardiovascular:   Negative except as noted in HPI.  Gastrointestinal:   Negative for melena and abdominal pain.  Genitourinary:   Negative for hematuria, renal stones.  Integumentary:   Negative for rash or non-healing wounds  Hematologic/Lymphatic:   Negative for excessive bleeding hx or clotting disorder.  Musculoskeletal:  Negative for active, unexplained/severe joint pain.  Neurological:   Negative for stroke.    Behavioral/Psych:   Negative for suicidal ideations.    Endocrine:   Negative for uncontrolled diabetic symptoms including polyuria, polydipsia and poor wound healing.     Physical Examination:   Height: 1.778 m (5' 10\"), Weight - Scale: 103.4 kg (228 lb), BP:

## 2024-05-03 ENCOUNTER — HOSPITAL ENCOUNTER (OUTPATIENT)
Dept: CT IMAGING | Age: 74
End: 2024-05-03
Payer: MEDICARE

## 2024-05-03 DIAGNOSIS — I65.23 BILATERAL CAROTID ARTERY STENOSIS: ICD-10-CM

## 2024-05-03 LAB — CREAT BLD-MCNC: 1.3 MG/DL (ref 0.8–1.5)

## 2024-05-03 PROCEDURE — 70498 CT ANGIOGRAPHY NECK: CPT | Performed by: RADIOLOGY

## 2024-05-03 PROCEDURE — 82565 ASSAY OF CREATININE: CPT

## 2024-05-03 PROCEDURE — 6360000004 HC RX CONTRAST MEDICATION: Performed by: NURSE PRACTITIONER

## 2024-05-03 PROCEDURE — 70496 CT ANGIOGRAPHY HEAD: CPT

## 2024-05-03 PROCEDURE — 70496 CT ANGIOGRAPHY HEAD: CPT | Performed by: RADIOLOGY

## 2024-05-03 RX ADMIN — IOPAMIDOL 50 ML: 755 INJECTION, SOLUTION INTRAVENOUS at 13:08

## 2024-05-09 ENCOUNTER — PREP FOR PROCEDURE (OUTPATIENT)
Dept: VASCULAR SURGERY | Age: 74
End: 2024-05-09

## 2024-05-09 DIAGNOSIS — I65.22 OCCLUSION OF LEFT CAROTID ARTERY: Primary | ICD-10-CM

## 2024-05-10 ENCOUNTER — HOSPITAL ENCOUNTER (OUTPATIENT)
Dept: SURGERY | Age: 74
End: 2024-05-10
Payer: MEDICARE

## 2024-05-10 VITALS
RESPIRATION RATE: 16 BRPM | HEART RATE: 68 BPM | DIASTOLIC BLOOD PRESSURE: 87 MMHG | BODY MASS INDEX: 31.98 KG/M2 | WEIGHT: 228.4 LBS | HEIGHT: 71 IN | TEMPERATURE: 97.5 F | OXYGEN SATURATION: 96 % | SYSTOLIC BLOOD PRESSURE: 135 MMHG

## 2024-05-10 LAB
CREAT SERPL-MCNC: 1.47 MG/DL (ref 0.8–1.3)
GLUCOSE BLD STRIP.AUTO-MCNC: 196 MG/DL (ref 65–100)
HGB BLD-MCNC: 14.6 G/DL (ref 13.6–17.2)
POTASSIUM SERPL-SCNC: 5.1 MMOL/L (ref 3.5–5.1)
SERVICE CMNT-IMP: ABNORMAL

## 2024-05-10 PROCEDURE — 84132 ASSAY OF SERUM POTASSIUM: CPT

## 2024-05-10 PROCEDURE — 85018 HEMOGLOBIN: CPT

## 2024-05-10 PROCEDURE — 82565 ASSAY OF CREATININE: CPT

## 2024-05-10 PROCEDURE — 82962 GLUCOSE BLOOD TEST: CPT

## 2024-05-10 NOTE — PROGRESS NOTES
Patient verified name and     Order for consent NOT found in EHR; patient verified.     Type 2 surgery, Walk-in assessment complete.    Labs per surgeon: none  Labs per anesthesia protocol: POC Glucose (196), Hgb, K+, Creatinine, T/S DOS; results pending  EK24 - In box for anesthesia review, flagged for CN f/u    ICD (Biotronik), NOT pacer dependant. Denies previous shocks. Anesthesia to review if rep needed DOS. Flagged for CN f/u    Patient provided with and instructed on educational handouts including Guide to Surgery, Preventing Surgical Site Infections, Pain Management, and [facility] Brochure.    Patient answered medical/surgical history questions at their best of ability. All prior to admission medications documented in EPIC. Original medication prescription bottle visualized during patient appointment.     Patient instructed to hold all vitamins 7 days prior to surgery and NSAIDS 5 days prior to surgery, patient verbalized understanding.     Patient teach back successful and patient demonstrates knowledge of instructions.     PLEASE CONTINUE TAKING ALL PRESCRIPTION MEDICATIONS UP TO THE DAY OF SURGERY UNLESS OTHERWISE DIRECTED BELOW. You may take Tylenol, allergy,  and/or indigestion medications.     TAKE ONLY THESE MEDICATIONS ON THE DAY OF SURGERY   Aspirin 81 mg     Atorvastatin (Lipitor)     Metoprolol (Lopressor)      DISCONTINUE all vitamins and supplements 7 days prior to surgery. DISCONTINUE Non-Steroidal Anti-Inflammatory (NSAIDS) such as Advil and Aleve 5 days prior to surgery.     Home Medications to Hold- please continue all other medications except these.            Comments   Yajaira-Hex shower the night before and morning of surgery.    On the day before surgery (Monday) please take 2 Tylenol in the morning and then again before bed. You may use either regular or extra strength.           Please do not bring home medications with you on the day of surgery unless otherwise

## 2024-05-13 ENCOUNTER — ANESTHESIA EVENT (OUTPATIENT)
Dept: SURGERY | Age: 74
DRG: 038 | End: 2024-05-13
Payer: MEDICARE

## 2024-05-13 NOTE — PERIOP NOTE
Confirmed surgery time with yin Schroeder rep. Informed of patient arrival by 0530 and surgical start time of 0700. States will have rep here by 0630.

## 2024-05-13 NOTE — PROGRESS NOTES
Dr. Edwards reviewed chart. New order received \"rep DOS.\" Ok to proceed. Notified Cassidy with [employer] that rep is needed DOS.

## 2024-05-14 ENCOUNTER — ANESTHESIA (OUTPATIENT)
Dept: SURGERY | Age: 74
DRG: 038 | End: 2024-05-14
Payer: MEDICARE

## 2024-05-14 ENCOUNTER — HOSPITAL ENCOUNTER (INPATIENT)
Age: 74
LOS: 1 days | Discharge: HOME | DRG: 038 | End: 2024-05-15
Attending: STUDENT IN AN ORGANIZED HEALTH CARE EDUCATION/TRAINING PROGRAM | Admitting: STUDENT IN AN ORGANIZED HEALTH CARE EDUCATION/TRAINING PROGRAM
Payer: MEDICARE

## 2024-05-14 ENCOUNTER — APPOINTMENT (OUTPATIENT)
Dept: ULTRASOUND IMAGING | Age: 74
DRG: 038 | End: 2024-05-14
Attending: STUDENT IN AN ORGANIZED HEALTH CARE EDUCATION/TRAINING PROGRAM
Payer: MEDICARE

## 2024-05-14 DIAGNOSIS — I65.22 LEFT CAROTID STENOSIS: Primary | ICD-10-CM

## 2024-05-14 LAB
ABO + RH BLD: NORMAL
ACT BLD: 141 SECS (ref 70–128)
ACT BLD: 271 SECS (ref 70–128)
ACT BLD: 342 SECS (ref 70–128)
BLOOD GROUP ANTIBODIES SERPL: NORMAL
GLUCOSE BLD STRIP.AUTO-MCNC: 203 MG/DL (ref 65–100)
PLATELET # BLD AUTO: 147 K/UL (ref 150–450)
SERVICE CMNT-IMP: ABNORMAL
SPECIMEN EXP DATE BLD: NORMAL

## 2024-05-14 PROCEDURE — 86901 BLOOD TYPING SEROLOGIC RH(D): CPT

## 2024-05-14 PROCEDURE — 2580000003 HC RX 258: Performed by: STUDENT IN AN ORGANIZED HEALTH CARE EDUCATION/TRAINING PROGRAM

## 2024-05-14 PROCEDURE — 3600000005 HC SURGERY LEVEL 5 BASE: Performed by: STUDENT IN AN ORGANIZED HEALTH CARE EDUCATION/TRAINING PROGRAM

## 2024-05-14 PROCEDURE — 7100000001 HC PACU RECOVERY - ADDTL 15 MIN: Performed by: STUDENT IN AN ORGANIZED HEALTH CARE EDUCATION/TRAINING PROGRAM

## 2024-05-14 PROCEDURE — 2500000003 HC RX 250 WO HCPCS: Performed by: STUDENT IN AN ORGANIZED HEALTH CARE EDUCATION/TRAINING PROGRAM

## 2024-05-14 PROCEDURE — 6360000002 HC RX W HCPCS: Performed by: NURSE ANESTHETIST, CERTIFIED REGISTERED

## 2024-05-14 PROCEDURE — 82962 GLUCOSE BLOOD TEST: CPT

## 2024-05-14 PROCEDURE — 7100000000 HC PACU RECOVERY - FIRST 15 MIN: Performed by: STUDENT IN AN ORGANIZED HEALTH CARE EDUCATION/TRAINING PROGRAM

## 2024-05-14 PROCEDURE — 6370000000 HC RX 637 (ALT 250 FOR IP): Performed by: STUDENT IN AN ORGANIZED HEALTH CARE EDUCATION/TRAINING PROGRAM

## 2024-05-14 PROCEDURE — 85347 COAGULATION TIME ACTIVATED: CPT

## 2024-05-14 PROCEDURE — 2580000003 HC RX 258: Performed by: NURSE ANESTHETIST, CERTIFIED REGISTERED

## 2024-05-14 PROCEDURE — 6360000002 HC RX W HCPCS: Performed by: ANESTHESIOLOGY

## 2024-05-14 PROCEDURE — 2580000003 HC RX 258: Performed by: ANESTHESIOLOGY

## 2024-05-14 PROCEDURE — 6360000002 HC RX W HCPCS: Performed by: STUDENT IN AN ORGANIZED HEALTH CARE EDUCATION/TRAINING PROGRAM

## 2024-05-14 PROCEDURE — 2100000001 HC CVICU R&B

## 2024-05-14 PROCEDURE — C1763 CONN TISS, NON-HUMAN: HCPCS | Performed by: STUDENT IN AN ORGANIZED HEALTH CARE EDUCATION/TRAINING PROGRAM

## 2024-05-14 PROCEDURE — 3700000000 HC ANESTHESIA ATTENDED CARE: Performed by: STUDENT IN AN ORGANIZED HEALTH CARE EDUCATION/TRAINING PROGRAM

## 2024-05-14 PROCEDURE — 03UL0KZ SUPPLEMENT LEFT INTERNAL CAROTID ARTERY WITH NONAUTOLOGOUS TISSUE SUBSTITUTE, OPEN APPROACH: ICD-10-PCS | Performed by: STUDENT IN AN ORGANIZED HEALTH CARE EDUCATION/TRAINING PROGRAM

## 2024-05-14 PROCEDURE — 3600000015 HC SURGERY LEVEL 5 ADDTL 15MIN: Performed by: STUDENT IN AN ORGANIZED HEALTH CARE EDUCATION/TRAINING PROGRAM

## 2024-05-14 PROCEDURE — 2709999900 HC NON-CHARGEABLE SUPPLY: Performed by: STUDENT IN AN ORGANIZED HEALTH CARE EDUCATION/TRAINING PROGRAM

## 2024-05-14 PROCEDURE — 85049 AUTOMATED PLATELET COUNT: CPT

## 2024-05-14 PROCEDURE — 37799 UNLISTED PX VASCULAR SURGERY: CPT

## 2024-05-14 PROCEDURE — 03CL0ZZ EXTIRPATION OF MATTER FROM LEFT INTERNAL CAROTID ARTERY, OPEN APPROACH: ICD-10-PCS | Performed by: STUDENT IN AN ORGANIZED HEALTH CARE EDUCATION/TRAINING PROGRAM

## 2024-05-14 PROCEDURE — 86900 BLOOD TYPING SEROLOGIC ABO: CPT

## 2024-05-14 PROCEDURE — 86850 RBC ANTIBODY SCREEN: CPT

## 2024-05-14 PROCEDURE — 2500000003 HC RX 250 WO HCPCS: Performed by: NURSE ANESTHETIST, CERTIFIED REGISTERED

## 2024-05-14 PROCEDURE — 3700000001 HC ADD 15 MINUTES (ANESTHESIA): Performed by: STUDENT IN AN ORGANIZED HEALTH CARE EDUCATION/TRAINING PROGRAM

## 2024-05-14 PROCEDURE — B347ZZZ ULTRASONOGRAPHY OF LEFT INTERNAL CAROTID ARTERY: ICD-10-PCS | Performed by: STUDENT IN AN ORGANIZED HEALTH CARE EDUCATION/TRAINING PROGRAM

## 2024-05-14 PROCEDURE — 6370000000 HC RX 637 (ALT 250 FOR IP): Performed by: ANESTHESIOLOGY

## 2024-05-14 DEVICE — DECELLULARIZED BOVINE PERICARDIUM
Type: IMPLANTABLE DEVICE | Site: CAROTID | Status: FUNCTIONAL
Brand: PHOTOFIX DECELLULARIZED BOVINE PERICARDIUM

## 2024-05-14 RX ORDER — LIDOCAINE HYDROCHLORIDE 10 MG/ML
1 INJECTION, SOLUTION INFILTRATION; PERINEURAL
Status: DISCONTINUED | OUTPATIENT
Start: 2024-05-14 | End: 2024-05-14 | Stop reason: HOSPADM

## 2024-05-14 RX ORDER — ONDANSETRON 2 MG/ML
INJECTION INTRAMUSCULAR; INTRAVENOUS PRN
Status: DISCONTINUED | OUTPATIENT
Start: 2024-05-14 | End: 2024-05-14 | Stop reason: SDUPTHER

## 2024-05-14 RX ORDER — SODIUM CHLORIDE, SODIUM LACTATE, POTASSIUM CHLORIDE, CALCIUM CHLORIDE 600; 310; 30; 20 MG/100ML; MG/100ML; MG/100ML; MG/100ML
INJECTION, SOLUTION INTRAVENOUS CONTINUOUS
Status: DISCONTINUED | OUTPATIENT
Start: 2024-05-14 | End: 2024-05-14 | Stop reason: HOSPADM

## 2024-05-14 RX ORDER — HYDROMORPHONE HYDROCHLORIDE 2 MG/ML
0.5 INJECTION, SOLUTION INTRAMUSCULAR; INTRAVENOUS; SUBCUTANEOUS EVERY 5 MIN PRN
Status: DISCONTINUED | OUTPATIENT
Start: 2024-05-14 | End: 2024-05-14 | Stop reason: HOSPADM

## 2024-05-14 RX ORDER — NOREPINEPHRINE BITARTRATE 0.02 MG/ML
INJECTION, SOLUTION INTRAVENOUS CONTINUOUS PRN
Status: DISCONTINUED | OUTPATIENT
Start: 2024-05-14 | End: 2024-05-14 | Stop reason: SDUPTHER

## 2024-05-14 RX ORDER — OXYCODONE HYDROCHLORIDE 5 MG/1
10 TABLET ORAL EVERY 4 HOURS PRN
Status: DISCONTINUED | OUTPATIENT
Start: 2024-05-14 | End: 2024-05-15 | Stop reason: HOSPADM

## 2024-05-14 RX ORDER — ASPIRIN 81 MG/1
81 TABLET, CHEWABLE ORAL DAILY
Status: DISCONTINUED | OUTPATIENT
Start: 2024-05-14 | End: 2024-05-14 | Stop reason: HOSPADM

## 2024-05-14 RX ORDER — SODIUM CHLORIDE 0.9 % (FLUSH) 0.9 %
5-40 SYRINGE (ML) INJECTION EVERY 12 HOURS SCHEDULED
Status: DISCONTINUED | OUTPATIENT
Start: 2024-05-14 | End: 2024-05-15 | Stop reason: HOSPADM

## 2024-05-14 RX ORDER — DEXMEDETOMIDINE HYDROCHLORIDE 4 UG/ML
INJECTION, SOLUTION INTRAVENOUS CONTINUOUS PRN
Status: DISCONTINUED | OUTPATIENT
Start: 2024-05-14 | End: 2024-05-14 | Stop reason: SDUPTHER

## 2024-05-14 RX ORDER — MIDAZOLAM HYDROCHLORIDE 2 MG/2ML
2 INJECTION, SOLUTION INTRAMUSCULAR; INTRAVENOUS
Status: DISCONTINUED | OUTPATIENT
Start: 2024-05-14 | End: 2024-05-14 | Stop reason: HOSPADM

## 2024-05-14 RX ORDER — ETOMIDATE 2 MG/ML
INJECTION INTRAVENOUS PRN
Status: DISCONTINUED | OUTPATIENT
Start: 2024-05-14 | End: 2024-05-14 | Stop reason: SDUPTHER

## 2024-05-14 RX ORDER — PROPOFOL 10 MG/ML
INJECTION, EMULSION INTRAVENOUS CONTINUOUS PRN
Status: DISCONTINUED | OUTPATIENT
Start: 2024-05-14 | End: 2024-05-14 | Stop reason: SDUPTHER

## 2024-05-14 RX ORDER — PROCHLORPERAZINE EDISYLATE 5 MG/ML
5 INJECTION INTRAMUSCULAR; INTRAVENOUS
Status: DISCONTINUED | OUTPATIENT
Start: 2024-05-14 | End: 2024-05-14 | Stop reason: HOSPADM

## 2024-05-14 RX ORDER — DEXAMETHASONE SODIUM PHOSPHATE 10 MG/ML
INJECTION INTRAMUSCULAR; INTRAVENOUS PRN
Status: DISCONTINUED | OUTPATIENT
Start: 2024-05-14 | End: 2024-05-14 | Stop reason: SDUPTHER

## 2024-05-14 RX ORDER — ASPIRIN 81 MG/1
81 TABLET ORAL DAILY
Status: DISCONTINUED | OUTPATIENT
Start: 2024-05-14 | End: 2024-05-15 | Stop reason: HOSPADM

## 2024-05-14 RX ORDER — LIDOCAINE HYDROCHLORIDE 20 MG/ML
INJECTION, SOLUTION EPIDURAL; INFILTRATION; INTRACAUDAL; PERINEURAL PRN
Status: DISCONTINUED | OUTPATIENT
Start: 2024-05-14 | End: 2024-05-14 | Stop reason: SDUPTHER

## 2024-05-14 RX ORDER — ENOXAPARIN SODIUM 100 MG/ML
30 INJECTION SUBCUTANEOUS 2 TIMES DAILY
Status: DISCONTINUED | OUTPATIENT
Start: 2024-05-14 | End: 2024-05-15 | Stop reason: HOSPADM

## 2024-05-14 RX ORDER — ESMOLOL HYDROCHLORIDE 10 MG/ML
INJECTION INTRAVENOUS PRN
Status: DISCONTINUED | OUTPATIENT
Start: 2024-05-14 | End: 2024-05-14 | Stop reason: SDUPTHER

## 2024-05-14 RX ORDER — LABETALOL HYDROCHLORIDE 5 MG/ML
10 INJECTION, SOLUTION INTRAVENOUS
Status: DISCONTINUED | OUTPATIENT
Start: 2024-05-14 | End: 2024-05-14 | Stop reason: HOSPADM

## 2024-05-14 RX ORDER — HEPARIN SODIUM 1000 [USP'U]/ML
INJECTION, SOLUTION INTRAVENOUS; SUBCUTANEOUS PRN
Status: DISCONTINUED | OUTPATIENT
Start: 2024-05-14 | End: 2024-05-14 | Stop reason: SDUPTHER

## 2024-05-14 RX ORDER — HYDROMORPHONE HYDROCHLORIDE 2 MG/ML
0.25 INJECTION, SOLUTION INTRAMUSCULAR; INTRAVENOUS; SUBCUTANEOUS EVERY 5 MIN PRN
Status: DISCONTINUED | OUTPATIENT
Start: 2024-05-14 | End: 2024-05-14 | Stop reason: HOSPADM

## 2024-05-14 RX ORDER — NEOSTIGMINE METHYLSULFATE 1 MG/ML
INJECTION, SOLUTION INTRAVENOUS PRN
Status: DISCONTINUED | OUTPATIENT
Start: 2024-05-14 | End: 2024-05-14 | Stop reason: SDUPTHER

## 2024-05-14 RX ORDER — SODIUM CHLORIDE 0.9 % (FLUSH) 0.9 %
5-40 SYRINGE (ML) INJECTION PRN
Status: DISCONTINUED | OUTPATIENT
Start: 2024-05-14 | End: 2024-05-14 | Stop reason: HOSPADM

## 2024-05-14 RX ORDER — ACETAMINOPHEN 500 MG
1000 TABLET ORAL ONCE
Status: COMPLETED | OUTPATIENT
Start: 2024-05-14 | End: 2024-05-14

## 2024-05-14 RX ORDER — ONDANSETRON 4 MG/1
4 TABLET, ORALLY DISINTEGRATING ORAL EVERY 8 HOURS PRN
Status: DISCONTINUED | OUTPATIENT
Start: 2024-05-14 | End: 2024-05-15 | Stop reason: HOSPADM

## 2024-05-14 RX ORDER — HEPARIN SODIUM 5000 [USP'U]/ML
INJECTION, SOLUTION INTRAVENOUS; SUBCUTANEOUS PRN
Status: DISCONTINUED | OUTPATIENT
Start: 2024-05-14 | End: 2024-05-14 | Stop reason: ALTCHOICE

## 2024-05-14 RX ORDER — OXYCODONE HYDROCHLORIDE 5 MG/1
5 TABLET ORAL
Status: DISCONTINUED | OUTPATIENT
Start: 2024-05-14 | End: 2024-05-14 | Stop reason: HOSPADM

## 2024-05-14 RX ORDER — SODIUM CHLORIDE 0.9 % (FLUSH) 0.9 %
5-40 SYRINGE (ML) INJECTION EVERY 12 HOURS SCHEDULED
Status: DISCONTINUED | OUTPATIENT
Start: 2024-05-14 | End: 2024-05-14 | Stop reason: HOSPADM

## 2024-05-14 RX ORDER — ASPIRIN 81 MG/1
81 TABLET, CHEWABLE ORAL ONCE
Status: COMPLETED | OUTPATIENT
Start: 2024-05-14 | End: 2024-05-14

## 2024-05-14 RX ORDER — NOREPINEPHRINE BITARTRATE 1 MG/ML
INJECTION, SOLUTION INTRAVENOUS PRN
Status: DISCONTINUED | OUTPATIENT
Start: 2024-05-14 | End: 2024-05-14 | Stop reason: SDUPTHER

## 2024-05-14 RX ORDER — SODIUM CHLORIDE 9 MG/ML
INJECTION, SOLUTION INTRAVENOUS PRN
Status: DISCONTINUED | OUTPATIENT
Start: 2024-05-14 | End: 2024-05-14 | Stop reason: HOSPADM

## 2024-05-14 RX ORDER — GLYCOPYRROLATE 0.2 MG/ML
INJECTION INTRAMUSCULAR; INTRAVENOUS PRN
Status: DISCONTINUED | OUTPATIENT
Start: 2024-05-14 | End: 2024-05-14 | Stop reason: SDUPTHER

## 2024-05-14 RX ORDER — NICARDIPINE HYDROCHLORIDE 0.1 MG/ML
INJECTION INTRAVENOUS PRN
Status: DISCONTINUED | OUTPATIENT
Start: 2024-05-14 | End: 2024-05-14 | Stop reason: SDUPTHER

## 2024-05-14 RX ORDER — SODIUM CHLORIDE, SODIUM LACTATE, POTASSIUM CHLORIDE, CALCIUM CHLORIDE 600; 310; 30; 20 MG/100ML; MG/100ML; MG/100ML; MG/100ML
INJECTION, SOLUTION INTRAVENOUS CONTINUOUS PRN
Status: DISCONTINUED | OUTPATIENT
Start: 2024-05-14 | End: 2024-05-14 | Stop reason: SDUPTHER

## 2024-05-14 RX ORDER — OXYCODONE HYDROCHLORIDE 5 MG/1
5 TABLET ORAL EVERY 4 HOURS PRN
Status: DISCONTINUED | OUTPATIENT
Start: 2024-05-14 | End: 2024-05-15 | Stop reason: HOSPADM

## 2024-05-14 RX ORDER — FENTANYL CITRATE 50 UG/ML
INJECTION, SOLUTION INTRAMUSCULAR; INTRAVENOUS PRN
Status: DISCONTINUED | OUTPATIENT
Start: 2024-05-14 | End: 2024-05-14 | Stop reason: SDUPTHER

## 2024-05-14 RX ORDER — METOPROLOL SUCCINATE 25 MG/1
50 TABLET, EXTENDED RELEASE ORAL ONCE
Status: COMPLETED | OUTPATIENT
Start: 2024-05-14 | End: 2024-05-14

## 2024-05-14 RX ORDER — ONDANSETRON 2 MG/ML
4 INJECTION INTRAMUSCULAR; INTRAVENOUS EVERY 6 HOURS PRN
Status: DISCONTINUED | OUTPATIENT
Start: 2024-05-14 | End: 2024-05-15 | Stop reason: HOSPADM

## 2024-05-14 RX ORDER — ROCURONIUM BROMIDE 10 MG/ML
INJECTION, SOLUTION INTRAVENOUS PRN
Status: DISCONTINUED | OUTPATIENT
Start: 2024-05-14 | End: 2024-05-14 | Stop reason: SDUPTHER

## 2024-05-14 RX ORDER — METOPROLOL SUCCINATE 25 MG/1
50 TABLET, EXTENDED RELEASE ORAL DAILY
Status: DISCONTINUED | OUTPATIENT
Start: 2024-05-14 | End: 2024-05-14 | Stop reason: HOSPADM

## 2024-05-14 RX ORDER — ONDANSETRON 2 MG/ML
4 INJECTION INTRAMUSCULAR; INTRAVENOUS
Status: DISCONTINUED | OUTPATIENT
Start: 2024-05-14 | End: 2024-05-14 | Stop reason: HOSPADM

## 2024-05-14 RX ORDER — PROTAMINE SULFATE 10 MG/ML
INJECTION, SOLUTION INTRAVENOUS PRN
Status: DISCONTINUED | OUTPATIENT
Start: 2024-05-14 | End: 2024-05-14 | Stop reason: SDUPTHER

## 2024-05-14 RX ORDER — NALOXONE HYDROCHLORIDE 0.4 MG/ML
INJECTION, SOLUTION INTRAMUSCULAR; INTRAVENOUS; SUBCUTANEOUS PRN
Status: DISCONTINUED | OUTPATIENT
Start: 2024-05-14 | End: 2024-05-14 | Stop reason: HOSPADM

## 2024-05-14 RX ORDER — NOREPINEPHRINE BITARTRATE 0.06 MG/ML
1-100 INJECTION, SOLUTION INTRAVENOUS CONTINUOUS
Status: DISCONTINUED | OUTPATIENT
Start: 2024-05-14 | End: 2024-05-14

## 2024-05-14 RX ORDER — FENTANYL CITRATE 50 UG/ML
100 INJECTION, SOLUTION INTRAMUSCULAR; INTRAVENOUS
Status: DISCONTINUED | OUTPATIENT
Start: 2024-05-14 | End: 2024-05-14 | Stop reason: HOSPADM

## 2024-05-14 RX ORDER — SODIUM CHLORIDE 0.9 % (FLUSH) 0.9 %
5-40 SYRINGE (ML) INJECTION PRN
Status: DISCONTINUED | OUTPATIENT
Start: 2024-05-14 | End: 2024-05-15 | Stop reason: HOSPADM

## 2024-05-14 RX ORDER — LIDOCAINE HYDROCHLORIDE 10 MG/ML
INJECTION, SOLUTION INFILTRATION; PERINEURAL PRN
Status: DISCONTINUED | OUTPATIENT
Start: 2024-05-14 | End: 2024-05-14 | Stop reason: ALTCHOICE

## 2024-05-14 RX ORDER — NOREPINEPHRINE BITARTRATE 0.02 MG/ML
1-100 INJECTION, SOLUTION INTRAVENOUS CONTINUOUS PRN
Status: DISCONTINUED | OUTPATIENT
Start: 2024-05-14 | End: 2024-05-15 | Stop reason: HOSPADM

## 2024-05-14 RX ORDER — SODIUM CHLORIDE 9 MG/ML
INJECTION, SOLUTION INTRAVENOUS PRN
Status: DISCONTINUED | OUTPATIENT
Start: 2024-05-14 | End: 2024-05-15 | Stop reason: HOSPADM

## 2024-05-14 RX ORDER — BUPIVACAINE HYDROCHLORIDE 2.5 MG/ML
INJECTION, SOLUTION EPIDURAL; INFILTRATION; INTRACAUDAL PRN
Status: DISCONTINUED | OUTPATIENT
Start: 2024-05-14 | End: 2024-05-14 | Stop reason: ALTCHOICE

## 2024-05-14 RX ORDER — HYDRALAZINE HYDROCHLORIDE 20 MG/ML
10 INJECTION INTRAMUSCULAR; INTRAVENOUS
Status: DISCONTINUED | OUTPATIENT
Start: 2024-05-14 | End: 2024-05-14 | Stop reason: HOSPADM

## 2024-05-14 RX ADMIN — PROTAMINE SULFATE 10 MG: 10 INJECTION, SOLUTION INTRAVENOUS at 09:05

## 2024-05-14 RX ADMIN — NOREPINEPHRINE BITARTRATE 8 MCG: 1 SOLUTION INTRAVENOUS at 08:53

## 2024-05-14 RX ADMIN — LIDOCAINE HYDROCHLORIDE 100 MG: 20 INJECTION, SOLUTION EPIDURAL; INFILTRATION; INTRACAUDAL; PERINEURAL at 07:11

## 2024-05-14 RX ADMIN — PROTAMINE SULFATE 10 MG: 10 INJECTION, SOLUTION INTRAVENOUS at 09:08

## 2024-05-14 RX ADMIN — NOREPINEPHRINE BITARTRATE 8 MCG: 1 SOLUTION INTRAVENOUS at 07:53

## 2024-05-14 RX ADMIN — ASPIRIN 81 MG 81 MG: 81 TABLET ORAL at 06:49

## 2024-05-14 RX ADMIN — SODIUM CHLORIDE, PRESERVATIVE FREE 10 ML: 5 INJECTION INTRAVENOUS at 20:01

## 2024-05-14 RX ADMIN — PROTAMINE SULFATE 10 MG: 10 INJECTION, SOLUTION INTRAVENOUS at 09:06

## 2024-05-14 RX ADMIN — NOREPINEPHRINE BITARTRATE 4 MCG: 1 SOLUTION INTRAVENOUS at 09:18

## 2024-05-14 RX ADMIN — NOREPINEPHRINE BITARTRATE 8 MCG: 1 SOLUTION INTRAVENOUS at 08:34

## 2024-05-14 RX ADMIN — Medication 0.03 MCG/KG/MIN: at 07:24

## 2024-05-14 RX ADMIN — NOREPINEPHRINE BITARTRATE 16 MCG: 1 SOLUTION INTRAVENOUS at 08:10

## 2024-05-14 RX ADMIN — FENTANYL CITRATE 100 MCG: 50 INJECTION, SOLUTION INTRAMUSCULAR; INTRAVENOUS at 07:45

## 2024-05-14 RX ADMIN — Medication 2000 MG: at 07:28

## 2024-05-14 RX ADMIN — NOREPINEPHRINE BITARTRATE 4 MCG: 1 SOLUTION INTRAVENOUS at 08:43

## 2024-05-14 RX ADMIN — PROPOFOL 50 MG: 10 INJECTION, EMULSION INTRAVENOUS at 09:14

## 2024-05-14 RX ADMIN — OXYCODONE 5 MG: 5 TABLET ORAL at 20:00

## 2024-05-14 RX ADMIN — ACETAMINOPHEN 1000 MG: 500 TABLET, FILM COATED ORAL at 06:02

## 2024-05-14 RX ADMIN — NOREPINEPHRINE BITARTRATE 4 MCG: 1 SOLUTION INTRAVENOUS at 07:52

## 2024-05-14 RX ADMIN — PROTAMINE SULFATE 10 MG: 10 INJECTION, SOLUTION INTRAVENOUS at 09:07

## 2024-05-14 RX ADMIN — ETOMIDATE 26 MG: 2 INJECTION INTRAVENOUS at 07:11

## 2024-05-14 RX ADMIN — FENTANYL CITRATE 100 MCG: 50 INJECTION, SOLUTION INTRAMUSCULAR; INTRAVENOUS at 06:58

## 2024-05-14 RX ADMIN — ROCURONIUM BROMIDE 10 MG: 10 INJECTION, SOLUTION INTRAVENOUS at 08:19

## 2024-05-14 RX ADMIN — DEXAMETHASONE SODIUM PHOSPHATE 10 MG: 10 INJECTION INTRAMUSCULAR; INTRAVENOUS at 07:58

## 2024-05-14 RX ADMIN — SODIUM CHLORIDE 4 MCG/MIN: 9 INJECTION, SOLUTION INTRAVENOUS at 12:46

## 2024-05-14 RX ADMIN — NOREPINEPHRINE BITARTRATE 16 MCG: 1 SOLUTION INTRAVENOUS at 07:56

## 2024-05-14 RX ADMIN — ESMOLOL HYDROCHLORIDE 10 MG: 10 INJECTION, SOLUTION INTRAVENOUS at 09:11

## 2024-05-14 RX ADMIN — NICARDIPINE HYDROCHLORIDE 0.2 MG: 0.1 INJECTION INTRAVENOUS at 08:56

## 2024-05-14 RX ADMIN — SODIUM CHLORIDE, SODIUM LACTATE, POTASSIUM CHLORIDE, AND CALCIUM CHLORIDE: 600; 310; 30; 20 INJECTION, SOLUTION INTRAVENOUS at 07:16

## 2024-05-14 RX ADMIN — ESMOLOL HYDROCHLORIDE 30 MG: 10 INJECTION, SOLUTION INTRAVENOUS at 07:15

## 2024-05-14 RX ADMIN — NICARDIPINE HYDROCHLORIDE 0.3 MG: 0.1 INJECTION INTRAVENOUS at 09:11

## 2024-05-14 RX ADMIN — ROCURONIUM BROMIDE 50 MG: 10 INJECTION, SOLUTION INTRAVENOUS at 07:11

## 2024-05-14 RX ADMIN — NOREPINEPHRINE BITARTRATE 8 MCG: 1 SOLUTION INTRAVENOUS at 08:13

## 2024-05-14 RX ADMIN — ROCURONIUM BROMIDE 20 MG: 10 INJECTION, SOLUTION INTRAVENOUS at 07:45

## 2024-05-14 RX ADMIN — NOREPINEPHRINE BITARTRATE 8 MCG: 1 SOLUTION INTRAVENOUS at 08:51

## 2024-05-14 RX ADMIN — SODIUM CHLORIDE, POTASSIUM CHLORIDE, SODIUM LACTATE AND CALCIUM CHLORIDE: 600; 310; 30; 20 INJECTION, SOLUTION INTRAVENOUS at 06:03

## 2024-05-14 RX ADMIN — HEPARIN SODIUM 12000 UNITS: 1000 INJECTION INTRAVENOUS; SUBCUTANEOUS at 07:51

## 2024-05-14 RX ADMIN — NOREPINEPHRINE BITARTRATE 16 MCG: 1 SOLUTION INTRAVENOUS at 09:20

## 2024-05-14 RX ADMIN — NOREPINEPHRINE BITARTRATE 4 MCG: 1 SOLUTION INTRAVENOUS at 08:41

## 2024-05-14 RX ADMIN — SODIUM CHLORIDE 5 MCG/MIN: 9 INJECTION, SOLUTION INTRAVENOUS at 11:14

## 2024-05-14 RX ADMIN — CEFAZOLIN 2000 MG: 10 INJECTION, POWDER, FOR SOLUTION INTRAVENOUS at 20:00

## 2024-05-14 RX ADMIN — PROPOFOL 50 MCG/KG/MIN: 10 INJECTION, EMULSION INTRAVENOUS at 07:11

## 2024-05-14 RX ADMIN — NICARDIPINE HYDROCHLORIDE 0.2 MG: 0.1 INJECTION INTRAVENOUS at 08:57

## 2024-05-14 RX ADMIN — ONDANSETRON 4 MG: 2 INJECTION INTRAMUSCULAR; INTRAVENOUS at 09:10

## 2024-05-14 RX ADMIN — HYDROMORPHONE HYDROCHLORIDE 0.5 MG: 2 INJECTION INTRAMUSCULAR; INTRAVENOUS; SUBCUTANEOUS at 10:52

## 2024-05-14 RX ADMIN — PROTAMINE SULFATE 10 MG: 10 INJECTION, SOLUTION INTRAVENOUS at 09:09

## 2024-05-14 RX ADMIN — HYDROMORPHONE HYDROCHLORIDE 0.5 MG: 2 INJECTION INTRAMUSCULAR; INTRAVENOUS; SUBCUTANEOUS at 10:42

## 2024-05-14 RX ADMIN — NICARDIPINE HYDROCHLORIDE 5 MG/HR: 0.1 INJECTION INTRAVENOUS at 09:06

## 2024-05-14 RX ADMIN — NOREPINEPHRINE BITARTRATE 8 MCG: 1 SOLUTION INTRAVENOUS at 09:19

## 2024-05-14 RX ADMIN — NICARDIPINE HYDROCHLORIDE 0.2 MG: 0.1 INJECTION INTRAVENOUS at 08:58

## 2024-05-14 RX ADMIN — NOREPINEPHRINE BITARTRATE 4 MCG: 1 SOLUTION INTRAVENOUS at 07:25

## 2024-05-14 RX ADMIN — DEXMEDETOMIDINE HYDROCHLORIDE 0.3 MCG/KG/HR: 4 INJECTION, SOLUTION INTRAVENOUS at 07:11

## 2024-05-14 RX ADMIN — Medication 5 MG: at 09:23

## 2024-05-14 RX ADMIN — GLYCOPYRROLATE 0.8 MG: 0.2 INJECTION INTRAMUSCULAR; INTRAVENOUS at 09:23

## 2024-05-14 RX ADMIN — ROCURONIUM BROMIDE 20 MG: 10 INJECTION, SOLUTION INTRAVENOUS at 08:37

## 2024-05-14 RX ADMIN — NOREPINEPHRINE BITARTRATE 8 MCG: 1 SOLUTION INTRAVENOUS at 08:47

## 2024-05-14 RX ADMIN — METOPROLOL SUCCINATE 50 MG: 25 TABLET, EXTENDED RELEASE ORAL at 06:49

## 2024-05-14 ASSESSMENT — PAIN DESCRIPTION - LOCATION
LOCATION: NECK

## 2024-05-14 ASSESSMENT — PAIN SCALES - GENERAL
PAINLEVEL_OUTOF10: 3
PAINLEVEL_OUTOF10: 4
PAINLEVEL_OUTOF10: 4
PAINLEVEL_OUTOF10: 7
PAINLEVEL_OUTOF10: 5
PAINLEVEL_OUTOF10: 0
PAINLEVEL_OUTOF10: 8
PAINLEVEL_OUTOF10: 0
PAINLEVEL_OUTOF10: 3
PAINLEVEL_OUTOF10: 7
PAINLEVEL_OUTOF10: 7
PAINLEVEL_OUTOF10: 0
PAINLEVEL_OUTOF10: 3
PAINLEVEL_OUTOF10: 0
PAINLEVEL_OUTOF10: 3
PAINLEVEL_OUTOF10: 4
PAINLEVEL_OUTOF10: 8
PAINLEVEL_OUTOF10: 3
PAINLEVEL_OUTOF10: 8
PAINLEVEL_OUTOF10: 8
PAINLEVEL_OUTOF10: 5
PAINLEVEL_OUTOF10: 0
PAINLEVEL_OUTOF10: 2
PAINLEVEL_OUTOF10: 0

## 2024-05-14 ASSESSMENT — PAIN - FUNCTIONAL ASSESSMENT: PAIN_FUNCTIONAL_ASSESSMENT: 0-10

## 2024-05-14 ASSESSMENT — PAIN DESCRIPTION - ORIENTATION
ORIENTATION: LEFT

## 2024-05-14 ASSESSMENT — PAIN DESCRIPTION - DESCRIPTORS: DESCRIPTORS: ACHING

## 2024-05-14 NOTE — ANESTHESIA POSTPROCEDURE EVALUATION
Department of Anesthesiology  Postprocedure Note    Patient: Dell Parker  MRN: [id number]  YOB: 1950  Date of evaluation: 5/14/2024    Procedure Summary       Date: 05/14/24 Room / Location: [facility] MAIN OR 09 / [facility] MAIN OR    Anesthesia Start: 0651 Anesthesia Stop: 0939    Procedure: LEFT CAROTID ENDARTERECTOMY WITH EEG MONITORING (Left: Neck) Diagnosis:       Occlusion of left carotid artery      (Occlusion of left carotid artery [I65.22])    Providers: Rickey Chow MD Responsible Provider: Billy Mcguire DO    Anesthesia Type: general ASA Status: 4            Anesthesia Type: No value filed.    Avelino Phase I: Avelino Score: 9    Avelino Phase II:      Anesthesia Post Evaluation    Patient location during evaluation: PACU  Level of consciousness: awake and alert  Airway patency: patent  Nausea & Vomiting: no nausea  Cardiovascular status: hemodynamically stable  Respiratory status: acceptable  Hydration status: euvolemic  Comments: Neuro intact, low dose Levophed to maintain MAPs  Pain management: satisfactory to patient    No notable events documented.

## 2024-05-14 NOTE — OP NOTE
VASCULAR SURGERY   [address] [address]  [phone] FAX: [phone]     Pre-Op diagnosis:    Asymptomatic left carotid artery stenosis <90%    Post-Op diagnosis:    Asymptomatic left carotid artery stenosis <90%     Surgeon: Rickey Chow MD     Procedure:   Left carotid endarterectomy (CPT 35106)  EEG for extracranial surgery (CPT 95050)  Unilateral inter-op limited carotid ultrasound (CPT 46955)     Complications: None     EBL: 100cc     Anesthesia: General     Ultrasound findings:   Left carotid artery: Good proximal and distal endpoints without notable flap. No evidence of significant mobile plaque in endarterectomy site. Common carotid, internal carotid artery, and external carotid arteries with multiphasic waveform and without significant stenosis.     Description of procedure: After informed consent was obtained the patient was brought to the operating room and placed in supine position.  Preoperative antibiotics were given.  Appropriate anesthesia was administered and endotracheal intubation was performed.  Timeout was performed confirming patient identity and procedure.  Patient was then prepped and draped in sterile fashion. Carotid bifurcation was identified under ultrasound guidance and marked. Oblique incision was made anterior to the SCM. Dissection was carried down through the platysma and SCM retracted laterally. Proximal control was obtained on the common carotid artery with Hyacinth tourniquet. Dissection distally until the distal extent of the ICA plaque was identified and distal control obtained with hurd vessel loop. ECA was then controlled with hurd vessel loop. Patient was systemically heparinized and systolic pressure was maintained >160. ACT confirmed at >250. ICA, CCA and ECA were clamped sequentially. Arteriotomy was made and extended with hurd. Endarterectomy was performed with freer-elevator and adequate proximal and distal end points

## 2024-05-14 NOTE — PROGRESS NOTES
TRANSFER - IN REPORT:    Verbal report received from PACU on Dell Parker  being received from PACU for routine post-op      Report consisted of patient's Situation, Background, Assessment and   Recommendations(SBAR).     Information from the following report(s) Nurse Handoff Report, Index, Surgery Report, Intake/Output, MAR, and Cardiac Rhythm paced  was reviewed with the receiving nurse.    Opportunity for questions and clarification was provided.      Assessment completed upon patient's arrival to unit and care assumed Nicol MONTIEL.

## 2024-05-14 NOTE — PLAN OF CARE
Problem: Chronic Conditions and Co-morbidities  Goal: Patient's chronic conditions and co-morbidity symptoms are monitored and maintained or improved  Outcome: Progressing  Flowsheets (Taken 5/14/2024 1500)  Care Plan - Patient's Chronic Conditions and Co-Morbidity Symptoms are Monitored and Maintained or Improved:   Monitor and assess patient's chronic conditions and comorbid symptoms for stability, deterioration, or improvement   Collaborate with multidisciplinary team to address chronic and comorbid conditions and prevent exacerbation or deterioration     Problem: Pain  Goal: Verbalizes/displays adequate comfort level or baseline comfort level  Outcome: Progressing  Flowsheets (Taken 5/14/2024 1500)  Verbalizes/displays adequate comfort level or baseline comfort level:   Encourage patient to monitor pain and request assistance   Assess pain using appropriate pain scale     Problem: Safety - Adult  Goal: Free from fall injury  Outcome: Progressing  Flowsheets (Taken 5/14/2024 1500)  Free From Fall Injury:   Instruct family/caregiver on patient safety   Based on caregiver fall risk screen, instruct family/caregiver to ask for assistance with transferring infant if caregiver noted to have fall risk factors     Problem: Discharge Planning  Goal: Discharge to home or other facility with appropriate resources  Outcome: Progressing  Flowsheets (Taken 5/14/2024 1500)  Discharge to home or other facility with appropriate resources:   Identify barriers to discharge with patient and caregiver   Arrange for needed discharge resources and transportation as appropriate     Problem: Neurosensory - Adult  Goal: Achieves stable or improved neurological status  Outcome: Progressing     Problem: Respiratory - Adult  Goal: Achieves optimal ventilation and oxygenation  Outcome: Progressing     Problem: Cardiovascular - Adult  Goal: Maintains optimal cardiac output and hemodynamic stability  Outcome: Progressing  Goal: Absence of

## 2024-05-14 NOTE — PERIOP NOTE
TRANSFER - OUT REPORT:    Verbal report given to CVICU RN on Dell Parker  being transferred to [facility] for routine progression of patient care       Report consisted of patient’s Situation, Background, Assessment and   Recommendations(SBAR).     Information from the following report(s) Nurse Handoff Report, Adult Overview, Surgery Report, Intake/Output, MAR, Cardiac Rhythm SR/intermittent v paced, and Neuro Assessment was reviewed with the receiving nurse.    Lines:   Peripheral IV 05/14/24 Posterior;Right Wrist (Active)   Site Assessment Clean, dry & intact 05/14/24 1328   Line Status Infusing 05/14/24 1328   Line Care Connections checked and tightened 05/14/24 1328   Phlebitis Assessment No symptoms 05/14/24 1328   Infiltration Assessment 0 05/14/24 1328   Alcohol Cap Used No 05/14/24 1328   Dressing Status Clean, dry & intact 05/14/24 1328   Dressing Type Transparent 05/14/24 1328       Peripheral IV 05/14/24 Left Hand (Active)   Site Assessment Clean, dry & intact 05/14/24 0934   Line Status Flushed 05/14/24 0934   Line Care Cap changed 05/14/24 0934   Phlebitis Assessment No symptoms 05/14/24 0934   Dressing Status Clean, dry & intact 05/14/24 0934   Dressing Type Transparent 05/14/24 0934        Opportunity for questions and clarification was provided.      Patient transported with:   Registered Nurse    VTE prophylaxis orders have been written for Dell Parker.    Patient and family given floor number and nurses name.  Family updated re: pt status after security code verified.

## 2024-05-14 NOTE — ANESTHESIA PROCEDURE NOTES
Arterial Line:    An arterial line was placed using ultrasound guidance, in the OR for the following indication(s): continuous blood pressure monitoring and blood sampling needed.    A 20 gauge (size) (length), Arrow (type) catheter was placed, Seldinger technique used, into the right radial artery, secured by Tegaderm and tape.  Anesthesia type: Local  Local infiltration: Injection    Events:  patient tolerated procedure well with no complications and EBL < 5mL.    Additional notes:  Right arm prepped with ChloraPrep, 0.8ml of 1% lidocaine infiltrated at skin, ultrasound guided Seldinger technique, good blood return, good waveform.      Potential access sites were examined with ultrasound and acceptable patent access site selected as noted above.  Needle path and artery access visualized in real time using ultrasound, an image of wire in vessel recorded for permanent record.    5/14/2024 7:00 AM5/14/2024 7:04 AM  Resident/CRNA: Evan Vazquez APRN - CRNA  Performed: Resident/CRNA   Preanesthetic Checklist  Completed: patient identified, IV checked, site marked, risks and benefits discussed, surgical/procedural consents, equipment checked, pre-op evaluation, timeout performed, anesthesia consent given, oxygen available, monitors applied/VS acknowledged, fire risk safety assessment completed and verbalized and blood product R/B/A discussed and consented

## 2024-05-14 NOTE — ANESTHESIA PROCEDURE NOTES
Airway  Date/Time: 5/14/2024 7:14 AM  Urgency: elective    Airway not difficult    General Information and Staff    Patient location during procedure: OR  Resident/CRNA: Evan Vazquez APRN - CRNA  Performed: resident/CRNA   Performed by: Evan Vazquez APRN - CRNA  Authorized by: Billy Mcguire DO      Indications and Patient Condition  Indications for airway management: anesthesia  Sedation level: deep  Preoxygenated: yes  Patient position: sniffing  MILS not maintained throughout  Mask difficulty assessment: vent by bag mask    Final Airway Details  Final airway type: endotracheal airway      Successful airway: ETT  Cuffed: yes   Successful intubation technique: direct laryngoscopy  Facilitating devices/methods: intubating stylet  Endotracheal tube insertion site: oral  Blade: Morgan  Blade size: #3  Cormack-Lehane Classification: grade I - full view of glottis  Placement verified by: chest auscultation and capnometry   Measured from: lips  ETT to lips (cm): 24  Number of attempts at approach: 1  Number of other approaches attempted: 0    no

## 2024-05-14 NOTE — H&P
VASCULAR SURGERY   [address] [address]  [phone] FAX: [phone]           Dell Parker  : 1950     Reason for visit: Carotid stenosis     Chief Complaint: 73 y.o. male presents after imaging work up for left eye pain and vision changes  DUS with left ICA stenosis >80%. Denies slurred speech or unilateral weakness. CTA with 90% left ICA stenosis.      Plan:   Critical left ICA stenosis: Left carotid endarterectomy. Risks benefits and alternatives discussed with patient and he wishes to proceed.     Level 5 , Acute or chronic illness or injury that poses threat to life or bodily function., and Elective major surgery with risk factors below     Imaging interrupted:   CTA Head/Neck  IMPRESSION:  1. Severe stenosis with near complete occlusion of the proximal left ICA,  stenosis measures greater than 90%.  2. There is a small ulceration within the extensive soft tissue plaque of the  proximal left ICA.  3. No significant right ICA stenosis.  4. No obvious large vessel intracranial occlusion..  Carotid DUS    Mild (<50%) stenosis in the right internal carotid artery.  Heterogeneous plaque in the right internal carotid artery.    Critical (80-99%) stenosis in the left internal carotid artery.  Severe and homogeneous plaque (proximal) in the left internal carotid artery.    Normal antegrade flow involving the right vertebral artery.    Normal antegrade flow involving the left vertebral artery.     Constitutional:   Negative for fevers and unexplained weight loss.  Eyes:   Negative for visual disturbance.  ENT:   Negative for significant hearing loss and tinnitus.  Respiratory:   Negative for hemoptysis.  Cardiovascular:   Negative except as noted in HPI.  Gastrointestinal:   Negative for melena and abdominal pain.  Genitourinary:   Negative for hematuria, renal stones.  Integumentary:   Negative for rash or non-healing wounds  Hematologic/Lymphatic:   Negative for

## 2024-05-14 NOTE — ANESTHESIA PRE PROCEDURE
Department of Anesthesiology  Preprocedure Note       Name:  Dell Parker   Age:  74 y.o.  :  1950                                          MRN:  [id number]         Date:  2024      Surgeon: Surgeon(s):  Rickey Chow MD    Procedure: Procedure(s):  LEFT CAROTID ENDARTERECTOMY WITH EEG- Patient has an ICD (Biotronik) - REP DOS    Medications prior to admission:   Prior to Admission medications    Medication Sig Start Date End Date Taking? Authorizing Provider   Magnesium 400 MG CAPS Take 400 mg by mouth daily    Provider, MD Jazmyn   aspirin 81 MG EC tablet Take 1 tablet by mouth daily 10/20/23   Maria Luisa Payne PA   valsartan (DIOVAN) 40 MG tablet Take 1 tablet by mouth daily TAKE ONE TABLET BY MOUTH ONE TIME DAILY 10/16/23   Benson Baez MD   metoprolol succinate (TOPROL XL) 50 MG extended release tablet Take 1 tablet by mouth in the morning and at bedtime 10/16/23   Benson Baez MD   empagliflozin (JARDIANCE) 10 MG tablet Take 1 tablet by mouth daily 10/16/23   Benson Baez MD   atorvastatin (LIPITOR) 40 MG tablet Take 1 tablet by mouth daily 10/16/23   Benson Baez MD   spironolactone (ALDACTONE) 25 MG tablet Take 1 tablet by mouth 2 times daily 10/16/23   Benson Baez MD   cyanocobalamin 1000 MCG tablet Take 0.5 tablets by mouth daily    Automatic Reconciliation, Ar   melatonin 10 MG CAPS capsule Take 1 capsule by mouth nightly    Automatic Reconciliation, Ar       Current medications:    Current Facility-Administered Medications   Medication Dose Route Frequency Provider Last Rate Last Admin    lidocaine 1 % injection 1 mL  1 mL IntraDERmal Once PRN Billy Mcguire DO        fentaNYL (SUBLIMAZE) injection 100 mcg  100 mcg IntraVENous Once PRN Billy Mcguire DO        lactated ringers IV soln infusion   IntraVENous Continuous Billy Mcguire  mL/hr at 24 NoRateChange at 24    sodium chloride flush 0.9 %

## 2024-05-15 VITALS
SYSTOLIC BLOOD PRESSURE: 107 MMHG | BODY MASS INDEX: 32.5 KG/M2 | HEIGHT: 70 IN | WEIGHT: 227 LBS | TEMPERATURE: 97.5 F | RESPIRATION RATE: 19 BRPM | DIASTOLIC BLOOD PRESSURE: 58 MMHG | OXYGEN SATURATION: 98 % | HEART RATE: 61 BPM

## 2024-05-15 LAB
ANION GAP SERPL CALC-SCNC: 12 MMOL/L (ref 9–18)
BUN SERPL-MCNC: 23 MG/DL (ref 8–23)
CALCIUM SERPL-MCNC: 8.9 MG/DL (ref 8.8–10.2)
CHLORIDE SERPL-SCNC: 97 MMOL/L (ref 98–107)
CO2 SERPL-SCNC: 19 MMOL/L (ref 20–28)
CREAT SERPL-MCNC: 1.32 MG/DL (ref 0.8–1.3)
ERYTHROCYTE [DISTWIDTH] IN BLOOD BY AUTOMATED COUNT: 12.9 % (ref 11.9–14.6)
GLUCOSE SERPL-MCNC: 256 MG/DL (ref 70–99)
HCT VFR BLD AUTO: 37.4 % (ref 41.1–50.3)
HGB BLD-MCNC: 12.6 G/DL (ref 13.6–17.2)
MCH RBC QN AUTO: 29.2 PG (ref 26.1–32.9)
MCHC RBC AUTO-ENTMCNC: 33.7 G/DL (ref 31.4–35)
MCV RBC AUTO: 86.6 FL (ref 82–102)
NRBC # BLD: 0 K/UL (ref 0–0.2)
PLATELET # BLD AUTO: 144 K/UL (ref 150–450)
PMV BLD AUTO: 9.9 FL (ref 9.4–12.3)
POTASSIUM SERPL-SCNC: 4.8 MMOL/L (ref 3.5–5.1)
RBC # BLD AUTO: 4.32 M/UL (ref 4.23–5.6)
SODIUM SERPL-SCNC: 127 MMOL/L (ref 136–145)
WBC # BLD AUTO: 11.1 K/UL (ref 4.3–11.1)

## 2024-05-15 PROCEDURE — 80048 BASIC METABOLIC PNL TOTAL CA: CPT

## 2024-05-15 PROCEDURE — 6360000002 HC RX W HCPCS: Performed by: STUDENT IN AN ORGANIZED HEALTH CARE EDUCATION/TRAINING PROGRAM

## 2024-05-15 PROCEDURE — 85027 COMPLETE CBC AUTOMATED: CPT

## 2024-05-15 PROCEDURE — 6370000000 HC RX 637 (ALT 250 FOR IP): Performed by: STUDENT IN AN ORGANIZED HEALTH CARE EDUCATION/TRAINING PROGRAM

## 2024-05-15 PROCEDURE — 2580000003 HC RX 258: Performed by: STUDENT IN AN ORGANIZED HEALTH CARE EDUCATION/TRAINING PROGRAM

## 2024-05-15 PROCEDURE — 37799 UNLISTED PX VASCULAR SURGERY: CPT

## 2024-05-15 RX ORDER — OXYCODONE HYDROCHLORIDE 5 MG/1
5 TABLET ORAL EVERY 6 HOURS PRN
Qty: 12 TABLET | Refills: 0 | Status: SHIPPED | OUTPATIENT
Start: 2024-05-15 | End: 2024-05-18

## 2024-05-15 RX ADMIN — CEFAZOLIN 2000 MG: 10 INJECTION, POWDER, FOR SOLUTION INTRAVENOUS at 04:55

## 2024-05-15 RX ADMIN — ENOXAPARIN SODIUM 30 MG: 100 INJECTION SUBCUTANEOUS at 09:09

## 2024-05-15 RX ADMIN — ASPIRIN 81 MG: 81 TABLET, COATED ORAL at 09:09

## 2024-05-15 RX ADMIN — SODIUM CHLORIDE, PRESERVATIVE FREE 10 ML: 5 INJECTION INTRAVENOUS at 09:10

## 2024-05-15 ASSESSMENT — PAIN DESCRIPTION - LOCATION: LOCATION: NECK

## 2024-05-15 ASSESSMENT — PAIN SCALES - GENERAL: PAINLEVEL_OUTOF10: 3

## 2024-05-15 NOTE — DISCHARGE SUMMARY
VASCULAR SURGERY   [address] [address]  [phone] FAX: [phone]    Physician Discharge Summary     Patient: Dell Parker MRN: [id number]  SSN: [id number]    YOB: 1950  Age: 74 y.o.  Sex: male       Admit Date: 5/14/2024    Discharge Date: 5/15/2024      Admitting Physician: Rickey Chow MD     Discharge Physician: Rickey Chow MD    Admission Diagnoses: Occlusion of left carotid artery [I65.22]  Carotid stenosis, asymptomatic, left [I65.22]  Left carotid stenosis [I65.22]    Discharge Diagnoses:        Procedures for this admission: Procedure(s):  LEFT CAROTID ENDARTERECTOMY WITH EEG MONITORING    Discharged Condition: stable    Hospital Course:   Admitted following left carotid endarterectomy to CVICU. Post-operative course uneventful. POD 1 He was meeting all goals, neuro intact, and ready for discharge. He was discharged home with 2 week f/u.     Consults: None    Treatments:   Left CEA    Discharge Exam:   Gen: NAD  Neck: Left neck incision without swelling, drainage, or erythema  RRR  NWOB on RA  Motor and sensation grossly intact    Disposition: home    Patient Instructions:   Current Discharge Medication List        CONTINUE these medications which have NOT CHANGED    Details   Magnesium 400 MG CAPS Take 400 mg by mouth daily      aspirin 81 MG EC tablet Take 1 tablet by mouth daily  Qty: 30 tablet, Refills: 3      valsartan (DIOVAN) 40 MG tablet Take 1 tablet by mouth daily TAKE ONE TABLET BY MOUTH ONE TIME DAILY  Qty: 90 tablet, Refills: 3      metoprolol succinate (TOPROL XL) 50 MG extended release tablet Take 1 tablet by mouth in the morning and at bedtime  Qty: 180 tablet, Refills: 3      empagliflozin (JARDIANCE) 10 MG tablet Take 1 tablet by mouth daily  Qty: 90 tablet, Refills: 3      atorvastatin (LIPITOR) 40 MG tablet Take 1 tablet by mouth daily  Qty: 90 tablet, Refills: 3      spironolactone (ALDACTONE) 25

## 2024-05-15 NOTE — DISCHARGE INSTRUCTIONS
to heal completely. It may feel raised and thickened for a while and will decrease over time. It takes 2-3 weeks for the swelling to go away. No ointments, lotions, creams or bandages should be applied to the incision.     Call Your Surgeon for Any of These Symptoms (#[phone]):  - Increasing pain or swelling in the neck causing difficulty breathing or swallowing.  - Sudden or severe headache. A headache that will not go away.  - Changes in your eyesight, problem speaking, or problem swallowing.  - Increasing pain, not relieved by pain medication.  - Fever above 101 degrees.  - Redness, an increase in swelling or bruising around your incision. There may be some slight drainage the first couple of days from the incision, but this should stop and the incision should be  dry. If there is continued drainage or pus the surgeon should be called.    If you cannot reach your doctor quickly, go to the nearest emergency room for immediate assessment.

## 2024-05-16 ENCOUNTER — CARE COORDINATION (OUTPATIENT)
Dept: CARE COORDINATION | Facility: CLINIC | Age: 74
End: 2024-05-16

## 2024-05-16 NOTE — CARE COORDINATION
Care Transitions Outreach Attempt    Call within 2 business days of discharge: Yes   CTN attempted to contact patient for transitions of care outreach after hospital discharge on . Unable to reach at this time and unable to leave a message as vm is not set up. Will try again later.     Patient: Dell Parker Patient : 1950 MRN: [id number]    Last Discharge Facility       Date Complaint Diagnosis Description Type Department Provider    24  Left carotid stenosis Admission (Discharged) [id number] Rickey Chow MD        Was this an external facility discharge? No Discharge Facility Name: [facility]    Noted following upcoming appointments from discharge chart review:   [facility] follow up appointment(s):   Future Appointments   Date Time Provider Department Center   2024 12:45 PM Benson Baez MD [facility] AM[facility]   2024  9:15 AM Rickey Chow MD [facility] AM[facility]   6/10/2024 10:00 AM BS ULTRASOUND 1 [facility] AM[facility]   6/10/2024 10:30 AM Rickey Chow MD [facility] AM[facility]   2024 11:00 AM Benson Baez MD [facility] AM[facility]   2024  9:40 AM Ziyad Elise DO [facility] AM[facility]

## 2024-05-17 ENCOUNTER — CARE COORDINATION (OUTPATIENT)
Dept: CARE COORDINATION | Facility: CLINIC | Age: 74
End: 2024-05-17

## 2024-05-17 NOTE — CARE COORDINATION
Care Transitions Outreach Attempt    Call within 2 business days of discharge: Yes   Attempted to reach patient for transitions of care follow up. Unable to reach patient.    Patient: Dell Parker Patient : 1950 MRN: [id number]    Last Discharge Facility       Date Complaint Diagnosis Description Type Department Provider    24  Left carotid stenosis Admission (Discharged) [id number] Rickey Chow MD          Was this an external facility discharge? No Discharge Facility Name: [facility]    Noted following upcoming appointments from discharge chart review:   [facility] follow up appointment(s):   Future Appointments   Date Time Provider Department Center   2024 12:45 PM Benson Baez MD [facility] AMB   2024  9:15 AM Rickey Chow MD [facility] AMB   6/10/2024 10:00 AM BS ULTRASOUND 1 [facility] AMB   6/10/2024 10:30 AM Rickey Chow MD [facility] AMB   2024 11:00 AM Benson Baez MD [facility] AMB   2024  9:40 AM Ziyad Elise DO [facility] AMB

## 2024-05-20 ENCOUNTER — TELEPHONE (OUTPATIENT)
Dept: VASCULAR SURGERY | Age: 74
End: 2024-05-20

## 2024-05-20 NOTE — TELEPHONE ENCOUNTER
Pt c/o rash,hives,itching from surgery site down to randa shoulders- started Saturday  -he did use some cream he had for bee stings which helped    **Dr Chow spoke w/pt

## 2024-05-24 ENCOUNTER — OFFICE VISIT (OUTPATIENT)
Age: 74
End: 2024-05-24
Payer: MEDICARE

## 2024-05-24 VITALS
HEART RATE: 70 BPM | HEIGHT: 70 IN | SYSTOLIC BLOOD PRESSURE: 124 MMHG | WEIGHT: 225 LBS | BODY MASS INDEX: 32.21 KG/M2 | DIASTOLIC BLOOD PRESSURE: 69 MMHG

## 2024-05-24 DIAGNOSIS — I48.0 PAF (PAROXYSMAL ATRIAL FIBRILLATION) (HCC): ICD-10-CM

## 2024-05-24 DIAGNOSIS — I65.22 LEFT CAROTID ARTERY STENOSIS: ICD-10-CM

## 2024-05-24 DIAGNOSIS — D68.69 SECONDARY HYPERCOAGULABLE STATE (HCC): ICD-10-CM

## 2024-05-24 DIAGNOSIS — I50.22 SYSTOLIC CHF, CHRONIC (HCC): Primary | ICD-10-CM

## 2024-05-24 PROCEDURE — 3078F DIAST BP <80 MM HG: CPT | Performed by: INTERNAL MEDICINE

## 2024-05-24 PROCEDURE — 3017F COLORECTAL CA SCREEN DOC REV: CPT | Performed by: INTERNAL MEDICINE

## 2024-05-24 PROCEDURE — 1123F ACP DISCUSS/DSCN MKR DOCD: CPT | Performed by: INTERNAL MEDICINE

## 2024-05-24 PROCEDURE — G8417 CALC BMI ABV UP PARAM F/U: HCPCS | Performed by: INTERNAL MEDICINE

## 2024-05-24 PROCEDURE — 99214 OFFICE O/P EST MOD 30 MIN: CPT | Performed by: INTERNAL MEDICINE

## 2024-05-24 PROCEDURE — G8427 DOCREV CUR MEDS BY ELIG CLIN: HCPCS | Performed by: INTERNAL MEDICINE

## 2024-05-24 PROCEDURE — 3074F SYST BP LT 130 MM HG: CPT | Performed by: INTERNAL MEDICINE

## 2024-05-24 PROCEDURE — 1111F DSCHRG MED/CURRENT MED MERGE: CPT | Performed by: INTERNAL MEDICINE

## 2024-05-24 PROCEDURE — 1036F TOBACCO NON-USER: CPT | Performed by: INTERNAL MEDICINE

## 2024-05-24 NOTE — PROGRESS NOTES
41.1 - 50.3 %    MCV 86.6 82 - 102 FL    MCH 29.2 26.1 - 32.9 PG    MCHC 33.7 31.4 - 35.0 g/dL    RDW 12.9 11.9 - 14.6 %    Platelets 144 (L) 150 - 450 K/uL    MPV 9.9 9.4 - 12.3 FL    nRBC 0.00 0.0 - 0.2 K/uL     Lab Results   Component Value Date/Time    CHOL 183 07/18/2023 08:05 AM    HDL 48 07/18/2023 08:05 AM    LDL 91.8 07/18/2023 08:05 AM    VLDL 43.2 07/18/2023 08:05 AM    VLDL 64 01/12/2022 09:54 AM       ASSESSMENT and PLAN  Problem List Items Addressed This Visit          Circulatory    Systolic CHF, chronic (HCC) - Primary       Other    Secondary hypercoagulable state (HCC)     Other Visit Diagnoses       PAF (paroxysmal atrial fibrillation) (HCC)        Left carotid artery stenosis               Cad  The current medical regimen is effective;  continue present plan and medications.    Hfref  The current medical regimen is effective;  continue present plan and medications.    PAF  The current medical regimen is effective;  continue present plan and medications.      Continue meds as below    Current Outpatient Medications:     Magnesium 400 MG CAPS, Take 400 mg by mouth daily, Disp: , Rfl:     aspirin 81 MG EC tablet, Take 1 tablet by mouth daily, Disp: 30 tablet, Rfl: 3    valsartan (DIOVAN) 40 MG tablet, Take 1 tablet by mouth daily TAKE ONE TABLET BY MOUTH ONE TIME DAILY, Disp: 90 tablet, Rfl: 3    metoprolol succinate (TOPROL XL) 50 MG extended release tablet, Take 1 tablet by mouth in the morning and at bedtime, Disp: 180 tablet, Rfl: 3    empagliflozin (JARDIANCE) 10 MG tablet, Take 1 tablet by mouth daily, Disp: 90 tablet, Rfl: 3    atorvastatin (LIPITOR) 40 MG tablet, Take 1 tablet by mouth daily, Disp: 90 tablet, Rfl: 3    spironolactone (ALDACTONE) 25 MG tablet, Take 1 tablet by mouth 2 times daily, Disp: 180 tablet, Rfl: 3    cyanocobalamin 1000 MCG tablet, Take 0.5 tablets by mouth daily, Disp: , Rfl:     melatonin 10 MG CAPS capsule, Take 1 capsule by mouth nightly, Disp: , Rfl:     EDUARDO GILMAN

## 2024-05-30 ENCOUNTER — OFFICE VISIT (OUTPATIENT)
Dept: VASCULAR SURGERY | Age: 74
End: 2024-05-30

## 2024-05-30 VITALS
WEIGHT: 226 LBS | BODY MASS INDEX: 32.35 KG/M2 | HEIGHT: 70 IN | SYSTOLIC BLOOD PRESSURE: 126 MMHG | TEMPERATURE: 58 F | OXYGEN SATURATION: 95 % | DIASTOLIC BLOOD PRESSURE: 79 MMHG

## 2024-05-30 DIAGNOSIS — Z98.890 HISTORY OF LEFT-SIDED CAROTID ENDARTERECTOMY: Primary | ICD-10-CM

## 2024-05-30 PROCEDURE — 99024 POSTOP FOLLOW-UP VISIT: CPT | Performed by: STUDENT IN AN ORGANIZED HEALTH CARE EDUCATION/TRAINING PROGRAM

## 2024-05-30 NOTE — PROGRESS NOTES
at [facility] MAIN OR    CORONARY ARTERY BYPASS GRAFT  10/03/2018    CABG x1, Dr. Moris Reeves    EP DEVICE PROCEDURE N/A 10/19/2023    Insert ICD multi performed by Carl Grimm MD at [facility] CARDIAC CATH LAB    EP DEVICE PROCEDURE N/A 10/19/2023    Lv lead placement performed by Carl Grimm MD at [facility] CARDIAC CATH LAB    ORTHOPEDIC SURGERY  2014    right lower ext - broken put in rods and screws    OTHER SURGICAL HISTORY      melanoma surgery to nose    TOTAL HIP ARTHROPLASTY Right 07/2021       Metal implants or AICD: no    Dye allergy: no     Smoker:  Tobacco Use      Smoking status: Never      Smokeless tobacco: Never      Referred by: No ref. provider found    PCP:No primary care provider on file.     Rickey Chow MD    Elements of this note have been dictated using speech recognition software. As a result, errors of speech recognition may have occurred.

## 2024-06-10 ENCOUNTER — OFFICE VISIT (OUTPATIENT)
Dept: VASCULAR SURGERY | Age: 74
End: 2024-06-10

## 2024-06-10 VITALS
OXYGEN SATURATION: 95 % | HEIGHT: 70 IN | HEART RATE: 76 BPM | DIASTOLIC BLOOD PRESSURE: 80 MMHG | SYSTOLIC BLOOD PRESSURE: 127 MMHG | BODY MASS INDEX: 32.35 KG/M2 | WEIGHT: 226 LBS

## 2024-06-10 DIAGNOSIS — Z98.890 H/O CAROTID ENDARTERECTOMY: Primary | ICD-10-CM

## 2024-06-10 PROCEDURE — 99024 POSTOP FOLLOW-UP VISIT: CPT | Performed by: STUDENT IN AN ORGANIZED HEALTH CARE EDUCATION/TRAINING PROGRAM

## 2024-08-07 ENCOUNTER — PATIENT MESSAGE (OUTPATIENT)
Age: 74
End: 2024-08-07

## 2024-08-07 ENCOUNTER — TELEPHONE (OUTPATIENT)
Age: 74
End: 2024-08-07

## 2024-08-07 NOTE — TELEPHONE ENCOUNTER
This morning about 2:30 woke \"sweating like crazy\".Felt dizzy and weak.He sat on side of bed for a little while then felt better.Blood sugar 137,/71.Heart did not feel like it was fluttering or racing.He has Bi[employer]ronik.He can not force a transmission himself.He will call when he arrives home.

## 2024-08-07 NOTE — TELEPHONE ENCOUNTER
Pt woke up this morning at 2:30, pt was sweating and feeling \"weird\". Pt was dizzy and felt weak.   FYI:He has a pace maker.     Please call patient back at [phone]   Cimetidine Pregnancy And Lactation Text: This medication is Pregnancy Category B and is considered safe during pregnancy. It is also excreted in breast milk and breast feeding isn't recommended.

## 2024-08-07 NOTE — TELEPHONE ENCOUNTER
Cannot \"force\" transmission with [employer], however, his monitor transmitted this morning at 1:24 am well before he woke up with his symptoms.  BiV ICD showing normal function with no arrhythmias detected.  97% BiV pacing.  All looks good.

## 2024-08-10 RX ORDER — SPIRONOLACTONE 25 MG/1
25 TABLET ORAL 2 TIMES DAILY
Qty: 180 TABLET | Refills: 3 | Status: SHIPPED | OUTPATIENT
Start: 2024-08-10

## 2024-08-10 RX ORDER — ATORVASTATIN CALCIUM 40 MG/1
40 TABLET, FILM COATED ORAL DAILY
Qty: 90 TABLET | Refills: 3 | Status: SHIPPED | OUTPATIENT
Start: 2024-08-10

## 2024-08-10 RX ORDER — EMPAGLIFLOZIN 10 MG/1
10 TABLET, FILM COATED ORAL DAILY
Qty: 90 TABLET | Refills: 3 | Status: SHIPPED | OUTPATIENT
Start: 2024-08-10

## 2024-08-27 ENCOUNTER — OFFICE VISIT (OUTPATIENT)
Age: 74
End: 2024-08-27
Payer: MEDICARE

## 2024-08-27 VITALS
BODY MASS INDEX: 32.21 KG/M2 | SYSTOLIC BLOOD PRESSURE: 122 MMHG | HEART RATE: 64 BPM | WEIGHT: 225 LBS | DIASTOLIC BLOOD PRESSURE: 76 MMHG | HEIGHT: 70 IN

## 2024-08-27 DIAGNOSIS — D68.69 SECONDARY HYPERCOAGULABLE STATE (HCC): ICD-10-CM

## 2024-08-27 DIAGNOSIS — I25.118 ATHEROSCLEROTIC HEART DISEASE OF NATIVE CORONARY ARTERY WITH OTHER FORMS OF ANGINA PECTORIS (HCC): ICD-10-CM

## 2024-08-27 DIAGNOSIS — I48.0 PAF (PAROXYSMAL ATRIAL FIBRILLATION) (HCC): Primary | ICD-10-CM

## 2024-08-27 DIAGNOSIS — Z95.2 S/P AVR (AORTIC VALVE REPLACEMENT): ICD-10-CM

## 2024-08-27 DIAGNOSIS — I50.22 SYSTOLIC CHF, CHRONIC (HCC): ICD-10-CM

## 2024-08-27 PROCEDURE — G8417 CALC BMI ABV UP PARAM F/U: HCPCS | Performed by: INTERNAL MEDICINE

## 2024-08-27 PROCEDURE — 99215 OFFICE O/P EST HI 40 MIN: CPT | Performed by: INTERNAL MEDICINE

## 2024-08-27 PROCEDURE — 3017F COLORECTAL CA SCREEN DOC REV: CPT | Performed by: INTERNAL MEDICINE

## 2024-08-27 PROCEDURE — 3074F SYST BP LT 130 MM HG: CPT | Performed by: INTERNAL MEDICINE

## 2024-08-27 PROCEDURE — 3078F DIAST BP <80 MM HG: CPT | Performed by: INTERNAL MEDICINE

## 2024-08-27 PROCEDURE — 1123F ACP DISCUSS/DSCN MKR DOCD: CPT | Performed by: INTERNAL MEDICINE

## 2024-08-27 PROCEDURE — G8427 DOCREV CUR MEDS BY ELIG CLIN: HCPCS | Performed by: INTERNAL MEDICINE

## 2024-08-27 PROCEDURE — 1036F TOBACCO NON-USER: CPT | Performed by: INTERNAL MEDICINE

## 2024-08-27 RX ORDER — METFORMIN HCL 500 MG
500 TABLET, EXTENDED RELEASE 24 HR ORAL
COMMUNITY
Start: 2024-07-10

## 2024-08-27 NOTE — PROGRESS NOTES
[facility] CARDIOLOGY, [address], [address]  [place] [address]  PHONE: [phone]    SUBJECTIVE: /HPI  Dell Parker (1950) is a 74 y.o. male seen for a follow up visit regarding the following:   Specialty Problems          Cardiology Problems    Acute embolism and thrombosis of right peroneal vein (HCC)        Aortic stenosis        Hypertension        Systolic CHF, chronic (HCC)        Atherosclerotic heart disease of native coronary artery with other forms of angina pectoris (HCC)        NSTEMI (non-ST elevated myocardial infarction) ([facility])        NICM (nonischemic cardiomyopathy) ([facility])        Occlusion of left carotid artery        Carotid stenosis, asymptomatic, left        Left carotid stenosis         Patients last visit 6/10/24 was post-op for Carotid DUS    Mild (<50%) stenosis in the right internal carotid artery.  Heterogeneous plaque in the right internal carotid artery.    Mild (<50%) stenosis in the left internal carotid artery.  Homogeneous plaque in the left internal carotid artery.    Normal antegrade flow involving the right vertebral artery.    Normal antegrade flow involving the left vertebral artery.    Patient currently denies chest pain, lower extremity edema, palpations, dizziness and syncope. Patient has SOB with exertions.    PCP recently started patient on Metformin 2 months ago (takes 1 in the morning and 1/2 at night).      Patient would like to talk about pain in his toes that her gets in his feet at the end of the day. Patient states that the pain is a pinching that does not radiate. Pt tried tylenol before he goes to bed, states that it helps some.     Past Medical History, Past Surgical History, Family history, Social History, and Medications were all reviewed with the patient today and updated as necessary.    Allergies   Allergen Reactions    Hydrocodone-Acetaminophen Other (See Comments)     hallucinations    Chlorhexidine Hives, Itching and Rash     Past  for underlying cardiac issues has been offered.  If patient wishes for adjustment of therapy which would include intensification of pharmacologic therapy for any above conditions this will be sent to appropriate pharmacy.  If patient politely declines any further changes they will be encouraged to continue with current treatment and appropriate risk factor modification and healthy lifestyle.      Pt is instructed to follow all appropriate cardiac risk factor recommendations and to be compliant with meds and testing. Instructed to follow up appropriately and seek urgent medical care if acute or unstable issues arise. Results of some tests may be viewed thru MyChart but this does not substitute for follow up with MD. If follow up is not scheduled pt is instructed to call for follow up. Any non cardiac issues identified in this visit the patient is counseled to address these with their primary care physician or appropriate specialist.    I have personally seen and evaluated the patient and reviewed the students note and agree with the following assessment and plan and findings. I was present for and observed the key components of this note.  Any appropriate additions or editing of the information have been done by me.    Benson Baez MD, FACC  Cardiology

## 2024-09-18 RX ORDER — METOPROLOL SUCCINATE 50 MG/1
50 TABLET, EXTENDED RELEASE ORAL 2 TIMES DAILY
Qty: 180 TABLET | Refills: 3 | Status: SHIPPED | OUTPATIENT
Start: 2024-09-18

## 2024-10-16 RX ORDER — VALSARTAN 40 MG/1
40 TABLET ORAL DAILY
Qty: 90 TABLET | Refills: 3 | Status: SHIPPED | OUTPATIENT
Start: 2024-10-16

## 2024-11-05 ENCOUNTER — OFFICE VISIT (OUTPATIENT)
Age: 74
End: 2024-11-05

## 2024-11-05 VITALS
SYSTOLIC BLOOD PRESSURE: 102 MMHG | DIASTOLIC BLOOD PRESSURE: 78 MMHG | WEIGHT: 222.8 LBS | HEIGHT: 71 IN | HEART RATE: 68 BPM | BODY MASS INDEX: 31.19 KG/M2

## 2024-11-05 DIAGNOSIS — I50.22 SYSTOLIC CHF, CHRONIC (HCC): ICD-10-CM

## 2024-11-05 DIAGNOSIS — I48.0 PAF (PAROXYSMAL ATRIAL FIBRILLATION) (HCC): Primary | ICD-10-CM

## 2024-11-05 DIAGNOSIS — Z95.2 S/P AVR (AORTIC VALVE REPLACEMENT): ICD-10-CM

## 2024-11-05 RX ORDER — CILOSTAZOL 100 MG/1
100 TABLET ORAL 2 TIMES DAILY
Qty: 60 TABLET | Refills: 3 | Status: SHIPPED | OUTPATIENT
Start: 2024-11-05

## 2024-11-05 NOTE — PROGRESS NOTES
pupils equal and reactive, extraocular eye movements intact  Neck/lymph - supple, no significant adenopathy  Chest/lungs - clear to auscultation, no wheezes, rales or rhonchi, symmetric air entry  Heart/CV - normal rate, regular rhythm, normal S1, S2, no murmurs, rubs, clicks or gallops  Abdomen/GI - soft, nontender, nondistended, no masses or organomegaly  Musculoskeletal - no joint tenderness, deformity or swelling  Extremities - peripheral pulses normal, no pedal edema, no clubbing or cyanosis  Skin - normal coloration and turgor, no rashes, no suspicious skin lesions noted    EKG: normal EKG, normal sinus rhythm, nonspecific ST and T waves changes.         Medications reviewed and questions answered    Recent Results (from the past 672 hour(s))   Vascular duplex lower extremity arteries bilateral with ARYA    Collection Time: 10/11/24 12:09 PM   Result Value Ref Range    Left CFA prox PSV 98.9 cm/s    Left MAURIZIO dist PSV 61.6 cm/s    Left MAURIZIO mid .0 cm/s    Left MAURIZIO prox .0 cm/s    Left Pop A dist .0 cm/s    Left Pop A prox PSV 62.6 cm/s    Left peroneal dist PSV 86.8 cm/s    Left peroneal mid PSV 70.8 cm/s    Left peronal prox PSV 49.9 cm/s    Left SFA dist PSV 88.2 cm/s    Left SFA mid PSV 90.1 cm/s    Left SFA prox PSV 82.5 cm/s    Left PFA prox PSV 86.5 cm/s    Right CFA prox .0 cm/s    Right Pop A dist PSV 44.8 cm/s    Right Pop A prox PSV 71.9 cm/s    Right PTA dist PSV 41.3 cm/s    Right PTA mid PSV 33.0 cm/s    Right PTA prox PSV 36.4 cm/s    Right peronal dist .0 cm/s    Right peronal mid PSV 84.2 cm/s    Right peronal prox PSV 46.2 cm/s    Right SFA dist PSV 62.7 cm/s    Right SFA mid PSV 85.8 cm/s    Right SFA prox PSV 83.4 cm/s    Right CFA prox PSV 88.5 cm/s    Right MAURIZIO prox .0 cm/s    Body Surface Area 2.24 m2    Right SFA prox anthony ratio 0.7     Right SFA mid anthony ratio 1.0     Right SFA dist anthony ratio 0.73     Right Pop A prox anthony ratio 1.15     Left SFA prox

## 2024-12-03 ENCOUNTER — OFFICE VISIT (OUTPATIENT)
Age: 74
End: 2024-12-03
Payer: MEDICARE

## 2024-12-03 VITALS
SYSTOLIC BLOOD PRESSURE: 136 MMHG | DIASTOLIC BLOOD PRESSURE: 82 MMHG | HEIGHT: 71 IN | BODY MASS INDEX: 31.78 KG/M2 | HEART RATE: 78 BPM | WEIGHT: 227 LBS

## 2024-12-03 DIAGNOSIS — I48.0 PAF (PAROXYSMAL ATRIAL FIBRILLATION) (HCC): Primary | ICD-10-CM

## 2024-12-03 DIAGNOSIS — I50.22 SYSTOLIC CHF, CHRONIC (HCC): ICD-10-CM

## 2024-12-03 DIAGNOSIS — Z95.2 S/P AVR (AORTIC VALVE REPLACEMENT): ICD-10-CM

## 2024-12-03 DIAGNOSIS — I25.118 ATHEROSCLEROTIC HEART DISEASE OF NATIVE CORONARY ARTERY WITH OTHER FORMS OF ANGINA PECTORIS (HCC): ICD-10-CM

## 2024-12-03 PROCEDURE — G8417 CALC BMI ABV UP PARAM F/U: HCPCS | Performed by: INTERNAL MEDICINE

## 2024-12-03 PROCEDURE — 3075F SYST BP GE 130 - 139MM HG: CPT | Performed by: INTERNAL MEDICINE

## 2024-12-03 PROCEDURE — G8484 FLU IMMUNIZE NO ADMIN: HCPCS | Performed by: INTERNAL MEDICINE

## 2024-12-03 PROCEDURE — 3017F COLORECTAL CA SCREEN DOC REV: CPT | Performed by: INTERNAL MEDICINE

## 2024-12-03 PROCEDURE — 1159F MED LIST DOCD IN RCRD: CPT | Performed by: INTERNAL MEDICINE

## 2024-12-03 PROCEDURE — G8427 DOCREV CUR MEDS BY ELIG CLIN: HCPCS | Performed by: INTERNAL MEDICINE

## 2024-12-03 PROCEDURE — 1126F AMNT PAIN NOTED NONE PRSNT: CPT | Performed by: INTERNAL MEDICINE

## 2024-12-03 PROCEDURE — 1123F ACP DISCUSS/DSCN MKR DOCD: CPT | Performed by: INTERNAL MEDICINE

## 2024-12-03 PROCEDURE — 3079F DIAST BP 80-89 MM HG: CPT | Performed by: INTERNAL MEDICINE

## 2024-12-03 PROCEDURE — 1036F TOBACCO NON-USER: CPT | Performed by: INTERNAL MEDICINE

## 2024-12-03 PROCEDURE — 99214 OFFICE O/P EST MOD 30 MIN: CPT | Performed by: INTERNAL MEDICINE

## 2024-12-03 RX ORDER — ATORVASTATIN CALCIUM 40 MG/1
40 TABLET, FILM COATED ORAL DAILY
Qty: 90 TABLET | Refills: 3 | Status: SHIPPED | OUTPATIENT
Start: 2024-12-03

## 2024-12-03 RX ORDER — CILOSTAZOL 100 MG/1
100 TABLET ORAL 2 TIMES DAILY
Qty: 180 TABLET | Refills: 3 | Status: SHIPPED | OUTPATIENT
Start: 2024-12-03

## 2024-12-03 RX ORDER — METOPROLOL SUCCINATE 50 MG/1
50 TABLET, EXTENDED RELEASE ORAL 2 TIMES DAILY
Qty: 180 TABLET | Refills: 3 | Status: SHIPPED | OUTPATIENT
Start: 2024-12-03

## 2024-12-03 RX ORDER — SPIRONOLACTONE 25 MG/1
25 TABLET ORAL 2 TIMES DAILY
Qty: 180 TABLET | Refills: 3 | Status: SHIPPED | OUTPATIENT
Start: 2024-12-03

## 2024-12-03 RX ORDER — VALSARTAN 40 MG/1
40 TABLET ORAL DAILY
Qty: 90 TABLET | Refills: 3 | Status: SHIPPED | OUTPATIENT
Start: 2024-12-03

## 2024-12-03 NOTE — PROGRESS NOTES
[facility] CARDIOLOGY, [address], [address]  [place] [address]  PHONE: [phone]    SUBJECTIVE: /HPI  Dell Parker (1950) is a 74 y.o. male seen for a follow up visit regarding the following:   Specialty Problems          Cardiology Problems    Acute embolism and thrombosis of right peroneal vein ([facility])        Aortic stenosis        Hypertension        Systolic CHF, chronic ([facility])        Atherosclerotic heart disease of native coronary artery with other forms of angina pectoris ([facility])        NSTEMI (non-ST elevated myocardial infarction) ([facility])        NICM (nonischemic cardiomyopathy) ([facility])        Occlusion of left carotid artery        Carotid stenosis, asymptomatic, left        Left carotid stenosis         Mr. Parker presents today saying he is feeling \"just about normal today.\" He says he has discontinued his smoking and is trying to eat more vegetables. He says his blood glucose at home was 140. 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    Past Medical History, Past Surgical History, Family history, Social History, and Medications were all reviewed with the patient today and updated as necessary.    Allergies   Allergen Reactions    Hydrocodone-Acetaminophen Other (See Comments)     hallucinations    Chlorhexidine Hives, Itching and Rash     Past Medical History:   Diagnosis Date    Arthritis     generalized OA    CAD (coronary artery disease) 09/11/2018    NO MI/ NO STENTS-- CABG AND AVR-10/2018- FOLLOWED BY DR ASKEW    CHF (congestive heart failure) ([facility])     last echo-3/2021-- EF30-35%- followed by dr askew    Diabetes mellitus ([facility])     oral reliant; last a1c 8.0 (2/6/24); does not check BS at home; Denies sx hypoglycemia    DVT (deep venous thrombosis) ([facility]) 8/2021    RLE s/p covid    History of kidney stones 2016    naturally passed    History of melanoma 2016    removed from nose    Hx of CABG     Hypercholesterolemia     Hypertension 9/11/2018

## 2024-12-12 ENCOUNTER — OFFICE VISIT (OUTPATIENT)
Dept: VASCULAR SURGERY | Age: 74
End: 2024-12-12
Payer: MEDICARE

## 2024-12-12 VITALS
HEART RATE: 76 BPM | HEIGHT: 71 IN | OXYGEN SATURATION: 97 % | DIASTOLIC BLOOD PRESSURE: 91 MMHG | BODY MASS INDEX: 31.44 KG/M2 | WEIGHT: 224.6 LBS | SYSTOLIC BLOOD PRESSURE: 145 MMHG

## 2024-12-12 DIAGNOSIS — Z98.890 HISTORY OF LEFT-SIDED CAROTID ENDARTERECTOMY: Primary | ICD-10-CM

## 2024-12-12 PROCEDURE — 1036F TOBACCO NON-USER: CPT | Performed by: STUDENT IN AN ORGANIZED HEALTH CARE EDUCATION/TRAINING PROGRAM

## 2024-12-12 PROCEDURE — 3080F DIAST BP >= 90 MM HG: CPT | Performed by: STUDENT IN AN ORGANIZED HEALTH CARE EDUCATION/TRAINING PROGRAM

## 2024-12-12 PROCEDURE — 1123F ACP DISCUSS/DSCN MKR DOCD: CPT | Performed by: STUDENT IN AN ORGANIZED HEALTH CARE EDUCATION/TRAINING PROGRAM

## 2024-12-12 PROCEDURE — G8428 CUR MEDS NOT DOCUMENT: HCPCS | Performed by: STUDENT IN AN ORGANIZED HEALTH CARE EDUCATION/TRAINING PROGRAM

## 2024-12-12 PROCEDURE — 99213 OFFICE O/P EST LOW 20 MIN: CPT | Performed by: STUDENT IN AN ORGANIZED HEALTH CARE EDUCATION/TRAINING PROGRAM

## 2024-12-12 PROCEDURE — G8417 CALC BMI ABV UP PARAM F/U: HCPCS | Performed by: STUDENT IN AN ORGANIZED HEALTH CARE EDUCATION/TRAINING PROGRAM

## 2024-12-12 PROCEDURE — G8484 FLU IMMUNIZE NO ADMIN: HCPCS | Performed by: STUDENT IN AN ORGANIZED HEALTH CARE EDUCATION/TRAINING PROGRAM

## 2024-12-12 PROCEDURE — 3077F SYST BP >= 140 MM HG: CPT | Performed by: STUDENT IN AN ORGANIZED HEALTH CARE EDUCATION/TRAINING PROGRAM

## 2024-12-12 PROCEDURE — 3017F COLORECTAL CA SCREEN DOC REV: CPT | Performed by: STUDENT IN AN ORGANIZED HEALTH CARE EDUCATION/TRAINING PROGRAM

## 2024-12-12 NOTE — PROGRESS NOTES
VASCULAR SURGERY   [address] [place] [address] [address] [address]  [phone] FAX: [phone]        Dell Parker  : 1950    Reason for visit: Left CEA    Chief Complaint: 74 y.o. male presents after left CEA. Reports left eye pain and unsteadiness has resolved. Denies slurred speech or unilateral weakness.     Plan:   Left CEA: f/u 1 yr carotid Carotid DUS. Continue ASA and statin.     Global    Imaging interrupted:   Carotid DUS    Mild (<50%) stenosis in the right internal carotid artery.  Heterogeneous plaque in the right internal carotid artery.    Mild (<50%) stenosis in the left internal carotid artery.  Homogeneous plaque in the left internal carotid artery.    Normal antegrade flow involving the right vertebral artery.    Normal antegrade flow involving the left vertebral artery.    Physical Examination:   Vitals:    24 1021   BP: (!) 145/91   Pulse:    SpO2:          General: No acute distress  HENT: AT, neck incision healed well without swelling, drainage or erythema  CV: Regular rhythm.    LUNG: No respiratory distress on RA  Abdominal: No distension.  Extremities: No wounds or edema, motor and sensation grossly intact  Neuro: Motor grossly intact    Past Medical History:   Diagnosis Date    Arthritis     generalized OA    CAD (coronary artery disease) 2018    NO MI/ NO STENTS-- CABG AND AVR-10/2018- FOLLOWED BY DR ASKEW    CHF (congestive heart failure) ([facility])     last echo-3/2021-- EF30-35%- followed by dr askew    Diabetes mellitus ([facility])     oral reliant; last a1c 8.0 (24); does not check BS at home; Denies sx hypoglycemia    DVT (deep venous thrombosis) ([facility]) 2021    RLE s/p covid    History of kidney stones 2016    naturally passed    History of melanoma 2016    removed from nose    Hx of CABG     Hypercholesterolemia     Hypertension 2018    CONTROLLED WITH MED    ICD (implantable cardioverter-defibrillator) in place 10/19/2023    [employer];

## 2025-02-05 ENCOUNTER — HOSPITAL ENCOUNTER (EMERGENCY)
Age: 75
Discharge: HOME | End: 2025-02-05
Attending: EMERGENCY MEDICINE
Payer: MEDICARE

## 2025-02-05 ENCOUNTER — APPOINTMENT (OUTPATIENT)
Dept: GENERAL RADIOLOGY | Age: 75
End: 2025-02-05
Payer: MEDICARE

## 2025-02-05 ENCOUNTER — APPOINTMENT (OUTPATIENT)
Dept: CT IMAGING | Age: 75
End: 2025-02-05
Payer: MEDICARE

## 2025-02-05 VITALS
HEART RATE: 92 BPM | SYSTOLIC BLOOD PRESSURE: 144 MMHG | OXYGEN SATURATION: 98 % | BODY MASS INDEX: 32.21 KG/M2 | WEIGHT: 225 LBS | HEIGHT: 70 IN | RESPIRATION RATE: 17 BRPM | TEMPERATURE: 97.7 F | DIASTOLIC BLOOD PRESSURE: 87 MMHG

## 2025-02-05 DIAGNOSIS — N18.9 CHRONIC KIDNEY DISEASE, UNSPECIFIED CKD STAGE: ICD-10-CM

## 2025-02-05 DIAGNOSIS — E86.0 DEHYDRATION: ICD-10-CM

## 2025-02-05 DIAGNOSIS — J11.1 INFLUENZA WITH RESPIRATORY MANIFESTATION OTHER THAN PNEUMONIA: Primary | ICD-10-CM

## 2025-02-05 LAB
ALBUMIN SERPL-MCNC: 3.4 G/DL (ref 3.2–4.6)
ALBUMIN/GLOB SERPL: 0.6 (ref 1–1.9)
ALP SERPL-CCNC: 53 U/L (ref 40–129)
ALT SERPL-CCNC: 21 U/L (ref 8–55)
ANION GAP SERPL CALC-SCNC: 16 MMOL/L (ref 7–16)
AST SERPL-CCNC: 39 U/L (ref 15–37)
BASOPHILS # BLD: 0.04 K/UL (ref 0–0.2)
BASOPHILS NFR BLD: 0.3 % (ref 0–2)
BILIRUB SERPL-MCNC: 0.7 MG/DL (ref 0–1.2)
BUN SERPL-MCNC: 36 MG/DL (ref 8–23)
CALCIUM SERPL-MCNC: 9.6 MG/DL (ref 8.8–10.2)
CHLORIDE SERPL-SCNC: 95 MMOL/L (ref 98–107)
CO2 SERPL-SCNC: 21 MMOL/L (ref 20–29)
CREAT SERPL-MCNC: 1.56 MG/DL (ref 0.8–1.3)
D DIMER PPP FEU-MCNC: 1.39 UG/ML(FEU)
DIFFERENTIAL METHOD BLD: ABNORMAL
EKG ATRIAL RATE: 114 BPM
EKG DIAGNOSIS: NORMAL
EKG P AXIS: 61 DEGREES
EKG P-R INTERVAL: 122 MS
EKG Q-T INTERVAL: 404 MS
EKG QRS DURATION: 188 MS
EKG QTC CALCULATION (BAZETT): 556 MS
EKG R AXIS: -39 DEGREES
EKG T AXIS: 74 DEGREES
EKG VENTRICULAR RATE: 114 BPM
EOSINOPHIL # BLD: 0.02 K/UL (ref 0–0.8)
EOSINOPHIL NFR BLD: 0.2 % (ref 0.5–7.8)
ERYTHROCYTE [DISTWIDTH] IN BLOOD BY AUTOMATED COUNT: 13 % (ref 11.9–14.6)
FLUAV RNA SPEC QL NAA+PROBE: DETECTED
FLUBV RNA SPEC QL NAA+PROBE: NOT DETECTED
GLOBULIN SER CALC-MCNC: 5.4 G/DL (ref 2.3–3.5)
GLUCOSE SERPL-MCNC: 168 MG/DL (ref 70–99)
HCT VFR BLD AUTO: 46.8 % (ref 41.1–50.3)
HGB BLD-MCNC: 15.3 G/DL (ref 13.6–17.2)
IMM GRANULOCYTES # BLD AUTO: 0.04 K/UL (ref 0–0.5)
IMM GRANULOCYTES NFR BLD AUTO: 0.3 % (ref 0–5)
LACTATE SERPL-SCNC: 1.5 MMOL/L (ref 0.5–2)
LACTATE SERPL-SCNC: 2.1 MMOL/L (ref 0.5–2)
LYMPHOCYTES # BLD: 1.88 K/UL (ref 0.5–4.6)
LYMPHOCYTES NFR BLD: 16.1 % (ref 13–44)
MCH RBC QN AUTO: 28.2 PG (ref 26.1–32.9)
MCHC RBC AUTO-ENTMCNC: 32.7 G/DL (ref 31.4–35)
MCV RBC AUTO: 86.2 FL (ref 82–102)
MONOCYTES # BLD: 1.04 K/UL (ref 0.1–1.3)
MONOCYTES NFR BLD: 8.9 % (ref 4–12)
NEUTS SEG # BLD: 8.65 K/UL (ref 1.7–8.2)
NEUTS SEG NFR BLD: 74.2 % (ref 43–78)
NRBC # BLD: 0 K/UL (ref 0–0.2)
NT PRO BNP: 6305 PG/ML (ref 0–125)
PLATELET # BLD AUTO: 157 K/UL (ref 150–450)
PMV BLD AUTO: 9.4 FL (ref 9.4–12.3)
POTASSIUM SERPL-SCNC: 4.7 MMOL/L (ref 3.5–5.1)
PROT SERPL-MCNC: 8.8 G/DL (ref 6.3–8.2)
RBC # BLD AUTO: 5.43 M/UL (ref 4.23–5.6)
SARS-COV-2 RDRP RESP QL NAA+PROBE: NOT DETECTED
SODIUM SERPL-SCNC: 132 MMOL/L (ref 136–145)
SOURCE: NORMAL
TROPONIN T SERPL HS-MCNC: 28 NG/L (ref 0–22)
TROPONIN T SERPL HS-MCNC: 30 NG/L (ref 0–22)
WBC # BLD AUTO: 11.7 K/UL (ref 4.3–11.1)

## 2025-02-05 PROCEDURE — 96360 HYDRATION IV INFUSION INIT: CPT

## 2025-02-05 PROCEDURE — 93010 ELECTROCARDIOGRAM REPORT: CPT | Performed by: INTERNAL MEDICINE

## 2025-02-05 PROCEDURE — 83880 ASSAY OF NATRIURETIC PEPTIDE: CPT

## 2025-02-05 PROCEDURE — 84484 ASSAY OF TROPONIN QUANT: CPT

## 2025-02-05 PROCEDURE — 6360000004 HC RX CONTRAST MEDICATION: Performed by: EMERGENCY MEDICINE

## 2025-02-05 PROCEDURE — 85379 FIBRIN DEGRADATION QUANT: CPT

## 2025-02-05 PROCEDURE — 71260 CT THORAX DX C+: CPT

## 2025-02-05 PROCEDURE — 80053 COMPREHEN METABOLIC PANEL: CPT

## 2025-02-05 PROCEDURE — 2580000003 HC RX 258: Performed by: NURSE PRACTITIONER

## 2025-02-05 PROCEDURE — 99285 EMERGENCY DEPT VISIT HI MDM: CPT

## 2025-02-05 PROCEDURE — 71046 X-RAY EXAM CHEST 2 VIEWS: CPT

## 2025-02-05 PROCEDURE — 87635 SARS-COV-2 COVID-19 AMP PRB: CPT

## 2025-02-05 PROCEDURE — 85025 COMPLETE CBC W/AUTO DIFF WBC: CPT

## 2025-02-05 PROCEDURE — 83605 ASSAY OF LACTIC ACID: CPT

## 2025-02-05 PROCEDURE — 93005 ELECTROCARDIOGRAM TRACING: CPT | Performed by: NURSE PRACTITIONER

## 2025-02-05 PROCEDURE — 87502 INFLUENZA DNA AMP PROBE: CPT

## 2025-02-05 RX ORDER — BENZONATATE 200 MG/1
200 CAPSULE ORAL 3 TIMES DAILY PRN
Qty: 21 CAPSULE | Refills: 0 | Status: SHIPPED | OUTPATIENT
Start: 2025-02-05 | End: 2025-02-12

## 2025-02-05 RX ORDER — IOPAMIDOL 755 MG/ML
75 INJECTION, SOLUTION INTRAVASCULAR
Status: COMPLETED | OUTPATIENT
Start: 2025-02-05 | End: 2025-02-05

## 2025-02-05 RX ORDER — OSELTAMIVIR PHOSPHATE 30 MG/1
30 CAPSULE ORAL
Status: DISCONTINUED | OUTPATIENT
Start: 2025-02-05 | End: 2025-02-05

## 2025-02-05 RX ORDER — OSELTAMIVIR PHOSPHATE 6 MG/ML
30 FOR SUSPENSION ORAL 2 TIMES DAILY
Qty: 50 ML | Refills: 0 | Status: SHIPPED | OUTPATIENT
Start: 2025-02-05 | End: 2025-02-05

## 2025-02-05 RX ORDER — 0.9 % SODIUM CHLORIDE 0.9 %
1000 INTRAVENOUS SOLUTION INTRAVENOUS ONCE
Status: COMPLETED | OUTPATIENT
Start: 2025-02-05 | End: 2025-02-05

## 2025-02-05 RX ORDER — OSELTAMIVIR PHOSPHATE 6 MG/ML
30 FOR SUSPENSION ORAL 2 TIMES DAILY
Qty: 50 ML | Refills: 0 | Status: SHIPPED | OUTPATIENT
Start: 2025-02-05 | End: 2025-02-10

## 2025-02-05 RX ADMIN — SODIUM CHLORIDE 1000 ML: 9 INJECTION, SOLUTION INTRAVENOUS at 16:12

## 2025-02-05 RX ADMIN — IOPAMIDOL 75 ML: 755 INJECTION, SOLUTION INTRAVENOUS at 14:14

## 2025-02-05 ASSESSMENT — PAIN - FUNCTIONAL ASSESSMENT
PAIN_FUNCTIONAL_ASSESSMENT: NONE - DENIES PAIN
PAIN_FUNCTIONAL_ASSESSMENT: NONE - DENIES PAIN

## 2025-02-05 ASSESSMENT — LIFESTYLE VARIABLES
HOW MANY STANDARD DRINKS CONTAINING ALCOHOL DO YOU HAVE ON A TYPICAL DAY: PATIENT DOES NOT DRINK
HOW OFTEN DO YOU HAVE A DRINK CONTAINING ALCOHOL: NEVER

## 2025-02-05 ASSESSMENT — PAIN DESCRIPTION - LOCATION: LOCATION: GENERALIZED

## 2025-02-05 ASSESSMENT — PAIN SCALES - GENERAL: PAINLEVEL_OUTOF10: 5

## 2025-02-05 NOTE — ED NOTES
RN spoke with pharmacist regarding tamiflu. Pharmacist stated that they are short on tamiflu and they can not give him it.      Rosibel Pike, RN  02/05/25 5040

## 2025-02-05 NOTE — ED TRIAGE NOTES
Pt ambulatory unassisted to triage. Pt complains of cough, congestion and generalized body aches onset Thursday.

## 2025-02-05 NOTE — DISCHARGE INSTRUCTIONS
Rest is much as possible  Make sure to drink plenty of clear fluids  You should be pushing 64 to 80 ounces of clear fluids daily  Use the Tamiflu as directed.  It 1 teaspoon 2 times daily for 5 days  That is a 30 mg and you will take it twice daily x 5 days  Make sure to follow-up with your PCP by phone this week to let them know how you are doing  If you have any worsening shortness of breath, you develop any chest pain, you develop any abnormal swelling, abnormal weight gain, you notice that you not breathing well at night.  Or you notice that your heart rate is running high again.  Return to the ER right away  Make sure to use Tylenol over-the-counter 325 mg tablets.  You can take 2 of these every 6 hours as needed for any fever, chills, body aches  Use the Tessalon you can take 1 of those every 8 hours as needed for the cough.  Return to ER for any problems

## 2025-02-05 NOTE — ED PROVIDER NOTES
Emergency Department Provider Note       PCP: None, None   Age: 74 y.o.   Sex: male     DISPOSITION Decision To Discharge 02/05/2025 05:21:58 PM    ICD-10-CM    1. Influenza with respiratory manifestation other than pneumonia  J11.1       2. Dehydration  E86.0       3. Chronic kidney disease, unspecified CKD stage  N18.9           Medical Decision Making     74-year-old white male with a past medical history of UDAY, DVT, type 2 diabetes, CHF, NSTEMI, CABG, bovine heart valve, obesity, hypertension, pneumonia, nonischemic cardiomyopathy, CKD stage III, left carotid stenosis, left carotid endarterectomy, on Pletal, presents emergency room with a chief complaint of cough congestion body aches onset Thursday of last week.  And states over the course she is just gotten weaker and weaker each day.  He denies any fever or chills.  States that he does have an oxygen concentrator in the house.  Used O2 at 2 L last night.  States that the first time he slept for 5 hours.  He has had some mild chest discomfort which she feels is due to the cough.  He is noted to be tachycardic on arrival at 120 bpm.  Afebrile.    Patient seen and evaluated in the emergency department.  Will workup with CBC, CMP, troponin, lactic acid, chest x-ray, COVID and flu swabs, BNP, working diagnosis of pneumonia, viral syndrome, CHF, atypical chest pain, rule out ACS, rule out CHF.     Workup is back.  CBC shows mild white count.  No left shift.  He is influenza positive.  COVID-negative.  His proBNP was elevated 6305.  Chest x-ray shows no signs of failure.  D-dimer is elevated 1.39.  Will obtain CTA of the chest.  CHEM profile shows sodium's mildly low 132.  Chloride 95.  Glucose at 168.  BUN at 36.  Creatinine 1.56.  His AST is mildly elevated 39.  Lactic is normal at 1.5.  States he feels like he is a little dehydrated and I would agree.  Unsure why his proBNP is so elevated.  But he states he has not had any increased edema. Taking his

## 2025-02-27 ENCOUNTER — TELEPHONE (OUTPATIENT)
Age: 75
End: 2025-02-27

## 2025-02-27 NOTE — TELEPHONE ENCOUNTER
I notified pt.of MD response.He says he does not have a PCP.I suggested Urgent Care and also offered to help him find a PCP in his area.He was not interested in Urgent Care or PCP help and ended the call.

## 2025-02-27 NOTE — TELEPHONE ENCOUNTER
I spoke w/pt.he has had the flu about a month ago and is recovering.Has been having dizzy spells off/on.Says it is similar to what he felt prior to having surgery on left carotid.He is feeling SOB and dizzy walking from one end of the house to the other.He feels pressure in chest when he breathes in.BP and blood sugars have been good at home per pt..Dizzy spells last about 5 minutes then resolve.Should he start w/PCP?See ?

## 2025-02-27 NOTE — TELEPHONE ENCOUNTER
Patient states he has been having some chest pain and sob.  He has no energy..  He is having some tightness in his chest now.

## 2025-04-24 ENCOUNTER — CLINICAL SUPPORT (OUTPATIENT)
Age: 75
End: 2025-04-24

## 2025-04-24 DIAGNOSIS — I50.22 SYSTOLIC CHF, CHRONIC (HCC): ICD-10-CM

## 2025-04-24 DIAGNOSIS — I21.4 NSTEMI (NON-ST ELEVATED MYOCARDIAL INFARCTION) (HCC): Primary | ICD-10-CM

## 2025-05-07 PROCEDURE — 93296 REM INTERROG EVL PM/IDS: CPT | Performed by: INTERNAL MEDICINE

## 2025-05-07 PROCEDURE — 93295 DEV INTERROG REMOTE 1/2/MLT: CPT | Performed by: INTERNAL MEDICINE

## 2025-06-03 ENCOUNTER — OFFICE VISIT (OUTPATIENT)
Age: 75
End: 2025-06-03
Payer: MEDICARE

## 2025-06-03 VITALS
WEIGHT: 224 LBS | HEART RATE: 86 BPM | SYSTOLIC BLOOD PRESSURE: 138 MMHG | HEIGHT: 70 IN | DIASTOLIC BLOOD PRESSURE: 84 MMHG | BODY MASS INDEX: 32.07 KG/M2

## 2025-06-03 DIAGNOSIS — I10 PRIMARY HYPERTENSION: ICD-10-CM

## 2025-06-03 DIAGNOSIS — Z95.2 S/P AVR (AORTIC VALVE REPLACEMENT): ICD-10-CM

## 2025-06-03 DIAGNOSIS — I50.22 SYSTOLIC CHF, CHRONIC (HCC): ICD-10-CM

## 2025-06-03 DIAGNOSIS — E11.9 TYPE 2 DIABETES MELLITUS WITHOUT COMPLICATION, WITHOUT LONG-TERM CURRENT USE OF INSULIN (HCC): ICD-10-CM

## 2025-06-03 DIAGNOSIS — I50.22 CHRONIC SYSTOLIC CONGESTIVE HEART FAILURE (HCC): Primary | ICD-10-CM

## 2025-06-03 PROCEDURE — G8428 CUR MEDS NOT DOCUMENT: HCPCS | Performed by: INTERNAL MEDICINE

## 2025-06-03 PROCEDURE — 1036F TOBACCO NON-USER: CPT | Performed by: INTERNAL MEDICINE

## 2025-06-03 PROCEDURE — G8417 CALC BMI ABV UP PARAM F/U: HCPCS | Performed by: INTERNAL MEDICINE

## 2025-06-03 PROCEDURE — 2022F DILAT RTA XM EVC RTNOPTHY: CPT | Performed by: INTERNAL MEDICINE

## 2025-06-03 PROCEDURE — 3079F DIAST BP 80-89 MM HG: CPT | Performed by: INTERNAL MEDICINE

## 2025-06-03 PROCEDURE — 99214 OFFICE O/P EST MOD 30 MIN: CPT | Performed by: INTERNAL MEDICINE

## 2025-06-03 PROCEDURE — 3075F SYST BP GE 130 - 139MM HG: CPT | Performed by: INTERNAL MEDICINE

## 2025-06-03 PROCEDURE — 1126F AMNT PAIN NOTED NONE PRSNT: CPT | Performed by: INTERNAL MEDICINE

## 2025-06-03 PROCEDURE — 3046F HEMOGLOBIN A1C LEVEL >9.0%: CPT | Performed by: INTERNAL MEDICINE

## 2025-06-03 PROCEDURE — 3017F COLORECTAL CA SCREEN DOC REV: CPT | Performed by: INTERNAL MEDICINE

## 2025-06-03 PROCEDURE — 1123F ACP DISCUSS/DSCN MKR DOCD: CPT | Performed by: INTERNAL MEDICINE

## 2025-06-10 DIAGNOSIS — E11.9 TYPE 2 DIABETES MELLITUS WITHOUT COMPLICATION, WITHOUT LONG-TERM CURRENT USE OF INSULIN (HCC): ICD-10-CM

## 2025-06-10 DIAGNOSIS — I10 PRIMARY HYPERTENSION: ICD-10-CM

## 2025-06-10 DIAGNOSIS — Z95.2 S/P AVR (AORTIC VALVE REPLACEMENT): ICD-10-CM

## 2025-06-10 DIAGNOSIS — I50.22 CHRONIC SYSTOLIC CONGESTIVE HEART FAILURE (HCC): ICD-10-CM

## 2025-06-10 DIAGNOSIS — I50.22 SYSTOLIC CHF, CHRONIC (HCC): ICD-10-CM

## 2025-06-10 LAB
25(OH)D3 SERPL-MCNC: 25.2 NG/ML (ref 30–100)
ALBUMIN SERPL-MCNC: 4 G/DL (ref 3.2–4.6)
ALBUMIN/GLOB SERPL: 0.9 (ref 1–1.9)
ALP SERPL-CCNC: 54 U/L (ref 40–129)
ALT SERPL-CCNC: 30 U/L (ref 8–55)
ANION GAP SERPL CALC-SCNC: 15 MMOL/L (ref 7–16)
AST SERPL-CCNC: 32 U/L (ref 15–37)
BASOPHILS # BLD: 0.04 K/UL (ref 0–0.2)
BASOPHILS NFR BLD: 0.4 % (ref 0–2)
BILIRUB SERPL-MCNC: 0.6 MG/DL (ref 0–1.2)
BUN SERPL-MCNC: 26 MG/DL (ref 8–23)
CALCIUM SERPL-MCNC: 10.3 MG/DL (ref 8.8–10.2)
CHLORIDE SERPL-SCNC: 97 MMOL/L (ref 98–107)
CHOLEST SERPL-MCNC: 230 MG/DL (ref 0–200)
CO2 SERPL-SCNC: 21 MMOL/L (ref 20–29)
CREAT SERPL-MCNC: 1.6 MG/DL (ref 0.8–1.3)
DIFFERENTIAL METHOD BLD: NORMAL
EOSINOPHIL # BLD: 0.33 K/UL (ref 0–0.8)
EOSINOPHIL NFR BLD: 3.3 % (ref 0.5–7.8)
ERYTHROCYTE [DISTWIDTH] IN BLOOD BY AUTOMATED COUNT: 12.8 % (ref 11.9–14.6)
EST. AVERAGE GLUCOSE BLD GHB EST-MCNC: 198 MG/DL
GLOBULIN SER CALC-MCNC: 4.7 G/DL (ref 2.3–3.5)
GLUCOSE SERPL-MCNC: 218 MG/DL (ref 70–99)
HBA1C MFR BLD: 8.5 % (ref 0–5.6)
HCT VFR BLD AUTO: 46.8 % (ref 41.1–50.3)
HDLC SERPL-MCNC: 50 MG/DL (ref 40–60)
HDLC SERPL: 4.6 (ref 0–5)
HGB BLD-MCNC: 14.9 G/DL (ref 13.6–17.2)
IMM GRANULOCYTES # BLD AUTO: 0.02 K/UL (ref 0–0.5)
IMM GRANULOCYTES NFR BLD AUTO: 0.2 % (ref 0–5)
LDLC SERPL CALC-MCNC: 107 MG/DL (ref 0–100)
LDLC SERPL DIRECT ASSAY-MCNC: 131 MG/DL (ref 0–100)
LYMPHOCYTES # BLD: 3.38 K/UL (ref 0.5–4.6)
LYMPHOCYTES NFR BLD: 34 % (ref 13–44)
MCH RBC QN AUTO: 28.9 PG (ref 26.1–32.9)
MCHC RBC AUTO-ENTMCNC: 31.8 G/DL (ref 31.4–35)
MCV RBC AUTO: 90.9 FL (ref 82–102)
MONOCYTES # BLD: 0.73 K/UL (ref 0.1–1.3)
MONOCYTES NFR BLD: 7.3 % (ref 4–12)
NEUTS SEG # BLD: 5.45 K/UL (ref 1.7–8.2)
NEUTS SEG NFR BLD: 54.8 % (ref 43–78)
NRBC # BLD: 0 K/UL (ref 0–0.2)
PLATELET # BLD AUTO: 190 K/UL (ref 150–450)
PMV BLD AUTO: 10.8 FL (ref 9.4–12.3)
POTASSIUM SERPL-SCNC: 5.6 MMOL/L (ref 3.5–5.1)
PROT SERPL-MCNC: 8.8 G/DL (ref 6.3–8.2)
RBC # BLD AUTO: 5.15 M/UL (ref 4.23–5.6)
SODIUM SERPL-SCNC: 133 MMOL/L (ref 136–145)
T4 FREE SERPL-MCNC: 1.1 NG/DL (ref 0.9–1.7)
TRIGL SERPL-MCNC: 364 MG/DL (ref 0–150)
VLDLC SERPL CALC-MCNC: 73 MG/DL (ref 6–23)
WBC # BLD AUTO: 10 K/UL (ref 4.3–11.1)

## 2025-06-12 LAB — LPA SERPL-SCNC: 344 NMOL/L

## (undated) DEVICE — SUTURE FIBERWIRE SZ 2 W/ TAPERED NEEDLE BLUE L38IN NONABSORB BLU L26.5MM 1/2 CIRCLE AR7200

## (undated) DEVICE — SUTURE VCRL SZ 3-0 L27IN ABSRB UD L24MM PS-1 3/8 CIR PRIM J936H

## (undated) DEVICE — CATH KT THOR DRY PLEUR-EVAC LF --

## (undated) DEVICE — Device

## (undated) DEVICE — T4 HOOD

## (undated) DEVICE — CATHETER ETER TY TEMP SENS F MBO W URIN M FOLLOWS CDC GUIDELINES TO

## (undated) DEVICE — CAROTID DR WILLIAMS: Brand: MEDLINE INDUSTRIES, INC.

## (undated) DEVICE — PLEDGET VASC W3/16XL3/8IN THK1/16IN PTFE SFT

## (undated) DEVICE — RADIFOCUS GLIDEWIRE: Brand: GLIDEWIRE

## (undated) DEVICE — SUTURE PROL SZ 6-0 L30IN NONABSORBABLE BLU L13MM CC-1 3/8 M8707

## (undated) DEVICE — BUTTON SWITCH PENCIL BLADE ELECTRODE, HOLSTER: Brand: EDGE

## (undated) DEVICE — CLAMP INSERT: Brand: STEALTH® CLAMP INSERT

## (undated) DEVICE — CANNULA PERF L1.8MM TIP L1MM S STL SHFT BLB SHP TIP FEM

## (undated) DEVICE — (D)PREP SKN CHLRAPRP APPL 26ML -- CONVERT TO ITEM 371833

## (undated) DEVICE — SOLUTION IV 1000ML 0.9% SOD CHL

## (undated) DEVICE — AMD ANTIMICROBIAL BANDAGE ROLL,6 PLY: Brand: KERLIX

## (undated) DEVICE — STOCKINETTE TUBE 6X48 -- MEDICHOICE

## (undated) DEVICE — SUTURE NONABSORBABLE MONOFILAMENT 5-0 C-1 1X24 IN PROLENE 8725H

## (undated) DEVICE — SUTURE MCRYL SZ 4-0 L27IN ABSRB UD L19MM PS-2 1/2 CIR PRIM Y426H

## (undated) DEVICE — SUT PROL 4-0 36IN RB1 DA BLU --

## (undated) DEVICE — SUTURE MCRYL SZ 2-0 L27IN ABSRB UD CP-1 1 L36MM 1/2 CIR REV Y266H

## (undated) DEVICE — SUTURE V-LOC 90 3-0 L9IN ABSRB VLT L26MM V-20 1/2 CIR TAPR VLOCM0644

## (undated) DEVICE — BLADE SAW W10XL54MM FOR PRI REPEAT STRNOTMY

## (undated) DEVICE — SOLUTION IRRIG 1000ML 0.9% SOD CHL USP POUR PLAS BTL

## (undated) DEVICE — GUIDE COR SNUS L40CM DIA9FR 0.035IN STD CRV ADV UNIQUE

## (undated) DEVICE — TOTAL HIP DR WATSON: Brand: MEDLINE INDUSTRIES, INC.

## (undated) DEVICE — SUTURE COAT VCRL SZ 0 L36IN ABSRB VLT CTX L48MM TAPERPOINT J370H

## (undated) DEVICE — BASIC SINGLE BASIN-LF: Brand: MEDLINE INDUSTRIES, INC.

## (undated) DEVICE — SOLUTION IRRIG 3000ML 0.9% SOD CHL FLX CONT 0797208] ICU MEDICAL INC]

## (undated) DEVICE — INTRODUCER SHTH J TIP 0.038 IN 8 FRX13 CM 18 GA SIDEPRT BLU

## (undated) DEVICE — SOLUTION LACTATED RINGERS INJECTION USP

## (undated) DEVICE — SUTURE PROL SZ 7-0 L24IN NONABSORBABLE BLU L9.3MM BV-1 3/8 M8702

## (undated) DEVICE — 48" PROBE COVER W/GEL, ULTRASOUND, STERILE: Brand: SITE-RITE

## (undated) DEVICE — SUTURE PROL SZ 5-0 L24IN NONABSORBABLE BLU L17MM RB-1 1/2 8555H

## (undated) DEVICE — CATHETER THOR 32FR L23IN PVC 5 EYELET STR ATRAUM

## (undated) DEVICE — MEDI-TRACE CADENCE ADULT, DEFIBRILLATION ELECTRODE -RTS  (10 PR/PK) - ZOLL: Brand: MEDI-TRACE CADENCE

## (undated) DEVICE — SUT PROL 3-0 36IN SH DA BLU --

## (undated) DEVICE — BOWL MED M 16OZ PLAS CAP GRAD

## (undated) DEVICE — BLADE OPHTH 180DEG CUT SURF BLU STR SHRP DBL BVL GRINDLESS

## (undated) DEVICE — REM POLYHESIVE ADULT PATIENT RETURN ELECTRODE: Brand: VALLEYLAB

## (undated) DEVICE — SYR 10ML LUER LOK 1/5ML GRAD --

## (undated) DEVICE — AMD ANTIMICROBIAL NON-ADHERENT PAD,0.2% POLYHEXAMETHYLENE BIGUANIDE HCI (PHMB): Brand: TELFA

## (undated) DEVICE — SUTURE VCRL 3-0 L36IN ABSRB UD CT L40MM 1/2 CIR TAPERPOINT J956H

## (undated) DEVICE — LEAD PCMKR MYOCARDL BPLR TEMP. --

## (undated) DEVICE — 3M™ IOBAN™ 2 ANTIMICROBIAL INCISE DRAPE 6651EZ: Brand: IOBAN™ 2

## (undated) DEVICE — SUTURE SILK PERMAHAND PRECUT 6 X 30 IN SZ 1 BLK BRAID A307H

## (undated) DEVICE — SUTURE NONABSORBABLE MONOFILAMENT 6-0 BV-1 1X30 IN PROLENE 8709H

## (undated) DEVICE — CONNECTOR IV 3/8X3/8X3/8 Y

## (undated) DEVICE — DRAPE,U/SHT,SPLIT,FILM,60X84,STERILE: Brand: MEDLINE

## (undated) DEVICE — SUTURE ABSORBABLE BRAIDED 2-0 CT-1 27 IN UD VICRYL J259H

## (undated) DEVICE — INTENDED USED TO PROTECT, TAG AND HELP LOCATED SUTURES DURING SURGERY: Brand: STERION®SUTURE AID BOOTIES

## (undated) DEVICE — SYSTEM SKIN CLSR 22CM DERMBND PRINEO

## (undated) DEVICE — SUTURE ABSRB 27IN SZ 4-0 17MM RB-1 1/2 CIR TAPR PNT MFIL U207H

## (undated) DEVICE — CATHETER THOR 32FR L23IN PVC 6 EYELET STR ATRAUM

## (undated) DEVICE — GOWN,AURORA,FABRIC-REINFORCED,2XL: Brand: MEDLINE

## (undated) DEVICE — BIPOLAR SEALER 23-112-1 AQM 6.0: Brand: AQUAMANTYS ®

## (undated) DEVICE — INTENDED FOR TISSUE SEPARATION, AND OTHER PROCEDURES THAT REQUIRE A SHARP SURGICAL BLADE TO PUNCTURE OR CUT.: Brand: BARD-PARKER ® STAINLESS STEEL BLADES

## (undated) DEVICE — SUTURE S STL SZ 5 L18IN NONABSORBABLE SIL CCS L48MM 1/2 CIR M653G

## (undated) DEVICE — SUTURE PERMAHAND SZ 2-0 L12X18IN NONABSORBABLE BLK SILK A185H

## (undated) DEVICE — SUTURE ETHBND EXCEL SZ 3-0 L36IN NONABSORBABLE GRN RB-1 X558H

## (undated) DEVICE — 3000CC GUARDIAN II: Brand: GUARDIAN

## (undated) DEVICE — COR-KNOT® QUICK LOAD® SINGLES: Brand: COR-KNOT® QUICK LOAD®

## (undated) DEVICE — OCCLUDER CV VES 1.25X12 MM CORONARY INT SIL STRL FLO RST

## (undated) DEVICE — INTRODUCER LAT VEIN L62CM OD5.5FR ADV TELSCP SYS RENAL

## (undated) DEVICE — CLIP INT SM TI EZ LD LIG SYS WECK HORZ

## (undated) DEVICE — SUTURE ETHBND EXCEL 2-0 L30IN NONABSORBABLE GRN L26MM SH MX563

## (undated) DEVICE — JELLY LUBRICATING 10GM PREFIL SYR LUBE

## (undated) DEVICE — GUIDE WIRE WITH HYDROPHILIC COATING: Brand: ACUITY WHISPER VIEW™

## (undated) DEVICE — PAD,NON-ADHERENT,3X8,STERILE,LF,1/PK: Brand: MEDLINE

## (undated) DEVICE — CATHETER PRESSURE WEDGE BLLN 6FRX110CM

## (undated) DEVICE — PLASMABLADE X PS210-030S-LIGHT 3.0SL: Brand: PLASMABLADE™ X

## (undated) DEVICE — Device: Brand: JELCO

## (undated) DEVICE — (D)GLOVE SURG 8 PWD LTX -- DISC BY MFR USE ITEM 110052

## (undated) DEVICE — STRYKER PERFORMANCE SERIES SAGITTAL BLADE: Brand: STRYKER PERFORMANCE SERIES

## (undated) DEVICE — HANDPIECE SET WITH COAXIAL HIGH FLOW TIP AND SUCTION TUBE: Brand: INTERPULSE

## (undated) DEVICE — BLADE CLIPPER GEN PURP NS

## (undated) DEVICE — AMD ANTIMICROBIAL GAUZE SPONGES,12 PLY USP TYPE VII, 0.2% POLYHEXAMETHYLENE BIGUANIDE HCI (PHMB): Brand: CURITY

## (undated) DEVICE — KIT ANGIO CNTRST ADMIN W O BWL WORLEY

## (undated) DEVICE — AORTIC PUNCHES ARE USED TO CREATE A UNIFORM OPENING IN BLOOD VESSELS DURING CARDIOVASCULAR SURGERY. THE VESSEL GRAFT IS INSERTED INTO THE CREATED OPENING AND SUTURED TO THE VESSEL WALL. AORTIC LANCETS ARE USED TO MAKE A SMALL UNIFORM CUT IN A BLOOD VESSEL TO FACILITATE INSERTION OF AN AORTIC PUNCH.  PUNCHES COME IN VARIOUS LENGTHS, DIAMETERS AND TIP CONFIGURATIONS.: Brand: CLEANCUT ROTATING AORTIC PUNCH

## (undated) DEVICE — SUTURE ETHBND EXCEL SZ 0 L30IN NONABSORBABLE GRN L26MM CT-2 X412H

## (undated) DEVICE — STERILE HOOK LOCK LATEX FREE ELASTIC BANDAGE 4INX5YD: Brand: HOOK LOCK™

## (undated) DEVICE — PVC URETHRAL CATHETER: Brand: DOVER

## (undated) DEVICE — DRAPE SLUSH DISC W44XL66IN ST FOR RND BSIN HUSH SLUSH SYS

## (undated) DEVICE — STERILE HOOK LOCK LATEX FREE ELASTIC BANDAGE 6INX5YD: Brand: HOOK LOCK™

## (undated) DEVICE — Z DISCONTINUED USE 2423037 APPLICATOR MEDICATED 3 CC CHLORHEXIDINE GLUC 2% CHLORAPREP

## (undated) DEVICE — SUTURE STRATAFIX SZ 3-0 L30CM NONABSORBABLE UD L19MM FS-2 SXMP2B408

## (undated) DEVICE — SOLUTION IV 250ML 0.9% SOD CHL CLR INJ FLX BG CONT PRT CLSR

## (undated) DEVICE — SUTURE PERMAHAND SZ 0 L30IN NONABSORBABLE BLK L30MM PSL 3/8 590H

## (undated) DEVICE — SUTURE STRATAFIX SPRL SZ 1 L5IN ABSRB VLT CT-1 L36MM 1/2 SXPD2B401

## (undated) DEVICE — TRAY PREP DRY W/ PREM GLV 2 APPL 6 SPNG 2 UNDPD 1 OVERWRAP

## (undated) DEVICE — 3M™ STERI-DRAPE™ INSTRUMENT POUCH 1018: Brand: STERI-DRAPE™

## (undated) DEVICE — DRESSING POSTOP AG PRISMASEAL 3.5X6IN

## (undated) DEVICE — CANNULA INJ L2.5IN BLNT TIP 3MM CLR BODY W/ 1 W VLV DLP

## (undated) DEVICE — (D)STRIP SKN CLSR 0.5X4IN WHT --

## (undated) DEVICE — 18G NG KIT WITH 96IN PROBE COVER (10 PK): Brand: SITE-RITE

## (undated) DEVICE — CATH URETH INTMIT ROB 14FR FUN -- USE ITEM 179521

## (undated) DEVICE — ABDOMINAL PAD: Brand: DERMACEA

## (undated) DEVICE — SUTURE NONABSORBABLE L24IN SZ 7-0 M0-5 BV175-8 EP 24 BLU M8745

## (undated) DEVICE — OCCLUDER CV VES 1.5X12 MM CORONARY INT SIL STRL FLO RST

## (undated) DEVICE — SUTURE ETHBND EXCEL SZ 0 L18IN NONABSORBABLE GRN L36MM CT-1 CX21D

## (undated) DEVICE — VENOUS GUIDE WIRE: Brand: ACUITY STRAIT-TRAK®

## (undated) DEVICE — GLOVE SURG SZ 75 CRM LTX FREE POLYISOPRENE POLYMER BEAD ANTI

## (undated) DEVICE — COR-KNOT MINI® DEVICE KIT: Brand: COR-KNOT MINI®